# Patient Record
Sex: MALE | Race: WHITE | Employment: OTHER | URBAN - METROPOLITAN AREA
[De-identification: names, ages, dates, MRNs, and addresses within clinical notes are randomized per-mention and may not be internally consistent; named-entity substitution may affect disease eponyms.]

---

## 2017-01-03 ENCOUNTER — OFFICE VISIT (OUTPATIENT)
Dept: GASTROENTEROLOGY | Facility: CLINIC | Age: 62
End: 2017-01-03
Attending: INTERNAL MEDICINE
Payer: COMMERCIAL

## 2017-01-03 ENCOUNTER — TELEPHONE (OUTPATIENT)
Dept: GASTROENTEROLOGY | Facility: CLINIC | Age: 62
End: 2017-01-03

## 2017-01-03 VITALS
RESPIRATION RATE: 16 BRPM | WEIGHT: 185.3 LBS | BODY MASS INDEX: 25.94 KG/M2 | TEMPERATURE: 97.4 F | HEIGHT: 71 IN | SYSTOLIC BLOOD PRESSURE: 135 MMHG | OXYGEN SATURATION: 99 % | HEART RATE: 72 BPM | DIASTOLIC BLOOD PRESSURE: 71 MMHG

## 2017-01-03 DIAGNOSIS — K74.60 CIRRHOSIS OF LIVER WITHOUT ASCITES, UNSPECIFIED HEPATIC CIRRHOSIS TYPE (H): Primary | ICD-10-CM

## 2017-01-03 DIAGNOSIS — K76.82 HEPATIC ENCEPHALOPATHY (H): ICD-10-CM

## 2017-01-03 DIAGNOSIS — B18.2 CHRONIC HEPATITIS C WITHOUT HEPATIC COMA (H): ICD-10-CM

## 2017-01-03 PROCEDURE — 99213 OFFICE O/P EST LOW 20 MIN: CPT | Mod: ZF

## 2017-01-03 RX ORDER — RIBAVIRIN 200 MG/1
400 TABLET, FILM COATED ORAL 2 TIMES DAILY
Qty: 150 TABLET | Refills: 2 | Status: SHIPPED | OUTPATIENT
Start: 2017-01-03 | End: 2017-02-22 | Stop reason: DRUGHIGH

## 2017-01-03 RX ORDER — LEDIPASVIR AND SOFOSBUVIR 90; 400 MG/1; MG/1
1 TABLET, FILM COATED ORAL DAILY
Qty: 28 TABLET | Refills: 2 | Status: ON HOLD | OUTPATIENT
Start: 2017-01-03 | End: 2017-01-09

## 2017-01-03 ASSESSMENT — PAIN SCALES - GENERAL: PAINLEVEL: NO PAIN (0)

## 2017-01-03 NOTE — TELEPHONE ENCOUNTER
[facility] Prior Authorization Team   Phone: [phone]  Fax: [phone]    PA Initiation    Medication: Xifaxan 550 mg BID  Insurance Company: [employer]  Fax Number: [phone]    Phone Number:    Pharmacy Filling the Rx: [facility] [address] [place], [place] - [address]  Filling Pharmacy Phone: [phone]  Filling Pharmacy Fax: [phone]  Start Date: 1/3/2017

## 2017-01-03 NOTE — TELEPHONE ENCOUNTER
Prior Authorization Retail Medication Request  Medication/Dose: Xifaxan 550 mg BID  Diagnosis and ICD code: Hepatic encephalopathy  K72.90  New/Renewal/Insurance Change PA: Renewal PA  Previously Tried and Failed Therapies: Lactulose     Insurance ID (if provided):   Insurance Phone (if provided):     Any additional info from fax request: Not covered, change RX or PA?  [phone],  PT ID# [id number]    If you received a fax notification from an outside Pharmacy:  Pharmacy Name:  [facility]  Pharmacy #:   Pharmacy Fax: [phone]

## 2017-01-03 NOTE — PROGRESS NOTES
I had the pleasure of seeing Scott Casale for followup in the Liver Clinic at the [facility] on 01/03/2017.  Mr. 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  He is status post TIPS for diuretic refractory ascites.      The TIPS was working well in terms of his ascites management, as he has not required a large volume paracentesis for quite some time, and his most recent ultrasound showed no ascites to be present.  He does though complain of some right upper quadrant pain.  He denies any itching or skin rash but does complain of fatigue.  He is also complaining of a tremor and some decrease in mental status.  It does not sound as though he is taking his lactulose on a reliable basis, at best once a day according to his significant other.      He denies any fevers or chills, cough or shortness of breath.  He denies any nausea or vomiting.  He is having he says 2 bowel movements a day.  He denies any increased abdominal girth or lower extremity edema.  His appetite has been good, and he has put on a significant amount of muscle mass since I saw him last.     Current Outpatient Prescriptions   Medication     rifaximin (XIFAXAN) 550 MG TABS tablet     ledipasvir-sofosbuvir (LEDIPASVIR-SOFOSBUVIR)  MG per tablet     ribavirin 200 MG TABS     gabapentin (NEURONTIN) 100 MG capsule     traZODone (DESYREL) 100 MG tablet     sildenafil (REVATIO/VIAGRA) 50 MG cap/tab     lactulose 20 GM/30ML SOLN     vitamin D (ERGOCALCIFEROL) 92510 UNIT capsule     furosemide (LASIX) 40 MG tablet     spironolactone (ALDACTONE) 100 MG tablet     ondansetron (ZOFRAN) 4 MG tablet     [DISCONTINUED] gabapentin (NEURONTIN) 100 MG capsule     ledipasvir-sofosbuvir (LEDIPASVIR-SOFOSBUVIR)  MG per tablet     ribavirin 200 MG TABS     No current facility-administered medications for this visit.     B/P: 135/71, T: 97.4, P: 72, R: 16    HEENT exam shows no scleral icterus.  He has no  temporal muscle wasting.  His chest is clear.  His abdominal exam shows no obvious ascites.  No masses or tenderness to palpation are present.  His liver is 10-11 cm in span with a slightly prominent left lobe.  No spleen tip is palpable, and extremity exam shows no edema.  Skin exam shows some palmar erythema without spider angioma.  Neurologic exam does shows some mild asterixis.  He is able to converse and is oriented x3.     Recent Results (from the past 168 hour(s))   Routine UA with microscopic - No culture    Collection Time: 12/27/16  1:40 PM   Result Value Ref Range    Color Urine Yellow     Appearance Urine Clear     Glucose Urine Negative NEG mg/dL    Bilirubin Urine Negative NEG    Ketones Urine Negative NEG mg/dL    Specific Gravity Urine 1.017 1.003 - 1.035    Blood Urine Large (A) NEG    pH Urine 5.0 5.0 - 7.0 pH    Protein Albumin Urine Negative NEG mg/dL    Urobilinogen mg/dL 4.0 (H) 0.0 - 2.0 mg/dL    Nitrite Urine Negative NEG    Leukocyte Esterase Urine Negative NEG    Source Unspecified Urine     WBC Urine 1 0 - 2 /HPF    RBC Urine 32 (H) 0 - 2 /HPF    Squamous Epithelial /HPF Urine <1 0 - 1 /HPF    Mucous Urine Present (A) NEG /LPF   Urine Culture Aerobic Bacterial    Collection Time: 12/27/16  1:40 PM   Result Value Ref Range    Specimen Description Unspecified Urine     Special Requests Specimen received in preservative     Culture Micro No growth     Micro Report Status FINAL 12/28/2016       His most recent laboratory tests show his white count is 4.2, hemoglobin 12.0, platelets are 44,000, INR is 1.36, sodium is 142, potassium 3.9, BUN is 23, creatinine 1.15.  AST is 80, ALT is 39, alkaline phosphatase is 59.  His albumin is 2.2 with total protein of 6.4, total bilirubin is 2.6.        I did review his most recent ultrasound which shows good TIPS flow velocities and, as I mentioned, showed no ascites.        My impression is that Mr. Casale has cirrhosis caused by chronic hepatitis C and  is status post TIPS for diuretic refractory ascites.  The TIPS, as I mentioned above, is working well and his ascites is well managed, but unfortunately he does have some encephalopathy.  I have encouraged him to take his lactulose on a more reliable basis, targeting 3-5 bowel movements per day. I will also add Rifaximin 50 mg twice a day to his regimen.  He also is a candidate for Harvoni with ribavirin to treat his hepatitis C, and I will put in a prescription for that.  I also will, as I mentioned, tell our hepatitis C nurses that this is occurring.  Otherwise, my plan will be to see the patient back in the clinic in 3 months.      Thank you very much for allowing me to participate in the care of this patient.  If you have any questions regarding my recommendations, please do not hesitate to contact me.       Warren Higginbotham MD    Professor of Medicine  [employer]    Executive Medical Director, Solid Organ Transplant Program  [facility]

## 2017-01-03 NOTE — Clinical Note
1/3/2017       RE: Scott Casale  [address] [address]     Dear Colleague,    Thank you for referring your patient, Scott Casale, to the [facility] at [facility]. Please see a copy of my visit note below.    I had the pleasure of seeing Scott Casale for followup in the Liver Clinic at the [facility] on 01/03/2017.  Mr. 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  He is status post TIPS for diuretic refractory ascites.      The TIPS was working well in terms of his ascites management, as he has not required a large volume paracentesis for quite some time, and his most recent ultrasound showed no ascites to be present.  He does though complain of some right upper quadrant pain.  He denies any itching or skin rash but does complain of fatigue.  He is also complaining of a tremor and some decrease in mental status.  It does not sound as though he is taking his lactulose on a reliable basis, at best once a day according to his significant other.      He denies any fevers or chills, cough or shortness of breath.  He denies any nausea or vomiting.  He is having he says 2 bowel movements a day.  He denies any increased abdominal girth or lower extremity edema.  His appetite has been good, and he has put on a significant amount of muscle mass since I saw him last.     Current Outpatient Prescriptions   Medication     rifaximin (XIFAXAN) 550 MG TABS tablet     ledipasvir-sofosbuvir (LEDIPASVIR-SOFOSBUVIR)  MG per tablet     ribavirin 200 MG TABS     gabapentin (NEURONTIN) 100 MG capsule     traZODone (DESYREL) 100 MG tablet     sildenafil (REVATIO/VIAGRA) 50 MG cap/tab     lactulose 20 GM/30ML SOLN     vitamin D (ERGOCALCIFEROL) 36133 UNIT capsule     furosemide (LASIX) 40 MG tablet     spironolactone (ALDACTONE) 100 MG tablet     ondansetron (ZOFRAN) 4 MG tablet     [DISCONTINUED] gabapentin (NEURONTIN)  100 MG capsule     ledipasvir-sofosbuvir (LEDIPASVIR-SOFOSBUVIR)  MG per tablet     ribavirin 200 MG TABS     No current facility-administered medications for this visit.     B/P: 135/71, T: 97.4, P: 72, R: 16    HEENT exam shows no scleral icterus.  He has no temporal muscle wasting.  His chest is clear.  His abdominal exam shows no obvious ascites.  No masses or tenderness to palpation are present.  His liver is 10-11 cm in span with a slightly prominent left lobe.  No spleen tip is palpable, and extremity exam shows no edema.  Skin exam shows some palmar erythema without spider angioma.  Neurologic exam does shows some mild asterixis.  He is able to converse and is oriented x3.     Recent Results (from the past 168 hour(s))   Routine UA with microscopic - No culture    Collection Time: 12/27/16  1:40 PM   Result Value Ref Range    Color Urine Yellow     Appearance Urine Clear     Glucose Urine Negative NEG mg/dL    Bilirubin Urine Negative NEG    Ketones Urine Negative NEG mg/dL    Specific Gravity Urine 1.017 1.003 - 1.035    Blood Urine Large (A) NEG    pH Urine 5.0 5.0 - 7.0 pH    Protein Albumin Urine Negative NEG mg/dL    Urobilinogen mg/dL 4.0 (H) 0.0 - 2.0 mg/dL    Nitrite Urine Negative NEG    Leukocyte Esterase Urine Negative NEG    Source Unspecified Urine     WBC Urine 1 0 - 2 /HPF    RBC Urine 32 (H) 0 - 2 /HPF    Squamous Epithelial /HPF Urine <1 0 - 1 /HPF    Mucous Urine Present (A) NEG /LPF   Urine Culture Aerobic Bacterial    Collection Time: 12/27/16  1:40 PM   Result Value Ref Range    Specimen Description Unspecified Urine     Special Requests Specimen received in preservative     Culture Micro No growth     Micro Report Status FINAL 12/28/2016       His most recent laboratory tests show his white count is 4.2, hemoglobin 12.0, platelets are 44,000, INR is 1.36, sodium is 142, potassium 3.9, BUN is 23, creatinine 1.15.  AST is 80, ALT is 39, alkaline phosphatase is 59.  His albumin is  2.2 with total protein of 6.4, total bilirubin is 2.6.        I did review his most recent ultrasound which shows good TIPS flow velocities and, as I mentioned, showed no ascites.        My impression is that Mr. Casale has cirrhosis caused by chronic hepatitis C and is status post TIPS for diuretic refractory ascites.  The TIPS, as I mentioned above, is working well and his ascites is well managed, but unfortunately he does have some encephalopathy.  I have encouraged him to take his lactulose on a more reliable basis, targeting 3-5 bowel movements per day. I will also add Rifaximin 50 mg twice a day to his regimen.  He also is a candidate for Harvoni with ribavirin to treat his hepatitis C, and I will put in a prescription for that.  I also will, as I mentioned, tell our hepatitis C nurses that this is occurring.  Otherwise, my plan will be to see the patient back in the clinic in 3 months.      Thank you very much for allowing me to participate in the care of this patient.  If you have any questions regarding my recommendations, please do not hesitate to contact me.       Warren Higginbotham MD    Professor of Medicine  [employer]    Executive Medical Director, Solid Organ Transplant Program  [facility]

## 2017-01-03 NOTE — NURSING NOTE
"Chief Complaint   Patient presents with     RECHECK     Hep C cirrhosis, mass on liver       Initial /71 mmHg  Pulse 72  Temp(Src) 97.4  F (36.3  C) (Oral)  Resp 16  Ht 1.803 m (5' 10.98\")  Wt 84.052 kg (185 lb 4.8 oz)  BMI 25.86 kg/m2  SpO2 99% Estimated body mass index is 25.86 kg/(m^2) as calculated from the following:    Height as of this encounter: 1.803 m (5' 10.98\").    Weight as of this encounter: 84.052 kg (185 lb 4.8 oz).  BP completed using cuff size: regular    "

## 2017-01-03 NOTE — MR AVS SNAPSHOT
After Visit Summary   1/3/2017    Scott Casale    MRN: [id number]           Patient Information     Date Of Birth          1955        Visit Information        Provider Department      1/3/2017 11:00 AM Warren Higginbotham MD [facility] Hepatology        Today's Diagnoses     Cirrhosis of liver without ascites, unspecified hepatic cirrhosis type (H) [K74.60]    -  1     Hepatic encephalopathy (H)         Chronic hepatitis C without hepatic coma (H)            Follow-ups after your visit        Follow-up notes from your care team     Return in about 3 months (around 4/3/2017).      Your next 10 appointments already scheduled     Jan 17, 2017   Procedure with Guru Reina Palacios MD   [facility] [place], Endoscopy ([facility])    [address] [address] [phone]           [facility] located on the corner of [address] and [address] on the [facility]. It is easily accessible from virtually any point in the [facility] [place] area, via [address].            Jan 24, 2017  2:30 PM   (Arrive by 2:15 PM)   Cystoscopy with Cheo Estrada MD   [facility] Urology and Inst for Prostate and Urologic Cancers ([facility])    [address] [place] [address] [phone]            Mar 09, 2017  9:25 AM   (Arrive by 9:10 AM)   Return Visit with Greg Valdez MD   [facility] Primary Care Clinic ([facility])    [address] [place] [address]   [phone]            Mar 22, 2017  6:00 PM   (Arrive by 5:45 PM)   New Patient Visit with GILMAR Saleem CNP   Gastroenterology and IBD ([facility])    [address] [place] [address]   [phone]            Apr 11, 2017  9:00 AM   Lab with  LAB   [facility] Lab ([facility])    [address] [facility] [facility]  [address] [facility] [address]   [phone]            Apr 11, 2017 10:00 AM   US ABDOMEN COMPLETE with UCUS1   [facility] US ([facility])    [address] [place] [address]  [address] [facility] [address]   [phone]           Please bring a list of your medicines (including vitamins, minerals and over-the-counter drugs). Also, tell your doctor about any allergies you may have. Wear comfortable clothes and leave your valuables at home.  Adults: No eating or drinking for 8 hours before the exam. You may take medicine with a small sip of water.  Children: - Children 6+ years: No food or drink for 6 hours before exam. - Children 1-5 years: No food or drink for 4 hours before exam. - Infants, breast-fed: may have breast milk up to 2 hours before exam. - Infants, formula: may have bottle until 4 hours before exam.  Please call the Imaging Department at your exam site with any questions.            Apr 11, 2017 11:00 AM   (Arrive by 10:45 AM)   Return General Liver with Warren Higginbotham MD   [facility] Hepatology ([facility])    [address] [place] [address]  [address] [facility] [address]   [phone]            Apr 11, 2017 12:00 PM   (Arrive by 11:45 AM)   Return Visit with Micki Peter MD   [facility] ([facility])    [address] [place] [address]  [address] [facility] [address] [phone]              Future tests that were ordered for you today     Open Standing Orders        Priority Remaining Interval Expires Ordered    US abdomen complete [MCI427] Routine 2/2 Every 6 Months 2/2/2018 1/3/2017          Open Future Orders        Priority Expected Expires Ordered    US Abdomen/Pelvis Duplex Complete Routine 1/31/2017 1/3/2018 1/3/2017     Hepatic Panel [LAB20] Routine 7/2/2017 2/2/2018 1/3/2017    Basic metabolic panel [LAB15] Routine 7/2/2017 2/2/2018 1/3/2017  "   CBC with platelets [ZEW312] Routine 7/2/2017 2/2/2018 1/3/2017    INR [XOU4847] Routine 7/2/2017 2/2/2018 1/3/2017    AFP tumor marker [NST926] Routine 7/2/2017 2/2/2018 1/3/2017            Who to contact     If you have questions or need follow up information about today's clinic visit or your schedule please contact [facility] HEPATOLOGY directly at [phone].  Normal or non-critical lab and imaging results will be communicated to you by [employer], letter or phone within 4 business days after the clinic has received the results. If you do not hear from us within 7 days, please contact the clinic through [employer] or phone. If you have a critical or abnormal lab result, we will notify you by phone as soon as possible.  Submit refill requests through [employer] or call your pharmacy and they will forward the refill request to us. Please allow 3 business days for your refill to be completed.          Additional Information About Your Visit        [employer] Information     [employer] gives you secure access to your electronic health record. If you see a primary care provider, you can also send messages to your care team and make appointments. If you have questions, please call your primary care clinic.  If you do not have a primary care provider, please call [phone] and they will assist you.        Care EveryWhere ID     This is your Care EveryWhere ID. This could be used by other organizations to access your [facility] medical records  [id number]        Your Vitals Were     Pulse Temperature Respirations Height BMI (Body Mass Index) Pulse Oximetry    72 97.4  F (36.3  C) (Oral) 16 1.803 m (5' 10.98\") 25.86 kg/m2 99%       Blood Pressure from Last 3 Encounters:   01/03/17 135/71   12/27/16 134/71   12/09/16 137/73    Weight from Last 3 Encounters:   01/03/17 84.052 kg (185 lb 4.8 oz)   12/27/16 90.266 kg (199 lb)   12/09/16 86.002 kg (189 lb 9.6 oz)              We Performed the Following     Schedule follow up " appointments          Today's Medication Changes          These changes are accurate as of: 1/3/17 11:33 AM.  If you have any questions, ask your nurse or doctor.               Start taking these medicines.        Dose/Directions    rifaximin 550 MG Tabs tablet   Commonly known as:  XIFAXAN   Used for:  Hepatic encephalopathy (H)   Started by:  Warren Higginbotham MD        Dose:  550 mg   Take 1 tablet (550 mg) by mouth 2 times daily   Quantity:  60 tablet   Refills:  11         These medicines have changed or have updated prescriptions.        Dose/Directions    gabapentin 100 MG capsule   Commonly known as:  NEURONTIN   This may have changed:  Another medication with the same name was removed. Continue taking this medication, and follow the directions you see here.   Used for:  Chronic low back pain with sciatica, sciatica laterality unspecified, unspecified back pain laterality   Changed by:  Greg Valdez MD        Dose:  200 mg   Take 2 capsules (200 mg) by mouth At Bedtime   Quantity:  90 capsule   Refills:  1       * ledipasvir-sofosbuvir  MG per tablet   Commonly known as:  ledipasvir-sofosbuvir   This may have changed:  Another medication with the same name was added. Make sure you understand how and when to take each.   Used for:  Hepatitis C virus infection without hepatic coma, unspecified chronicity   Changed by:  Warren Higginbotham MD        Dose:  1 tablet   Take 1 tablet by mouth daily   Quantity:  28 tablet   Refills:  2       * ledipasvir-sofosbuvir  MG per tablet   Commonly known as:  ledipasvir-sofosbuvir   This may have changed:  You were already taking a medication with the same name, and this prescription was added. Make sure you understand how and when to take each.   Used for:  Chronic hepatitis C without hepatic coma (H)   Changed by:  Warren Higginbotham MD        Dose:  1 tablet   Take 1 tablet by mouth daily   Quantity:  28 tablet   Refills:  2       * ribavirin 200 MG Tabs   This  may have changed:  Another medication with the same name was added. Make sure you understand how and when to take each.   Used for:  Hepatitis C virus infection without hepatic coma, unspecified chronicity   Changed by:  Warren Higginbotham MD        600 mg PO BID   Quantity:  180 tablet   Refills:  2       * ribavirin 200 MG Tabs   This may have changed:  You were already taking a medication with the same name, and this prescription was added. Make sure you understand how and when to take each.   Used for:  Chronic hepatitis C without hepatic coma (H)   Changed by:  Warren Higginbotham MD        Dose:  400 mg   Take 2 tablets (400 mg) by mouth 2 times daily   Quantity:  150 tablet   Refills:  2       * Notice:  This list has 4 medication(s) that are the same as other medications prescribed for you. Read the directions carefully, and ask your doctor or other care provider to review them with you.      Stop taking these medicines if you haven't already. Please contact your care team if you have questions.     morphine 15 MG 12 hr tablet   Commonly known as:  MS CONTIN   Stopped by:  Warren Higginbotham MD           morphine 15 MG IR tablet   Commonly known as:  MSIR   Stopped by:  Warren Higginbotham MD                Where to get your medicines      These medications were sent to [facility] - [address]  [address] [facility] [address]    Hours:  Mon-Fri 8:30am-5:00pm Toll Free [phone] Phone:  [phone]    - ledipasvir-sofosbuvir  MG per tablet  - ribavirin 200 MG Tabs      These medications were sent to [facility] - [place], [place] - [address]   [address] [facility] [address]     Phone:  [phone]    - rifaximin 550 MG Tabs tablet             Primary Care Provider Office Phone # Fax #    Carlos Duenas MD, [phone]       [facility] [place] [address] [place] [address]        Thank you!      Thank you for choosing [facility] HEPATOLOGY  for your care. Our goal is always to provide you with excellent care. Hearing back from our patients is one way we can continue to improve our services. Please take a few minutes to complete the written survey that you may receive in the mail after your visit with us. Thank you!             Your Updated Medication List - Protect others around you: Learn how to safely use, store and throw away your medicines at www.disposemymeds.org.          This list is accurate as of: 1/3/17 11:33 AM.  Always use your most recent med list.                   Brand Name Dispense Instructions for use    furosemide 40 MG tablet    LASIX     Take 40 mg by mouth daily       gabapentin 100 MG capsule    NEURONTIN    90 capsule    Take 2 capsules (200 mg) by mouth At Bedtime       lactulose 20 GM/30ML Soln     1892 mL    Take 30 mLs by mouth 3 times daily       * ledipasvir-sofosbuvir  MG per tablet    ledipasvir-sofosbuvir    28 tablet    Take 1 tablet by mouth daily       * ledipasvir-sofosbuvir  MG per tablet    ledipasvir-sofosbuvir    28 tablet    Take 1 tablet by mouth daily       * ribavirin 200 MG Tabs     180 tablet    600 mg PO BID       * ribavirin 200 MG Tabs     150 tablet    Take 2 tablets (400 mg) by mouth 2 times daily       rifaximin 550 MG Tabs tablet    XIFAXAN    60 tablet    Take 1 tablet (550 mg) by mouth 2 times daily       sildenafil 50 MG cap/tab    REVATIO/VIAGRA    12 tablet    Take 0.5-1 tablets (25-50 mg) by mouth daily as needed for erectile dysfunction Take 30 min to 4 hours before intercourse.  Never use with nitroglycerin, terazosin or doxazosin.       spironolactone 100 MG tablet    ALDACTONE     Take 100 mg by mouth daily       traZODone 100 MG tablet    DESYREL    30 tablet    Take 1 tablet (100 mg) by mouth nightly as needed for sleep       vitamin D 03278 UNIT capsule    ERGOCALCIFEROL     Take 50,000 Units by mouth Twice weekly Mon and Thurs        ZOFRAN 4 MG tablet   Generic drug:  ondansetron      Take 1 tablet (4 mg) by mouth every 8 hours as needed for nausea       * Notice:  This list has 4 medication(s) that are the same as other medications prescribed for you. Read the directions carefully, and ask your doctor or other care provider to review them with you.

## 2017-01-04 ENCOUNTER — HOSPITAL ENCOUNTER (INPATIENT)
Facility: CLINIC | Age: 62
LOS: 5 days | Discharge: HOME | DRG: 433 | End: 2017-01-09
Attending: FAMILY MEDICINE | Admitting: FAMILY MEDICINE
Payer: COMMERCIAL

## 2017-01-04 DIAGNOSIS — K76.82 HEPATIC ENCEPHALOPATHY (H): ICD-10-CM

## 2017-01-04 LAB
ALBUMIN SERPL-MCNC: 2.3 G/DL (ref 3.4–5)
ALBUMIN UR-MCNC: 10 MG/DL
ALP SERPL-CCNC: 65 U/L (ref 40–150)
ALT SERPL W P-5'-P-CCNC: 45 U/L (ref 0–70)
AMMONIA PLAS-SCNC: 46 UMOL/L (ref 10–50)
ANION GAP SERPL CALCULATED.3IONS-SCNC: 7 MMOL/L (ref 3–14)
APPEARANCE UR: CLEAR
AST SERPL W P-5'-P-CCNC: 76 U/L (ref 0–45)
BASOPHILS # BLD AUTO: 0 10E9/L (ref 0–0.2)
BASOPHILS NFR BLD AUTO: 0.5 %
BILIRUB SERPL-MCNC: 3 MG/DL (ref 0.2–1.3)
BILIRUB UR QL STRIP: NEGATIVE
BUN SERPL-MCNC: 20 MG/DL (ref 7–30)
CALCIUM SERPL-MCNC: 8.2 MG/DL (ref 8.5–10.1)
CHLORIDE SERPL-SCNC: 114 MMOL/L (ref 94–109)
CO2 SERPL-SCNC: 24 MMOL/L (ref 20–32)
COLOR UR AUTO: YELLOW
CREAT SERPL-MCNC: 1.11 MG/DL (ref 0.66–1.25)
DIFFERENTIAL METHOD BLD: ABNORMAL
EOSINOPHIL # BLD AUTO: 0.1 10E9/L (ref 0–0.7)
EOSINOPHIL NFR BLD AUTO: 3.2 %
ERYTHROCYTE [DISTWIDTH] IN BLOOD BY AUTOMATED COUNT: 13.5 % (ref 10–15)
GFR SERPL CREATININE-BSD FRML MDRD: 67 ML/MIN/1.7M2
GLUCOSE SERPL-MCNC: 110 MG/DL (ref 70–99)
GLUCOSE UR STRIP-MCNC: NEGATIVE MG/DL
HCT VFR BLD AUTO: 37.9 % (ref 40–53)
HGB BLD-MCNC: 13.2 G/DL (ref 13.3–17.7)
HGB UR QL STRIP: ABNORMAL
IMM GRANULOCYTES # BLD: 0 10E9/L (ref 0–0.4)
IMM GRANULOCYTES NFR BLD: 0 %
KETONES UR STRIP-MCNC: NEGATIVE MG/DL
LEUKOCYTE ESTERASE UR QL STRIP: NEGATIVE
LYMPHOCYTES # BLD AUTO: 0.6 10E9/L (ref 0.8–5.3)
LYMPHOCYTES NFR BLD AUTO: 15.6 %
MCH RBC QN AUTO: 35.6 PG (ref 26.5–33)
MCHC RBC AUTO-ENTMCNC: 34.8 G/DL (ref 31.5–36.5)
MCV RBC AUTO: 102 FL (ref 78–100)
MONOCYTES # BLD AUTO: 0.4 10E9/L (ref 0–1.3)
MONOCYTES NFR BLD AUTO: 10.7 %
MUCOUS THREADS #/AREA URNS LPF: PRESENT /LPF
NEUTROPHILS # BLD AUTO: 2.9 10E9/L (ref 1.6–8.3)
NEUTROPHILS NFR BLD AUTO: 70 %
NITRATE UR QL: NEGATIVE
PH UR STRIP: 5.5 PH (ref 5–7)
PLATELET # BLD AUTO: 50 10E9/L (ref 150–450)
POTASSIUM SERPL-SCNC: 3.7 MMOL/L (ref 3.4–5.3)
PROT SERPL-MCNC: 6.9 G/DL (ref 6.8–8.8)
RBC # BLD AUTO: 3.71 10E12/L (ref 4.4–5.9)
RBC #/AREA URNS AUTO: 9 /HPF (ref 0–2)
SODIUM SERPL-SCNC: 145 MMOL/L (ref 133–144)
SP GR UR STRIP: 1.01 (ref 1–1.03)
SQUAMOUS #/AREA URNS AUTO: <1 /HPF (ref 0–1)
URN SPEC COLLECT METH UR: ABNORMAL
UROBILINOGEN UR STRIP-MCNC: 2 MG/DL (ref 0–2)
WBC # BLD AUTO: 4.1 10E9/L (ref 4–11)
WBC #/AREA URNS AUTO: 1 /HPF (ref 0–2)

## 2017-01-04 PROCEDURE — 99285 EMERGENCY DEPT VISIT HI MDM: CPT | Mod: 25

## 2017-01-04 PROCEDURE — 87086 URINE CULTURE/COLONY COUNT: CPT | Performed by: FAMILY MEDICINE

## 2017-01-04 PROCEDURE — 25000132 ZZH RX MED GY IP 250 OP 250 PS 637: Performed by: FAMILY MEDICINE

## 2017-01-04 PROCEDURE — 12000000 ZZH R&B MED SURG/OB

## 2017-01-04 PROCEDURE — 96374 THER/PROPH/DIAG INJ IV PUSH: CPT

## 2017-01-04 PROCEDURE — 82140 ASSAY OF AMMONIA: CPT | Performed by: FAMILY MEDICINE

## 2017-01-04 PROCEDURE — 96361 HYDRATE IV INFUSION ADD-ON: CPT

## 2017-01-04 PROCEDURE — 25000128 H RX IP 250 OP 636: Performed by: FAMILY MEDICINE

## 2017-01-04 PROCEDURE — 85025 COMPLETE CBC W/AUTO DIFF WBC: CPT | Performed by: FAMILY MEDICINE

## 2017-01-04 PROCEDURE — 25000125 ZZHC RX 250: Performed by: FAMILY MEDICINE

## 2017-01-04 PROCEDURE — 80053 COMPREHEN METABOLIC PANEL: CPT | Performed by: FAMILY MEDICINE

## 2017-01-04 PROCEDURE — 99285 EMERGENCY DEPT VISIT HI MDM: CPT | Performed by: FAMILY MEDICINE

## 2017-01-04 PROCEDURE — 81001 URINALYSIS AUTO W/SCOPE: CPT | Performed by: FAMILY MEDICINE

## 2017-01-04 RX ORDER — LACTULOSE 10 G/15ML
30 SOLUTION ORAL 3 TIMES DAILY
Status: DISCONTINUED | OUTPATIENT
Start: 2017-01-05 | End: 2017-01-08

## 2017-01-04 RX ORDER — SODIUM CHLORIDE 9 MG/ML
1000 INJECTION, SOLUTION INTRAVENOUS CONTINUOUS
Status: DISCONTINUED | OUTPATIENT
Start: 2017-01-04 | End: 2017-01-05

## 2017-01-04 RX ORDER — ONDANSETRON 2 MG/ML
4 INJECTION INTRAMUSCULAR; INTRAVENOUS
Status: DISCONTINUED | OUTPATIENT
Start: 2017-01-04 | End: 2017-01-05

## 2017-01-04 RX ADMIN — SODIUM CHLORIDE 1000 ML: 9 INJECTION, SOLUTION INTRAVENOUS at 20:34

## 2017-01-04 RX ADMIN — LACTULOSE 30 G: 10 SOLUTION ORAL at 23:30

## 2017-01-04 RX ADMIN — ONDANSETRON 4 MG: 2 INJECTION INTRAMUSCULAR; INTRAVENOUS at 18:56

## 2017-01-04 RX ADMIN — SODIUM CHLORIDE 500 ML: 9 INJECTION, SOLUTION INTRAVENOUS at 18:56

## 2017-01-04 NOTE — IP AVS SNAPSHOT
[facility]    [address] [facility] [address] [address]    Phone:  [phone]    Fax:  [phone]                                       After Visit Summary   1/4/2017    Scott Casale    MRN: [id number]           After Visit Summary Signature Page     I have received my discharge instructions, and my questions have been answered. I have discussed any challenges I see with this plan with the nurse or doctor.    ..........................................................................................................................................  Patient/Patient Representative Signature      ..........................................................................................................................................  Patient Representative Print Name and Relationship to Patient    ..................................................               ................................................  Date                                            Time    ..........................................................................................................................................  Reviewed by Signature/Title    ...................................................              ..............................................  Date                                                            Time

## 2017-01-04 NOTE — IP AVS SNAPSHOT
MRN:[id number]                      After Visit Summary   1/4/2017    Scott Casale    MRN: [id number]           Thank you!     Thank you for choosing [facility] for your care. Our goal is always to provide you with excellent care. Hearing back from our patients is one way we can continue to improve our services. Please take a few minutes to complete the written survey that you may receive in the mail after you visit with us. Thank you!        Patient Information     Date Of Birth          1955        About your hospital stay     You were admitted on:  January 4, 2017 You last received care in the:  [facility]    You were discharged on:  January 9, 2017       Who to Call     For medical emergencies, please call 911.  For non-urgent questions about your medical care, please call your primary care provider or clinic, [phone]          Attending Provider     Provider    Adis Ma MD NorthwoodWarren MD Eikens, John Patrick, MD       Primary Care Provider Office Phone # Fax #    Carlos Duenas MD, [phone]       [address]     [facility] [address]        Your next 10 appointments already scheduled     Jan 17, 2017   Procedure with Guru Reina Palacios MD   [facility] ([facility])    [address] [address] [phone]           The [facility] is located on the corner of [address] and [address] on the [facility]. It is easily accessible from virtually any point in the [facility] area, via [address] and [address].            Jan 24, 2017  2:30 PM   (Arrive by 2:15 PM)   Cystoscopy with Cheo Estrada MD   [facility] ([facility])    [address]  4th Floor  [facility] [address] [phone]             Mar 09, 2017  9:25 AM   (Arrive by 9:10 AM)   Return Visit with Greg Valdez MD   [facility] ([facility])    [address] [place] [address]  [address] [facility] [address]   [phone]            Mar 22, 2017  6:00 PM   (Arrive by 5:45 PM)   New Patient Visit with GILMAR Saleem CNP   Gastroenterology and IBD ([facility])    [address] [place] [address]  [address] [facility] [address]   [phone]            Apr 11, 2017  9:00 AM   Lab with  LAB   [facility] ([facility])    [address] [place] [id number] [phone]            Apr 11, 2017 10:00 AM   US ABDOMEN COMPLETE with UCUS1   [facility] ([facility])    [address] [place] [id number] [phone]           Please bring a list of your medicines (including vitamins, minerals and over-the-counter drugs). Also, tell your doctor about any allergies you may have. Wear comfortable clothes and leave your valuables at home.  Adults: No eating or drinking for 8 hours before the exam. You may take medicine with a small sip of water.  Children: - Children 6+ years: No food or drink for 6 hours before exam. - Children 1-5 years: No food or drink for 4 hours before exam. - Infants, breast-fed: may have breast milk up to 2 hours before exam. - Infants, formula: may have bottle until 4 hours before exam.  Please call the Imaging Department at your exam site with any questions.            Apr 11, 2017 11:00 AM   (Arrive by 10:45 AM)   Return General Liver with Warren Higginbotham MD   [facility] ([facility])    [address] [place] [address]  [address] [facility] [address]   [phone]            Apr 11, 2017 12:00 PM   (Arrive by 11:45 AM)   Return Visit with Micki Peter MD   [facility] ([facility]  "and [facility] Center)    [address]  [address] [facility] [address] [phone]              Further instructions from your care team       Take lactulose 20 grams (30 ml) three times daily.  See your regular doctor by late this week and have your ammonia rechecked.  Continue your other meds.  Stay on a low protein diet.    Pending Results     No orders found from 1/3/2017 to 1/5/2017.            Statement of Approval     Ordered          01/09/17 1011  I have reviewed and agree with all the recommendations and orders detailed in this document.   EFFECTIVE NOW     Approved and electronically signed by:  Warren Larson MD             Admission Information        Provider Department Dept Phone    1/4/2017 Warren Larson MD, MD [facility] [phone]      Your Vitals Were     Blood Pressure Pulse Temperature    110/53 mmHg 73 97.7  F (36.5  C) (Oral)    Respirations Height Weight    18 1.803 m (5' 11\") 85.2 kg (187 lb 13.3 oz)    BMI (Body Mass Index) Pulse Oximetry       26.21 kg/m2 99%       MyChart Information     [employer] gives you secure access to your electronic health record. If you see a primary care provider, you can also send messages to your care team and make appointments. If you have questions, please call your primary care clinic.  If you do not have a primary care provider, please call [phone] and they will assist you.        Care EveryWhere ID     This is your Care EveryWhere ID. This could be used by other organizations to access your [facility] medical records  [id number]           Review of your medicines      CONTINUE these medicines which may have CHANGED, or have new prescriptions. If we are uncertain of the size of tablets/capsules you have at home, strength may be listed as something that might have changed.        Dose / Directions    ledipasvir-sofosbuvir  MG per tablet   Commonly known as:  ledipasvir-sofosbuvir   This may have changed:  " Another medication with the same name was removed. Continue taking this medication, and follow the directions you see here.   Used for:  Hepatitis C virus infection without hepatic coma, unspecified chronicity        Dose:  1 tablet   Take 1 tablet by mouth daily   Quantity:  28 tablet   Refills:  2       ribavirin 200 MG Tabs   This may have changed:  Another medication with the same name was removed. Continue taking this medication, and follow the directions you see here.   Used for:  Chronic hepatitis C without hepatic coma (H)        Dose:  400 mg   Take 2 tablets (400 mg) by mouth 2 times daily   Quantity:  150 tablet   Refills:  2         CONTINUE these medicines which have NOT CHANGED        Dose / Directions    furosemide 40 MG tablet   Commonly known as:  LASIX        Dose:  40 mg   Take 40 mg by mouth daily   Refills:  0       gabapentin 100 MG capsule   Commonly known as:  NEURONTIN   Used for:  Chronic low back pain with sciatica, sciatica laterality unspecified, unspecified back pain laterality        Dose:  200 mg   Take 2 capsules (200 mg) by mouth At Bedtime   Quantity:  90 capsule   Refills:  1       lactulose 20 GM/30ML Soln   Used for:  Alcoholic cirrhosis of liver with ascites (H)        Dose:  30 mL   Take 30 mLs by mouth 3 times daily   Quantity:  1892 mL   Refills:  3       rifaximin 550 MG Tabs tablet   Commonly known as:  XIFAXAN   Used for:  Hepatic encephalopathy (H)        Dose:  550 mg   Take 1 tablet (550 mg) by mouth 2 times daily   Quantity:  60 tablet   Refills:  11       sildenafil 50 MG cap/tab   Commonly known as:  REVATIO/VIAGRA   Used for:  Erectile dysfunction, unspecified erectile dysfunction type        Dose:  25-50 mg   Take 0.5-1 tablets (25-50 mg) by mouth daily as needed for erectile dysfunction Take 30 min to 4 hours before intercourse.  Never use with nitroglycerin, terazosin or doxazosin.   Quantity:  12 tablet   Refills:  11       spironolactone 100 MG tablet    Commonly known as:  ALDACTONE        Dose:  100 mg   Take 100 mg by mouth daily   Refills:  0       traZODone 100 MG tablet   Commonly known as:  DESYREL   Used for:  Primary insomnia        Dose:  100 mg   Take 1 tablet (100 mg) by mouth nightly as needed for sleep   Quantity:  30 tablet   Refills:  1       vitamin D 34742 UNIT capsule   Commonly known as:  ERGOCALCIFEROL        Dose:  99521 Units   Take 50,000 Units by mouth Twice weekly Mon and Thurs   Refills:  0       ZOFRAN 4 MG tablet   Generic drug:  ondansetron        Dose:  4 mg   Take 1 tablet (4 mg) by mouth every 8 hours as needed for nausea   Refills:  0                Protect others around you: Learn how to safely use, store and throw away your medicines at www.disposemymeds.org.             Medication List: This is a list of all your medications and when to take them. Check marks below indicate your daily home schedule. Keep this list as a reference.      Medications           Morning Afternoon Evening Bedtime As Needed    furosemide 40 MG tablet   Commonly known as:  LASIX   Take 40 mg by mouth daily   Last time this was given:  20 mg on 1/9/2017  8:28 AM                                gabapentin 100 MG capsule   Commonly known as:  NEURONTIN   Take 2 capsules (200 mg) by mouth At Bedtime   Last time this was given:  200 mg on 1/8/2017  9:53 PM                                lactulose 20 GM/30ML Soln   Take 30 mLs by mouth 3 times daily   Last time this was given:  20 g on 1/9/2017  8:28 AM                                ledipasvir-sofosbuvir  MG per tablet   Commonly known as:  ledipasvir-sofosbuvir   Take 1 tablet by mouth daily                                ribavirin 200 MG Tabs   Take 2 tablets (400 mg) by mouth 2 times daily                                rifaximin 550 MG Tabs tablet   Commonly known as:  XIFAXAN   Take 1 tablet (550 mg) by mouth 2 times daily   Last time this was given:  550 mg on 1/9/2017  8:28 AM                                 sildenafil 50 MG cap/tab   Commonly known as:  REVATIO/VIAGRA   Take 0.5-1 tablets (25-50 mg) by mouth daily as needed for erectile dysfunction Take 30 min to 4 hours before intercourse.  Never use with nitroglycerin, terazosin or doxazosin.                                spironolactone 100 MG tablet   Commonly known as:  ALDACTONE   Take 100 mg by mouth daily   Last time this was given:  25 mg on 1/9/2017  8:28 AM                                traZODone 100 MG tablet   Commonly known as:  DESYREL   Take 1 tablet (100 mg) by mouth nightly as needed for sleep   Last time this was given:  100 mg on 1/8/2017  9:52 PM                                vitamin D 92457 UNIT capsule   Commonly known as:  ERGOCALCIFEROL   Take 50,000 Units by mouth Twice weekly Mon and Thurs                                ZOFRAN 4 MG tablet   Take 1 tablet (4 mg) by mouth every 8 hours as needed for nausea   Generic drug:  ondansetron

## 2017-01-05 ENCOUNTER — APPOINTMENT (OUTPATIENT)
Dept: OCCUPATIONAL THERAPY | Facility: CLINIC | Age: 62
DRG: 433 | End: 2017-01-05
Payer: COMMERCIAL

## 2017-01-05 ENCOUNTER — APPOINTMENT (OUTPATIENT)
Dept: PHYSICAL THERAPY | Facility: CLINIC | Age: 62
DRG: 433 | End: 2017-01-05
Payer: COMMERCIAL

## 2017-01-05 PROBLEM — K76.82 HEPATIC ENCEPHALOPATHY (H): Status: ACTIVE | Noted: 2017-01-05

## 2017-01-05 LAB
ALBUMIN SERPL-MCNC: 2 G/DL (ref 3.4–5)
ALP SERPL-CCNC: 64 U/L (ref 40–150)
ALT SERPL W P-5'-P-CCNC: 39 U/L (ref 0–70)
AMMONIA PLAS-SCNC: 121 UMOL/L (ref 10–50)
ANION GAP SERPL CALCULATED.3IONS-SCNC: 7 MMOL/L (ref 3–14)
AST SERPL W P-5'-P-CCNC: 70 U/L (ref 0–45)
BASOPHILS # BLD AUTO: 0 10E9/L (ref 0–0.2)
BASOPHILS NFR BLD AUTO: 0.4 %
BILIRUB SERPL-MCNC: 2.7 MG/DL (ref 0.2–1.3)
BUN SERPL-MCNC: 19 MG/DL (ref 7–30)
CALCIUM SERPL-MCNC: 7.6 MG/DL (ref 8.5–10.1)
CHLORIDE SERPL-SCNC: 118 MMOL/L (ref 94–109)
CO2 SERPL-SCNC: 20 MMOL/L (ref 20–32)
CREAT SERPL-MCNC: 0.98 MG/DL (ref 0.66–1.25)
DIFFERENTIAL METHOD BLD: ABNORMAL
EOSINOPHIL # BLD AUTO: 0.1 10E9/L (ref 0–0.7)
EOSINOPHIL NFR BLD AUTO: 2.9 %
ERYTHROCYTE [DISTWIDTH] IN BLOOD BY AUTOMATED COUNT: 13.4 % (ref 10–15)
FOLATE SERPL-MCNC: 10.9 NG/ML
GFR SERPL CREATININE-BSD FRML MDRD: 78 ML/MIN/1.7M2
GLUCOSE SERPL-MCNC: 115 MG/DL (ref 70–99)
HCT VFR BLD AUTO: 34.6 % (ref 40–53)
HGB BLD-MCNC: 11.8 G/DL (ref 13.3–17.7)
IMM GRANULOCYTES # BLD: 0 10E9/L (ref 0–0.4)
IMM GRANULOCYTES NFR BLD: 0 %
LYMPHOCYTES # BLD AUTO: 0.8 10E9/L (ref 0.8–5.3)
LYMPHOCYTES NFR BLD AUTO: 16.9 %
MCH RBC QN AUTO: 35.2 PG (ref 26.5–33)
MCHC RBC AUTO-ENTMCNC: 34.1 G/DL (ref 31.5–36.5)
MCV RBC AUTO: 103 FL (ref 78–100)
MONOCYTES # BLD AUTO: 0.4 10E9/L (ref 0–1.3)
MONOCYTES NFR BLD AUTO: 9.8 %
NEUTROPHILS # BLD AUTO: 3.2 10E9/L (ref 1.6–8.3)
NEUTROPHILS NFR BLD AUTO: 70 %
PLATELET # BLD AUTO: 52 10E9/L (ref 150–450)
POTASSIUM SERPL-SCNC: 4.1 MMOL/L (ref 3.4–5.3)
PROT SERPL-MCNC: 6 G/DL (ref 6.8–8.8)
RBC # BLD AUTO: 3.35 10E12/L (ref 4.4–5.9)
SODIUM SERPL-SCNC: 145 MMOL/L (ref 133–144)
WBC # BLD AUTO: 4.5 10E9/L (ref 4–11)

## 2017-01-05 PROCEDURE — 12000007 ZZH R&B INTERMEDIATE

## 2017-01-05 PROCEDURE — 40000193 ZZH STATISTIC PT WARD VISIT: Performed by: PHYSICAL THERAPIST

## 2017-01-05 PROCEDURE — 97161 PT EVAL LOW COMPLEX 20 MIN: CPT | Mod: GP | Performed by: PHYSICAL THERAPIST

## 2017-01-05 PROCEDURE — 12000000 ZZH R&B MED SURG/OB

## 2017-01-05 PROCEDURE — 40000133 ZZH STATISTIC OT WARD VISIT

## 2017-01-05 PROCEDURE — 25000128 H RX IP 250 OP 636: Performed by: FAMILY MEDICINE

## 2017-01-05 PROCEDURE — 82746 ASSAY OF FOLIC ACID SERUM: CPT | Performed by: PHYSICIAN ASSISTANT

## 2017-01-05 PROCEDURE — 25800025 ZZH RX 258: Performed by: PHYSICIAN ASSISTANT

## 2017-01-05 PROCEDURE — 82140 ASSAY OF AMMONIA: CPT | Performed by: PHYSICIAN ASSISTANT

## 2017-01-05 PROCEDURE — 80053 COMPREHEN METABOLIC PANEL: CPT | Performed by: PHYSICIAN ASSISTANT

## 2017-01-05 PROCEDURE — 97165 OT EVAL LOW COMPLEX 30 MIN: CPT | Mod: GO

## 2017-01-05 PROCEDURE — 97535 SELF CARE MNGMENT TRAINING: CPT | Mod: GO

## 2017-01-05 PROCEDURE — 97530 THERAPEUTIC ACTIVITIES: CPT | Mod: GP | Performed by: PHYSICAL THERAPIST

## 2017-01-05 PROCEDURE — 85025 COMPLETE CBC W/AUTO DIFF WBC: CPT | Performed by: PHYSICIAN ASSISTANT

## 2017-01-05 PROCEDURE — 36415 COLL VENOUS BLD VENIPUNCTURE: CPT | Performed by: PHYSICIAN ASSISTANT

## 2017-01-05 PROCEDURE — 25000132 ZZH RX MED GY IP 250 OP 250 PS 637: Performed by: PHYSICIAN ASSISTANT

## 2017-01-05 PROCEDURE — 99223 1ST HOSP IP/OBS HIGH 75: CPT | Mod: AI | Performed by: PHYSICIAN ASSISTANT

## 2017-01-05 PROCEDURE — 25000132 ZZH RX MED GY IP 250 OP 250 PS 637: Performed by: FAMILY MEDICINE

## 2017-01-05 PROCEDURE — 82607 VITAMIN B-12: CPT | Performed by: PHYSICIAN ASSISTANT

## 2017-01-05 RX ORDER — IBUPROFEN 600 MG/1
600 TABLET, FILM COATED ORAL EVERY 6 HOURS PRN
Status: DISCONTINUED | OUTPATIENT
Start: 2017-01-05 | End: 2017-01-05

## 2017-01-05 RX ORDER — PROCHLORPERAZINE MALEATE 5 MG
5-10 TABLET ORAL EVERY 6 HOURS PRN
Status: DISCONTINUED | OUTPATIENT
Start: 2017-01-05 | End: 2017-01-09 | Stop reason: HOSPADM

## 2017-01-05 RX ORDER — FUROSEMIDE 40 MG
40 TABLET ORAL DAILY
Status: DISCONTINUED | OUTPATIENT
Start: 2017-01-05 | End: 2017-01-07

## 2017-01-05 RX ORDER — NALOXONE HYDROCHLORIDE 0.4 MG/ML
.1-.4 INJECTION, SOLUTION INTRAMUSCULAR; INTRAVENOUS; SUBCUTANEOUS
Status: DISCONTINUED | OUTPATIENT
Start: 2017-01-05 | End: 2017-01-09 | Stop reason: HOSPADM

## 2017-01-05 RX ORDER — ONDANSETRON 4 MG/1
4 TABLET, FILM COATED ORAL EVERY 8 HOURS PRN
Status: DISCONTINUED | OUTPATIENT
Start: 2017-01-05 | End: 2017-01-09 | Stop reason: HOSPADM

## 2017-01-05 RX ORDER — PROCHLORPERAZINE 25 MG
25 SUPPOSITORY, RECTAL RECTAL EVERY 12 HOURS PRN
Status: DISCONTINUED | OUTPATIENT
Start: 2017-01-05 | End: 2017-01-09 | Stop reason: HOSPADM

## 2017-01-05 RX ORDER — TRAZODONE HYDROCHLORIDE 100 MG/1
100 TABLET ORAL
Status: DISCONTINUED | OUTPATIENT
Start: 2017-01-05 | End: 2017-01-09 | Stop reason: HOSPADM

## 2017-01-05 RX ORDER — SPIRONOLACTONE 25 MG/1
100 TABLET ORAL DAILY
Status: DISCONTINUED | OUTPATIENT
Start: 2017-01-05 | End: 2017-01-07

## 2017-01-05 RX ORDER — RIBAVIRIN 200 MG/1
600 TABLET, FILM COATED ORAL 2 TIMES DAILY
Status: DISCONTINUED | OUTPATIENT
Start: 2017-01-05 | End: 2017-01-09 | Stop reason: HOSPADM

## 2017-01-05 RX ORDER — GABAPENTIN 100 MG/1
200 CAPSULE ORAL AT BEDTIME
Status: DISCONTINUED | OUTPATIENT
Start: 2017-01-05 | End: 2017-01-09 | Stop reason: HOSPADM

## 2017-01-05 RX ORDER — NALOXONE HYDROCHLORIDE 0.4 MG/ML
.1-.4 INJECTION, SOLUTION INTRAMUSCULAR; INTRAVENOUS; SUBCUTANEOUS
Status: DISCONTINUED | OUTPATIENT
Start: 2017-01-05 | End: 2017-01-05

## 2017-01-05 RX ADMIN — SODIUM CHLORIDE 1000 ML: 9 INJECTION, SOLUTION INTRAVENOUS at 00:32

## 2017-01-05 RX ADMIN — LACTULOSE 30 G: 10 SOLUTION ORAL at 18:07

## 2017-01-05 RX ADMIN — DEXTROSE AND SODIUM CHLORIDE: 5; 450 INJECTION, SOLUTION INTRAVENOUS at 22:40

## 2017-01-05 RX ADMIN — DEXTROSE AND SODIUM CHLORIDE: 5; 450 INJECTION, SOLUTION INTRAVENOUS at 09:46

## 2017-01-05 RX ADMIN — LACTULOSE 30 G: 10 SOLUTION ORAL at 12:18

## 2017-01-05 RX ADMIN — DEXTROSE AND SODIUM CHLORIDE: 5; 450 INJECTION, SOLUTION INTRAVENOUS at 16:08

## 2017-01-05 RX ADMIN — RIFAXIMIN 550 MG: 550 TABLET ORAL at 08:56

## 2017-01-05 RX ADMIN — TRAZODONE HYDROCHLORIDE 100 MG: 100 TABLET ORAL at 22:08

## 2017-01-05 RX ADMIN — RIFAXIMIN 550 MG: 550 TABLET ORAL at 19:53

## 2017-01-05 RX ADMIN — GABAPENTIN 200 MG: 100 CAPSULE ORAL at 22:04

## 2017-01-05 RX ADMIN — FUROSEMIDE 40 MG: 40 TABLET ORAL at 08:55

## 2017-01-05 RX ADMIN — SPIRONOLACTONE 100 MG: 25 TABLET ORAL at 08:55

## 2017-01-05 RX ADMIN — SODIUM CHLORIDE 1000 ML: 9 INJECTION, SOLUTION INTRAVENOUS at 09:00

## 2017-01-05 RX ADMIN — SODIUM CHLORIDE 1000 ML: 9 INJECTION, SOLUTION INTRAVENOUS at 00:08

## 2017-01-05 RX ADMIN — LACTULOSE 30 G: 10 SOLUTION ORAL at 08:50

## 2017-01-05 NOTE — PLAN OF CARE
Problem: Goal Outcome Summary  Goal: Goal Outcome Summary  Outcome: Improving  Gait remains unsteady but improving, and he still has jerky movements with activity at times.  Requires assist of two for bedside commode.  Has had 4 bowel movements since admission and he received lactulose prior to admission.  Is alert and orientated once awake, but has troubles with the date.  Slept well after admission, arouses easily.  Uses call light appropriately.  Denies pain other than what he calls his baseline abdominal pain.  Neuros intact, vitals stable.  No family present at time of admission.

## 2017-01-05 NOTE — PROGRESS NOTES
"[facility] [place]MISSION [place]    Patient admitted to room 2205 at approximately 0037 via ambulation from emergency room. Patient was accompanied by transport tech.     Verbal SBAR report received from Raji prior to patient arrival.     Patient trasferred to bed via assist of 2-very jerky and shaky and assisted onto commode. Patient alert and oriented X 2. Pain is controlled without any medications.  . Admission vital signs: Blood pressure 134/70, pulse 73, temperature 98  F (36.7  C), temperature source Oral, resp. rate 22, height 1.803 m (5' 11\"), weight 83.8 kg (184 lb 11.9 oz), SpO2 99 %. Patient was oriented to plan of care, call light, bed controls, tv, telephone, bathroom and visiting hours.     The following safety risks were identified during admission: fall. Yellow risk band applied: YES.     Charity Marx      "

## 2017-01-05 NOTE — PROGRESS NOTES
"   01/05/17 1500   Quick Adds   Type of Visit Initial Occupational Therapy Evaluation   Living Environment   Lives With child(sara), adult;spouse   Living Arrangements house   Number of Stairs to Enter Home 2   Number of Stairs Within Home 14   Stair Railings at Home inside, present on left side;outside, present on right side   Functional Level Prior   Ambulation 2-->assistive person   Transferring 2-->assistive person   Toileting 0-->independent   Bathing 0-->independent   Dressing 0-->independent  (wife assists with socks. )   Eating 0-->independent   Communication 0-->understands/communicates without difficulty   Swallowing 0-->swallows foods/liquids without difficulty   Cognition 0 - no cognition issues reported   Fall history within last six months yes   Number of times patient has fallen within last six months 1   Which of the above functional risks had a recent onset or change? ambulation;transferring;toileting;bathing;dressing   General Information   Onset of Illness/Injury or Date of Surgery - Date 01/04/17   Referring Physician Warren Rodriguez MD   Patient/Family Goals Statement Eventual to return home.    Additional Occupational Profile Info/Pertinent History of Current Problem Scott Casale is a 61 year old male with PMH significant for chronic hepatitis C (no yet received harvoni), alcoholic cirrhosis (now sober), previous ascites s/p TIPS procedure, thrombocytopenia, edema of LE, hx depression, GERD who now presents with increasing confusion.   Precautions/Limitations fall precautions   Cognitive Status Examination   Orientation person;place   Level of Consciousness alert   Able to Follow Commands WNL/WFL   Cognitive Comment states \"2001\" for year   Transfer Skills   Transfer Comments SBA for supine to sit.    Transfer Skill: Bed to Chair/Chair to Bed   Level of [place]: Bed to Chair moderate assist (50% patients effort)   Physical Assist/Nonphysical Assist: Bed to Chair 2 persons   Weight-Bearing " "Restrictions full weight-bearing   Assistive Device - Transfer Skill Bed to Chair Chair to Bed Rehab Eval rolling walker   Transfer Skill: Sit to Stand   Level of [place]: Sit/Stand contact guard   Physical Assist/Nonphysical Assist: Sit/Stand 2 persons   Transfer Skill: Sit to Stand full weight-bearing   Assistive Device for Transfer: Sit/Stand rolling walker   Instrumental Activities of Daily Living (IADL)   IADL Comments Wife can assist with IADL tasks upon discharge.    Activities of Daily Living Analysis   Impairments Contributing to Impaired Activities of Daily Living balance impaired;cognition impaired;strength decreased   General Therapy Interventions   Planned Therapy Interventions ADL retraining;progressive activity/exercise   Clinical Impression   Criteria for Skilled Therapeutic Interventions Met yes, treatment indicated   OT Diagnosis decreased independence with ADLs and functional mobility   Influenced by the following impairments weakness, impaired balance, AMS   Assessment of Occupational Performance 3-5 Performance Deficits   Identified Performance Deficits dressing, bathing, toileting, home management.    Clinical Decision Making (Complexity) Moderate complexity   Therapy Frequency daily   Predicted Duration of Therapy Intervention (days/wks) 2-3 days   Anticipated Discharge Disposition Transitional Care Facility   Risks and Benefits of Treatment have been explained. Yes   Patient, Family & other staff in agreement with plan of care Yes   [facility] AM-PAC  \"6 Clicks\" Daily Activity Inpatient Short Form   1. Putting on and taking off regular lower body clothing? 2 - A Lot   2. Bathing (including washing, rinsing, drying)? 2 - A Lot   3. Toileting, which includes using toilet, bedpan or urinal? 2 - A Lot   4. Putting on and taking off regular upper body clothing? 4 - None   5. Taking care of personal grooming such as brushing teeth? 4 - None   6. Eating meals? 4 - None   Daily Activity " Raw Score (Score out of 24.Lower scores equate to lower levels of function) 18   Total Evaluation Time   Total Evaluation Time (Minutes) 10     Maria Luisa Riojas OTR/L

## 2017-01-05 NOTE — ED PROVIDER NOTES
"  HPI  Patient is a 61-year-old male presenting with confusion.  He comes in by ambulance.  He is known to have alcoholic cirrhosis and hepatitis C.  He's had encephalopathy previously.  He has had ascites and a TIPS procedure.  He was seen by his primary hepatologist yesterday and thought to have some encephalopathy.  He had been using lactulose intermittently, as much as twice a day.  He was told to increase the frequency of his use.  He was also told to consider using some antiviral which was prescribed.    The patient reports being confused \"and all over the place of my house.\"  He apparently slept all day and when he awoke this evening he had difficulty getting up or moving around.  He was stumbling and falling against the walls.  He was obviously confused, not remembering the name of his wife or where he was.  Currently, he knows he is in the hospital.  He knows it's January.  He knows his wife's name.  He thinks it's 2001 though.  He has difficulty remembering some of the details of his recent medical problems.  He denies any new abdominal pain.  He denies any change in urine frequency or pain with urination.  No reported hematochezia or melena.  No fever.  He denies falls or trauma.  No chest pain.  No coughing or rhinorrhea.  He denies shortness of breath.    ROS: All other review of systems are negative other than that noted above.   PMH: Reviewed.  SH: Reviewed.  FH: Reviewed.      PHYSICAL  /63 mmHg  Pulse 77  Temp(Src) 97.6  F (36.4  C) (Oral)  Resp 13  Ht 1.803 m (5' 11\")  SpO2 100%  General: Patient is alert and in moderate distress.  Mild jaundice.  Neurological: Alert.  Moving upper and lower extremities equally, bilaterally.  Head / Neck: Atraumatic.  Ears: Not done.  Eyes: Pupils are equal, round, and reactive.  Scleral icterus.  Nose: Midline.  No epistaxis.  Mouth / Throat: No ulcerations or lesions.  Upper pharynx is not erythematous.  Moist.  Respiratory: No respiratory distress. " CTA B.  Cardiovascular: Regular rhythm.  Peripheral extremities are warm.  No edema.  No calf tenderness.  Abdomen / Pelvis: He does have some mild tenderness in the right lower quadrant but he tells me this is not new.  No distention.  Soft throughout.  Genitalia: Not done.  Musculoskeletal: No tenderness over major muscles and joints.  Skin: No evidence of rash or trauma.        PHYSICIAN  1853.  Patient has confusion and apparently had been stumbling and weak at home.  No other infectious kinds of symptoms described.  Urinalysis will be ordered, culture added.  CBC and comprehensive panel.  Ammonia level.  Fluid bolus with normal saline 500 mL and then 125 mL per hour.  Normal vitals here in the ED except for some mild hypertension.    Labs Ordered and Resulted from Time of ED Arrival Up to the Time of Departure from the ED   CBC WITH PLATELETS DIFFERENTIAL - Abnormal; Notable for the following:     RBC Count 3.71 (*)     Hemoglobin 13.2 (*)     Hematocrit 37.9 (*)      (*)     MCH 35.6 (*)     Platelet Count 50 (*)     Absolute Lymphocytes 0.6 (*)     All other components within normal limits   COMPREHENSIVE METABOLIC PANEL - Abnormal; Notable for the following:     Sodium 145 (*)     Chloride 114 (*)     Glucose 110 (*)     Calcium 8.2 (*)     Bilirubin Total 3.0 (*)     Albumin 2.3 (*)     AST 76 (*)     All other components within normal limits   ROUTINE UA WITH MICROSCOPIC - Abnormal; Notable for the following:     Blood Urine Moderate (*)     Protein Albumin Urine 10 (*)     RBC Urine 9 (*)     Mucous Urine Present (*)     All other components within normal limits   AMMONIA   MAY SALINE LOCK IV   URINE CULTURE AEROBIC BACTERIAL       1950.  The patient is answering questions appropriately for the most part.  He is asking for fluid by mouth.  1 L normal saline ordered after seeing his lab values.  They suggest dehydration.  This correlates clinically.  Ammonia value was within normal limits.  He  likely still has encephalopathy as suggested by his hepatologist.  Unable to provide a urine sample for analysis as of yet.    2304.  Urine analysis is unremarkable.  Clinically, the patient has hepatic encephalopathy.  Lactulose will be given.  Patient will be admitted to the hospital for further cares as he is unable to care for himself currently given his medical problems.  He needs treatment provided here in the hospital.  Dr. Larson accepting.      IMPRESSION    ICD-10-CM    1. Hepatic encephalopathy (H) K72.90            Critical Care Time:  none   Trauma:      CMS Coding:  None        Adis Ma MD  01/04/17 2064

## 2017-01-05 NOTE — PROGRESS NOTES
01/05/17 1200   Quick Adds   Type of Visit Initial PT Evaluation   Living Environment   Lives With child(sara), adult;spouse;grandchild(sara)   Living Arrangements house   Number of Stairs to Enter Home 2   Number of Stairs Within Home 14   Stair Railings at Home inside, present on left side;outside, present on right side   Functional Level Prior   Ambulation 2-->assistive person   Transferring 2-->assistive person   Fall history within last six months yes   Number of times patient has fallen within last six months 1   Which of the above functional risks had a recent onset or change? ambulation;transferring   General Information   Onset of Illness/Injury or Date of Surgery - Date 01/04/17   Referring Physician Rhiannon   Patient/Family Goals Statement Go Home   Pertinent History of Current Problem (include personal factors and/or comorbidities that impact the POC) Pt states may need a liver transplant, difficulty walking around home yesterday so came to [facility]. Pt states extremely shaky when he walks. Pt notes blood in his urine. Pt states 1 fall in the last 6 months while walking in the woods, notes he is otherwise very active. Pt states he does aquatic therapy over the last couple months.   Precautions/Limitations no known precautions/limitations   Cognitive Status Examination   Orientation place;person   Level of Consciousness lethargic/somnolent   Follows Commands and Answers Questions 100% of the time   Personal Safety and Judgment impaired  (attempted to walk with PT cueing not to)   Memory intact  (Pt able to later identify correct date in session today)   Pain Assessment   Patient Currently in Pain No   Strength   Strength Comments Pt demonstrates changing status during functional mobility. For instance, pt sit to stand I, however during standing with tremors and hemibalism of trunk. Pt required 2 assist to ensure walking did not more and pt stays standing.   Bed Mobility   Bed Mobility Comments Pt  "independent with bed mobility   Gait   Gait Comments Pt amb x4 steps w/ CGA to mod assist x2 due to pt tremor and intermittent spastic trunk movements.   Clinical Impression   Criteria for Skilled Therapeutic Intervention yes, treatment indicated   PT Diagnosis Altered gait secondary to weakness   Influenced by the following impairments decreased strength, independent and safety awareness   Functional limitations due to impairments decreased ambulation tolerance, safety with transfers   Clinical Presentation Stable/Uncomplicated   Clinical Presentation Rationale Pt demonstrates changing condition with good strength. However, pt impaired cogntivity with ambulation and transfers.   Clinical Decision Making (Complexity) Low complexity   Therapy Frequency` daily   Predicted Duration of Therapy Intervention (days/wks) 5 days   Anticipated Discharge Disposition Transitional Care Facility   Risk & Benefits of therapy have been explained Yes   Patient, Family & other staff in agreement with plan of care Yes   [facility] [employer] TM \"6 Clicks\"   2016, Trustees of [facility], under license to [employer].  All rights reserved.   6 Clicks Short Forms Basic Mobility Inpatient Short Form   [facility] AM-PAC  \"6 Clicks\" V.2 Basic Mobility Inpatient Short Form   1. Turning from your back to your side while in a flat bed without using bedrails? 3 - A Little   2. Moving from lying on your back to sitting on the side of a flat bed without using bedrails? 3 - A Little   3. Moving to and from a bed to a chair (including a wheelchair)? 2 - A Lot   4. Standing up from a chair using your arms (e.g., wheelchair, or bedside chair)? 2 - A Lot   5. To walk in hospital room? 2 - A Lot   6. Climbing 3-5 steps with a railing? 1 - Total   Basic Mobility Raw Score (Score out of 24.Lower scores equate to lower levels of function) 13   Total Evaluation Time   Total Evaluation Time (Minutes) 10     "

## 2017-01-05 NOTE — PLAN OF CARE
Problem: Goal Outcome Summary  Goal: Goal Outcome Summary  Pt Progress Note: Pt evaluated today w/ PT and OT present. Pt demonstrates tremor in UE and trunk with mobility, requiring assist of 2 to maintain upright position. Pt intermittent assistance, sit to stand with CGA x2, however once standing required assist of 2 mod. Pt then took 4 steps w/ assist of 2 to bedside chair. Pt noted dizziness and SOB afterwards.    Recommendation: TCU

## 2017-01-05 NOTE — ED NOTES
Pt presents to ED via ambulance. Pt reports that he has had generalized weakness since this morning. Also reports that he has had shaking/tremors since this morning. Pt reports that he feels very disoriented and that he has never felt like this before. Pt has hx cirrhosis of the liver and hep C. Reports that he has had aptmts recently and has not been able to take his lactulose as prescribed. Pt appears slightly jaundice. abd upper right quardrant is firm. Denies pain

## 2017-01-05 NOTE — H&P
[facility]    History and Physical  Hospital Medicine       Date of Admission:  1/4/2017  Date of Service: 1/5/2017     Assessment and Plan  Scott Casale is a 61 year old male with PMH significant for chronic hepatitis C (no yet received harvoni), alcoholic cirrhosis (now sober), previous ascites s/p TIPS procedure, thrombocytopenia, edema of LE, hx depression, GERD who now presents with increasing confusion.  VSS.  CMP within normal limits.  Ammonia 46.  CBC reveals platelets 50k (this is about patient baseline).  UA negative.      Hepatic Encephalopathy, grade I-II  DDx: known alcoholic cirrhosis, poor compliance with lactulose  Ammonia 46 on admission  - recheck ammonia today, with tomorrow AM labs  - initiate lactulose 30mg TID - goal of 3-5BMs daily  - carefully monitor potassium - LOW threshold for potassium protocol  - continue PTA rifaximin (started 01/03/2017)    Weakness, shaking  DDx: secondary to hepatic encephalopathy  - IP PT consult placed  - IP SW consult placed, discharge planning    Ascites of liver s/p TIPS  - continue PTA lasix, spironolactone    Hepatic cirrhosis, Chronic hepatitis C virus infection (H)  - continue PTA ribavirin  - planning to start harvoni treatment as outpatient, will withhold at this point in time    Thrombocytopenia (H)  Chronic, 50k on admission which appears to be baseline.    - continue to monitor, AM CBC with platelets    Insomnia  - continue PTA gabapentin, trazodone       F: 125mL/hr 0.9 NS  E: BMP in AM  N: regular diet  DVT Prophylaxis: mechanical    Code Status: Full Code    Disposition: Anticipate discharge in 2-3 days. Appropriate for inpatient care.    Case discussed with Dr. Warren Rodriguez.  Assessment and plan as written above.    Rhiannon Krueger PA-C  [facility] Hospitalist Program    History is obtained from the patient and review of the EMR.    Past Medical History   Past Medical History   Diagnosis Date     Ankylosing  "spondylitis (H)      Splenomegaly      Shoulder dislocation      Ulcerative colitis (H)      H/O testicular cancer age 28     Hepatitis C      genotype 1A      Ascites      diuretic refractory       Past Surgical History  Past Surgical History   Procedure Laterality Date     C knee scope,clean/drain       bilateral     C appendectomy       Strabismus surgery       bilateral     Orchiectomy scrotal       prosthesis placed     Ir transven intrahepatic portosyst rev         Family History   Family History   Problem Relation Age of Onset     CANCER Mother 70     brain tumor     Prostate Cancer No family hx of      Cancer - colorectal No family hx of      DIABETES Maternal Grandmother      CEREBROVASCULAR DISEASE Sister      HEART DISEASE Sister      Respiratory Son      CANCER Father      Uterine Cancer Sister        History of Present Illness  Scott Casale is a 61 year old male who now presents with altered mental status, weakness/shaking.  Patient reports saw hepatologist 01/03. [place] somewhat weak at clinic visit, but noted normal mentation.  Reports was assured by hepatologist that \"shaking/weakness\" was \"all a part of\" it, in reference to his cirrhosis.  Awoke morning of 01/04 with acute disorientation.  Reports he was intermittently unaware of whom his wife, children and grandchildren were.  Wife urged patient to present to ED in the morning, but patient refused.  Spent the majority of the day in his bed; reports that he got up \"only\" four times to use the restroom.  Reports he \"fell\" numerous times when ambulating to bathroom given uncontrollable shaking and weakness.  Patient denies excessive itchiness.  Denies changes to chronic LE swelling and size of abdomen.  Denies acute changes to skin color.  Denies melena, hematochezia.  Patient has chronic nausea which is unchanged from baseline. Reports poor lactulose adherence, utilizing \"maybe\" once per day.  Noted some lightheadedness/dizziness with ambulation " "yesterday.  Patient endorses chronic RUQ, RMQ pain which is unchanged from baseline.  Estimates he has 1 BM daily.  Patient does not recent weight drop; he estimates that he's lost \"about 30 pounds\" as his lower extremity swelling has lessened.  Estimates that this change has been over the last month or so.  Upon chart review, appear to be 15 pound weight loss in last month.    In the past several weeks denies fever, chills, vomiting, myalgias, headaches, chest pain, palpitations/flutters, SOB, cough, wheezes, diarrhea/constipation, dysuria, hematuria, joint swelling, joint pain, leg swelling, and rashes.      Prior to Admission Medications  Prior to Admission Medications   Prescriptions Last Dose Informant Patient Reported? Taking?   furosemide (LASIX) 40 MG tablet 2017 at am  Yes Yes   Sig: Take 40 mg by mouth daily    gabapentin (NEURONTIN) 100 MG capsule 1/3/2017 at pm  No Yes   Sig: Take 2 capsules (200 mg) by mouth At Bedtime   lactulose 20 GM/30ML SOLN 2017  No Yes   Sig: Take 30 mLs by mouth 3 times daily   ledipasvir-sofosbuvir (LEDIPASVIR-SOFOSBUVIR)  MG per tablet hasn't started  No No   Sig: Take 1 tablet by mouth daily   ledipasvir-sofosbuvir (LEDIPASVIR-SOFOSBUVIR)  MG per tablet hasn't started  No No   Sig: Take 1 tablet by mouth daily   ondansetron (ZOFRAN) 4 MG tablet   Yes No   Sig: Take 1 tablet (4 mg) by mouth every 8 hours as needed for nausea   ribavirin 200 MG TABS   No No   Si mg PO BID   ribavirin 200 MG TABS 2017 at am  No Yes   Sig: Take 2 tablets (400 mg) by mouth 2 times daily   rifaximin (XIFAXAN) 550 MG TABS tablet hasn't started  No No   Sig: Take 1 tablet (550 mg) by mouth 2 times daily   sildenafil (REVATIO/VIAGRA) 50 MG cap/tab   No No   Sig: Take 0.5-1 tablets (25-50 mg) by mouth daily as needed for erectile dysfunction Take 30 min to 4 hours before intercourse.  Never use with nitroglycerin, terazosin or doxazosin.   spironolactone (ALDACTONE) 100 MG " "tablet 1/4/2017 at am  Yes Yes   Sig: Take 100 mg by mouth daily    traZODone (DESYREL) 100 MG tablet Past Week at Unknown time  No Yes   Sig: Take 1 tablet (100 mg) by mouth nightly as needed for sleep   vitamin D (ERGOCALCIFEROL) 45625 UNIT capsule 1/2/2017  Yes Yes   Sig: Take 50,000 Units by mouth Twice weekly Mon and Thurs      Facility-Administered Medications: None       Allergies  Allergies   Allergen Reactions     Blood Transfusion Related (Informational Only) Other (See Comments)     Patient has a history of a clinically significant antibody against RBC antigens.  A delay in compatible RBCs may occur.     Percocet [Oxycodone-Acetaminophen] Nausea and Vomiting     Acetaminophen Nausea     Iodine-131 Hives     Motrin [Ibuprofen Micronized] Nausea     Tylenol Nausea       Social History  Social History     Social History     Marital Status:      Spouse Name: N/A     Number of Children: N/A     Years of Education: N/A     Occupational History     Not on file.     Social History Main Topics     Smoking status: Former Smoker     Quit date: 01/01/1978     Smokeless tobacco: Never Used     Alcohol Use: No      Comment: quit in 2010     Drug Use: No     Sexual Activity:     Partners: Female     Other Topics Concern     Parent/Sibling W/ Cabg, Mi Or Angioplasty Before 65f 55m? No     Social History Narrative    Admit 9/28:    Lives with new wife of 2 mos.    Tobacco:  Former smoker, quit 1978    Alcohol:  Sober since 2010, recovering alcoholic.    Drugs:  Denies    Lives with wife Carrie    ROS: 10 point ROS neg other than the symptoms noted above in the HPI.    Physical Exam    /68 mmHg  Pulse 73  Temp(Src) 98.1  F (36.7  C) (Oral)  Resp 18  Ht 1.803 m (5' 11\")  Wt 83.8 kg (184 lb 11.9 oz)  BMI 25.78 kg/m2  SpO2 98%     Weight: 184 lbs 11.93 oz Body mass index is 25.78 kg/(m^2).     Constitutional: Alert and oriented x2 (self, location).  Believes it to be February 6th, 2006.  Cooperative.  " Appears older than stated age.  Appears in no acute distress.  Skin tan, but not overtly yellow.  Intentional tremor.  HEENT: Oropharynx is clear and moist. No evidence of cranial trauma.  No yellow hue to underside of tongue.  No scleral icterus.  Lymph/Hematologic: No occipital, submental, submandibular, anterior or posterior cervical, or supraclavicular lymphadenopathy is appreciated.  Cardiovascular: Regular rate/ rhythm.  S1 and S2 grossly normal.  No appreciable murmur, rub, gallop. Trace lower extremity edema, reportedly unchanged drom baseline.  Respiratory: Clear to auscultation bilaterally. Equal chest expansion.  GI: Soft, normal bowel sounds. Somewhat obese appearing abdomen.  Palpable liver edge secondary to known cirrhosis.  Mild tenderness to palpation of RUQ, RMQ.  Genitourinary: Deferred  Musculoskeletal: Normal muscle bulk and tone.  Skin: Warm and dry, no rashes.   Neurologic: Neck supple. Cranial nerves 3-12 are grossly intact.  is symmetric.     Data  Data reviewed today:     Recent Labs  Lab 01/05/17  0830 01/04/17  1845   WBC 4.5 4.1   HGB 11.8* 13.2*   * 102*   PLT 52* 50*   NA  --  145*   POTASSIUM  --  3.7   CHLORIDE  --  114*   CO2  --  24   BUN  --  20   CR  --  1.11   ANIONGAP  --  7   ARACELI  --  8.2*   GLC  --  110*   ALBUMIN  --  2.3*   PROTTOTAL  --  6.9   BILITOTAL  --  3.0*   ALKPHOS  --  65   ALT  --  45   AST  --  76*       No results found for this or any previous visit (from the past 24 hour(s)).    I personally reviewed no images or EKG's today.    Rhiannon Krueger PA-C  [facility] Medicine        Physician Attestation  I, Warren Rodriguez, saw and evaluated Scott Casale as part of a shared visit.  I have reviewed and discussed with the advanced practice provider their history, physical and plan.    I personally reviewed the vital signs, medications, labs and imaging.    My key history or physical exam findings: confused, weak, shaky ammonia level 110 today    Key management  decisions made by me: lactulose warren Kelley Oley  Date of Service (when I saw the patient): 01/05/2017

## 2017-01-05 NOTE — PLAN OF CARE
Problem: Goal Outcome Summary  Goal: Goal Outcome Summary  Pt has ataxia and very shaky with jerking movements when up oob.  Very unsteady, up with 2 assist, compliant with all cares, alert and oriented.  Ammonia 121, receiving Lactulose TID.  No significant changes noted with orthostatic blood pressures.

## 2017-01-05 NOTE — PLAN OF CARE
Problem: Goal Outcome Summary  Goal: Goal Outcome Summary  OT: Eval complete, see PT note for mobility status. Oriented to place and person. States February, 2001. Re-oriented and able to recall date 2 minutes later.     REC: TCU as pt is needing assist with all ADLs and functional mobility

## 2017-01-05 NOTE — TELEPHONE ENCOUNTER
Prior Authorization Approval    Authorization Effective Date: 1/4/2017  Authorization Expiration Date: 1/4/2018  Medication: Xifaxan 550 mg BID  Approved Dose/Quantity:   Reference #: [id number]   Insurance Company: [employer]  Expected CoPay: $0.00     CoPay Card Available:      Foundation Assistance Needed:    Which Pharmacy is filling the prescription (Not needed for infusion/clinic administered): [facility] - [place], [place] - [address]     Pharmacy notified. Called patient and Spouse wanted us to know that patient is in hospital due to pneumonia.

## 2017-01-06 ENCOUNTER — APPOINTMENT (OUTPATIENT)
Dept: OCCUPATIONAL THERAPY | Facility: CLINIC | Age: 62
DRG: 433 | End: 2017-01-06
Payer: COMMERCIAL

## 2017-01-06 ENCOUNTER — APPOINTMENT (OUTPATIENT)
Dept: PHYSICAL THERAPY | Facility: CLINIC | Age: 62
DRG: 433 | End: 2017-01-06
Payer: COMMERCIAL

## 2017-01-06 LAB
AMMONIA PLAS-SCNC: 115 UMOL/L (ref 10–50)
ANION GAP SERPL CALCULATED.3IONS-SCNC: 8 MMOL/L (ref 3–14)
BACTERIA SPEC CULT: NORMAL
BASOPHILS # BLD AUTO: 0 10E9/L (ref 0–0.2)
BASOPHILS NFR BLD AUTO: 0.6 %
BUN SERPL-MCNC: 15 MG/DL (ref 7–30)
CALCIUM SERPL-MCNC: 7.5 MG/DL (ref 8.5–10.1)
CHLORIDE SERPL-SCNC: 113 MMOL/L (ref 94–109)
CO2 SERPL-SCNC: 21 MMOL/L (ref 20–32)
CREAT SERPL-MCNC: 1.12 MG/DL (ref 0.66–1.25)
DIFFERENTIAL METHOD BLD: ABNORMAL
EOSINOPHIL # BLD AUTO: 0.1 10E9/L (ref 0–0.7)
EOSINOPHIL NFR BLD AUTO: 3.7 %
ERYTHROCYTE [DISTWIDTH] IN BLOOD BY AUTOMATED COUNT: 12.9 % (ref 10–15)
GFR SERPL CREATININE-BSD FRML MDRD: 67 ML/MIN/1.7M2
GLUCOSE SERPL-MCNC: 115 MG/DL (ref 70–99)
HCT VFR BLD AUTO: 31.7 % (ref 40–53)
HGB BLD-MCNC: 10.8 G/DL (ref 13.3–17.7)
IMM GRANULOCYTES # BLD: 0 10E9/L (ref 0–0.4)
IMM GRANULOCYTES NFR BLD: 0 %
LYMPHOCYTES # BLD AUTO: 0.6 10E9/L (ref 0.8–5.3)
LYMPHOCYTES NFR BLD AUTO: 17 %
MCH RBC QN AUTO: 35 PG (ref 26.5–33)
MCHC RBC AUTO-ENTMCNC: 34.1 G/DL (ref 31.5–36.5)
MCV RBC AUTO: 103 FL (ref 78–100)
MICRO REPORT STATUS: NORMAL
MONOCYTES # BLD AUTO: 0.6 10E9/L (ref 0–1.3)
MONOCYTES NFR BLD AUTO: 17 %
NEUTROPHILS # BLD AUTO: 2.1 10E9/L (ref 1.6–8.3)
NEUTROPHILS NFR BLD AUTO: 61.7 %
PLATELET # BLD AUTO: 44 10E9/L (ref 150–450)
POTASSIUM SERPL-SCNC: 4.1 MMOL/L (ref 3.4–5.3)
RBC # BLD AUTO: 3.09 10E12/L (ref 4.4–5.9)
SODIUM SERPL-SCNC: 142 MMOL/L (ref 133–144)
SPECIMEN SOURCE: NORMAL
VIT B12 SERPL-MCNC: 710 PG/ML (ref 193–986)
WBC # BLD AUTO: 3.5 10E9/L (ref 4–11)

## 2017-01-06 PROCEDURE — 25000132 ZZH RX MED GY IP 250 OP 250 PS 637: Performed by: FAMILY MEDICINE

## 2017-01-06 PROCEDURE — 40000193 ZZH STATISTIC PT WARD VISIT: Performed by: PHYSICAL THERAPIST

## 2017-01-06 PROCEDURE — 85025 COMPLETE CBC W/AUTO DIFF WBC: CPT | Performed by: PHYSICIAN ASSISTANT

## 2017-01-06 PROCEDURE — 82140 ASSAY OF AMMONIA: CPT | Performed by: PHYSICIAN ASSISTANT

## 2017-01-06 PROCEDURE — 25000132 ZZH RX MED GY IP 250 OP 250 PS 637: Performed by: PHYSICIAN ASSISTANT

## 2017-01-06 PROCEDURE — 80048 BASIC METABOLIC PNL TOTAL CA: CPT | Performed by: PHYSICIAN ASSISTANT

## 2017-01-06 PROCEDURE — 97116 GAIT TRAINING THERAPY: CPT | Mod: GP | Performed by: PHYSICAL THERAPIST

## 2017-01-06 PROCEDURE — 97535 SELF CARE MNGMENT TRAINING: CPT | Mod: GO

## 2017-01-06 PROCEDURE — 99233 SBSQ HOSP IP/OBS HIGH 50: CPT | Performed by: FAMILY MEDICINE

## 2017-01-06 PROCEDURE — 36415 COLL VENOUS BLD VENIPUNCTURE: CPT | Performed by: PHYSICIAN ASSISTANT

## 2017-01-06 PROCEDURE — 25800025 ZZH RX 258: Performed by: PHYSICIAN ASSISTANT

## 2017-01-06 PROCEDURE — 40000133 ZZH STATISTIC OT WARD VISIT

## 2017-01-06 PROCEDURE — 12000000 ZZH R&B MED SURG/OB

## 2017-01-06 RX ADMIN — FUROSEMIDE 40 MG: 40 TABLET ORAL at 08:11

## 2017-01-06 RX ADMIN — LACTULOSE 30 G: 10 SOLUTION ORAL at 12:01

## 2017-01-06 RX ADMIN — GABAPENTIN 200 MG: 100 CAPSULE ORAL at 21:41

## 2017-01-06 RX ADMIN — DEXTROSE AND SODIUM CHLORIDE: 5; 450 INJECTION, SOLUTION INTRAVENOUS at 07:08

## 2017-01-06 RX ADMIN — LACTULOSE 30 G: 10 SOLUTION ORAL at 08:11

## 2017-01-06 RX ADMIN — SPIRONOLACTONE 100 MG: 25 TABLET ORAL at 08:11

## 2017-01-06 RX ADMIN — LACTULOSE 30 G: 10 SOLUTION ORAL at 17:31

## 2017-01-06 RX ADMIN — DEXTROSE AND SODIUM CHLORIDE: 5; 450 INJECTION, SOLUTION INTRAVENOUS at 23:04

## 2017-01-06 RX ADMIN — RIFAXIMIN 550 MG: 550 TABLET ORAL at 19:53

## 2017-01-06 RX ADMIN — DEXTROSE AND SODIUM CHLORIDE: 5; 450 INJECTION, SOLUTION INTRAVENOUS at 15:02

## 2017-01-06 NOTE — PROGRESS NOTES
Pt up in the chair for breakfast, transfers small distances with assist of one. Has been up voiding but no bowel movement thus far, given scheduled lactulose this AM. Pt wanting to walk in the sifuentes today but pt is still pretty unsteady, will try ambulating later today with a 2nd person to assist.  Pt aware and agreeable to this plan. Call light within reach. Alarms activated and audible.

## 2017-01-06 NOTE — PROGRESS NOTES
Pt has had 8 soft/tan stools since 0700 this am.  Appears more clear cognitively, less tremmors and jerking movements noted, up with one to two assist to BSC with steady gait.

## 2017-01-06 NOTE — PLAN OF CARE
Problem: Goal Outcome Summary  Goal: Goal Outcome Summary  Outcome: Therapy, progress toward functional goals as expected  OT: Pt with increased independence today. See PT for mobility status. Pt with observed difficulty with lower body dressing. Educated pt/wife on use of sock aid to increase independence and ease. Pt's wife reports she will be purchasing one prior to discharge. Completes all aspects of toileting independently.     REC: Home with support of family as needed.

## 2017-01-06 NOTE — PLAN OF CARE
Problem: Confusion, Acute (Adult)  Goal: Cognitive/Functional Impairments Minimized  Patient will demonstrate the desired outcomes by discharge/transition of care.   Outcome: Improving  Pt awake on and off over night to use the bathroom.   Oriented to place, time and situation able to explain why he is taking Lactulose and expected results and goals.   Denies pain overnight.   Moving freely in bed and up to the commode overnight with A-1 for safetly, continues to be unsteady.

## 2017-01-06 NOTE — PROGRESS NOTES
Ammonia 115 (HH) 10 - 50 umol/L Final     Platelet Count 44 (LL) 150 - 450 10e9/L Final      Comment:      Critical labs web paged to Dr Rodriguez. Primary nurse Lucretia ibarra.

## 2017-01-06 NOTE — PLAN OF CARE
Problem: Goal Outcome Summary  Goal: Goal Outcome Summary  PT Progress Note: Pt amb x150ft in hallway with PT and OT, CGA x1. Pt demonstrates no shakiness today as he was able to ambulate, barely holding onto FWW. Pt normally walks without an AD. Plan is to progress tomorrow with attempting stairs and ambulation with an AD w/ PT.    Recommendation: Home with spouse support.

## 2017-01-06 NOTE — PROGRESS NOTES
[facility]    Assessment and Plan:    Principal Problem:    Hepatic encephalopathy (H)   improved  Pt clearer today, but still confused.  amonia down but still quit high.  Had several bowel movements yesterday.  Plan to keep up with lactulose and recheck amonia sandy  Active Problems:    Ascites of liver  Stable will recheck labs in am.       Chronic hepatitis C virus infection (H)    - continue PTA ribavirin  - planning to start harvoni treatment when avalible. Okay to use home meds when it is avalible for him    Hepatic cirrhosis (H)   will treat hep c    Thrombocytopenia (H)   stable recheck sandy       Disposition: Tentative plan is to discharge patient Home in 1-2 days    Subjective:  General:  feels better and more alert   Vitals: vitals signs have been stable   Intake/Output: tolerating po well   Mental status: more alert  , still confused   Respiratory: denies shortness of breath or cough   Cardiovascular no chest pain and no palpitations   Renal: good urine output     Review of Systems:  C: NEGATIVE for fever, chills, change in weight  E/M: NEGATIVE for ear, mouth and throat problems  R: NEGATIVE for significant cough or SOB  CV: NEGATIVE for chest pain, palpitations or peripheral edema    Physical Exam:  Vitals Were Reviewed    Patient Vitals for the past 16 hrs:   BP Temp Temp src Pulse Heart Rate Resp SpO2   01/06/17 1156 123/69 mmHg 97.7  F (36.5  C) Oral - 76 18 100 %   01/06/17 0755 117/70 mmHg 98.2  F (36.8  C) Oral - 75 18 95 %   01/06/17 0450 117/62 mmHg 98.2  F (36.8  C) Oral 72 - 18 97 %   01/06/17 0036 133/71 mmHg 98  F (36.7  C) Oral 78 - 18 97 %    Patient Vitals for the past 16 hrs:   SpO2 O2 Device   01/06/17 1156 100 % None (Room air)   01/06/17 0755 95 % None (Room air)   01/06/17 0450 97 % None (Room air)   01/06/17 0036 97 % None (Room air)         Intake/Output Summary (Last 24 hours) at 01/06/17 1322  Last data filed at 01/06/17 1227   Gross per 24 hour   Intake  5003.5 ml   Output   1950 ml   Net 3053.5 ml       Patient Vitals for the past 24 hrs:   Urine Occurrence Stool Occurrence Stool Color   01/05/17 1343 1 1 Yellow;Tan   01/05/17 1439 - 1 Yellow;Tan   01/05/17 1606 1 1 Tan   01/05/17 1700 1 1 Tan   01/05/17 1730 1 1 Tan   01/05/17 1757 - 1 Tan   01/05/17 1920 1 1 Tan   01/05/17 2100 - 1 -   01/05/17 2121 - 1 -   01/05/17 2145 1 1 -   01/05/17 2239 1 1 Timmons   01/06/17 0024 1 - -   01/06/17 0313 1 - -   01/06/17 1204 - 1 Tan       GENERAL APPEARANCE: healthy, alert and no distress  EYES: conjunctiva clear, eyes grossly normal  RESP: lungs clear to auscultation - no rales, rhonchi or wheezes  CV: regular rate and rhythm, normal S1 S2, no S3 or S4 and no murmur, click or rub   ABDOMEN: soft, nontender, no HSM or masses and bowel sounds normal  MS: no clubbing, cyanosis; trace edema  SKIN: clear without significant rashes or lesions    Lab:  CMP  Recent Labs  Lab 01/06/17  0702 01/05/17  0830 01/04/17  1845    145* 145*   POTASSIUM 4.1 4.1 3.7   CHLORIDE 113* 118* 114*   CO2 21 20 24   ANIONGAP 8 7 7   * 115* 110*   BUN 15 19 20   CR 1.12 0.98 1.11   GFRESTIMATED 67 78 67   GFRESTBLACK 80 >90African American GFR Calc 81   ARACELI 7.5* 7.6* 8.2*   PROTTOTAL  --  6.0* 6.9   ALBUMIN  --  2.0* 2.3*   BILITOTAL  --  2.7* 3.0*   ALKPHOS  --  64 65   AST  --  70* 76*   ALT  --  39 45     CBC  Recent Labs  Lab 01/06/17  0702 01/05/17  0830 01/04/17  1845   WBC 3.5* 4.5 4.1   RBC 3.09* 3.35* 3.71*   HGB 10.8* 11.8* 13.2*   HCT 31.7* 34.6* 37.9*   * 103* 102*   MCH 35.0* 35.2* 35.6*   MCHC 34.1 34.1 34.8   RDW 12.9 13.4 13.5   PLT 44* 52* 50*     INRNo lab results found in last 7 days.  Arterial Blood GasNo lab results found in last 7 days.     Intake/Output Summary (Last 24 hours) at 01/06/17 1322  Last data filed at 01/06/17 1227   Gross per 24 hour   Intake 5003.5 ml   Output   1950 ml   Net 3053.5 ml

## 2017-01-07 ENCOUNTER — APPOINTMENT (OUTPATIENT)
Dept: PHYSICAL THERAPY | Facility: CLINIC | Age: 62
DRG: 433 | End: 2017-01-07
Payer: COMMERCIAL

## 2017-01-07 ENCOUNTER — APPOINTMENT (OUTPATIENT)
Dept: OCCUPATIONAL THERAPY | Facility: CLINIC | Age: 62
DRG: 433 | End: 2017-01-07
Payer: COMMERCIAL

## 2017-01-07 LAB
ALBUMIN SERPL-MCNC: 1.9 G/DL (ref 3.4–5)
ALP SERPL-CCNC: 75 U/L (ref 40–150)
ALT SERPL W P-5'-P-CCNC: 36 U/L (ref 0–70)
AMMONIA PLAS-SCNC: 150 UMOL/L (ref 10–50)
ANION GAP SERPL CALCULATED.3IONS-SCNC: 6 MMOL/L (ref 3–14)
AST SERPL W P-5'-P-CCNC: 63 U/L (ref 0–45)
BILIRUB SERPL-MCNC: 2.2 MG/DL (ref 0.2–1.3)
BUN SERPL-MCNC: 17 MG/DL (ref 7–30)
CALCIUM SERPL-MCNC: 7.7 MG/DL (ref 8.5–10.1)
CHLORIDE SERPL-SCNC: 110 MMOL/L (ref 94–109)
CO2 SERPL-SCNC: 23 MMOL/L (ref 20–32)
CREAT SERPL-MCNC: 1.07 MG/DL (ref 0.66–1.25)
ERYTHROCYTE [DISTWIDTH] IN BLOOD BY AUTOMATED COUNT: 12.6 % (ref 10–15)
GFR SERPL CREATININE-BSD FRML MDRD: 70 ML/MIN/1.7M2
GLUCOSE SERPL-MCNC: 111 MG/DL (ref 70–99)
HCT VFR BLD AUTO: 31.1 % (ref 40–53)
HGB BLD-MCNC: 10.7 G/DL (ref 13.3–17.7)
INR PPP: 1.5 (ref 0.86–1.14)
MCH RBC QN AUTO: 35.1 PG (ref 26.5–33)
MCHC RBC AUTO-ENTMCNC: 34.4 G/DL (ref 31.5–36.5)
MCV RBC AUTO: 102 FL (ref 78–100)
PLATELET # BLD AUTO: 44 10E9/L (ref 150–450)
POTASSIUM SERPL-SCNC: 4.4 MMOL/L (ref 3.4–5.3)
PROT SERPL-MCNC: 5.7 G/DL (ref 6.8–8.8)
RBC # BLD AUTO: 3.05 10E12/L (ref 4.4–5.9)
SODIUM SERPL-SCNC: 139 MMOL/L (ref 133–144)
WBC # BLD AUTO: 4.4 10E9/L (ref 4–11)

## 2017-01-07 PROCEDURE — 85610 PROTHROMBIN TIME: CPT | Performed by: FAMILY MEDICINE

## 2017-01-07 PROCEDURE — 85027 COMPLETE CBC AUTOMATED: CPT | Performed by: FAMILY MEDICINE

## 2017-01-07 PROCEDURE — 82140 ASSAY OF AMMONIA: CPT | Performed by: FAMILY MEDICINE

## 2017-01-07 PROCEDURE — 25000132 ZZH RX MED GY IP 250 OP 250 PS 637: Performed by: FAMILY MEDICINE

## 2017-01-07 PROCEDURE — 97535 SELF CARE MNGMENT TRAINING: CPT | Mod: GO

## 2017-01-07 PROCEDURE — 25000132 ZZH RX MED GY IP 250 OP 250 PS 637: Performed by: PHYSICIAN ASSISTANT

## 2017-01-07 PROCEDURE — 40000193 ZZH STATISTIC PT WARD VISIT: Performed by: PHYSICAL THERAPIST

## 2017-01-07 PROCEDURE — 97110 THERAPEUTIC EXERCISES: CPT | Mod: GO

## 2017-01-07 PROCEDURE — 12000000 ZZH R&B MED SURG/OB

## 2017-01-07 PROCEDURE — 80053 COMPREHEN METABOLIC PANEL: CPT | Performed by: FAMILY MEDICINE

## 2017-01-07 PROCEDURE — 25800025 ZZH RX 258: Performed by: PHYSICIAN ASSISTANT

## 2017-01-07 PROCEDURE — 36415 COLL VENOUS BLD VENIPUNCTURE: CPT | Performed by: FAMILY MEDICINE

## 2017-01-07 PROCEDURE — 97116 GAIT TRAINING THERAPY: CPT | Mod: GP | Performed by: PHYSICAL THERAPIST

## 2017-01-07 PROCEDURE — 40000133 ZZH STATISTIC OT WARD VISIT

## 2017-01-07 PROCEDURE — 99232 SBSQ HOSP IP/OBS MODERATE 35: CPT | Performed by: FAMILY MEDICINE

## 2017-01-07 RX ORDER — FUROSEMIDE 20 MG
20 TABLET ORAL DAILY
Status: DISCONTINUED | OUTPATIENT
Start: 2017-01-07 | End: 2017-01-09 | Stop reason: HOSPADM

## 2017-01-07 RX ORDER — LIDOCAINE HYDROCHLORIDE 10 MG/ML
INJECTION, SOLUTION EPIDURAL; INFILTRATION; INTRACAUDAL; PERINEURAL
Status: DISPENSED
Start: 2017-01-07 | End: 2017-01-08

## 2017-01-07 RX ORDER — SPIRONOLACTONE 25 MG/1
25 TABLET ORAL DAILY
Status: DISCONTINUED | OUTPATIENT
Start: 2017-01-07 | End: 2017-01-09 | Stop reason: HOSPADM

## 2017-01-07 RX ADMIN — SPIRONOLACTONE 25 MG: 25 TABLET ORAL at 09:09

## 2017-01-07 RX ADMIN — FUROSEMIDE 20 MG: 20 TABLET ORAL at 09:09

## 2017-01-07 RX ADMIN — LACTULOSE 30 G: 10 SOLUTION ORAL at 13:44

## 2017-01-07 RX ADMIN — LACTULOSE 30 G: 10 SOLUTION ORAL at 17:22

## 2017-01-07 RX ADMIN — OMEPRAZOLE 20 MG: 20 CAPSULE, DELAYED RELEASE ORAL at 15:12

## 2017-01-07 RX ADMIN — DEXTROSE AND SODIUM CHLORIDE: 5; 450 INJECTION, SOLUTION INTRAVENOUS at 07:06

## 2017-01-07 RX ADMIN — RIFAXIMIN 550 MG: 550 TABLET ORAL at 20:32

## 2017-01-07 RX ADMIN — RIFAXIMIN 550 MG: 550 TABLET ORAL at 09:14

## 2017-01-07 RX ADMIN — TRAZODONE HYDROCHLORIDE 100 MG: 100 TABLET ORAL at 21:54

## 2017-01-07 RX ADMIN — LACTULOSE 30 G: 10 SOLUTION ORAL at 09:09

## 2017-01-07 RX ADMIN — GABAPENTIN 200 MG: 100 CAPSULE ORAL at 21:51

## 2017-01-07 NOTE — PLAN OF CARE
Problem: Confusion, Acute (Adult)  Goal: Identify Related Risk Factors and Signs and Symptoms  Related risk factors and signs and symptoms are identified upon initiation of Human Response Clinical Practice Guideline (CPG)   Outcome: Improving  Speech continues to be slow and garbled at times. Calls appropriately for assistance. Ambulates to bathroom with standby assist. Continues to have multiple stools after doses of Lactulose. States that he is feeling better.

## 2017-01-07 NOTE — CONSULTS
Care Transitions:  Per chart review and the discussion in Interdisciplinary Rounds there are no discharge needs.  Care Management will continue to monitor through daily chart reviews and Interdisciplinary Rounds.  If immediate needs arise, please contact Care Management at [phone].   Caitlin Navas RN, Care Coordinator

## 2017-01-07 NOTE — PLAN OF CARE
Problem: Goal Outcome Summary  Goal: Goal Outcome Summary  PT:  Pt amb in sifuentes with RW and supervision, 100ft x2. Ascend/descend 15 steps with B rail with SBA. Needing seated rest due to light headedness. Steady on feet. Amb in room pushing IV pole.    REC: Home with spouse once medical stable

## 2017-01-07 NOTE — PLAN OF CARE
Problem: Discharge Planning  Goal: Discharge Planning (Adult, OB, Behavioral, Peds)  Outcome: No Change  Plans to return home with help from his wife at time of discharge.    Problem: Confusion, Acute (Adult)  Goal: Identify Related Risk Factors and Signs and Symptoms  Related risk factors and signs and symptoms are identified upon initiation of Human Response Clinical Practice Guideline (CPG)   Outcome: No Change  Patient clear today, answering questions appropriately.

## 2017-01-07 NOTE — PLAN OF CARE
Problem: Confusion, Acute (Adult)  Goal: Identify Related Risk Factors and Signs and Symptoms  Related risk factors and signs and symptoms are identified upon initiation of Human Response Clinical Practice Guideline (CPG)   Outcome: Improving  Patient is not confused at this time  Using call lite appropriately  Up to BATHROOM with STAND BY ASSIST X1

## 2017-01-07 NOTE — PLAN OF CARE
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 Wife is planning to purchase AE recommended for ease and comfort of task for pt. Pt amb in room to stretch painful back with SBA no c/o dizziness. In afternoon pt seen due to unable to complete BUE exercises in am due to staff meeting and pt napping. He completed 6 ex for 10-15 reps with c/o increased dizziness seated EOB needing to lay back in bed. Wife present for tx.    Recommendation: Home with HH and assist from wife

## 2017-01-07 NOTE — PROGRESS NOTES
[facility]    Assessment and Plan:    Principal Problem:    Hepatic encephalopathy (H)   improved  Pt clearer today, but still confused.  amonia down but still quit high.  Had several bowel movements yesterday.  Plan to keep up with lactulose and recheck amonia sandy  1/7/17 unchanged.  Has frequent b owel movements   But amopnia is up,  Will decrese his spironolactone and his lasix.  I think he has not alex taking them at home, like maurice not been taking his lacutulose and he is now  Getting a bit over diuresis  He has also been eatting all the protein on his tray s o i will decrease him to a low protein diet.   Active Problems:    Ascites of liver  Stable will recheck labs in am.       Chronic hepatitis C virus infection (H)    - continue PTA ribavirin  - planning to start harvoni treatment when avalible. Okay to use home meds when it is avalible for him    Hepatic cirrhosis (H)   will treat hep c    Thrombocytopenia (H)   stable recheck sandy       Disposition: Tentative plan is to discharge patient Home in 1-2 days    Subjective:  General:  feels uncjhanged to a bit confused    Vitals: vitals signs have been stable   Intake/Output: tolerating po well   Mental status: more alert  , still confused   Respiratory: denies shortness of breath or cough   Cardiovascular no chest pain and no palpitations   Renal: good urine output     Review of Systems:  C: NEGATIVE for fever, chills, change in weight  E/M: NEGATIVE for ear, mouth and throat problems  R: NEGATIVE for significant cough or SOB  CV: NEGATIVE for chest pain, palpitations or peripheral edema    Physical Exam:  Vitals Were Reviewed    Patient Vitals for the past 16 hrs:   BP Temp Temp src Pulse Resp SpO2 Weight   01/07/17 1125 116/64 mmHg 97.9  F (36.6  C) Oral 79 18 100 % -   01/07/17 0826 115/59 mmHg 98.2  F (36.8  C) Oral 84 18 99 % -   01/07/17 0330 130/73 mmHg 98.3  F (36.8  C) Oral 94 18 98 % 187 lb 13.3 oz (85.2 kg)   01/06/17 6923  127/66 mmHg 98.2  F (36.8  C) Oral 83 20 99 % -      Patient Vitals for the past 16 hrs:   SpO2 O2 Device   01/07/17 1125 100 % None (Room air)   01/07/17 0826 99 % None (Room air)   01/07/17 0330 98 % None (Room air)   01/06/17 2248 99 % None (Room air)         Intake/Output Summary (Last 24 hours) at 01/06/17 1322  Last data filed at 01/06/17 1227   Gross per 24 hour   Intake 5003.5 ml   Output   1950 ml   Net 3053.5 ml       Patient Vitals for the past 24 hrs:   Stool Color   01/06/17 1415 Tan   01/06/17 1510 Brown;Yellow   01/06/17 1820 Green;Brown   01/06/17 1829 Green;Brown   01/06/17 1945 Yellow;Brown   01/06/17 2344 Yellow   01/07/17 0941 Yellow;Tan       GENERAL APPEARANCE: slghtly jaundice, alert and no distress  EYES: conjunctiva clear, eyes grossly normal  RESP: lungs clear to auscultation - no rales, rhonchi or wheezes  CV: regular rate and rhythm, normal S1 S2, no S3 or S4 and no murmur, click or rub   ABDOMEN: soft,  distendeed no ascitiesnontender, no HSM or masses and bowel sounds normal  MS: no clubbing, cyanosis; trace edema  SKIN: clear without significant rashes or lesions    Lab:  CMP    Recent Labs  Lab 01/07/17  0715 01/06/17  0702 01/05/17  0830 01/04/17  1845    142 145* 145*   POTASSIUM 4.4 4.1 4.1 3.7   CHLORIDE 110* 113* 118* 114*   CO2 23 21 20 24   ANIONGAP 6 8 7 7   * 115* 115* 110*   BUN 17 15 19 20   CR 1.07 1.12 0.98 1.11   GFRESTIMATED 70 67 78 67   GFRESTBLACK 85 80 >90African American GFR Calc 81   ARACELI 7.7* 7.5* 7.6* 8.2*   PROTTOTAL 5.7*  --  6.0* 6.9   ALBUMIN 1.9*  --  2.0* 2.3*   BILITOTAL 2.2*  --  2.7* 3.0*   ALKPHOS 75  --  64 65   AST 63*  --  70* 76*   ALT 36  --  39 45     CBC    Recent Labs  Lab 01/07/17  0715 01/06/17  0702 01/05/17  0830 01/04/17  1845   WBC 4.4 3.5* 4.5 4.1   RBC 3.05* 3.09* 3.35* 3.71*   HGB 10.7* 10.8* 11.8* 13.2*   HCT 31.1* 31.7* 34.6* 37.9*   * 103* 103* 102*   MCH 35.1* 35.0* 35.2* 35.6*   MCHC 34.4 34.1 34.1 34.8   RDW  12.6 12.9 13.4 13.5   PLT 44* 44* 52* 50*     INR    Recent Labs  Lab 01/07/17  0715   INR 1.50*     Arterial Blood GasNo lab results found in last 7 days.     Intake/Output Summary (Last 24 hours) at 01/06/17 1322  Last data filed at 01/06/17 1227   Gross per 24 hour   Intake 5003.5 ml   Output   1950 ml   Net 3053.5 ml

## 2017-01-08 ENCOUNTER — APPOINTMENT (OUTPATIENT)
Dept: PHYSICAL THERAPY | Facility: CLINIC | Age: 62
DRG: 433 | End: 2017-01-08
Payer: COMMERCIAL

## 2017-01-08 ENCOUNTER — APPOINTMENT (OUTPATIENT)
Dept: OCCUPATIONAL THERAPY | Facility: CLINIC | Age: 62
DRG: 433 | End: 2017-01-08
Payer: COMMERCIAL

## 2017-01-08 LAB
ALBUMIN SERPL-MCNC: 1.9 G/DL (ref 3.4–5)
ALP SERPL-CCNC: 52 U/L (ref 40–150)
ALT SERPL W P-5'-P-CCNC: 36 U/L (ref 0–70)
AMMONIA PLAS-SCNC: 132 UMOL/L (ref 10–50)
ANION GAP SERPL CALCULATED.3IONS-SCNC: 7 MMOL/L (ref 3–14)
AST SERPL W P-5'-P-CCNC: 68 U/L (ref 0–45)
BILIRUB SERPL-MCNC: 2.9 MG/DL (ref 0.2–1.3)
BUN SERPL-MCNC: 16 MG/DL (ref 7–30)
CALCIUM SERPL-MCNC: 7.8 MG/DL (ref 8.5–10.1)
CHLORIDE SERPL-SCNC: 110 MMOL/L (ref 94–109)
CO2 SERPL-SCNC: 23 MMOL/L (ref 20–32)
CREAT SERPL-MCNC: 1.02 MG/DL (ref 0.66–1.25)
ERYTHROCYTE [DISTWIDTH] IN BLOOD BY AUTOMATED COUNT: 12.5 % (ref 10–15)
GFR SERPL CREATININE-BSD FRML MDRD: 74 ML/MIN/1.7M2
GLUCOSE SERPL-MCNC: 111 MG/DL (ref 70–99)
HCT VFR BLD AUTO: 30.9 % (ref 40–53)
HGB BLD-MCNC: 10.7 G/DL (ref 13.3–17.7)
MCH RBC QN AUTO: 35.1 PG (ref 26.5–33)
MCHC RBC AUTO-ENTMCNC: 34.6 G/DL (ref 31.5–36.5)
MCV RBC AUTO: 101 FL (ref 78–100)
PLATELET # BLD AUTO: 46 10E9/L (ref 150–450)
POTASSIUM SERPL-SCNC: 4.5 MMOL/L (ref 3.4–5.3)
PROT SERPL-MCNC: 5.5 G/DL (ref 6.8–8.8)
RBC # BLD AUTO: 3.05 10E12/L (ref 4.4–5.9)
SODIUM SERPL-SCNC: 140 MMOL/L (ref 133–144)
WBC # BLD AUTO: 3.7 10E9/L (ref 4–11)

## 2017-01-08 PROCEDURE — 82140 ASSAY OF AMMONIA: CPT | Performed by: FAMILY MEDICINE

## 2017-01-08 PROCEDURE — 97116 GAIT TRAINING THERAPY: CPT | Mod: GP | Performed by: PHYSICAL THERAPIST

## 2017-01-08 PROCEDURE — 40000193 ZZH STATISTIC PT WARD VISIT: Performed by: PHYSICAL THERAPIST

## 2017-01-08 PROCEDURE — 12000000 ZZH R&B MED SURG/OB

## 2017-01-08 PROCEDURE — 80053 COMPREHEN METABOLIC PANEL: CPT | Performed by: FAMILY MEDICINE

## 2017-01-08 PROCEDURE — 99232 SBSQ HOSP IP/OBS MODERATE 35: CPT | Performed by: FAMILY MEDICINE

## 2017-01-08 PROCEDURE — 25000132 ZZH RX MED GY IP 250 OP 250 PS 637: Performed by: FAMILY MEDICINE

## 2017-01-08 PROCEDURE — 25000132 ZZH RX MED GY IP 250 OP 250 PS 637: Performed by: PHYSICIAN ASSISTANT

## 2017-01-08 PROCEDURE — 97110 THERAPEUTIC EXERCISES: CPT | Mod: GO

## 2017-01-08 PROCEDURE — 85027 COMPLETE CBC AUTOMATED: CPT | Performed by: FAMILY MEDICINE

## 2017-01-08 PROCEDURE — 97535 SELF CARE MNGMENT TRAINING: CPT | Mod: GO

## 2017-01-08 PROCEDURE — 40000133 ZZH STATISTIC OT WARD VISIT

## 2017-01-08 PROCEDURE — 36415 COLL VENOUS BLD VENIPUNCTURE: CPT | Performed by: FAMILY MEDICINE

## 2017-01-08 PROCEDURE — 97110 THERAPEUTIC EXERCISES: CPT | Mod: GP | Performed by: PHYSICAL THERAPIST

## 2017-01-08 RX ORDER — LACTULOSE 10 G/15ML
20 SOLUTION ORAL 3 TIMES DAILY
Status: DISCONTINUED | OUTPATIENT
Start: 2017-01-08 | End: 2017-01-09 | Stop reason: HOSPADM

## 2017-01-08 RX ADMIN — LACTULOSE 30 G: 10 SOLUTION ORAL at 08:17

## 2017-01-08 RX ADMIN — FUROSEMIDE 20 MG: 20 TABLET ORAL at 08:19

## 2017-01-08 RX ADMIN — LACTULOSE 20 G: 10 SOLUTION ORAL at 12:18

## 2017-01-08 RX ADMIN — GABAPENTIN 200 MG: 100 CAPSULE ORAL at 21:53

## 2017-01-08 RX ADMIN — LACTULOSE 20 G: 10 SOLUTION ORAL at 17:16

## 2017-01-08 RX ADMIN — SPIRONOLACTONE 25 MG: 25 TABLET ORAL at 08:19

## 2017-01-08 RX ADMIN — TRAZODONE HYDROCHLORIDE 100 MG: 100 TABLET ORAL at 21:52

## 2017-01-08 RX ADMIN — RIFAXIMIN 550 MG: 550 TABLET ORAL at 08:19

## 2017-01-08 RX ADMIN — RIFAXIMIN 550 MG: 550 TABLET ORAL at 19:44

## 2017-01-08 RX ADMIN — OMEPRAZOLE 20 MG: 20 CAPSULE, DELAYED RELEASE ORAL at 06:34

## 2017-01-08 NOTE — PLAN OF CARE
Problem: Confusion, Acute (Adult)  Goal: Identify Related Risk Factors and Signs and Symptoms  Related risk factors and signs and symptoms are identified upon initiation of Human Response Clinical Practice Guideline (CPG)   Outcome: Improving  Awake, alert, steady on his feet, denies felling lightheaded or dizzy. Continues to have multiple stools after Lactulose doses. Using barrier cream on rectum for comfort. Low protein diet continues, patient states understanding of need to restrict diet. Hopes to go home tomorrow.

## 2017-01-08 NOTE — PROGRESS NOTES
Ammonia 132 (HH) 10 - 50 umol/L Final      Comment:      Critical lab result provided to Dr Rodriguez. Primary nurse Jodi SOLORZANO updated.

## 2017-01-08 NOTE — PLAN OF CARE
Problem: Goal Outcome Summary  Goal: Goal Outcome Summary  PT:  Patient has met all initial PT goals with plans to D/C to TCU as previous plan and assessment.  If patient does not d/c on Monday they will require new goals and POC as acute care and inpatient     Comments:   Recommendation:  PT goals as inpt met.  If does not d/c to TCU on Monday will require new POC and goals to continue with inpt skilled services.  Continues to demonstrate appropriateness for TCU for graded increased activity tolerance and progression.

## 2017-01-08 NOTE — PROGRESS NOTES
[facility]ist Service    Assessment and Plan:        Hepatic encephalopathy (H)   improved  Pt clearer today, but still confused.  amonia down but still quit high.  Had several bowel movements yesterday.  Plan to keep up with lactulose and recheck amonia sandy  1/7/17 unchanged.  Has frequent b owel movements   But amopnia is up,  Will decrese his spironolactone and his lasix.  I think he has not alex taking them at home, like maurice not been taking his lacutulose and he is now  Getting a bit over diuresis  He has also been eatting all the protein on his tray s o i will decrease him to a low protein diet.   1/8/17 ammonia down today 132, was 150.  Having 8+ stools a day anus sore.     Ascites of liver  Stable will recheck labs in am.       Chronic hepatitis C virus infection (H)    - continue PTA ribavirin  - planning to start harvoni treatment when avalible. Okay to use home meds when it is avalible for him    Hepatic cirrhosis (H)   will treat hep c    Thrombocytopenia (H)   stable recheck sandy       Disposition: Tentative plan is to discharge patient Home in 1-2 days    Subjective:  General:  feels uncjhanged to a bit confused    Vitals: vitals signs have been stable   Intake/Output: tolerating po well   Mental status: more alert  , still confused   Respiratory: denies shortness of breath or cough   Cardiovascular no chest pain and no palpitations   Renal: good urine output     Review of Systems:  C: NEGATIVE for fever, chills, change in weight  E/M: NEGATIVE for ear, mouth and throat problems  R: NEGATIVE for significant cough or SOB  CV: NEGATIVE for chest pain, palpitations or peripheral edema    Physical Exam:  Vitals Were Reviewed    Patient Vitals for the past 16 hrs:   BP Temp Temp src Pulse Heart Rate Resp SpO2   01/08/17 0927 126/66 mmHg - - 77 - - 98 %   01/08/17 0722 115/61 mmHg 97.7  F (36.5  C) Oral - 74 18 96 %   01/08/17 0458 109/56 mmHg 97.7  F (36.5  C) Oral 73 - 18 97 %   01/07/17 2316  113/59 mmHg 97.8  F (36.6  C) Oral 82 - 16 98 %   01/07/17 2012 114/62 mmHg 97.7  F (36.5  C) Oral 78 - 18 100 %      Patient Vitals for the past 16 hrs:   SpO2 O2 Device   01/08/17 0927 98 % -   01/08/17 0722 96 % None (Room air)   01/08/17 0458 97 % None (Room air)   01/07/17 2316 98 % None (Room air)   01/07/17 2012 100 % None (Room air)         Intake/Output Summary (Last 24 hours) at 01/06/17 1322  Last data filed at 01/06/17 1227   Gross per 24 hour   Intake 5003.5 ml   Output   1950 ml   Net 3053.5 ml       Patient Vitals for the past 24 hrs:   Stool Color   01/07/17 1600 Yellow;Tan   01/07/17 1722 Brown   01/07/17 1800 Brown   01/07/17 1900 Brown       GENERAL APPEARANCE: slghtly jaundice, alert and no distress  EYES: conjunctiva clear, eyes grossly normal  RESP: lungs clear to auscultation - no rales, rhonchi or wheezes  CV: regular rate and rhythm, normal S1 S2, no S3 or S4 and no murmur, click or rub   ABDOMEN: soft,  distendeed no ascitiesnontender, no HSM or masses and bowel sounds normal  MS: no clubbing, cyanosis; trace edema  SKIN: clear without significant rashes or lesions    Lab:  CMP    Recent Labs  Lab 01/08/17  0707 01/07/17  0715 01/06/17  0702 01/05/17  0830 01/04/17  1845    139 142 145* 145*   POTASSIUM 4.5 4.4 4.1 4.1 3.7   CHLORIDE 110* 110* 113* 118* 114*   CO2 23 23 21 20 24   ANIONGAP 7 6 8 7 7   * 111* 115* 115* 110*   BUN 16 17 15 19 20   CR 1.02 1.07 1.12 0.98 1.11   GFRESTIMATED 74 70 67 78 67   GFRESTBLACK 90 85 80 >90African American GFR Calc 81   ARACELI 7.8* 7.7* 7.5* 7.6* 8.2*   PROTTOTAL 5.5* 5.7*  --  6.0* 6.9   ALBUMIN 1.9* 1.9*  --  2.0* 2.3*   BILITOTAL 2.9* 2.2*  --  2.7* 3.0*   ALKPHOS 52 75  --  64 65   AST 68* 63*  --  70* 76*   ALT 36 36  --  39 45     CBC    Recent Labs  Lab 01/08/17  0707 01/07/17  0715 01/06/17  0702 01/05/17  0830   WBC 3.7* 4.4 3.5* 4.5   RBC 3.05* 3.05* 3.09* 3.35*   HGB 10.7* 10.7* 10.8* 11.8*   HCT 30.9* 31.1* 31.7* 34.6*   *  102* 103* 103*   MCH 35.1* 35.1* 35.0* 35.2*   MCHC 34.6 34.4 34.1 34.1   RDW 12.5 12.6 12.9 13.4   PLT 46* 44* 44* 52*     INR    Recent Labs  Lab 01/07/17  0715   INR 1.50*     Arterial Blood GasNo lab results found in last 7 days.     Intake/Output Summary (Last 24 hours) at 01/06/17 1322  Last data filed at 01/06/17 1227   Gross per 24 hour   Intake 5003.5 ml   Output   1950 ml   Net 3053.5 ml

## 2017-01-08 NOTE — PLAN OF CARE
Patient is up to bathroom independently; steady gait  sleeping without difficulty, abdomen distended,  Sclera with increase color of yellow than 24 hrs previously  Patient has not c/o pain or discomfort to writer

## 2017-01-08 NOTE — PLAN OF CARE
Problem: Goal Outcome Summary  Goal: Goal Outcome Summary  Outcome: Improving  OT Note: Pt progressing well toward all goals having met goal of 10 min stand at sink with S with initial c/o dizziness resolving quickly and S toilet transfer with FWW. Pt completed 6 ex with 12 reps of yellow t band limiting L shoulder ex due to increased irritation from yesterday exercises with soreness from recent fall pt states.    REcommendation: Home with assist and continued HEP for increased act sivan

## 2017-01-08 NOTE — PROGRESS NOTES
"   01/08/17 1400   Signing Clinician's Name / Credentials   Signing clinician's name / credentials Taylor Bejarano PT, DPT <CEEAA, STAR?T   Quick Adds   Rehab Discipline PT   Gait Training   Minutes of Treatment (Gait Training) 20   Symptoms Noted During/After Treatment (Gait Training) fatigue;increased pain   Treatment Detail Patietn demonstrates sit to stand with set up and CGA.  Use of RW.  Stnding rests periods and reviewed steps with x 10 bilateral rails.  Increased fatgiue and required standing rest.  RW to room   Theresa Level (Gait Training) contact guard   Physical Assistance Level (Gait Training) set-up required   Weight Bearing (Gait Training) full weight-bearing   Assistive Device (Gait Training) rolling walker   Gait Distance 175 ft   Gait Analysis Deviations decreased devika;increased time in double stance   Therapeutic Exercise   Minutes of Treatment 15   Symptoms Noted During/After Treatment fatigue   Treatment Detail HEP:  Written instructions:  Sitting marching, LAQ, Heel toe raises.  Standing mini squats, hip abduction and standing and march.  Use of RW to balance and give additional support   Additional Documentation   Rehab Comments Progressing toward POC    PT Plan POC continues to be TCU at d/c.  Minimal progressin.  Standing rest vs sitting today.  Added and tolerated exercises.   [facility] AM-PAC  \"6 Clicks\" V.2 Basic Mobility Inpatient Short Form   1. Turning from your back to your side while in a flat bed without using bedrails? 4 - None   2. Moving from lying on your back to sitting on the side of a flat bed without using bedrails? 4 - None   3. Moving to and from a bed to a chair (including a wheelchair)? 4 - None   4. Standing up from a chair using your arms (e.g., wheelchair, or bedside chair)? 4 - None   5. To walk in hospital room? 4 - None   6. Climbing 3-5 steps with a railing? 3 - A Little   Basic Mobility Raw Score (Score out of 24.Lower scores equate to lower levels " of function) 23   Total Session Time   Total Session Time (minutes) 35 minutes

## 2017-01-08 NOTE — PLAN OF CARE
"Problem: Goal Outcome Summary  Goal: Goal Outcome Summary  Outcome: Improving  No complaints of pain. Ammonia trending down. Voiding fine. Alert and steady. Ambulated in hallway this afternoon, stated \" I feel so much better.\"        "

## 2017-01-09 ENCOUNTER — TELEPHONE (OUTPATIENT)
Dept: GASTROENTEROLOGY | Facility: CLINIC | Age: 62
End: 2017-01-09

## 2017-01-09 VITALS
WEIGHT: 187.83 LBS | OXYGEN SATURATION: 99 % | TEMPERATURE: 97.7 F | SYSTOLIC BLOOD PRESSURE: 110 MMHG | HEIGHT: 71 IN | BODY MASS INDEX: 26.3 KG/M2 | HEART RATE: 73 BPM | RESPIRATION RATE: 18 BRPM | DIASTOLIC BLOOD PRESSURE: 53 MMHG

## 2017-01-09 LAB
ALBUMIN SERPL-MCNC: 2.1 G/DL (ref 3.4–5)
ALP SERPL-CCNC: 68 U/L (ref 40–150)
ALT SERPL W P-5'-P-CCNC: 43 U/L (ref 0–70)
AMMONIA PLAS-SCNC: 127 UMOL/L (ref 10–50)
ANION GAP SERPL CALCULATED.3IONS-SCNC: 6 MMOL/L (ref 3–14)
AST SERPL W P-5'-P-CCNC: 81 U/L (ref 0–45)
BILIRUB SERPL-MCNC: 2.5 MG/DL (ref 0.2–1.3)
BUN SERPL-MCNC: 19 MG/DL (ref 7–30)
CALCIUM SERPL-MCNC: 8.2 MG/DL (ref 8.5–10.1)
CHLORIDE SERPL-SCNC: 108 MMOL/L (ref 94–109)
CO2 SERPL-SCNC: 24 MMOL/L (ref 20–32)
CREAT SERPL-MCNC: 1.02 MG/DL (ref 0.66–1.25)
ERYTHROCYTE [DISTWIDTH] IN BLOOD BY AUTOMATED COUNT: 12.7 % (ref 10–15)
GFR SERPL CREATININE-BSD FRML MDRD: 74 ML/MIN/1.7M2
GLUCOSE SERPL-MCNC: 103 MG/DL (ref 70–99)
HCT VFR BLD AUTO: 32.7 % (ref 40–53)
HGB BLD-MCNC: 11.4 G/DL (ref 13.3–17.7)
MCH RBC QN AUTO: 35.2 PG (ref 26.5–33)
MCHC RBC AUTO-ENTMCNC: 34.9 G/DL (ref 31.5–36.5)
MCV RBC AUTO: 101 FL (ref 78–100)
PLATELET # BLD AUTO: 48 10E9/L (ref 150–450)
POTASSIUM SERPL-SCNC: 4.5 MMOL/L (ref 3.4–5.3)
PROT SERPL-MCNC: 6.1 G/DL (ref 6.8–8.8)
RBC # BLD AUTO: 3.24 10E12/L (ref 4.4–5.9)
SODIUM SERPL-SCNC: 138 MMOL/L (ref 133–144)
WBC # BLD AUTO: 4.3 10E9/L (ref 4–11)

## 2017-01-09 PROCEDURE — 85027 COMPLETE CBC AUTOMATED: CPT | Performed by: FAMILY MEDICINE

## 2017-01-09 PROCEDURE — 82140 ASSAY OF AMMONIA: CPT | Performed by: FAMILY MEDICINE

## 2017-01-09 PROCEDURE — 25000132 ZZH RX MED GY IP 250 OP 250 PS 637: Performed by: PHYSICIAN ASSISTANT

## 2017-01-09 PROCEDURE — 25000132 ZZH RX MED GY IP 250 OP 250 PS 637: Performed by: FAMILY MEDICINE

## 2017-01-09 PROCEDURE — 99239 HOSP IP/OBS DSCHRG MGMT >30: CPT | Performed by: FAMILY MEDICINE

## 2017-01-09 PROCEDURE — 36415 COLL VENOUS BLD VENIPUNCTURE: CPT | Performed by: FAMILY MEDICINE

## 2017-01-09 PROCEDURE — 80053 COMPREHEN METABOLIC PANEL: CPT | Performed by: FAMILY MEDICINE

## 2017-01-09 RX ADMIN — FUROSEMIDE 20 MG: 20 TABLET ORAL at 08:28

## 2017-01-09 RX ADMIN — OMEPRAZOLE 20 MG: 20 CAPSULE, DELAYED RELEASE ORAL at 06:35

## 2017-01-09 RX ADMIN — RIFAXIMIN 550 MG: 550 TABLET ORAL at 08:28

## 2017-01-09 RX ADMIN — SPIRONOLACTONE 25 MG: 25 TABLET ORAL at 08:28

## 2017-01-09 RX ADMIN — LACTULOSE 20 G: 10 SOLUTION ORAL at 08:28

## 2017-01-09 NOTE — PLAN OF CARE
Problem: Confusion, Acute (Adult)  Goal: Identify Related Risk Factors and Signs and Symptoms  Related risk factors and signs and symptoms are identified upon initiation of Human Response Clinical Practice Guideline (CPG)   Outcome: Improving  Awake, alert, steady on his feet. Up in room and sifuentes with standby assist. Good appetite, stooling has slowed down this evening. Patient concerned about increased edema of his feet. Encouraged to elevate them for awhile.

## 2017-01-09 NOTE — DISCHARGE INSTRUCTIONS
Take lactulose 20 grams (30 ml) three times daily.  See your regular doctor by late this week and have your ammonia rechecked.  Continue your other meds.  Stay on a low protein diet.

## 2017-01-09 NOTE — PLAN OF CARE
Problem: Goal Outcome Summary  Goal: Goal Outcome Summary  Outcome: Completed Date Met:  01/09/17  Occupational Therapy Discharge Summary    Reason for therapy discharge:    All goals and outcomes met, no further needs identified.    Progress towards therapy goal(s). See goals on Care Plan in [facility] electronic health record for goal details.  Goals met    Therapy recommendation(s):    Continue home exercise program.

## 2017-01-09 NOTE — PLAN OF CARE
Problem: Goal Outcome Summary  Goal: Goal Outcome Summary  Physical Therapy Discharge Summary    Reason for therapy discharge:    Discharged to home.    Progress towards therapy goal(s). See goals on Care Plan in [facility] electronic health record for goal details.  Goals met    Therapy recommendation(s):    Continue home exercise program.     Sangita Jean Baptiste, PT

## 2017-01-09 NOTE — TELEPHONE ENCOUNTER
PA Initiation    Medication: Harvoni and Ribavirin  Insurance Company: [employer]  Fax Number: [phone]    Phone Number: [phone]  [facility] [place], [place] - [facility] [address]  Filling Pharmacy Phone: [phone]  Filling Pharmacy Fax: [phone]  Start Date: 1/9/2017

## 2017-01-09 NOTE — PROGRESS NOTES
[place] [facility] DISCHARGE NOTE    Patient discharged to home at 11:09 AM via wheel chair. Accompanied by spouse and staff. Discharge instructions reviewed with patient, opportunity offered to ask questions. Prescriptions - None ordered for discharge. All belongings sent with patient.    Izabela Stinson

## 2017-01-09 NOTE — DISCHARGE SUMMARY
HISTORY OF PRESENT ILLNESS:  Scott Casale is a 61-year-old male with history of chronic hepatitis C that has not been treated yet, alcoholic cirrhosis, ascites with previous TIPS procedure, thrombocytopenia, lower extremity edema, depression and GERD who presented with increasing confusion.      The patient has had known hepatic encephalopathy, grade I-II, he has been noncompliant with lactulose.  His ammonia was initially 46 upon admission.  He did rise up to 150 before starting back down, was 127 on the day of discharge.  His lactulose was up titrated and his mental status consistently improved through his hospitalization.  He has also been maintained on rifaximin.      The patient does have chronic hepatitis C infection and he is going to start Harvoni, this has been ordered but he has not received it yet.      He has had chronic thrombocytopenia.      I saw him just on the day of discharge, he was afebrile.  He had a normal white count, he was quite conversant and oriented.  He gave me very clear history.  He was stooling frequently.  He had some rectal and anal irritation from that.  We talked about staying on a low protein diet.      ASSESSMENT:   1.  Chronic hepatic encephalopathy with acute exacerbation.   2.  Known cirrhosis, portal hypertension, ascites, splenomegaly and previous TIPS procedure.   3.  Chronic hepatitis C infection with Harvoni treatment to be initiated soon.   4.  Chronic thrombocytopenia.      PLAN:  The patient is going to discharge on his regular medications, he at the time of discharge was taking Lactulose 20 grams, which is 30 mL 3 times daily.  We will continue that and continue his other regular medications.  He is to see his primary care doctor by the end of the week and have his ammonia level checked and have status rechecked.     Discharge Medication List as of 1/9/2017 10:32 AM      CONTINUE these medications which have NOT CHANGED    Details   ribavirin 200 MG TABS Take 2  tablets (400 mg) by mouth 2 times daily, Disp-150 tablet, R-2, E-PrescribeTake 3 tablets and 2 PO in PM      gabapentin (NEURONTIN) 100 MG capsule Take 2 capsules (200 mg) by mouth At Bedtime, Disp-90 capsule, R-1, E-Prescribe      traZODone (DESYREL) 100 MG tablet Take 1 tablet (100 mg) by mouth nightly as needed for sleep, Disp-30 tablet, R-1, E-Prescribe      lactulose 20 GM/30ML SOLN Take 30 mLs by mouth 3 times daily, Disp-1892 mL, R-3, E-Prescribe      vitamin D (ERGOCALCIFEROL) 72396 UNIT capsule Take 50,000 Units by mouth Twice weekly Mon and Thurs, Historical      furosemide (LASIX) 40 MG tablet Take 40 mg by mouth daily , Historical      spironolactone (ALDACTONE) 100 MG tablet Take 100 mg by mouth daily , Historical      rifaximin (XIFAXAN) 550 MG TABS tablet Take 1 tablet (550 mg) by mouth 2 times daily, Disp-60 tablet, R-11, E-Prescribe      sildenafil (REVATIO/VIAGRA) 50 MG cap/tab Take 0.5-1 tablets (25-50 mg) by mouth daily as needed for erectile dysfunction Take 30 min to 4 hours before intercourse.  Never use with nitroglycerin, terazosin or doxazosin., Disp-12 tablet, R-11, Fax      ledipasvir-sofosbuvir (LEDIPASVIR-SOFOSBUVIR)  MG per tablet Take 1 tablet by mouth daily, Disp-28 tablet, R-2, E-Prescribe      ondansetron (ZOFRAN) 4 MG tablet Take 1 tablet (4 mg) by mouth every 8 hours as needed for nausea, Historical           Unresulted Labs Ordered in the Past 30 Days of this Admission     No orders found from 2016 to 2017.                TOTAL TIME SPENT:  Greater than 30 minutes spent on this.         ADY SINGER MD             D: 2017 10:16   T: 2017 12:55   MT: [id number]      Name:     CASALE, SCOTT   MRN:      [id number]        Account:        [id number]   :      1955           Admit Date:                                       Discharge Date: 2017      Document: [id number]

## 2017-01-12 NOTE — TELEPHONE ENCOUNTER
Prior Authorization Approval    Authorization Effective Date: 1/11/2017  Authorization Expiration Date: 4/5/2017  Medication: Harvoni and Ribavirin  Approved Dose/Quantity: 12 weeks  Reference #:     Insurance Company: [employer]  Expected CoPay: $0     CoPay Card Available: No   Foundation Assistance Needed: No  Which Pharmacy is filling the prescription (Not needed for infusion/clinic administered): [facility] - [place], [place] - [facility] [address]

## 2017-01-12 NOTE — TELEPHONE ENCOUNTER
Received notice of approval for Harvoni and ribavirin - 01/11/17 to 04/05/17.     Ciara Lopez LPN  [facility] - Hepatitis C Program

## 2017-01-13 ENCOUNTER — OFFICE VISIT (OUTPATIENT)
Dept: INTERNAL MEDICINE | Facility: CLINIC | Age: 62
End: 2017-01-13

## 2017-01-13 VITALS
OXYGEN SATURATION: 99 % | HEART RATE: 79 BPM | WEIGHT: 186 LBS | RESPIRATION RATE: 20 BRPM | SYSTOLIC BLOOD PRESSURE: 136 MMHG | DIASTOLIC BLOOD PRESSURE: 72 MMHG | BODY MASS INDEX: 25.95 KG/M2

## 2017-01-13 DIAGNOSIS — K72.10 END-STAGE LIVER DISEASE (H): Primary | ICD-10-CM

## 2017-01-13 DIAGNOSIS — K72.10 END-STAGE LIVER DISEASE (H): ICD-10-CM

## 2017-01-13 LAB
ALBUMIN SERPL-MCNC: 2.3 G/DL (ref 3.4–5)
ALP SERPL-CCNC: 75 U/L (ref 40–150)
ALT SERPL W P-5'-P-CCNC: 50 U/L (ref 0–70)
AMMONIA PLAS-SCNC: 69 UMOL/L (ref 10–50)
ANION GAP SERPL CALCULATED.3IONS-SCNC: 5 MMOL/L (ref 3–14)
AST SERPL W P-5'-P-CCNC: 98 U/L (ref 0–45)
BASOPHILS # BLD AUTO: 0 10E9/L (ref 0–0.2)
BASOPHILS NFR BLD AUTO: 0.4 %
BILIRUB SERPL-MCNC: 2.4 MG/DL (ref 0.2–1.3)
BUN SERPL-MCNC: 24 MG/DL (ref 7–30)
CALCIUM SERPL-MCNC: 8.2 MG/DL (ref 8.5–10.1)
CHLORIDE SERPL-SCNC: 109 MMOL/L (ref 94–109)
CO2 SERPL-SCNC: 27 MMOL/L (ref 20–32)
CREAT SERPL-MCNC: 1.21 MG/DL (ref 0.66–1.25)
DIFFERENTIAL METHOD BLD: ABNORMAL
EOSINOPHIL # BLD AUTO: 0.1 10E9/L (ref 0–0.7)
EOSINOPHIL NFR BLD AUTO: 1.5 %
ERYTHROCYTE [DISTWIDTH] IN BLOOD BY AUTOMATED COUNT: 13.7 % (ref 10–15)
GFR SERPL CREATININE-BSD FRML MDRD: 61 ML/MIN/1.7M2
GLUCOSE SERPL-MCNC: 75 MG/DL (ref 70–99)
HCT VFR BLD AUTO: 33.7 % (ref 40–53)
HGB BLD-MCNC: 11.5 G/DL (ref 13.3–17.7)
IMM GRANULOCYTES # BLD: 0 10E9/L (ref 0–0.4)
IMM GRANULOCYTES NFR BLD: 0.2 %
INR PPP: 1.31 (ref 0.86–1.14)
LYMPHOCYTES # BLD AUTO: 0.6 10E9/L (ref 0.8–5.3)
LYMPHOCYTES NFR BLD AUTO: 13.7 %
MCH RBC QN AUTO: 35.3 PG (ref 26.5–33)
MCHC RBC AUTO-ENTMCNC: 34.1 G/DL (ref 31.5–36.5)
MCV RBC AUTO: 103 FL (ref 78–100)
MONOCYTES # BLD AUTO: 0.7 10E9/L (ref 0–1.3)
MONOCYTES NFR BLD AUTO: 14.3 %
NEUTROPHILS # BLD AUTO: 3.2 10E9/L (ref 1.6–8.3)
NEUTROPHILS NFR BLD AUTO: 69.9 %
NRBC # BLD AUTO: 0 10*3/UL
NRBC BLD AUTO-RTO: 0 /100
PLATELET # BLD AUTO: 54 10E9/L (ref 150–450)
POTASSIUM SERPL-SCNC: 4.3 MMOL/L (ref 3.4–5.3)
PROT SERPL-MCNC: 6.6 G/DL (ref 6.8–8.8)
RBC # BLD AUTO: 3.26 10E12/L (ref 4.4–5.9)
SODIUM SERPL-SCNC: 141 MMOL/L (ref 133–144)
WBC # BLD AUTO: 4.6 10E9/L (ref 4–11)

## 2017-01-13 ASSESSMENT — PAIN SCALES - GENERAL: PAINLEVEL: NO PAIN (0)

## 2017-01-13 NOTE — NURSING NOTE
Chief Complaint   Patient presents with     Pre-Op Exam     preop fo upper endoscopy 1/17/17     [facility] F/U     1/4/17-1/9/17 for hepatic encephalopthy   Ruth Ann Yuan LPN 1:46 PM on 1/13/2017

## 2017-01-13 NOTE — PATIENT INSTRUCTIONS
[facility] Medication Refill Request Information:  * Please contact your pharmacy regarding ANY request for medication refills.  ** [facility] Prescription Fax = [phone]  * Please allow 3 business days for routine medication refills.  * Please allow 5 business days for controlled substance medication refills.     [facility] Test Result notification information:  *You will be notified with in 7-10 days of your appointment day regarding the results of your test.  If you are on MyChart you will be notified as soon as the provider has reviewed the results and signed off on them.

## 2017-01-13 NOTE — MR AVS SNAPSHOT
After Visit Summary   1/13/2017    Scott Casale    MRN: [id number]           Patient Information     Date Of Birth          1955        Visit Information        Provider Department      1/13/2017 1:40 PM Caroline Coker MD [facility]        Today's Diagnoses     End-stage liver disease (H)    -  1       Care Instructions    Primary Care Center Medication Refill Request Information:  * Please contact your pharmacy regarding ANY request for medication refills.  ** [facility] Prescription Fax = [phone]  * Please allow 3 business days for routine medication refills.  * Please allow 5 business days for controlled substance medication refills.     Primary Care Center Test Result notification information:  *You will be notified with in 7-10 days of your appointment day regarding the results of your test.  If you are on MyChart you will be notified as soon as the provider has reviewed the results and signed off on them.        Follow-ups after your visit        Your next 10 appointments already scheduled     Jan 13, 2017  2:15 PM   LAB with  LAB   [facility] ([facility])    [address] [place] [address] [phone]           Patient must bring picture ID.  Patient should be prepared to give a urine specimen  Please do not eat 10-12 hours before your appointment if you are coming in fasting for labs on lipids, cholesterol, or glucose (sugar).  Pregnant women should follow their Care Team instructions. Water with medications is okay. Do not drink coffee or other fluids.   If you have concerns about taking  your medications, please ask at office or if scheduling via [facility], send a message by clicking on Secure Messaging, Message Your Care Team.            Jan 17, 2017   Procedure with Guru Reina Palacios MD   [facility] [place] Endoscopy ([facility])    [address]  [facility] [address]   [phone]           The [place] campus is located on the corner of [facility] [address] [facility] on the [facility]. It is easily accessible from virtually any point in the [place] [place] area, via [address] and [address].            Jan 24, 2017  2:30 PM   (Arrive by 2:15 PM)   Cystoscopy with Cheo Estrada MD   [facility] ([facility])    [address] [place] [address]  4th [facility] [address] [phone]            Jan 26, 2017 10:30 AM   US ABDOMEN/PELVIS DUPLEX COMPLETE with [facility] [place] [address] Imaging Center US ([facility])    [address] [place] [id number] [phone]           Please bring a list of your medicines (including vitamins, minerals and over-the-counter drugs). Also, tell your doctor about any allergies you may have. Wear comfortable clothes and leave your valuables at home.  Adults: No eating or drinking for 8 hours before the exam. You may take medicine with a small sip of water.  Children: - Children 6+ years: No food or drink for 6 hours before exam. - Children 1-5 years: No food or drink for 4 hours before exam. - Infants, breast-fed: may have breast milk up to 2 hours before exam. - Infants, formula: may have bottle until 4 hours before exam.  Please call the Imaging Department at your exam site with any questions.            Jan 26, 2017 11:15 AM   US ABDOMEN COMPLETE with [address] [place] [address] Imaging Center US ([facility])    [address] [place] [id number] [phone]           Please bring a list of your medicines (including vitamins, minerals and over-the-counter drugs). Also, tell your doctor about any allergies you may have. Wear comfortable clothes and leave your valuables at home.  Adults: No eating or drinking for 8 hours before the  exam. You may take medicine with a small sip of water.  Children: - Children 6+ years: No food or drink for 6 hours before exam. - Children 1-5 years: No food or drink for 4 hours before exam. - Infants, breast-fed: may have breast milk up to 2 hours before exam. - Infants, formula: may have bottle until 4 hours before exam.  Please call the Imaging Department at your exam site with any questions.            Mar 09, 2017  9:25 AM   (Arrive by 9:10 AM)   Return Visit with Greg Valdez MD   [facility] ([facility])    [address] [place] [address]   [phone]            Mar 22, 2017  6:00 PM   (Arrive by 5:45 PM)   New Patient Visit with GILMAR Saleem CNP   Gastroenterology and IBD ([facility])    [address] [place] [address]   [phone]            Apr 11, 2017  9:00 AM   Lab with  LAB   [facility] ([facility])    [address] [place] [address]   [phone]            Apr 11, 2017 10:00 AM   US ABDOMEN COMPLETE with UCUS1   [facility] Center US ([facility])    [address] [place] [address]   [phone]           Please bring a list of your medicines (including vitamins, minerals and over-the-counter drugs). Also, tell your doctor about any allergies you may have. Wear comfortable clothes and leave your valuables at home.  Adults: No eating or drinking for 8 hours before the exam. You may take medicine with a small sip of water.  Children: - Children 6+ years: No food or drink for 6 hours before exam. - Children 1-5 years: No food or drink for 4 hours before exam. - Infants, breast-fed: may have breast milk up to 2 hours before exam. - Infants, formula: may have bottle until 4 hours before exam.  Please call the Imaging Department at your exam site  with any questions.            Apr 11, 2017 11:00 AM   (Arrive by 10:45 AM)   Return General Liver with Warren Higginbotham MD   [facility]atology ([facility])    [address] [address] [phone]              Future tests that were ordered for you today     Open Future Orders        Priority Expected Expires Ordered    Comprehensive metabolic panel Routine  1/13/2018 1/13/2017    Ammonia Routine  1/14/2018 1/13/2017    INR Routine  1/13/2018 1/13/2017    CBC with platelets differential Routine  1/13/2018 1/13/2017            Who to contact     Please call your clinic at [phone] to:    Ask questions about your health    Make or cancel appointments    Discuss your medicines    Learn about your test results    Speak to your doctor   If you have compliments or concerns about an experience at your clinic, or if you wish to file a complaint, please contact [facility] Patient Relations at [phone] or email us at [email or web address]         Additional Information About Your Visit        [employer] Information     [employer] gives you secure access to your electronic health record. If you see a primary care provider, you can also send messages to your care team and make appointments. If you have questions, please call your primary care clinic.  If you do not have a primary care provider, please call [phone] and they will assist you.      eduClipper is an electronic gateway that provides easy, online access to your medical records. With eduClipper, you can request a clinic appointment, read your test results, renew a prescription or communicate with your care team.     To access your existing account, please contact your [facility] or call [phone] for assistance.        Care EveryWhere ID     This is your Care EveryWhere ID. This could be used by other organizations to access your [place]  medical records  [id number]        Your Vitals Were     Pulse Respirations Pulse Oximetry             79 20 99%          Blood Pressure from Last 3 Encounters:   01/13/17 136/72   01/09/17 110/53   01/03/17 135/71    Weight from Last 3 Encounters:   01/13/17 84.369 kg (186 lb)   01/07/17 85.2 kg (187 lb 13.3 oz)   01/03/17 84.052 kg (185 lb 4.8 oz)               Primary Care Provider Office Phone # Fax #    Carlos Duenas MD, [phone]       [facility] [address] [place] [address]        Thank you!     Thank you for choosing [facility]  for your care. Our goal is always to provide you with excellent care. Hearing back from our patients is one way we can continue to improve our services. Please take a few minutes to complete the written survey that you may receive in the mail after your visit with us. Thank you!             Your Updated Medication List - Protect others around you: Learn how to safely use, store and throw away your medicines at www.disposemymeds.org.          This list is accurate as of: 1/13/17  2:06 PM.  Always use your most recent med list.                   Brand Name Dispense Instructions for use    furosemide 40 MG tablet    LASIX     Take 40 mg by mouth daily       gabapentin 100 MG capsule    NEURONTIN    90 capsule    Take 2 capsules (200 mg) by mouth At Bedtime       lactulose 20 GM/30ML Soln     1892 mL    Take 30 mLs by mouth 3 times daily       ledipasvir-sofosbuvir  MG per tablet    ledipasvir-sofosbuvir    28 tablet    Take 1 tablet by mouth daily       ribavirin 200 MG Tabs     150 tablet    Take 2 tablets (400 mg) by mouth 2 times daily       rifaximin 550 MG Tabs tablet    XIFAXAN    60 tablet    Take 1 tablet (550 mg) by mouth 2 times daily       sildenafil 50 MG cap/tab    REVATIO/VIAGRA    12 tablet    Take 0.5-1 tablets (25-50 mg) by mouth daily as needed for erectile dysfunction Take 30 min to 4 hours  before intercourse.  Never use with nitroglycerin, terazosin or doxazosin.       spironolactone 100 MG tablet    ALDACTONE     Take 100 mg by mouth daily       traZODone 100 MG tablet    DESYREL    30 tablet    Take 1 tablet (100 mg) by mouth nightly as needed for sleep       vitamin D 46790 UNIT capsule    ERGOCALCIFEROL     Take 50,000 Units by mouth Twice weekly Mon and Thurs       ZOFRAN 4 MG tablet   Generic drug:  ondansetron      Take 1 tablet (4 mg) by mouth every 8 hours as needed for nausea

## 2017-01-17 ENCOUNTER — PRE VISIT (OUTPATIENT)
Dept: UROLOGY | Facility: CLINIC | Age: 62
End: 2017-01-17

## 2017-01-17 NOTE — PROGRESS NOTES
"Leonel is a 61 year old male with a past medical history of ESLD, Ankylosing spondylitis (H); Splenomegaly; Shoulder dislocation; Ulcerative colitis (H); H/O testicular cancer (age 28); Hepatitis C; and Ascites. This is my first visit with him for both a hospital discharge follow up and apparently a preop, for endoscopy under general anesthesia next week.    He is accompanied by his wife.  They both agree he is much improved compared to last week, when he was hospitalized with hepatic encephalopathy, and was \"completely out of it.\"  He is back on a schedule of lactulose and rifaximin.   He does report some insomnia but no confusion, disorientation, and no tremors or flap.      As far as the preop, he denies any chest pain or cardiac symptoms.  No fevers or chills.  No bleeding episodes.  Has had surgery and general anesthesia previously, with no complication.    Past, family, social history unchanged per Epic.   ROS: 10 point ROS neg other than the symptoms noted above in the HPI.    PHYSICAL EXAM:  B/P: 136/72, P: 79, R: 20  Constitutional: no distress, comfortable, pleasant   Eyes: anicteric, normal extra-ocular movements   Ears, Nose and Throat: tympanic membranes clear, nose clear and free of lesions, throat clear, neck supple with full range of motion, no thyromegaly.   Cardiovascular: regular rate and rhythm, normal S1 and S2, no murmurs, rubs or gallops, peripheral pulses full and symmetric   Respiratory: clear to auscultation, no wheezes or crackles, normal breath sounds   Gastrointestinal: positive bowel sounds, nontender, no hepatosplenomegaly, no masses   Musculoskeletal: full range of motion, no active joints  Skin: no concerning lesions, no jaundice   Neurological: A&O times three; normal gait, no tremor, no asterixis, no flap  Psychological: appropriate mood   Lymphatic: no cervical lymphadenopathy    A/P    1.  Hepatic encephalopathy:  Clinically improved.  Ammonia level still elevated, but much lower " than during hospitalization.  Continue current regimen of lactulose and rifaximin.    2.  From a preop perspective, low risk for perioperative events other than what might be related to his primary liver disease (potential for bleeding, infection).  Proceed with endoscopy under GA as planned.    Maurilio Lynch MD

## 2017-01-17 NOTE — TELEPHONE ENCOUNTER
Patient coming in for cystoscopy for hematuria workup. Patient saw Caitlin Fuentes for hematuria workup on 12/27/16. Patient has complete CT Scan and labs. My chart message to patient

## 2017-01-24 ENCOUNTER — OFFICE VISIT (OUTPATIENT)
Dept: UROLOGY | Facility: CLINIC | Age: 62
End: 2017-01-24

## 2017-01-24 VITALS — DIASTOLIC BLOOD PRESSURE: 68 MMHG | HEART RATE: 83 BPM | SYSTOLIC BLOOD PRESSURE: 127 MMHG

## 2017-01-24 DIAGNOSIS — N30.01 ACUTE CYSTITIS WITH HEMATURIA: Primary | ICD-10-CM

## 2017-01-24 ASSESSMENT — PAIN SCALES - GENERAL
PAINLEVEL: NO PAIN (0)
PAINLEVEL: MILD PAIN (3)

## 2017-01-24 NOTE — PATIENT INSTRUCTIONS
Follow up as needed with the urology clinic    It was a pleasure meeting with you today.  Thank you for allowing me and my team the privilege of caring for you today.  YOU are the reason we are here, and I truly hope we provided you with the excellent service you deserve.  Please let us know if there is anything else we can do for you so that we can be sure you are leaving completely satisfied with your care experience.

## 2017-01-24 NOTE — PROGRESS NOTES
"      Chief Complaint:   ***         Consult or Referral:   Mr. Scott Casale is a 61 year old male seen in consultation from [employer]:[id number]::\"No ref. provider found\",\"No ref. provider found \",\"Carlos Duenas MD.         History of Present Illness:    Scott Casale is a very pleasant 61 year old male who presents with a history of ***         Past Medical History:     Past Medical History   Diagnosis Date     Ankylosing spondylitis (H)      Splenomegaly      Shoulder dislocation      Ulcerative colitis (H)      H/O testicular cancer age 28     Hepatitis C      genotype 1A      Ascites      diuretic refractory            Past Surgical History:     Past Surgical History   Procedure Laterality Date     C knee scope,clean/drain       bilateral     C appendectomy       Strabismus surgery       bilateral     Orchiectomy scrotal       prosthesis placed     Ir transven intrahepatic portosyst rev       Eye surgery       Esophagoscopy, gastroscopy, duodenoscopy (egd), combined N/A 1/17/2017     Procedure: COMBINED ESOPHAGOSCOPY, GASTROSCOPY, DUODENOSCOPY (EGD);  Surgeon: Guru Reina Palacios MD;  Location: [facility]     Colonoscopy N/A 1/17/2017     Procedure: COLONOSCOPY;  Surgeon: Guru Reina Palacios MD;  Location: [facility]            Medications     Current Outpatient Prescriptions   Medication     rifaximin (XIFAXAN) 550 MG TABS tablet     ribavirin 200 MG TABS     gabapentin (NEURONTIN) 100 MG capsule     traZODone (DESYREL) 100 MG tablet     sildenafil (REVATIO/VIAGRA) 50 MG cap/tab     lactulose 20 GM/30ML SOLN     ledipasvir-sofosbuvir (LEDIPASVIR-SOFOSBUVIR)  MG per tablet     vitamin D (ERGOCALCIFEROL) 50464 UNIT capsule     furosemide (LASIX) 40 MG tablet     spironolactone (ALDACTONE) 100 MG tablet     ondansetron (ZOFRAN) 4 MG tablet     No current facility-administered medications for this visit.            Family History:     Family History   Problem Relation Age of Onset     " CANCER Mother 70     brain tumor     Prostate Cancer No family hx of      Cancer - colorectal No family hx of      DIABETES Maternal Grandmother      CEREBROVASCULAR DISEASE Sister      HEART DISEASE Sister      Respiratory Son      CANCER Father      Uterine Cancer Sister             Social History:     Social History     Social History     Marital Status:      Spouse Name: N/A     Number of Children: N/A     Years of Education: N/A     Occupational History     Not on file.     Social History Main Topics     Smoking status: Former Smoker     Quit date: 01/01/1978     Smokeless tobacco: Never Used     Alcohol Use: No      Comment: quit in 2010     Drug Use: No     Sexual Activity:     Partners: Female     Other Topics Concern     Parent/Sibling W/ Cabg, Mi Or Angioplasty Before 65f 55m? No     Social History Narrative    Admit 9/28:    Lives with new wife of 2 mos.    Tobacco:  Former smoker, quit 1978    Alcohol:  Sober since 2010, recovering alcoholic.    Drugs:  Denies            Allergies:   Blood transfusion related (informational only); Percocet; Acetaminophen; Iodine-131; Motrin; and Tylenol         Review of Systems:  From intake questionnaire   Negative 14 system review except as noted on HPI, nurse's note.         Physical Exam:   Patient is a 61 year old  male   Vitals: Blood pressure 127/68, pulse 83.  General Appearance Adult: Alert, no acute distress, oriented  HENT: throat/mouth:normal, good dentition  Neck: No adenopathy,masses or thyromegaly  Lungs: no respiratory distress, or pursed lip breathing  Heart: No obvious jugular venous distension present  Abdomen: soft, nontender, no organomegaly or masses, There is no weight on file to calculate BMI.  Lymphatics: No cervical or supraclavicular adenopathy  Musculoskeltal: extremities normal, no peripheral edema  Skin: no suspicious lesions or rashes  Neuro: Alert, oriented, speech and mentation normal  Psych: affect and mood normal  Gait:  Normal  : {NANY EXAM MALE GENITAL:869579}     Uroflow and Post-Void Residual by Bladder Scan   Voided Volume: ***  QMax: ***  QAvg: ***  PVR: ***        Labs and Pathology:    I personally reviewed all applicable laboratory data and went over findings with patient  Significant for:    CBC RESULTS:  Recent Labs   Lab Test  01/13/17   1435  01/09/17   0650  01/08/17   0707  01/07/17   0715   WBC  4.6  4.3  3.7*  4.4   HGB  11.5*  11.4*  10.7*  10.7*   PLT  54*  48*  46*  44*        BMP RESULTS:  Recent Labs   Lab Test  01/13/17   1435  01/09/17   0650  01/08/17   0707  01/07/17   0715   NA  141  138  140  139   POTASSIUM  4.3  4.5  4.5  4.4   CHLORIDE  109  108  110*  110*   CO2  27  24  23  23   ANIONGAP  5  6  7  6   GLC  75  103*  111*  111*   BUN  24  19  16  17   CR  1.21  1.02  1.02  1.07   GFRESTIMATED  61  74  74  70   GFRESTBLACK  74  90  90  85   ARACELI  8.2*  8.2*  7.8*  7.7*       UA RESULTS:   Recent Labs   Lab Test  01/04/17   2100  12/27/16   1340  12/19/16   1333   SG  1.015  1.017  1.024   URINEPH  5.5  5.0  5.5   NITRITE  Negative  Negative  Negative   RBCU  9*  32*  6*   WBCU  1  1  1       CALCIUM RESULTS  ARACELI      8.2   1/13/2017  ARACELI      8.2   1/9/2017  ARACELI      7.8   1/8/2017    PSA RESULTS  No results found for: PSA    INR  Recent Labs   Lab Test  01/13/17   1435  01/07/17   0715  12/19/16   1335  12/05/16   1616   INR  1.31*  1.50*  1.36*  1.25*           Imaging:    I personally reviewed all applicable imaging and went over findings with patient.  Significant for ***      Outside and Past Medical records:    I spent *** minutes reviewing outside and past medical records.       Standardized Questionnaire:      [employer] SYMPTOM SCORE  SUM ***/35  QOL ***/6    [place] INCONTINENCE INDEX  -Stress ***/12   -Urge ***/12   -Pads ***/8   -Total Severity ***/32   -Total bother ***/8               Assessment and Plan:     Assessment:    {Diagnoses:033741}    Plan:  ***    Orders  No orders of the defined types were placed in this encounter.         Cheo Estrada MD    CC:  No ref. provider found.  Carlos Duenas MD

## 2017-01-24 NOTE — Clinical Note
1/24/2017       RE: Scott Casale  [address] [address]  [address]  [place] [address]     Dear Colleague,    Thank you for referring your patient, Scott Casale, to the [facility] AND INST FOR PROSTATE AND UROLOGIC CANCERS at [facility]. Please see a copy of my visit note below.    CYSTOSCOPY NOTE    Mr. Casale is a 62 yo M referred for cystoscopy as part of evaluation for microscopic hematuria.  He has never had gross hematuria.  He has advanced liver disease and is undergoing treatment for Hep C prior to considering possible eligibility for trasnplant.  Workup to date has included:    Urine Cytology - 12/16/16  CYTOLOGIC INTERPRETATION:     A.  Urine, voided:   Negative for High-Grade Urothelial Carcinoma   Specimen Adequacy: Satisfactory for evaluation.     B.  Urine, voided, FISH UroVysion:   Negative UroVysion test   Specimen Adequacy: Satisfactory for evaluation.     MRI Abdomen 12/16 - negative for renal abnormalities, tumors  CT 9/16 - no evidence of renal abnormalities or stones    CYSTOSCOPY  After obtaining informed consent, the patient was prepped and draped in the standard sterile fashion.  The 15 [place] flexible cystoscope was inserted through the urethral meatus.  The anterior urethra was normal without stricture.  The external sphincter was appropriately coapted.  The prostatic urethra demonstrated mild hypertrophy.  The bladder neck was open.  The bladder was unremarkable for tumors, erythema or stones.  The ureteral orifices were identified on each side in orthotopic position with efflux of clear urine. There were minimal trabeculations.  On retroflexion there was the usual bladder neck hyperemia.  There was no intravesical protrusion of the prostate.      The patient tolerated the procedure well without complication.      62 yo M with ESLD and microscopic hematuria now s/p complete workup without identifiable  cause for bleeding  -Would continue to check U/A  yearly, if neg x 2 years can come off screening.  If continues to stay positive or develops asymptomatic gross hematuria should be considered for repeat evaluation    Cheo Estrada

## 2017-01-24 NOTE — NURSING NOTE
Invasive Procedure Safety Checklist:    Procedure: cysto    Action: Complete sections and checkboxes as appropriate.    Pre-procedure:  1. Patient ID Verified with 2 identifiers ([place] and  or MRN) : YES    2. Procedure and site verified with patient/designee (when able) : YES    3. Accurate consent documentation in medical record : YES    4. H&P (or appropriate assessment) documented in medical record : NO  H&P must be up to 30 days prior to procedure an updated within 24 hours of                 Procedure as applicable.     5. Relevant diagnostic and radiology test results appropriately labeled and displayed as applicable : NO    6. Blood products, implants, devices, and/or special equipment available for the procedure as applicable : NO    7. Procedure site(s) marked with provider initials [Exclusions: none] : NO    8. Marking not required. Reason : Yes  Procedure does not require site marking    Time Out:     Time-Out performed immediately prior to starting procedure, including verbal and active participation of all team members addressing: YES    1. Correct patient identity.  2. Confirmed that the correct side and site are marked.  3. An accurate procedure to be done.  4. Agreement on the procedure to be done.  5. Correct patient position.  6. Relevant images and results are properly labeled and appropriately displayed.  7. The need to administer antibiotics or fluids for irrigation purposes during the procedure as applicable.  8. Safety precautions based on patient history or medication use.    During Procedure: Verification of correct person, site, and procedure occurs any time the responsibility for care of the patient is transferred to another member of the care team.    Mil GRANDE

## 2017-01-25 NOTE — PROGRESS NOTES
CYSTOSCOPY NOTE    Mr. Casale is a 60 yo M referred for cystoscopy as part of evaluation for microscopic hematuria.  He has never had gross hematuria.  He has advanced liver disease and is undergoing treatment for Hep C prior to considering possible eligibility for trasnplant.  Workup to date has included:    Urine Cytology - 12/16/16  CYTOLOGIC INTERPRETATION:     A.  Urine, voided:   Negative for High-Grade Urothelial Carcinoma   Specimen Adequacy: Satisfactory for evaluation.     B.  Urine, voided, FISH UroVysion:   Negative UroVysion test   Specimen Adequacy: Satisfactory for evaluation.     MRI Abdomen 12/16 - negative for renal abnormalities, tumors  CT 9/16 - no evidence of renal abnormalities or stones    CYSTOSCOPY  After obtaining informed consent, the patient was prepped and draped in the standard sterile fashion.  The 15 Swedish flexible cystoscope was inserted through the urethral meatus.  The anterior urethra was normal without stricture.  The external sphincter was appropriately coapted.  The prostatic urethra demonstrated mild hypertrophy.  The bladder neck was open.  The bladder was unremarkable for tumors, erythema or stones.  The ureteral orifices were identified on each side in orthotopic position with efflux of clear urine. There were minimal trabeculations.  On retroflexion there was the usual bladder neck hyperemia.  There was no intravesical protrusion of the prostate.      The patient tolerated the procedure well without complication.      60 yo M with ESLD and microscopic hematuria now s/p complete workup without identifiable  cause for bleeding  -Would continue to check U/A yearly, if neg x 2 years can come off screening.  If continues to stay positive or develops asymptomatic gross hematuria should be considered for repeat evaluation    Cheo Estrada

## 2017-02-08 ENCOUNTER — HOSPITAL ENCOUNTER (INPATIENT)
Facility: CLINIC | Age: 62
LOS: 6 days | Discharge: ANOTHER HOSPITAL | DRG: 441 | End: 2017-02-15
Attending: EMERGENCY MEDICINE | Admitting: INTERNAL MEDICINE
Payer: MEDICARE

## 2017-02-08 DIAGNOSIS — B18.2 CHRONIC HEPATITIS C WITHOUT HEPATIC COMA (H): Chronic | ICD-10-CM

## 2017-02-08 DIAGNOSIS — K76.82 HEPATIC ENCEPHALOPATHY (H): ICD-10-CM

## 2017-02-08 PROBLEM — G93.40 ENCEPHALOPATHY ACUTE: Status: ACTIVE | Noted: 2017-02-08

## 2017-02-08 LAB
ALBUMIN SERPL-MCNC: 2.3 G/DL (ref 3.4–5)
ALP SERPL-CCNC: 91 U/L (ref 40–150)
ALT SERPL W P-5'-P-CCNC: 34 U/L (ref 0–70)
AMMONIA PLAS-SCNC: 135 UMOL/L (ref 10–50)
ANION GAP SERPL CALCULATED.3IONS-SCNC: 7 MMOL/L (ref 3–14)
APTT PPP: 32 SEC (ref 22–37)
AST SERPL W P-5'-P-CCNC: 65 U/L (ref 0–45)
BASOPHILS # BLD AUTO: 0 10E9/L (ref 0–0.2)
BASOPHILS NFR BLD AUTO: 0.4 %
BILIRUB SERPL-MCNC: 3.2 MG/DL (ref 0.2–1.3)
BUN SERPL-MCNC: 24 MG/DL (ref 7–30)
CALCIUM SERPL-MCNC: 7.9 MG/DL (ref 8.5–10.1)
CHLORIDE SERPL-SCNC: 108 MMOL/L (ref 94–109)
CO2 SERPL-SCNC: 25 MMOL/L (ref 20–32)
CREAT SERPL-MCNC: 1.46 MG/DL (ref 0.66–1.25)
DIFFERENTIAL METHOD BLD: ABNORMAL
EOSINOPHIL # BLD AUTO: 0.1 10E9/L (ref 0–0.7)
EOSINOPHIL NFR BLD AUTO: 2 %
ERYTHROCYTE [DISTWIDTH] IN BLOOD BY AUTOMATED COUNT: 14.1 % (ref 10–15)
GFR SERPL CREATININE-BSD FRML MDRD: 49 ML/MIN/1.7M2
GLUCOSE SERPL-MCNC: 88 MG/DL (ref 70–99)
HCT VFR BLD AUTO: 36.7 % (ref 40–53)
HGB BLD-MCNC: 12.6 G/DL (ref 13.3–17.7)
IMM GRANULOCYTES # BLD: 0 10E9/L (ref 0–0.4)
IMM GRANULOCYTES NFR BLD: 0.2 %
INR PPP: 1.37 (ref 0.86–1.14)
LYMPHOCYTES # BLD AUTO: 0.8 10E9/L (ref 0.8–5.3)
LYMPHOCYTES NFR BLD AUTO: 15.1 %
MCH RBC QN AUTO: 35.4 PG (ref 26.5–33)
MCHC RBC AUTO-ENTMCNC: 34.3 G/DL (ref 31.5–36.5)
MCV RBC AUTO: 103 FL (ref 78–100)
MONOCYTES # BLD AUTO: 0.8 10E9/L (ref 0–1.3)
MONOCYTES NFR BLD AUTO: 14.6 %
NEUTROPHILS # BLD AUTO: 3.7 10E9/L (ref 1.6–8.3)
NEUTROPHILS NFR BLD AUTO: 67.7 %
PLATELET # BLD AUTO: 54 10E9/L (ref 150–450)
POTASSIUM SERPL-SCNC: 3.7 MMOL/L (ref 3.4–5.3)
PROT SERPL-MCNC: 6.8 G/DL (ref 6.8–8.8)
RBC # BLD AUTO: 3.56 10E12/L (ref 4.4–5.9)
SODIUM SERPL-SCNC: 140 MMOL/L (ref 133–144)
WBC # BLD AUTO: 5.5 10E9/L (ref 4–11)

## 2017-02-08 PROCEDURE — 82140 ASSAY OF AMMONIA: CPT | Performed by: EMERGENCY MEDICINE

## 2017-02-08 PROCEDURE — 80053 COMPREHEN METABOLIC PANEL: CPT | Performed by: EMERGENCY MEDICINE

## 2017-02-08 PROCEDURE — 25000132 ZZH RX MED GY IP 250 OP 250 PS 637: Performed by: EMERGENCY MEDICINE

## 2017-02-08 PROCEDURE — 99285 EMERGENCY DEPT VISIT HI MDM: CPT | Mod: 25

## 2017-02-08 PROCEDURE — 85610 PROTHROMBIN TIME: CPT | Performed by: EMERGENCY MEDICINE

## 2017-02-08 PROCEDURE — 99220 ZZC INITIAL OBSERVATION CARE,LEVL III: CPT | Performed by: INTERNAL MEDICINE

## 2017-02-08 PROCEDURE — 85025 COMPLETE CBC W/AUTO DIFF WBC: CPT | Performed by: EMERGENCY MEDICINE

## 2017-02-08 PROCEDURE — 99285 EMERGENCY DEPT VISIT HI MDM: CPT | Performed by: EMERGENCY MEDICINE

## 2017-02-08 PROCEDURE — 85730 THROMBOPLASTIN TIME PARTIAL: CPT | Performed by: EMERGENCY MEDICINE

## 2017-02-08 RX ORDER — SPIRONOLACTONE 25 MG/1
100 TABLET ORAL DAILY
Status: DISCONTINUED | OUTPATIENT
Start: 2017-02-09 | End: 2017-02-13

## 2017-02-08 RX ORDER — NALOXONE HYDROCHLORIDE 0.4 MG/ML
.1-.4 INJECTION, SOLUTION INTRAMUSCULAR; INTRAVENOUS; SUBCUTANEOUS
Status: DISCONTINUED | OUTPATIENT
Start: 2017-02-08 | End: 2017-02-08

## 2017-02-08 RX ORDER — LIDOCAINE 40 MG/G
CREAM TOPICAL
Status: DISCONTINUED | OUTPATIENT
Start: 2017-02-08 | End: 2017-02-15 | Stop reason: HOSPADM

## 2017-02-08 RX ORDER — FUROSEMIDE 40 MG
40 TABLET ORAL DAILY
Status: DISCONTINUED | OUTPATIENT
Start: 2017-02-09 | End: 2017-02-13

## 2017-02-08 RX ORDER — ONDANSETRON 2 MG/ML
4 INJECTION INTRAMUSCULAR; INTRAVENOUS EVERY 6 HOURS PRN
Status: DISCONTINUED | OUTPATIENT
Start: 2017-02-08 | End: 2017-02-15 | Stop reason: HOSPADM

## 2017-02-08 RX ORDER — LACTULOSE 10 G/15ML
10 SOLUTION ORAL ONCE
Status: COMPLETED | OUTPATIENT
Start: 2017-02-08 | End: 2017-02-08

## 2017-02-08 RX ORDER — ONDANSETRON 4 MG/1
4 TABLET, ORALLY DISINTEGRATING ORAL EVERY 6 HOURS PRN
Status: DISCONTINUED | OUTPATIENT
Start: 2017-02-08 | End: 2017-02-15 | Stop reason: HOSPADM

## 2017-02-08 RX ORDER — LACTULOSE 10 G/15ML
20 SOLUTION ORAL 3 TIMES DAILY
Status: DISCONTINUED | OUTPATIENT
Start: 2017-02-09 | End: 2017-02-10

## 2017-02-08 RX ORDER — RIBAVIRIN 200 MG/1
600 TABLET, FILM COATED ORAL EVERY MORNING
Status: DISCONTINUED | OUTPATIENT
Start: 2017-02-09 | End: 2017-02-15

## 2017-02-08 RX ORDER — LEDIPASVIR AND SOFOSBUVIR 90; 400 MG/1; MG/1
1 TABLET, FILM COATED ORAL DAILY
Status: DISCONTINUED | OUTPATIENT
Start: 2017-02-09 | End: 2017-02-15 | Stop reason: HOSPADM

## 2017-02-08 RX ORDER — GABAPENTIN 100 MG/1
200 CAPSULE ORAL AT BEDTIME
Status: DISCONTINUED | OUTPATIENT
Start: 2017-02-08 | End: 2017-02-15 | Stop reason: HOSPADM

## 2017-02-08 RX ORDER — DEXTROSE MONOHYDRATE, SODIUM CHLORIDE, AND POTASSIUM CHLORIDE 50; 1.49; 4.5 G/1000ML; G/1000ML; G/1000ML
INJECTION, SOLUTION INTRAVENOUS CONTINUOUS
Status: CANCELLED | OUTPATIENT
Start: 2017-02-08 | End: 2017-02-09

## 2017-02-08 RX ORDER — NALOXONE HYDROCHLORIDE 0.4 MG/ML
.1-.4 INJECTION, SOLUTION INTRAMUSCULAR; INTRAVENOUS; SUBCUTANEOUS
Status: DISCONTINUED | OUTPATIENT
Start: 2017-02-08 | End: 2017-02-15 | Stop reason: HOSPADM

## 2017-02-08 RX ORDER — POLYETHYLENE GLYCOL 3350 17 G/17G
17 POWDER, FOR SOLUTION ORAL ONCE
Status: COMPLETED | OUTPATIENT
Start: 2017-02-08 | End: 2017-02-09

## 2017-02-08 RX ORDER — RIBAVIRIN 200 MG/1
400 TABLET, FILM COATED ORAL EVERY EVENING
Status: DISCONTINUED | OUTPATIENT
Start: 2017-02-09 | End: 2017-02-15

## 2017-02-08 RX ORDER — LACTULOSE 10 G/15ML
20 SOLUTION ORAL ONCE
Status: COMPLETED | OUTPATIENT
Start: 2017-02-08 | End: 2017-02-08

## 2017-02-08 RX ADMIN — LACTULOSE 10 G: 20 SOLUTION ORAL at 21:28

## 2017-02-08 RX ADMIN — LACTULOSE 20 G: 20 SOLUTION ORAL at 20:28

## 2017-02-08 NOTE — IP AVS SNAPSHOT
` `     [facility] SURGICAL: [phone]            Medication Administration Report for Casale, Scott as of 02/15/17 1438   Legend:    Given Hold Not Given Due Canceled Entry Other Actions    Time Time (Time) Time  Time-Action       Inactive    Active    Linked        Medications 02/09/17 02/10/17 02/11/17 02/12/17 02/13/17 02/14/17 02/15/17    0.9% sodium chloride infusion  Rate: 50 mL/hr Freq: CONTINUOUS Route: IV  Start: 02/13/17 1245        1318-Stopped       1447 ( )-New Bag       1500 ( )-Rate/Dose Verify       1600 ( )-Rate/Dose Verify       1700 ( )-Rate/Dose Verify       1800 ( )-Rate/Dose Verify       1900 ( )-Rate/Dose Verify        0112 ( )-New Bag       0700 ( )-Rate/Dose Verify       1120 (1,000 mL)-New Bag       1145 ( )-Rate/Dose Verify       1418 (1,000 mL)-Rate/Dose Change       2124 ( )-New Bag        0700 ( )-Rate/Dose Verify       0800 ( )-Rate/Dose Verify       0900 ( )-Rate/Dose Verify       1112 ( )-Rate/Dose Change       1119 ( )-New Bag           cetirizine (zyrTEC) tablet 10 mg  Dose: 10 mg Freq: DAILY Route: PO  Start: 02/09/17 1100    1358 (10 mg)-Given        0836 (10 mg)-Given        0859 (10 mg)-Given        0947 (10 mg)-Given        (0942)-Not Given        0742 (10 mg)-Given        0808 (10 mg)-Given           gabapentin (NEURONTIN) capsule 200 mg  Dose: 200 mg Freq: AT BEDTIME Route: PO  Start: 02/08/17 2345    0025 (200 mg)-Given       2210 (200 mg)-Given        2156 (200 mg)-Given        2111 (200 mg)-Given        2106 (200 mg)-Given        2126 (200 mg)-Given        2008 (200 mg)-Given        [ ] 2200           lactulose (CHRONULAC) solution 45 g  Dose: 45 g Freq: 3 TIMES DAILY Route: PO  Start: 02/15/17 1400          1334 (45 g)-Given       [ ] 2000           ledipasvir-sofosbuvir (HARVONI)  MG per tablet TABS 1 tablet  Dose: 1 tablet Freq: DAILY Route: PO  Start: 02/09/17 0800   Admin Instructions: Patient may use own supply            1032 (1  "tablet)-Given        1039 (1 tablet)-Given        1033 (1 tablet)-Given        1042 (1 tablet)-Given        (1113)-Not Given [C]        1018 (1 tablet)-Given        1041-Hold           lidocaine (LMX4) kit  Freq: EVERY 1 HOUR PRN Route: Top  PRN Reason: pain  PRN Comment: with VAD insertion or accessing implanted port.  Start: 02/08/17 2339   Admin Instructions: Do NOT give if patient has a history of allergy to any local anesthetic or any \"deb\" product.   Apply 30 minutes prior to VAD insertion or port access.  MAX Dose:  2.5 g (  of 5 g tube)               lidocaine 1 % 1 mL  Dose: 1 mL Freq: EVERY 1 HOUR PRN Route: OTHER  PRN Comment: mild pain with VAD insertion or accessing implanted port  Start: 02/08/17 2339   Admin Instructions: Do NOT give if patient has a history of allergy to any local anesthetic or any \"deb\" product. MAX dose 1 mL subcutaneous OR intradermal in divided doses.               naloxone (NARCAN) injection 0.1-0.4 mg  Dose: 0.1-0.4 mg Freq: EVERY 2 MIN PRN Route: IV  PRN Reason: opioid reversal  Start: 02/08/17 2339   Admin Instructions: For respiratory rate LESS than or EQUAL to 8.  Partial reversal dose:  0.1 mg titrated q 2 minutes for Analgesia Side Effects Monitoring Sedation Level of 3 (frequently drowsy, arousable, drifts to sleep during conversation).Full reversal dose:  0.4 mg bolus for Analgesia Side Effects Monitoring Sedation Level of 4 (somnolent, minimal or no response to stimulation).               ondansetron (ZOFRAN-ODT) ODT tab 4 mg  Dose: 4 mg Freq: EVERY 6 HOURS PRN Route: PO  PRN Reason: nausea  Start: 02/08/17 2339   Admin Instructions: This is Step 1 of nausea and vomiting management.  If nausea not resolved in 15 minutes, go to Step 2 prochlorperazine (COMPAZINE). Do not push through foil backing. Peel back foil and gently remove. Place on tongue immediately. Administration with liquid unnecessary     0021 (4 mg)-Given        2156 (4 mg)-Given        1507 (4 " mg)-Given                            Or  ondansetron (ZOFRAN) injection 4 mg  Dose: 4 mg Freq: EVERY 6 HOURS PRN Route: IV  PRN Reasons: nausea,vomiting  Start: 02/08/17 2339   Admin Instructions: This is Step 1 of nausea and vomiting management.  If nausea not resolved in 15 minutes, go to Step 2 prochlorperazine (COMPAZINE).                             2210 (4 mg)-Given          1123 (4 mg)-Given           pantoprazole (PROTONIX) 40 mg IV push injection  Dose: 40 mg Freq: 2 TIMES DAILY Route: IV  Start: 02/09/17 0000   Admin Instructions: Reconstitute vial with 10mLs Saline and administer IV Push     0022 (40 mg)-Given       0821 (40 mg)-Given       2018 (40 mg)-Given        0836 (40 mg)-Given       2008 (40 mg)-Given        0859 (40 mg)-Given       2111 (40 mg)-Given        0943 (40 mg)-Given       2106 (40 mg)-Given        0938 (40 mg)-Given       2126 (40 mg)-Given        0742 (40 mg)-Given       2006 (40 mg)-Given        0808 (40 mg)-Given       [ ] 2000           rifaximin (XIFAXAN) tablet 550 mg  Dose: 550 mg Freq: 2 TIMES DAILY Route: PO  Indications Comment: cirrhosis  Start: 02/09/17 0000    0025 (550 mg)-Given       0821 (550 mg)-Given       2018 (550 mg)-Given        0836 (550 mg)-Given       2007 (550 mg)-Given        0859 (550 mg)-Given       2111 (550 mg)-Given        0943 (550 mg)-Given       2105 (550 mg)-Given        (1113)-Not Given       2126 (550 mg)-Given        0742 (550 mg)-Given       2007 (550 mg)-Given        0808 (550 mg)-Given       [ ] 2000           sodium chloride (PF) 0.9% PF flush 3 mL  Dose: 3 mL Freq: EVERY 8 HOURS Route: IK  Start: 02/08/17 2345   Admin Instructions: And Q1H PRN, to lock peripheral IV dormant line.     0022 (3 mL)-Given       0826 (3 mL)-Given       1739 (3 mL)-Given       2020 (3 mL)-Given        0458 (3 mL)-Given       1414 (3 mL)-Given       2008 (3 mL)-Given        0535 (3 mL)-Given       1422 (3 mL)-Given       2111 (3 mL)-Given               0945 (3  mL)-Given              2106 (3 mL)-Given        0549 (3 mL)-Given       (1319)-Not Given       (2127)-Not Given        (0628)-Not Given       (1320)-Not Given       (2006)-Not Given        (0400)-Not Given       1200-Hold       [ ] 2000           sodium chloride (PF) 0.9% PF flush 3 mL  Dose: 3 mL Freq: EVERY 1 HOUR PRN Route: IK  PRN Reason: line flush  PRN Comment: for peripheral IV flush post IV meds  Start: 02/08/17 2339        0941 (3 mL)-Given            Future Medications  Medications 02/09/17 02/10/17 02/11/17 02/12/17 02/13/17 02/14/17 02/15/17       ribavirin TABS 200 mg  Dose: 200 mg Freq: EVERY EVENING Route: PO  Start: 02/15/17 1730   Admin Instructions: Patient may use own supply           [ ] 1730           ribavirin TABS 400 mg  Dose: 400 mg Freq: EVERY MORNING Route: PO  Indications of Use: HEPATITIS  Indications Comment: Hep C  Start: 02/16/17 1030   Admin Instructions: Patient takes at 10:30 am, may use own supply              Completed Medications  Medications 02/09/17 02/10/17 02/11/17 02/12/17 02/13/17 02/14/17 02/15/17         Dose: 1,000 mL Freq: ONCE Route: IV  Last Dose: Stopped (02/13/17 1446)  Start: 02/13/17 1245   End: 02/13/17 1446        1256 (1,000 mL)-New Bag       1446-Stopped               Dose: 15 g Freq: ONCE Route: PO  Start: 02/15/17 1100   End: 02/15/17 1118          1118 (15 g)-Given             Dose: 550 mg Freq: ONCE Route: PO  Indications of Use: INTRA-ABDOMINAL INFECTION  Start: 02/13/17 1645   End: 02/13/17 1650        1650 (550 mg)-Given            Discontinued Medications  Medications 02/09/17 02/10/17 02/11/17 02/12/17 02/13/17 02/14/17 02/15/17         Dose: 40 mg Freq: DAILY Route: PO  Start: 02/09/17 0800   End: 02/13/17 1244    0821 (40 mg)-Given        0835 (40 mg)-Given        0859 (40 mg)-Given        0942 (40 mg)-Given        0937 (40 mg)-Given       1244-Med Discontinued           Dose: 30 g Freq: 3 TIMES DAILY Route: PO  Start: 02/13/17 2000   End: 02/15/17  1027        2125 (30 g)-Given        0742 (40 g)-Given       1422 (30 g)-Given       2005 (30 g)-Given        0808 (30 g)-Given       1027-Med Discontinued         Dose: 30 g Freq: 3 TIMES DAILY Route: PO  Start: 02/10/17 2000   End: 02/13/17 1639     2007 (30 g)-Given        0859 (30 g)-Given       1422 (30 g)-Given       2111 (30 g)-Given        0941 (30 g)-Given       1428 (30 g)-Given       2105 (30 g)-Given        (1112)-Not Given       1504 (20 g)-Given       1639-Med Discontinued           Dose: 400 mg Freq: EVERY EVENING Route: PO  Start: 02/09/17 1730   End: 02/15/17 1052   Admin Instructions: Patient may use own supply     1731 (400 mg)-Given        1726 (400 mg)-Given        1739 (400 mg)-Given        1714 (400 mg)-Given        1720 (400 mg)-Given        1659 (400 mg)-Given        1052-Med Discontinued         Dose: 600 mg Freq: EVERY MORNING Route: PO  Indications of Use: HEPATITIS  Indications Comment: Hep C  Start: 02/09/17 1030   End: 02/15/17 1052   Admin Instructions: Patient takes at 10:30 am, may use own supply     1033 (600 mg)-Given        1039 (600 mg)-Given        1034 (600 mg)-Given        1043 (600 mg)-Given        (1114)-Not Given [C]        1018 (600 mg)-Given        1040 (600 mg)-Given       1052-Med Discontinued         Dose: 100 mg Freq: DAILY Route: PO  Start: 02/09/17 0800   End: 02/13/17 1244    0821 (100 mg)-Given        0836 (100 mg)-Given        0859 (100 mg)-Given        0942 (100 mg)-Given        (1113)-Not Given [C]       1244-Med Discontinued      Medications 02/09/17 02/10/17 02/11/17 02/12/17 02/13/17 02/14/17 02/15/17

## 2017-02-08 NOTE — IP AVS SNAPSHOT
"          [facility]: [phone]                                              INTERAGENCY TRANSFER FORM - LAB / IMAGING / EKG / EMG RESULTS   2017                    Hospital Admission Date: 2017  SCOTT CASALE   : 1955  Sex: Male        Attending Provider: Marshal Rivera MD     Allergies:  Blood Transfusion Related (Informational Only), Percocet [Oxycodone-acetaminophen], Acetaminophen, Iodine-131, Motrin [Ibuprofen Micronized], Tylenol    Infection:  None   Service:  HOSPITALIST    Ht:  1.803 m (5' 11\")   Wt:  82.1 kg (181 lb)   Admission Wt:  90.7 kg (200 lb)    BMI:  25.24 kg/m 2   BSA:  2.03 m 2            Patient PCP Information     Provider PCP Type    Carlos Duenas MD, MD General         Lab Results - 3 Days      Ammonia [[id number]] (Abnormal)  Resulted: 02/15/17 0644, Result status: Final result    Ordering provider: Marshal Rivera MD  02/15/17 0001 Resulting lab: [facility]    Specimen Information    Type Source Collected On   Blood  02/15/17 0607          Components       Value Reference Range Flag Lab   Ammonia 183 10 - 50 umol/L HH 59   Comment:         Critical Value called to and read back by  SIRENA JACKSON ICU RN 2/15/17 AT 0641. RAR              Comprehensive metabolic panel [[id number]] (Abnormal)  Resulted: 17 0643, Result status: Final result    Ordering provider: Marshal Rivera MD  17 0001 Resulting lab: [facility]    Specimen Information    Type Source Collected On   Blood  17 0610          Components       Value Reference Range Flag Lab   Sodium 142 133 - 144 mmol/L  59   Potassium 4.3 3.4 - 5.3 mmol/L  59   Chloride 111 94 - 109 mmol/L H 59   Carbon Dioxide 23 20 - 32 mmol/L  59   Anion Gap 8 3 - 14 mmol/L  59   Glucose 113 70 - 99 mg/dL H 59   Urea Nitrogen 30 7 - 30 mg/dL  59   Creatinine 1.27 0.66 - 1.25 mg/dL H 59   GFR Estimate 58 >60 mL/min/1.7m2 L 59   Comment:  Non  GFR Calc   GFR " Estimate If Black 70 >60 mL/min/1.7m2  59   Comment:  African American GFR Calc   Calcium 8.3 8.5 - 10.1 mg/dL L 59   Bilirubin Total 4.5 0.2 - 1.3 mg/dL H 59   Albumin 2.1 3.4 - 5.0 g/dL L 59   Protein Total 6.3 6.8 - 8.8 g/dL L 59   Alkaline Phosphatase 47 40 - 150 U/L  59   ALT 31 0 - 70 U/L  59   AST 42 0 - 45 U/L  59            CBC with platelets [[id number]] (Abnormal)  Resulted: 02/14/17 0627, Result status: Final result    Ordering provider: Marshal Rivera MD  02/14/17 0001 Resulting lab: [facility]    Specimen Information    Type Source Collected On   Blood  02/14/17 0610          Components       Value Reference Range Flag Lab   WBC 5.7 4.0 - 11.0 10e9/L  59   RBC Count 3.15 4.4 - 5.9 10e12/L L 59   Hemoglobin 11.0 13.3 - 17.7 g/dL L 59   Hematocrit 32.3 40.0 - 53.0 % L 59    78 - 100 fl H 59   MCH 34.9 26.5 - 33.0 pg H 59   MCHC 34.1 31.5 - 36.5 g/dL  59   RDW 13.6 10.0 - 15.0 %  59   Platelet Count 57 150 - 450 10e9/L L 59            Ammonia [[id number]] (Abnormal)  Resulted: 02/13/17 1203, Result status: Final result    Ordering provider: Marshal Rivera MD  02/13/17 1102 Resulting lab: [facility]    Specimen Information    Type Source Collected On   Blood  02/13/17 1117          Components       Value Reference Range Flag Lab   Ammonia 138 10 - 50 umol/L HH 59   Comment:         Critical Value called to and read back by  ELIZABETH ANDRESROBBY AT 1201 2/13/17 RD              Comprehensive metabolic panel [[id number]] (Abnormal)  Resulted: 02/13/17 1146, Result status: Final result    Ordering provider: Marshal Rivera MD  02/13/17 1102 Resulting lab: [facility]    Specimen Information    Type Source Collected On   Blood  02/13/17 1117          Components       Value Reference Range Flag Lab   Sodium 141 133 - 144 mmol/L  59   Potassium 4.6 3.4 - 5.3 mmol/L  59   Chloride 110 94 - 109 mmol/L H 59   Carbon Dioxide 23 20 - 32 mmol/L  59   Anion Gap 8 3 - 14  mmol/L  59   Glucose 106 70 - 99 mg/dL H 59   Urea Nitrogen 26 7 - 30 mg/dL  59   Creatinine 1.31 0.66 - 1.25 mg/dL H 59   GFR Estimate 55 >60 mL/min/1.7m2 L 59   Comment:  Non  GFR Calc   GFR Estimate If Black 67 >60 mL/min/1.7m2  59   Comment:  African American GFR Calc   Calcium 8.9 8.5 - 10.1 mg/dL  59   Bilirubin Total 5.3 0.2 - 1.3 mg/dL H 59   Albumin 2.4 3.4 - 5.0 g/dL L 59   Protein Total 7.0 6.8 - 8.8 g/dL  59   Alkaline Phosphatase 55 40 - 150 U/L  59   ALT 32 0 - 70 U/L  59   AST 48 0 - 45 U/L H 59            CBC with platelets [[id number]] (Abnormal)  Resulted: 02/13/17 1126, Result status: Final result    Ordering provider: Marshal Rivera MD  02/13/17 1102 Resulting lab: [facility]    Specimen Information    Type Source Collected On   Blood  02/13/17 1117          Components       Value Reference Range Flag Lab   WBC 6.0 4.0 - 11.0 10e9/L  59   RBC Count 3.58 4.4 - 5.9 10e12/L L 59   Hemoglobin 12.5 13.3 - 17.7 g/dL L 59   Hematocrit 36.3 40.0 - 53.0 % L 59    78 - 100 fl H 59   MCH 34.9 26.5 - 33.0 pg H 59   MCHC 34.4 31.5 - 36.5 g/dL  59   RDW 13.4 10.0 - 15.0 %  59   Platelet Count 63 150 - 450 10e9/L L 59            Basic metabolic panel [[id number]] (Abnormal)  Resulted: 02/12/17 0637, Result status: Final result    Ordering provider: Gayle Chung MD  02/11/17 4923 Resulting lab: [facility]    Specimen Information    Type Source Collected On   Blood  02/12/17 0610          Components       Value Reference Range Flag Lab   Sodium 140 133 - 144 mmol/L  59   Potassium 4.2 3.4 - 5.3 mmol/L  59   Chloride 108 94 - 109 mmol/L  59   Carbon Dioxide 25 20 - 32 mmol/L  59   Anion Gap 7 3 - 14 mmol/L  59   Glucose 135 70 - 99 mg/dL H 59   Urea Nitrogen 23 7 - 30 mg/dL  59   Creatinine 1.41 0.66 - 1.25 mg/dL H 59   GFR Estimate 51 >60 mL/min/1.7m2 L 59   Comment:  Non  GFR Calc   GFR Estimate If Black 62 >60 mL/min/1.7m2  59    Comment:  African American GFR Calc   Calcium 8.5 8.5 - 10.1 mg/dL  59            Ammonia [[id number]] (Abnormal)  Resulted: 02/12/17 0635, Result status: Final result    Ordering provider: Gayle Chung MD  02/11/17 2151 Resulting lab: [facility]    Specimen Information    Type Source Collected On   Blood  02/12/17 0610          Components       Value Reference Range Flag Lab   Ammonia 92 10 - 50 umol/L H 59            CBC with platelets [[id number]] (Abnormal)  Resulted: 02/12/17 0624, Result status: Final result    Ordering provider: Gayle Chung MD  02/11/17 2151 Resulting lab: [facility]    Specimen Information    Type Source Collected On   Blood  02/12/17 0610          Components       Value Reference Range Flag Lab   WBC 4.4 4.0 - 11.0 10e9/L  59   RBC Count 3.46 4.4 - 5.9 10e12/L L 59   Hemoglobin 12.1 13.3 - 17.7 g/dL L 59   Hematocrit 35.6 40.0 - 53.0 % L 59    78 - 100 fl H 59   MCH 35.0 26.5 - 33.0 pg H 59   MCHC 34.0 31.5 - 36.5 g/dL  59   RDW 13.7 10.0 - 15.0 %  59   Platelet Count 60 150 - 450 10e9/L L 59            Testing Performed By     Lab - Abbreviation Name Director Address Valid Date Range    59 - Unknown [facility] Unknown [address] [facility] [address] 12/31/14 1006 - Present            Unresulted Labs     None         Imaging Results - 3 Days      XR Abdomen Port 1 View [[id number]]  Resulted: 02/13/17 2159, Result status: Final result    Ordering provider: Marshal Rivera MD  02/13/17 1513 Resulted by: Cheo Farmer MD    Performed: 02/13/17 1623 - 02/13/17 1640 Resulting lab: RADIOLOGY RESULTS    Narrative:       ABDOMEN ONE VIEW   2/13/2017 4:40 PM     HISTORY: NG tube placement.    COMPARISON: None.      Impression:       IMPRESSION: Enteric tube is in place, with tip likely in the gastric  antrum. Visualized bowel gas pattern is otherwise within normal  limits. Numerous surgical clips are  noted in the epigastric region.  TIPS shunt is in place. There is elevation of the right hemidiaphragm.    MICHAEL BILLINGSLEY MD      US Abdomen Complete-1 [[id number]]  Resulted: 02/13/17 1402, Result status: Final result    Ordering provider: Marshal Rivera MD  02/13/17 1102 Resulted by: Alfonzo Villanueva MD    Performed: 02/13/17 1311 - 02/13/17 1353 Resulting lab: RADIOLOGY RESULTS    Narrative:       ABDOMINAL ULTRASOUND 2/13/2017 1:53 PM    HISTORY:  Abdominal pain. Cirrhosis. Previous TIPS.    COMPARISON:  None.    FINDINGS:  Gallbladder is normal in size with multiple gallstones  within the dependent portion of the gallbladder. Gallbladder wall is  normal in thickness. Patient is nontender over the gallbladder. No  evidence for dilated intra or extrahepatic biliary tree. Cirrhotic  appearance to the liver. TIPS shunt identified with flow identified  through the shunt. Portions of the tail of the pancreas are obscured.  Visualized pancreas is normal. Spleen is enlarged measuring 16 cm in  length. Spleen is otherwise normal. The inferior aspect of the right  kidney is not well seen. Visualized right kidney and left kidney are  normal. Aorta and IVC are normal. Remainder of the scan is  unremarkable.      Impression:       IMPRESSION:    1. Cholelithiasis.  2. Cirrhotic appearance to the liver.  3. Patent TIPS shunt.  4. Splenomegaly.    ALFONZO VILLANUEVA MD      Testing Performed By     Lab - Abbreviation Name Director Address Valid Date Range    104 - Rad R[facility]ts RADIOLOGY RESULTS Unknown Unknown 02/16/05 1553 - Present            Encounter-Level Documents:     There are no encounter-level documents.      Order-Level Documents:     There are no order-level documents.

## 2017-02-08 NOTE — IP AVS SNAPSHOT
` `     [facility] SURGICAL: [phone]                 INTERAGENCY TRANSFER FORM - NOTES (H&P, Discharge Summary, Consults, Procedures, Therapies)   2017                    Hospital Admission Date: 2017  SCOTT CASALE   : 1955  Sex: Male        Patient PCP Information     Provider PCP Type    Carlos Duenas MD, MD General         History & Physicals      H&P by James Julien MD at 2017 11:26 PM     Author:  James Julien MD Service:  Internal Medicine Author Type:  Physician    Filed:  2017  2:39 AM Date of Service:  2017 11:26 PM Note Created:  2017 11:26 PM    Status:  Signed :  James Julien MD (Physician)         [facility]    History and Physical  Hospital Medicine       Date of Admission:  2017  Date of Service: 2017     Assessment and Plan  Scott Casale is a 61 year old male who presents with increased tremulousness, decreased stool output and incomplete compliance with lactulose.      Acute hepatic encephalopathy, mild   Patient has been not completely compliant with lactulose and is developing some symptoms of hepatic encephalopathy.  Symptoms are currently mild however there are enough that the patient cannot manage by himself and his wife is being hospitalized so won't be there to help him.  His mentation is slowed but he is not disoriented.   - Continue lactulose as prescribed 20 g 3 times a day   - Add one dose of MiraLAX      Cirrhosis of liver, s/p TIPS procedure    Hepatitis C   Due to alcohol abuse in the past and active hepatitis C infection.  Started Harvoni 2 weeks ago, and this is generally going well.    - Continue Harvoni and ribavirin, may use own supply    DVT Prophylaxis: Low Risk/Ambulatory with no VTE prophylaxis indicated  Code Status: Full Code    Disposition: Anticipate discharge tomorrow if he responds to resumption of lactulose and the dose of MiraLAX.  Patient's symptoms  are not as severe as in the past. Appropriate for observation care    James Julien MD  Hospital Medicine        Primary Care Physician  Carlos Duenas -[phone]    History is obtained from the patient who is a good historian and review of old records via the EMR.    Past Medical History   Past Medical History   Diagnosis Date     Ankylosing spondylitis (H)      Splenomegaly      Shoulder dislocation      Ulcerative colitis (H)      H/O testicular cancer age 28       Hepatic encephalopathy (H) 01/05/2017     Priority: Medium     S/P TIPS (transjugular intrahepatic portosystemic shunt) 09/28/2016     Priority: Medium     Ascites of liver 09/27/2016     Priority: Medium     Overview:   Low MELD score   S/p TIPS procedure at [facility] (09/28/2016)       Edema of lower extremity 06/15/2016     Priority: Medium     Overview:   Resistant to diuretic therapy        Chronic hepatitis C virus infection (H) 06/15/2016     Priority: Medium     Overview:   Dr Higginbotham [facility]   Harvoni Ribavirin started January 2017       Hepatic cirrhosis (H) 06/15/2016     Priority: Medium     Overview:   Hepatitis C and history of alcohol use ( no use currently)  in the past        Liver mass 06/15/2016     Priority: Medium     Overview:   0.8 cm right lobe hyperlucency per CT 04/2016 suspicious for HCC   Liver biopsy due - seeing Liver clinic at [facility]       Portal hypertension (H) 06/15/2016     Priority: Medium     Overview:   Furosemide Spironolactone Lactulose   Intolerance to Propranolol with hypotension, dizziness       Primary malignant neoplasm of testis (H) 06/15/2016     Priority: Medium     Overview:   At age 28 s/p orchiectomy with RPLND        Thrombocytopenia (H) 06/15/2016     Priority: Medium     Overview:   6/12/2016  PLT: 48       Chronic low back pain 12/06/2012     Priority: Medium     Narcotic Contract signed 12/6/12 and sent to be scanned    Uses soma at  night to help him sleep due to back pain. Has had back problems since 1986, since fell in big hole, collapsed lower discs. Also has ankylosing spondylitis. Has had much Physical Therapy and chiropractic work over years, was told surgery was only option.  Was accompanied by [place] records.    Narcotic contract -- soma #30 R2, at 3 mo can call for 3 more refills, but be seen q 6 mo for refills.  Uses for sleep.  Flexeril for daytime if needed, unrestricted.           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 12/06/2012     Priority: Medium     Since summer; ENT referral 12/6/12       GERD (gastroesophageal reflux disease) 06/26/2012     Priority: Medium     Mild major depression (H) 08/27/2008     Priority: Medium       Past Surgical History  Past Surgical History   Procedure Laterality Date     C knee scope,clean/drain       bilateral     C appendectomy       Strabismus surgery       bilateral     Orchiectomy scrotal       prosthesis placed     Ir transven intrahepatic portosyst rev       Eye surgery       Esophagoscopy, gastroscopy, duodenoscopy (egd), combined N/A 1/17/2017     Procedure: COMBINED ESOPHAGOSCOPY, GASTROSCOPY, DUODENOSCOPY (EGD);  Surgeon: Guru Reina Palacios MD;  Location:  GI     Colonoscopy N/A 1/17/2017     Procedure: COLONOSCOPY;  Surgeon: Guru Reina Palacios MD;  Location:  GI        History of Present Illness  Scott Casale is a 61 year old male who presents with complaint of feeling more shaky as he does when his ammonia is elevated.  Patient has known history of hepatic encephalopathy and is on lactulose and rifaximin, but he has not been taking his lactulose as he should and is bowel movements have slowed to 1 or 2 a day.  He's had some nausea but no emesis for the last couple days.  The patient's wife is in the emergency room due to angina and the patient came to see her.  His wife told the ER physicians that her  needed to be seen because he is  becoming more confused and will be unable to manage this without her since she is being hospitalized.  Patient was found to have increased tremors and ammonia up to 135.     Prior to Admission Medications  Prior to Admission Medications   Prescriptions Last Dose Informant Patient Reported? Taking?   furosemide (LASIX) 40 MG tablet 2/8/2017 at am Self Yes Yes   Sig: Take 40 mg by mouth daily    gabapentin (NEURONTIN) 100 MG capsule 2/7/2017 at hs Self No Yes   Sig: Take 2 capsules (200 mg) by mouth At Bedtime   lactulose 20 GM/30ML SOLN 2/8/2017 at 1100 Self No Yes   Sig: Take 30 mLs by mouth 3 times daily   ledipasvir-sofosbuvir (LEDIPASVIR-SOFOSBUVIR)  MG per tablet 2/8/2017 at am Self No Yes   Sig: Take 1 tablet by mouth daily   ondansetron (ZOFRAN) 4 MG tablet 2/7/2017 at hs Self Yes Yes   Sig: Take 1 tablet (4 mg) by mouth every 8 hours as needed for nausea   ribavirin 200 MG TABS 2/8/2017 at am Self No Yes   Sig: Take 2 tablets (400 mg) by mouth 2 times daily   rifaximin (XIFAXAN) 550 MG TABS tablet 2/8/2017 at am Self No Yes   Sig: Take 1 tablet (550 mg) by mouth 2 times daily   sildenafil (REVATIO/VIAGRA) 50 MG cap/tab not started Self No No   Sig: Take 0.5-1 tablets (25-50 mg) by mouth daily as needed for erectile dysfunction Take 30 min to 4 hours before intercourse.  Never use with nitroglycerin, terazosin or doxazosin.   spironolactone (ALDACTONE) 100 MG tablet 2/7/2017 at am Self Yes Yes   Sig: Take 100 mg by mouth daily    traZODone (DESYREL) 100 MG tablet Past Month at Unknown time Self No Yes   Sig: Take 1 tablet (100 mg) by mouth nightly as needed for sleep   vitamin D (ERGOCALCIFEROL) 01549 UNIT capsule 2/6/2017 Self Yes No   Sig: Take 50,000 Units by mouth Twice weekly Mon and Thurs      Facility-Administered Medications: None     Allergies  Allergies   Allergen Reactions     Blood Transfusion Related (Informational Only) Other (See Comments)     Patient has a history of a clinically  "significant antibody against RBC antigens.  A delay in compatible RBCs may occur.     Percocet [Oxycodone-Acetaminophen] Nausea and Vomiting     Acetaminophen Nausea     Iodine-131 Hives     Motrin [Ibuprofen Micronized] Nausea     Tylenol Nausea       Family History   Family History   Problem Relation Age of Onset     CANCER Mother 70     brain tumor     Prostate Cancer No family hx of      Cancer - colorectal No family hx of      DIABETES Maternal Grandmother      CEREBROVASCULAR DISEASE Sister      HEART DISEASE Sister      Respiratory Son      CANCER Father      Uterine Cancer Sister          Social History  Social History     Social History     Marital Status:      Spouse Name: N/A     Number of Children: N/A     Years of Education: N/A     Occupational History     Not on file.     Social History Main Topics     Smoking status: Former Smoker     Quit date: 01/01/1978     Smokeless tobacco: Never Used     Alcohol Use: No      Comment: quit in 2010     Drug Use: No     Sexual Activity:     Partners: Female     Other Topics Concern     Parent/Sibling W/ Cabg, Mi Or Angioplasty Before 65f 55m? No     Social History Narrative    Admit 9/28:    Lives with new wife of 2 mos.    Tobacco:  Former smoker, quit 1978    Alcohol:  Sober since 2010, recovering alcoholic.    Drugs:  Denies        Review of Systems  The 10 point Review of Systems is negative other than noted in the HPI or here.  Patient's ascites has been better since his TIPS procedure, his edema in lower steroids is chronic with left worse than right but has been a little better recently, started Harvoni 2 weeks ago and this is going well.     Physical Exam  /66 mmHg  Pulse 87  Temp(Src) 97.2  F (36.2  C) (Oral)  Resp 16  Ht 1.803 m (5' 11\")  Wt 90.719 kg (200 lb)  BMI 27.91 kg/m2  SpO2 99%     Weight: 200 lbs 0 oz Body mass index is 27.91 kg/(m^2).     Constitutional: Awake, mentation a little slowed, oriented to place, date the week, " month, year and situation, cooperative, mild tremulousness otherwise no apparent distress, appears nontoxic.  Eyes: Eyes are clear, pupils are reactive.  HEENT: Oropharynx is clear and moist. No evidence of cranial trauma.  Lymph/Hematologic: No epitrochlear, axillary, anterior or posterior cervical, or supraclavicular lymphadenopathy is appreciated.  Cardiovascular: Regular rate and rhythm, normal S1 and S2, and no murmur noted. JVP is normal. Good peripheral pulses in wrists bilaterally. Lower extremity edema on the left to the knee and on the right two thirds up lower leg.  Respiratory: Clear to auscultation bilaterally.   GI: Soft, mild to moderate tenderness of the right abdomen to moderate palpation, left side is soft and nontender, normal bowel sounds, hepatomegaly present.  Genitourinary: Deferred  Musculoskeletal: Decreased muscle bulk and normal tone.  Skin: Warm and dry, no rashes.   Neurologic: Neck supple. Cranial nerves are grossly intact.  is symmetric.  There is mild irregular tremulousness to the hands but no profound asterixis.     Data  Data reviewed today:     Recent Labs  Lab 02/08/17 2010   WBC 5.5   HGB 12.6*   *   PLT 54*   INR 1.37*      POTASSIUM 3.7   CHLORIDE 108   CO2 25   BUN 24   CR 1.46*   ANIONGAP 7   ARACELI 7.9*   GLC 88   ALBUMIN 2.3*   PROTTOTAL 6.8   BILITOTAL 3.2*   ALKPHOS 91   ALT 34   AST 65*       No results found for this or any previous visit (from the past 24 hour(s)).    I personally reviewed no images or EKG's today.    Chart documentation with keystrokes and/or Dragon voice recognition software. Although reviewed after completion, some word and grammatical error may remain.  James Julien MD  Hospital Medicine            Revision History        User Key Date/Time User Provider Type Action    > [N/A] 2/9/2017  2:39 AM James Julien MD Physician Sign                  Discharge Summaries     No notes of this type exist for this encounter.          Consult Notes      Consults by Blanquita Longo LICSW at 2/13/2017 11:23 AM     Author:  Blanquita Longo LICSW Service:  (none) Author Type:      Filed:  2/13/2017 11:23 AM Date of Service:  2/13/2017 11:23 AM Note Created:  2/13/2017 11:22 AM    Status:  Signed :  Blanquita Longo LICSW ()     Consult Orders:    1. Care Transition RN/SW IP Consult [[id number]] ordered by Jett Candelario MD at 02/10/17 0921     2. Social Work IP Consult [[id number]] ordered by Rhiannon Krueger PA-C at 01/05/17 1311                Reason for Follow up: DC planning    Anticipated discharge needs: This writer received phone call from TCU and they reported that they will not be able to accept pt at this time d/t cost of his medications.     Next steps: CTS will cont to follow    Blanquita Longo [facility], [facility] [phone][AK1.1]           Revision History        User Key Date/Time User Provider Type Action    > AK1.1 2/13/2017 11:23 AM Blanquita Longo LICSW  Sign            Consults by Edna Ye RN at 2/12/2017  2:53 PM     Author:  Edna Ye RN Service:  (none) Author Type:      Filed:  2/12/2017  3:02 PM Date of Service:  2/12/2017  2:53 PM Note Created:  2/12/2017  2:41 PM    Status:  Addendum :  Edna Ye RN ()     Consult Orders:    1. Care Coordinator IP Consult [[id number]] ordered by Gayle Chung MD at 02/12/17 1407                  Reason for Referral:  Discharge planning    Diagnosis:[JD1.1]   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     S/P TIPS (transjugular intrahepatic portosystemic shunt)     Hepatic encephalopathy (H)     Ascites of liver     Edema of lower extremity     Chronic hepatitis C virus  infection (H)     Hepatic cirrhosis (H)     Hyponatremia     Liver mass     Portal hypertension (H)     Primary malignant neoplasm of testis (H)     Thrombocytopenia (H)     Encephalopathy acute[JD1.2]       Cognitive Behavioral Status:  awake, alert and oriented    Support Systems:  family    Current Living Situation:   Home Setting:  stairs   Living Status:  Spouse, adult children   Function Status / Assistive Devices:  no assistive devices    Employment / Financial / Insurance Concerns:  not employed ,disabled        No Insurance issues identified    Housing Concerns:  No    Health Care Directives:  Patient considering completing    Current Services: No    Assessment:  Chart reviewed, discussed at [facility] Rounds. Referral for discharge planning, TCU being recommended as patient is unsteady and continues to need assistance. Patient agrees for referrals for TCU to be sent (1) [facility] Phone: ([phone]) Fax: ([phone])          (2) [facility] (Phone: [phone]) Fax: ([phone])          (3)[facility] (Phone: [phone]) Fax: ([phone])  Patient states he will talk to his wife who is not currently here.     Plan: May need TCU[JD1.1],[JD1.3] referrals pending.         Edna Ye -[phone][JD1.1]       Revision History        User Key Date/Time User Provider Type Action    > JD1.3 2/12/2017  3:02 PM Edna Ye RN Case Manager Addend     JD1.2 2/12/2017  2:53 PM Edna Ye RN Case Manager Sign     JD1.1 2/12/2017  2:41 PM Edna Ye RN Case Manager                      Progress Notes - Physician (Notes from 02/12/17 through 02/15/17)      Progress Notes by Rosmery Oneal RN at 2/15/2017 10:13 AM     Author:  Rosmery Oneal RN Service:  (none) Author Type:  Registered Nurse    Filed:  2/15/2017 10:15 AM Date of Service:  2/15/2017 10:13 AM Note Created:  2/15/2017 10:13 AM    Status:  Signed :  Rosmery Oneal RN (Registered Nurse)          [facility] TRANSPORT NOTE  Data: Reason for Transport:  Transfer to med/surg  Scott Casale was transported from 1005 via wheel chair at 1013.  Patient was accompanied by Nursing Assistant. Equipment used for transport: IV pump. Family was aware of reason for transport: yes  Action:Report: given to RASHAD Stout  Response:Patient's condition when transferred off unit was stable.  Bridger Oneal RN,BC, CCRN[MO1.1]     Revision History        User Key Date/Time User Provider Type Action    > MO1.1 2/15/2017 10:15 AM Bridger Oneal RN Registered Nurse Sign            Progress Notes by Pilar Mercado RN at 2/15/2017  6:14 AM     Author:  Pilar Mercado RN Service:  (none) Author Type:  Registered Nurse    Filed:  2/15/2017  6:14 AM Date of Service:  2/15/2017  6:14 AM Note Created:  2/15/2017  6:14 AM    Status:  Signed :  Pilar Mercado RN (Registered Nurse)         AM labs drawn, await results.[SC1.1]     Revision History        User Key Date/Time User Provider Type Action    > SC1.1 2/15/2017  6:14 AM Pilar Mercado RN Registered Nurse Sign            Progress Notes by Pilar Mercado RN at 2/15/2017  3:27 AM     Author:  Pilar Mercado RN Service:  (none) Author Type:  Registered Nurse    Filed:  2/15/2017  3:30 AM Date of Service:  2/15/2017  3:27 AM Note Created:  2/15/2017  3:27 AM    Status:  Signed :  Pilar Mercado RN (Registered Nurse)         Pt appears to be sleeping/resting comfortably since 2300. IV patent at 75 cc/hr. Pt has not stooled since last evening after receiving bedtime lactulose dose. Will continue to monitor.[SC1.1]     Revision History        User Key Date/Time User Provider Type Action    > SC1.1 2/15/2017  3:30 AM Pilar Mercado RN Registered Nurse Sign            Progress Notes by Pilar Mercado RN at 2/14/2017  9:03 PM     Author:  Pilar Mercado RN Service:  (none) Author Type:  Registered Nurse    Filed:  2/14/2017  9:05 PM Date of Service:  2/14/2017  9:03 PM Note  Created:  2/14/2017  9:03 PM    Status:  Signed :  Pilar Mercado, RN (Registered Nurse)         Pt took all his bedtime meds without difficulty. VSS. Pt appears appropriate with answers to questions.[SC1.1]     Revision History        User Key Date/Time User Provider Type Action    > SC1.1 2/14/2017  9:05 PM Pilar Mercado, RN Registered Nurse Sign            Progress Notes by David Rivera MD at 2/14/2017 11:03 AM     Author:  David Rivera MD Service:  Hospitalist Author Type:  Physician    Filed:  2/14/2017 11:15 AM Date of Service:  2/14/2017 11:03 AM Note Created:  2/14/2017 11:03 AM    Status:  Signed :  David Rivera MD (Physician)         [facility] Internal Medicine Progress Note     Date of Service (when I saw the patient): 02/14/2017      REASON FOR ADMISSION / INTERVAL HISTORY:  Admitted for hepatic encephalopathy. Doing worse today after initial improvement. See details below.    ASSESSMENT/PLAN:   ACUTE HEPATIC ENCEPHALOPATHY STAGE 2  Presented with shakiness, tremors and ammonia of 180. [place] to be due to non compliance with meds. S/p TIPS redo a week ago 1/5. Was admitted and started on po lactulose/rifaximin. Apparently was doing better and ammonia down to 92.  Pt  Decompensated 2/12 night. Had large emesis after PM meds and another one in middle of night. On 2/13, he was in coma-difficult to arouse state. Pt appeared dehydrated with elevated cr, poor po intake, emesis and was on lasix/spironolactone. D/licha diuretics, put in NG for po meds on  2/13 and started fluids after a bolus. Pt woke up in a couple hrs after iv fluids and has been awake/alert since. NG d/licha 2/13.  -continue fluids, po meds.    LIVER CIRRHOSIS DUE TO HEP C and ETOH in past  S/p TIPS--redone 1/5.Started Harvoni 2 weeks ago.  -resume harvoni     DISPO  Pt has stabelized. Will move out of ICU. PT/OT/SW eval. Home vs TCU in 1-2 days    DAVID RIVERA MD   Pg [phone]    DVT Prhylaxis: Pneumatic Compression  "Devices  Code Status: Full Code    ROS:  As described in A/P and Exam.  Otherwise ALL are  negative.    PHYSICAL EXAM:  All vitals have been reviewed    Blood pressure 110/65, pulse 85, temperature 98.1  F (36.7  C), temperature source Oral, resp. rate 22, height 1.803 m (5' 11\"), weight 82.1 kg (181 lb), SpO2 97 %.    I/O this shift:  In: 25 [I.V.:25]  Out: 300 [Urine:300]    GENERAL APPEARANCE:awake, eating and no distress  EYES: conjunctiva clear, eyes grossly normal  HENT: external ears and nose normal   NECK: supple, no masses or adenopathy  RESP: lungs clear to auscultation - no rales, rhonchi or wheezes  CV: regular rate and rhythm, normal S1 S2, no S3 or S4 and no murmur, click or rub   ABDOMEN: soft, nontender, no HSM or masses and bowel sounds normal  MS: no clubbing, cyanosis; 1+ edema B LE  SKIN: clear without significant rashes or lesions  NEURO: -non-focal, flapping tremor.     ROUTINE  LABS (Last four results)  CMP    Recent Labs  Lab 02/14/17  0610 02/13/17  1117 02/12/17  0610 02/11/17  0715 02/10/17  0625 02/09/17  0725    141 140 142 142 140   POTASSIUM 4.3 4.6 4.2 4.6 4.4 3.5   CHLORIDE 111* 110* 108 110* 110* 109   CO2 23 23 25 23 24 25   ANIONGAP 8 8 7 9 8 6   * 106* 135* 107* 98 98   BUN 30 26 23 21 24 25   CR 1.27* 1.31* 1.41* 1.38* 1.34* 1.34*   GFRESTIMATED 58* 55* 51* 52* 54* 54*   GFRESTBLACK 70 67 62 63 65 65   ARACELI 8.3* 8.9 8.5 8.8 8.1* 7.8*   PROTTOTAL 6.3* 7.0  --   --  6.0* 6.0*   ALBUMIN 2.1* 2.4*  --   --  2.1* 2.1*   BILITOTAL 4.5* 5.3*  --   --  3.4* 3.0*   ALKPHOS 47 55  --   --  50 60   AST 42 48*  --   --  47* 50*   ALT 31 32  --   --  29 27     CBC    Recent Labs  Lab 02/14/17  0610 02/13/17  1117 02/12/17  0610 02/11/17  0715   WBC 5.7 6.0 4.4 4.5   RBC 3.15* 3.58* 3.46* 3.48*   HGB 11.0* 12.5* 12.1* 12.3*   HCT 32.3* 36.3* 35.6* 35.6*   * 101* 103* 102*   MCH 34.9* 34.9* 35.0* 35.3*   MCHC 34.1 34.4 34.0 34.6   RDW 13.6 13.4 13.7 13.7   PLT 57* 63* 60* " 56*     INR    Recent Labs  Lab 02/08/17 2010   INR 1.37*     Arterial Blood GasNo lab results found in last 7 days.    Recent Results (from the past 24 hour(s))   US Abdomen Complete-1    Narrative    ABDOMINAL ULTRASOUND 2/13/2017 1:53 PM    HISTORY:  Abdominal pain. Cirrhosis. Previous TIPS.    COMPARISON:  None.    FINDINGS:  Gallbladder is normal in size with multiple gallstones  within the dependent portion of the gallbladder. Gallbladder wall is  normal in thickness. Patient is nontender over the gallbladder. No  evidence for dilated intra or extrahepatic biliary tree. Cirrhotic  appearance to the liver. TIPS shunt identified with flow identified  through the shunt. Portions of the tail of the pancreas are obscured.  Visualized pancreas is normal. Spleen is enlarged measuring 16 cm in  length. Spleen is otherwise normal. The inferior aspect of the right  kidney is not well seen. Visualized right kidney and left kidney are  normal. Aorta and IVC are normal. Remainder of the scan is  unremarkable.      Impression    IMPRESSION:    1. Cholelithiasis.  2. Cirrhotic appearance to the liver.  3. Patent TIPS shunt.  4. Splenomegaly.    ALFONZO VILLANUEVA MD   XR Abdomen Port 1 View    Narrative    ABDOMEN ONE VIEW   2/13/2017 4:40 PM     HISTORY: NG tube placement.    COMPARISON: None.      Impression    IMPRESSION: Enteric tube is in place, with tip likely in the gastric  antrum. Visualized bowel gas pattern is otherwise within normal  limits. Numerous surgical clips are noted in the epigastric region.  TIPS shunt is in place. There is elevation of the right hemidiaphragm.    MICHAEL BILLINGSLEY MD[SK1.1]                   Revision History        User Key Date/Time User Provider Type Action    > SK1.1 2/14/2017 11:15 AM Marshal Rivera MD Physician Sign            Progress Notes by Blanquita Longo LICSW at 2/14/2017 10:53 AM     Author:  Blanquita Longo LICSW Service:  (none) Author Type:      Filed:   2/14/2017 10:54 AM Date of Service:  2/14/2017 10:53 AM Note Created:  2/14/2017 10:53 AM    Status:  Signed :  Blanquita Longo LICSW ()         Reason for Follow up: DC planning    Anticipated discharge needs: Discussed pt in rounds, doing better. Would most likely be able to dc home with home care.     Next steps: CTS to follow, home care on dc?    Blanquita Longo MSW, LESSW, [facility] [phone][AK1.1]           Revision History        User Key Date/Time User Provider Type Action    > AK1.1 2/14/2017 10:54 AM Blanquita Longo LICSW  Sign            Progress Notes by Audrey Blandon RN at 2/13/2017  5:32 PM     Author:  Audrey Blandon RN Service:  ICU Author Type:  Registered Nurse    Filed:  2/13/2017  5:34 PM Date of Service:  2/13/2017  5:32 PM Note Created:  2/13/2017  5:32 PM    Status:  Signed :  Audrey Blandon RN (Registered Nurse)         Pt put call light on and asked to go to the bathroom. Urinated and had a large BM. VSS. Pt transferred from commode and back to bed with assist of one.[JH1.1]     Revision History        User Key Date/Time User Provider Type Action    > JH1.1 2/13/2017  5:34 PM Audrey Blandon RN Registered Nurse Sign            Progress Notes by Marshal Rivera MD at 2/13/2017  3:20 PM     Author:  Marshal Rivera MD Service:  Hospitalist Author Type:  Physician    Filed:  2/13/2017  3:21 PM Date of Service:  2/13/2017  3:20 PM Note Created:  2/13/2017  3:20 PM    Status:  Signed :  Marshal Rivera MD (Physician)          abd-unremarkable.  Continue rx as current[SK1.1]     Revision History        User Key Date/Time User Provider Type Action    > SK1.1 2/13/2017  3:21 PM Marshal Rivera MD Physician Sign            Progress Notes by Audrey Blandon RN at 2/13/2017  2:26 PM     Author:  Audrey Blandon RN Service:  ICU Author Type:  Registered Nurse    Filed:  2/13/2017  2:29 PM Date of Service:  2/13/2017  2:26 PM Note Created:  2/13/2017  2:26 PM    Status:  Signed  :  Audrey Blandon, RN (Registered Nurse)         Pt arrives to ICU awake, asking to urinate. Oriented to person, place, year, situation. SHELLY 3 mm and brisk. Moves all extremities with equal strength. Pt is asking to talk to his wife. NGT placed, tolerated procedure well. VSS, NSR. 100% saturation on room air.[JH1.1]     Revision History        User Key Date/Time User Provider Type Action    > JH1.1 2/13/2017  2:29 PM Audrey Blandon RN Registered Nurse Sign            Progress Notes by Devorah Mayers RN at 2/13/2017  1:48 PM     Author:  Devorah Mayers RN Service:  (none) Author Type:  Registered Nurse    Filed:  2/13/2017  1:54 PM Date of Service:  2/13/2017  1:48 PM Note Created:  2/13/2017  1:48 PM    Status:  Addendum :  Devorah Mayers RN (Registered Nurse)         [facility] TRANSPORT NOTE  Data:   Reason for Transport:  ICU status    Scott Casale was transported to ICU via bed at 1349.  Patient was accompanied by Registered Nurse. Equipment used for transport: IV pump. Family was aware of reason for transport:[JB1.1] Yes.    Notified wife Carrie [phone][JB1.2]    Action:  Report: given to Audrey    Response:  Patient's condition when transferred off unit was stable.    Devorah Mayers[JB1.1]     Revision History        User Key Date/Time User Provider Type Action    > JB1.2 2/13/2017  1:54 PM Devorah Mayers RN Registered Nurse Addend     JB1.1 2/13/2017  1:51 PM Devorah Mayers RN Registered Nurse Sign            Progress Notes by Marshal Rivera MD at 2/13/2017  1:23 PM     Author:  Marshal Rivera MD Service:  Hospitalist Author Type:  Physician    Filed:  2/13/2017  1:40 PM Date of Service:  2/13/2017  1:23 PM Note Created:  2/13/2017  1:23 PM    Status:  Signed :  Marshal Rivera MD (Physician)         [facility] Progress Note     Date of Service (when I saw the patient): 02/13/2017      REASON FOR ADMISSION / INTERVAL HISTORY:  Admitted for hepatic encephalopathy. Doing worse today after initial  "improvement. See details below.    ASSESSMENT/PLAN:   ACUTE HEPATIC ENCEPHALOPATHY STAGE 3-4  Presented with shakiness, tremors and ammonia of 180. [place] to be due to non compliance with meds. S/p TIPS redo a week ago 1/5. Was admitted and started on po lactulose/rifaximin. Apparently was doing better and ammonia down to 92.  Pt  Decompensated last night. Had large emesis after PM meds and another one in middle of night. This AM, he is in coma-difficult to arouse.   Acute decompensation could be due to inability to keep lactulose/rifaximen, dehydration. ?GI bleed  -will move to ICU, NG in for po meds, stat abd US and f/u. Start iv fluids after a bolus and hold off diuretics.    LIVER CIRRHOSIS DUE TO HEP C and ETOH in past  S/p TIPS--redone 1/5.Started Harvoni 2 weeks ago.  -hold harvoni for now    DISPO  Pt is critical--move to ICU. Trying to contacy spouse with no success.    DAVID ZEPEDA MD   Pg [phone]    DVT Prhylaxis: Pneumatic Compression Devices  Code Status: Full Code    ROS:  As described in A/P and Exam.  Otherwise ALL are  negative.    PHYSICAL EXAM:  All vitals have been reviewed    Blood pressure 133/67, pulse 78, temperature 98.2  F (36.8  C), temperature source Oral, resp. rate 20, height 1.803 m (5' 11\"), weight 92 kg (202 lb 13.2 oz), SpO2 100 %.    I/O this shift:  In: 50 [P.O.:50]  Out: 1000 [Urine:1000]    GENERAL APPEARANCE:ill appearing, somnolant and no distress  EYES: conjunctiva clear, eyes grossly normal  HENT: external ears and nose normal   NECK: supple, no masses or adenopathy  RESP: lungs clear to auscultation - no rales, rhonchi or wheezes  CV: regular rate and rhythm, normal S1 S2, no S3 or S4 and no murmur, click or rub   ABDOMEN: soft, nontender, no HSM or masses and bowel sounds normal  MS: no clubbing, cyanosis; 1+ edema B LE  SKIN: clear without significant rashes or lesions  NEURO: -somnolant, difficult to arouse, does not follow commands.     ROUTINE  LABS (Last four " results)  CMP  Recent Labs  Lab 02/13/17  1117 02/12/17  0610 02/11/17 0715 02/10/17  0625 02/09/17 0725 02/08/17 2010    140 142 142 140 140   POTASSIUM 4.6 4.2 4.6 4.4 3.5 3.7   CHLORIDE 110* 108 110* 110* 109 108   CO2 23 25 23 24 25 25   ANIONGAP 8 7 9 8 6 7   * 135* 107* 98 98 88   BUN 26 23 21 24 25 24   CR 1.31* 1.41* 1.38* 1.34* 1.34* 1.46*   GFRESTIMATED 55* 51* 52* 54* 54* 49*   GFRESTBLACK 67 62 63 65 65 59*   ARACELI 8.9 8.5 8.8 8.1* 7.8* 7.9*   PROTTOTAL 7.0  --   --  6.0* 6.0* 6.8   ALBUMIN 2.4*  --   --  2.1* 2.1* 2.3*   BILITOTAL 5.3*  --   --  3.4* 3.0* 3.2*   ALKPHOS 55  --   --  50 60 91   AST 48*  --   --  47* 50* 65*   ALT 32  --   --  29 27 34     CBC  Recent Labs  Lab 02/13/17 1117 02/12/17  0610 02/11/17  0715 02/10/17  0625   WBC 6.0 4.4 4.5 3.6*   RBC 3.58* 3.46* 3.48* 3.15*   HGB 12.5* 12.1* 12.3* 10.9*   HCT 36.3* 35.6* 35.6* 32.1*   * 103* 102* 102*   MCH 34.9* 35.0* 35.3* 34.6*   MCHC 34.4 34.0 34.6 34.0   RDW 13.4 13.7 13.7 13.5   PLT 63* 60* 56* 50*     INR  Recent Labs  Lab 02/08/17 2010   INR 1.37*     Arterial Blood GasNo lab results found in last 7 days.    No results found for this or any previous visit (from the past 24 hour(s)).[SK1.1]                Revision History        User Key Date/Time User Provider Type Action    > SK1.1 2/13/2017  1:40 PM Marshal Rivera MD Physician Sign            ED Provider Notes by Kendall Ness MD at 2/8/2017  7:53 PM     Author:  Kendall Ness MD Service:  Hospitalist Author Type:  Physician    Filed:  2/13/2017  9:32 AM Date of Service:  2/8/2017  7:53 PM Note Created:  2/8/2017  7:53 PM    Status:  Signed :  Kendall Ness MD (Physician)           History     Chief Complaint   Patient presents with     Generalized Weakness     hx hep C and cirrhosis - states has been feeling shaky and feels like his ammonia is high     HPI  Scott Casale is a 61 year old male with a history of hepatitis C, hepatic cirrhosis,  "ascites, hepatic encephalopathy, hyponatremia, and thrombocytopenia who presents to the ED today with complaints of feeling shaky as if his ammonia is high. He had came to see his wife who is in the ED for evaluation of chest pain, and she wouldn't let him leave until he was seen for possible elevated ammonia/hepatic encephalopathy. The shaking and unsteady gait began within the past several days.  He is supposed to urinate 4-5 times a day, but he is only urinating 1-2 times daily. This started a few days ago. At that time he started to feel dizzy, weak and shaky. His legs have felt wobbly as well.  He does not take his lactulose if he is out or away from home. He is supposed to take 20g (30ml) tid. He states that he misses \"once in a while\". Today he has taken 2 of his doses.   Wife thinks he is poorly compliant with lactulose, states \"I think he only takes it when he wants to\".    I have reviewed the Medications, Allergies, Past Medical and Surgical History, and Social History in the Epic system.   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     S/P TIPS (transjugular intrahepatic portosystemic shunt)     Hepatic encephalopathy (H)     Ascites of liver     Edema of lower extremity     Chronic hepatitis C virus infection (H)     Hepatic cirrhosis (H)     Hyponatremia     Liver mass     Portal hypertension (H)     Primary malignant neoplasm of testis (H)     Thrombocytopenia (H)       Current Outpatient Prescriptions   Medication Sig Dispense Refill     rifaximin (XIFAXAN) 550 MG TABS tablet Take 1 tablet (550 mg) by mouth 2 times daily 60 tablet 11     ribavirin 200 MG TABS Take 2 tablets (400 mg) by mouth 2 times daily 150 tablet 2     gabapentin (NEURONTIN) 100 MG capsule Take 2 capsules (200 mg) by " "mouth At Bedtime 90 capsule 1     traZODone (DESYREL) 100 MG tablet Take 1 tablet (100 mg) by mouth nightly as needed for sleep 30 tablet 1     lactulose 20 GM/30ML SOLN Take 30 mLs by mouth 3 times daily 1892 mL 3     ledipasvir-sofosbuvir (LEDIPASVIR-SOFOSBUVIR)  MG per tablet Take 1 tablet by mouth daily 28 tablet 2     furosemide (LASIX) 40 MG tablet Take 40 mg by mouth daily        spironolactone (ALDACTONE) 100 MG tablet Take 100 mg by mouth daily        ondansetron (ZOFRAN) 4 MG tablet Take 1 tablet (4 mg) by mouth every 8 hours as needed for nausea       sildenafil (REVATIO/VIAGRA) 50 MG cap/tab Take 0.5-1 tablets (25-50 mg) by mouth daily as needed for erectile dysfunction Take 30 min to 4 hours before intercourse.  Never use with nitroglycerin, terazosin or doxazosin. 12 tablet 11     vitamin D (ERGOCALCIFEROL) 00201 UNIT capsule Take 50,000 Units by mouth Twice weekly Mon and Thurs         Allergies   Allergen Reactions     Blood Transfusion Related (Informational Only) Other (See Comments)     Patient has a history of a clinically significant antibody against RBC antigens.  A delay in compatible RBCs may occur.     Percocet [Oxycodone-Acetaminophen] Nausea and Vomiting     Acetaminophen Nausea     Iodine-131 Hives     Motrin [Ibuprofen Micronized] Nausea     Tylenol Nausea       Social History   Substance Use Topics     Smoking status: Former Smoker     Quit date: 01/01/1978     Smokeless tobacco: Never Used     Alcohol Use: No      Comment: quit in 2010     Review of Systems  As mentioned above in the history present illness. All other systems were reviewed and are negative.    Physical Exam   BP: 152/81 mmHg  Pulse: 87  Resp: 16  Height: 180.3 cm (5' 11\")  Weight: 90.719 kg (200 lb)  SpO2: 98 %    Physical Exam   Constitutional: He is oriented to person, place, and time. He appears well-developed and well-nourished. No distress.   HENT:   Head: Normocephalic and atraumatic.   Mouth/Throat: " Oropharynx is clear and moist.   Eyes: EOM are normal. Pupils are equal, round, and reactive to light. Scleral icterus (mild) is present.   Neck: Normal range of motion. Neck supple. No tracheal deviation present. No thyromegaly present.   Cardiovascular: Normal rate, regular rhythm and normal heart sounds.  Exam reveals no gallop and no friction rub.    No murmur heard.  Pulmonary/Chest: Effort normal and breath sounds normal. No respiratory distress. He has no wheezes. He has no rales.   Abdominal: Soft. Bowel sounds are normal. He exhibits no distension and no mass. There is no tenderness. There is no rebound and no guarding.   Musculoskeletal: Normal range of motion. He exhibits no edema or tenderness.   Neurological: He is alert and oriented to person, place, and time. He has normal strength. He displays tremor (asterixis). No cranial nerve deficit (2-12 intact) or sensory deficit. Coordination normal.   Skin: Skin is warm and dry. No rash noted. He is not diaphoretic. No erythema. No pallor.   Psychiatric: He has a normal mood and affect. His behavior is normal.   Nursing note and vitals reviewed.      ED Course   Procedures            Results for orders placed or performed during the hospital encounter of 02/08/17   CBC with platelets, differential   Result Value Ref Range    WBC 5.5 4.0 - 11.0 10e9/L    RBC Count 3.56 (L) 4.4 - 5.9 10e12/L    Hemoglobin 12.6 (L) 13.3 - 17.7 g/dL    Hematocrit 36.7 (L) 40.0 - 53.0 %     (H) 78 - 100 fl    MCH 35.4 (H) 26.5 - 33.0 pg    MCHC 34.3 31.5 - 36.5 g/dL    RDW 14.1 10.0 - 15.0 %    Platelet Count 54 (L) 150 - 450 10e9/L    Diff Method Automated Method     % Neutrophils 67.7 %    % Lymphocytes 15.1 %    % Monocytes 14.6 %    % Eosinophils 2.0 %    % Basophils 0.4 %    % Immature Granulocytes 0.2 %    Absolute Neutrophil 3.7 1.6 - 8.3 10e9/L    Absolute Lymphocytes 0.8 0.8 - 5.3 10e9/L    Absolute Monocytes 0.8 0.0 - 1.3 10e9/L    Absolute Eosinophils 0.1 0.0 -  0.7 10e9/L    Absolute Basophils 0.0 0.0 - 0.2 10e9/L    Abs Immature Granulocytes 0.0 0 - 0.4 10e9/L   Comprehensive metabolic panel   Result Value Ref Range    Sodium 140 133 - 144 mmol/L    Potassium 3.7 3.4 - 5.3 mmol/L    Chloride 108 94 - 109 mmol/L    Carbon Dioxide 25 20 - 32 mmol/L    Anion Gap 7 3 - 14 mmol/L    Glucose 88 70 - 99 mg/dL    Urea Nitrogen 24 7 - 30 mg/dL    Creatinine 1.46 (H) 0.66 - 1.25 mg/dL    GFR Estimate 49 (L) >60 mL/min/1.7m2    GFR Estimate If Black 59 (L) >60 mL/min/1.7m2    Calcium 7.9 (L) 8.5 - 10.1 mg/dL    Bilirubin Total 3.2 (H) 0.2 - 1.3 mg/dL    Albumin 2.3 (L) 3.4 - 5.0 g/dL    Protein Total 6.8 6.8 - 8.8 g/dL    Alkaline Phosphatase 91 40 - 150 U/L    ALT 34 0 - 70 U/L    AST 65 (H) 0 - 45 U/L   Ammonia (on ice)   Result Value Ref Range    Ammonia 135 (HH) 10 - 50 umol/L   PTT   Result Value Ref Range    PTT 32 22 - 37 sec   INR   Result Value Ref Range    INR 1.37 (H) 0.86 - 1.14       Medications   lactulose (CHRONULAC) solution 10 g (not administered)   lactulose (CHRONULAC) solution 20 g (20 g Oral Given 2/8/17 2028)       Assessments & Plan (with Medical Decision Making)   Hep C patient with acute hepatic encephalopathy. He was given Lactulose and will be admitted for further care. TIPS patent by US study performed in the past month. No acute infectious s/sx or sx of GI bleeding.[AG1.1]    I have reviewed the nursing notes.    I have reviewed the findings, diagnosis, plan and need for follow up with the patient.    New Prescriptions    No medications on file       Final diagnoses:   Hepatic encephalopathy (H)   Chronic hepatitis C without hepatic coma (H)     This document serves as a record of the services and decisions personally performed and made by Kendall Ness MD. It was created on HIS/HER behalf by Bianca Roslaes, a trained medical scribe. The creation of this document is based the provider's statements to the medical scribe.  Bianca Rosales 7:54 PM  2/8/2017    Provider:   The information in this document, created by the medical scribe for me, accurately reflects the services I personally performed and the decisions made by me. I have reviewed and approved this document for accuracy prior to leaving the patient care area.  Kendall Ness MD 7:54 PM 2/8/2017 2/8/2017   [facility] EMERGENCY DEPARTMENT       Kendall Ness MD  02/13/17 0932       Revision History        User Key Date/Time User Provider Type Action    > AG1.2 2/13/2017  9:32 AM Kendall Ness MD Physician Sign     AG1.1 2/13/2017  9:28 AM Kendall Ness MD Physician      [N/A] 2/10/2017  5:03 PM Jett Candelario MD Physician Share     [N/A] 2/8/2017  9:45 PM Kendall Ness MD Physician Share     [N/A] 2/8/2017  9:43 PM Kendall Ness MD Physician Share     [N/A] 2/8/2017  9:11 PM Kendall Ness MD Physician Share     [N/A] 2/8/2017  9:01 PM Kendall Ness MD Physician Share     [N/A] 2/8/2017  8:46 PM Kendall Ness MD Physician Share     [N/A] 2/8/2017  8:20 PM [place], Bianca Scribe Share     [N/A] 2/8/2017  8:17 PM [place], Bianca Scribe Share     [N/A] 2/8/2017  8:10 PM [place], Bianca Scribe Share     [N/A] 2/8/2017  7:59 PM [place], Bianca Scribe Share            Progress Notes by Jessica Jacome RN at 2/13/2017  5:42 AM     Author:  Jessica Jacome RN Service:  (none) Author Type:  Registered Nurse    Filed:  2/13/2017  5:44 AM Date of Service:  2/13/2017  5:42 AM Note Created:  2/13/2017  5:42 AM    Status:  Signed :  Jessica Jacome RN (Registered Nurse)         Pt up to BSC, unable to void, when back in bed had small (40 CC) emesis. Changed linen and gown. Oral care done as much as pt allowed.[LC1.1]     Revision History        User Key Date/Time User Provider Type Action    > LC1.1 2/13/2017  5:44 AM Jessica Jacome, RN Registered Nurse Sign            Progress Notes by Jessica Jacome RN at 2/13/2017  3:09 AM     Author:  Jessica Jaocme RN  Service:  (none) Author Type:  Registered Nurse    Filed:  2/13/2017  3:32 AM Date of Service:  2/13/2017  3:09 AM Note Created:  2/13/2017  3:09 AM    Status:  Addendum :  Jessica Jacome RN (Registered Nurse)         Pt up to C w/ confusion, unable to turn around to sit on commode, had to move commode to pt. Tremors/hepatic flapping also worse, noted had large emesis after HS meds. Discussed w/ charge nurse, will web page.[LC1.1]    Addendum: Return call per MD, no changes tonight, will address in AM.[LC1.2]     Revision History        User Key Date/Time User Provider Type Action    > LC1.2 2/13/2017  3:32 AM Jessica Jacome RN Registered Nurse Addend     LC1.1 2/13/2017  3:25 AM Jessica Jacome RN Registered Nurse Sign            Progress Notes by Gayle Chung MD at 2/12/2017  2:36 PM     Author:  Gayle Chung MD Service:  (none) Author Type:  Physician    Filed:  2/12/2017  2:43 PM Date of Service:  2/12/2017  2:36 PM Note Created:  2/12/2017  2:36 PM    Status:  Signed :  Gayle Chung MD (Physician)         [facility]            Assessment and Plan:    Acute hepatic encephalopathy  Non compliance with lactulose at home. The dose has been increased during the hospital stay. ammonia improving again today. Stronger and less confused today but still having difficulty with simple tasks. Unsteady while walking and needs assistance. Recommend continuing current dose of lactulose. Consult for tcu placement. (Pt wants to speak with wife about plan.)      Cirrhosis of liver, s/p TIPS procedure    Hepatitis C   2/8/17 -- Due to alcohol abuse in the past and active hepatitis C infection.  Started Harvoni 2 weeks ago, and this is generally going well.     - Continue Harvoni and ribavirin, may use own supply      DVT Prophylaxis:   2/9/2017 -- still ambulate today, consider other prophylaxis if unable to discharge tomorrow.   [SW1.1]  2017[SW1.2]: ambulating     Code Status: Full Code    Disposition  Recommend TCU. Will likely be ready tomorrow for discharge.           Interval History:   One stool today. Some bad pain which he alwatys has. No nausea/vomiting   No fever/shakes/chills    He isstill unable to dial his phone-- This feels abnormal to him. No cough or fevers  He will consider TCU- wants to speak with wife  Unsteady with walking            Review of Systems:    ROS: 10 point ROS neg other than the symptoms noted above in the HPI.             Medications:   Current active medications and PTA medications reviewed, see medication list for details.            Physical Exam:   Vitals were reviewed  Patient Vitals for the past 24 hrs:   BP Temp Temp src Pulse Resp SpO2   17 1132 115/69 98.3  F (36.8  C) Oral 74 20 100 %   17 0733 127/62 97.9  F (36.6  C) Oral 76 20 98 %   17 2323 129/62 98.1  F (36.7  C) Oral 79 18 99 %   17 2030 106/56 98.2  F (36.8  C) Oral 80 16 98 %   17 1545 135/66 98  F (36.7  C) Oral 78 16 99 %       Temperatures:  Current - Temp: 97.6  F (36.4  C); Max - Temp  Av  F (36.7  C)  Min: 97.6  F (36.4  C)  Max: 98.3  F (36.8  C)  Respiration range: Resp  Av.5  Min: 16  Max: 18  Pulse range: Pulse  Av.8  Min: 68  Max: 77  Blood pressure range: Systolic (24hrs), Av mmHg, Min:119 mmHg, Max:130 mmHg  ; Diastolic (24hrs), Av mmHg, Min:59 mmHg, Max:70 mmHg    Pulse oximetry range: SpO2  Av.5 %  Min: 99 %  Max: 100 %  I/O last 3 completed shifts:  In: 1360 [P.O.:1360]  Out: 2325 [Urine:2325]    Intake/Output Summary (Last 24 hours) at 02/10/17 1704  Last data filed at 02/10/17 1215   Gross per 24 hour   Intake    620 ml   Output      0 ml   Net    620 ml     EXAM:  General: awake and alert, NAD  Head: normocephalic  Neck: unremarkable, no lymphadenopathy   HEENT: oropharynx pink and moist    Heart: Regular rate and rhythm, no murmurs, rubs, or  gallops  Lungs: clear to auscultation bilaterally with good air movement throughout  Abdomen: soft, mild tenderness throughout, slightly distended, no masses or organomegaly  Extremities: no edema in lower extremities   More alert and now oriented x3  Appears mildly tremulous            Data:     Results for orders placed or performed during the hospital encounter of 02/08/17 (from the past 24 hour(s))   Basic metabolic panel   Result Value Ref Range    Sodium 140 133 - 144 mmol/L    Potassium 4.2 3.4 - 5.3 mmol/L    Chloride 108 94 - 109 mmol/L    Carbon Dioxide 25 20 - 32 mmol/L    Anion Gap 7 3 - 14 mmol/L    Glucose 135 (H) 70 - 99 mg/dL    Urea Nitrogen 23 7 - 30 mg/dL    Creatinine 1.41 (H) 0.66 - 1.25 mg/dL    GFR Estimate 51 (L) >60 mL/min/1.7m2    GFR Estimate If Black 62 >60 mL/min/1.7m2    Calcium 8.5 8.5 - 10.1 mg/dL   Ammonia   Result Value Ref Range    Ammonia 92 (H) 10 - 50 umol/L   CBC with platelets   Result Value Ref Range    WBC 4.4 4.0 - 11.0 10e9/L    RBC Count 3.46 (L) 4.4 - 5.9 10e12/L    Hemoglobin 12.1 (L) 13.3 - 17.7 g/dL    Hematocrit 35.6 (L) 40.0 - 53.0 %     (H) 78 - 100 fl    MCH 35.0 (H) 26.5 - 33.0 pg    MCHC 34.0 31.5 - 36.5 g/dL    RDW 13.7 10.0 - 15.0 %    Platelet Count 60 (L) 150 - 450 10e9/L           Attestation:  I have reviewed today's vital signs, notes, medications, labs and imaging.  Amount of time performed on this daily note: 25 minutes.     Gayle Chung MD[SW1.1]                     Revision History        User Key Date/Time User Provider Type Action    > SW1.2 2/12/2017  2:43 PM Gayle Chung MD Physician Sign     SW1.1 2/12/2017  2:36 PM Gayle Chung MD Physician             Progress Notes by Tarah Doherty OT at 2/12/2017 11:04 AM     Author:  Tarah Doherty OT Service:  Acute IP Rehab Author Type:  Occupational Therapist    Filed:  2/12/2017 11:04 AM Date of Service:  2/12/2017 11:04 AM Note Created:   "2/12/2017 11:04 AM    Status:  Signed :  Tarah Doherty OT (Occupational Therapist)            02/12/17 1000   Signing Clinician's Name / Credentials   Signing clinician's name / credentials BRUCE Nassar   Quick Adds   Rehab Discipline OT   Therapeutic Activity   Minutes of Treatment 8 minutes   Symptoms Noted During/After Treatment Dizziness;Fatigue;Pain increased   Treatment Detail Min A supine to sit EOB. Worked in conjunction with PT for safety as pt can be unsteady on his feet with twitching and knee buckling without loss of balance. After walking in s[place]es he reports increased head 'fuzziness' and headache. Positioned in recliner which he tolerated briefly. Nurse notified of symptoms   ADL Training   Minutes of Treatment 23   Symptoms Noted During/After Treatment none   Treatment ADL training within precautions   Treatment Detail Pt required set-up for self feeding sitting EOB with good balance. He is able to use utensils to feed self entire meal and uses 2 hands to manage coffee cup. Stood at sink about 6' to brush teeth with CGA for safety. Some knee buckling but no LOB   Patient Response Increased tolerance for ADL activity   Patient Response Comments Pt feels good about being able to help himself more today   Additional Documentation   Rehab Comments Did not administer MOCHA this weekend d/t poor level of alertness SAT. and just beginning to feel better Sun.   OT Plan See STGs.    [facility] AM-PAC  \"6 Clicks\" Daily Activity Inpatient Short Form   1. Putting on and taking off regular lower body clothing? 2 - A Lot   2. Bathing (including washing, rinsing, drying)? 2 - A Lot   3. Toileting, which includes using toilet, bedpan or urinal? 2 - A Lot   4. Putting on and taking off regular upper body clothing? 3 - A Little   5. Taking care of personal grooming such as brushing teeth? 4 - None   6. Eating meals? 4 - None   Daily Activity Raw Score (Score out of 24.Lower scores equate to " lower levels of function) 17   Total Session Time   Total Session Time (minutes) 31 minutes     BRUCE Nassar[SL1.1]     Revision History        User Key Date/Time User Provider Type Action    > SL1.1 2/12/2017 11:04 AM Tarah Doherty OT Occupational Therapist Sign                  Procedure Notes     No notes of this type exist for this encounter.         Progress Notes - Therapies (Notes from 02/12/17 through 02/15/17)      Progress Notes by Tarah Doherty OT at 2/12/2017 11:04 AM     Author:  Tarah Doherty OT Service:  Acute IP Rehab Author Type:  Occupational Therapist    Filed:  2/12/2017 11:04 AM Date of Service:  2/12/2017 11:04 AM Note Created:  2/12/2017 11:04 AM    Status:  Signed :  Tarah Doherty OT (Occupational Therapist)            02/12/17 1000   Signing Clinician's Name / Credentials   Signing clinician's name / credentials BRUCE Nassar   Quick Adds   Rehab Discipline OT   Therapeutic Activity   Minutes of Treatment 8 minutes   Symptoms Noted During/After Treatment Dizziness;Fatigue;Pain increased   Treatment Detail Min A supine to sit EOB. Worked in conjunction with PT for safety as pt can be unsteady on his feet with twitching and knee buckling without loss of balance. After walking in sifuentes he reports increased head 'fuzziness' and headache. Positioned in recliner which he tolerated briefly. Nurse notified of symptoms   ADL Training   Minutes of Treatment 23   Symptoms Noted During/After Treatment none   Treatment ADL training within precautions   Treatment Detail Pt required set-up for self feeding sitting EOB with good balance. He is able to use utensils to feed self entire meal and uses 2 hands to manage coffee cup. Stood at sink about 6' to brush teeth with CGA for safety. Some knee buckling but no LOB   Patient Response Increased tolerance for ADL activity   Patient Response Comments Pt feels good about being able to help himself more today  "  Additional Documentation   Rehab Comments Did not administer MOCHA this weekend d/t poor level of alertness SAT. and just beginning to feel better Sun.   OT Plan See STGs.    [facility] AM-PAC  \"6 Clicks\" Daily Activity Inpatient Short Form   1. Putting on and taking off regular lower body clothing? 2 - A Lot   2. Bathing (including washing, rinsing, drying)? 2 - A Lot   3. Toileting, which includes using toilet, bedpan or urinal? 2 - A Lot   4. Putting on and taking off regular upper body clothing? 3 - A Little   5. Taking care of personal grooming such as brushing teeth? 4 - None   6. Eating meals? 4 - None   Daily Activity Raw Score (Score out of 24.Lower scores equate to lower levels of function) 17   Total Session Time   Total Session Time (minutes) 31 minutes     Tarah Doherty OTR[SL1.1]     Revision History        User Key Date/Time User Provider Type Action    > SL1.1 2/12/2017 11:04 AM Tarah Doherty OT Occupational Therapist Sign            "

## 2017-02-08 NOTE — IP AVS SNAPSHOT
"    [facility] SURGICAL: [phone]                                              INTERAGENCY TRANSFER FORM - PHYSICIAN ORDERS   2017                    Hospital Admission Date: 2017  SCOTT CASALE   : 1955  Sex: Male        Attending Provider: Marshal Rivera MD     Allergies:  Blood Transfusion Related (Informational Only), Percocet [Oxycodone-acetaminophen], Acetaminophen, Iodine-131, Motrin [Ibuprofen Micronized], Tylenol    Infection:  None   Service:  HOSPITALIST    Ht:  1.803 m (5' 11\")   Wt:  82.1 kg (181 lb)   Admission Wt:  90.7 kg (200 lb)    BMI:  25.24 kg/m 2   BSA:  2.03 m 2            Patient PCP Information     Provider PCP Type    Carlos Duenas MD, MD General      ED Clinical Impression     Diagnosis Description Comment Added By Time Added    Hepatic encephalopathy (H) [K72.90] Hepatic encephalopathy (H) [K72.90]  Kendall Ness MD 2017  8:55 PM    Chronic hepatitis C without hepatic coma (H) [B18.2] Chronic hepatitis C without hepatic coma (H) [B18.2]  Kendall Ness MD 2017  9:08 PM      Hospital Problems as of 2/15/2017              Priority Class Noted POA    Hepatic encephalopathy (H) Medium  2017 Yes    * (Principal)Encephalopathy acute Medium  2017 Yes      Non-Hospital Problems as of 2/15/2017              Priority Class Noted    AC separation   2008    Mild major depression (H)   2008    CARDIOVASCULAR SCREENING; LDL GOAL LESS THAN 130   2012    GERD (gastroesophageal reflux disease)   2012    Chronic low back pain   2012    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   2012    Edema of lower extremity Medium  6/15/2016    Chronic hepatitis C virus infection (H) Medium  6/15/2016    Hepatic cirrhosis (H) Medium  6/15/2016    Liver mass Medium  6/15/2016    Portal hypertension (H) Medium  6/15/2016    Primary malignant neoplasm of testis (H) Medium  6/15/2016    Thrombocytopenia (H) Medium  6/15/2016    " Hyponatremia Medium  8/22/2016    Ascites of liver Medium  9/27/2016    S/P TIPS (transjugular intrahepatic portosystemic shunt) Medium  9/28/2016      Code Status History     Date Active Date Inactive Code Status Order ID Comments User Context    1/5/2017  7:33 AM 1/9/2017  1:06 PM Full Code [id number]  Rhiannon Krueger PA-C Inpatient    9/29/2016  1:29 PM 1/5/2017  7:33 AM Full Code [id number]  Mich Parr PA-C Outpatient    9/28/2016  5:49 PM 9/29/2016  1:29 PM Full Code [id number]  Katarina Rabago PA Inpatient         Medication Review      UNREVIEWED medications. Ask your doctor about these medications        Dose / Directions Comments    furosemide 40 MG tablet   Commonly known as:  LASIX        Dose:  40 mg   Take 40 mg by mouth daily   Refills:  0        gabapentin 100 MG capsule   Commonly known as:  NEURONTIN   Used for:  Chronic low back pain with sciatica, sciatica laterality unspecified, unspecified back pain laterality        Dose:  200 mg   Take 2 capsules (200 mg) by mouth At Bedtime   Quantity:  90 capsule   Refills:  1        lactulose 20 GM/30ML Soln   Used for:  Alcoholic cirrhosis of liver with ascites (H)        Dose:  30 mL   Take 30 mLs by mouth 3 times daily   Quantity:  1892 mL   Refills:  3        ledipasvir-sofosbuvir  MG per tablet   Commonly known as:  ledipasvir-sofosbuvir   Used for:  Hepatitis C virus infection without hepatic coma, unspecified chronicity        Dose:  1 tablet   Take 1 tablet by mouth daily   Quantity:  28 tablet   Refills:  2        ribavirin 200 MG Tabs   Used for:  Chronic hepatitis C without hepatic coma (H)        Dose:  400 mg   Take 2 tablets (400 mg) by mouth 2 times daily   Quantity:  150 tablet   Refills:  2    Take 3 tablets and 2 PO in PM       rifaximin 550 MG Tabs tablet   Commonly known as:  XIFAXAN   Used for:  Hepatic encephalopathy (H)        Dose:  550 mg   Take 1 tablet (550 mg) by mouth 2 times daily   Quantity:  60 tablet    Refills:  11        sildenafil 50 MG cap/tab   Commonly known as:  REVATIO/VIAGRA   Used for:  Erectile dysfunction, unspecified erectile dysfunction type        Dose:  25-50 mg   Take 0.5-1 tablets (25-50 mg) by mouth daily as needed for erectile dysfunction Take 30 min to 4 hours before intercourse.  Never use with nitroglycerin, terazosin or doxazosin.   Quantity:  12 tablet   Refills:  11        spironolactone 100 MG tablet   Commonly known as:  ALDACTONE        Dose:  100 mg   Take 100 mg by mouth daily   Refills:  0        traZODone 100 MG tablet   Commonly known as:  DESYREL   Used for:  Primary insomnia        Dose:  100 mg   Take 1 tablet (100 mg) by mouth nightly as needed for sleep   Quantity:  30 tablet   Refills:  1        vitamin D 70245 UNIT capsule   Commonly known as:  ERGOCALCIFEROL        Dose:  16817 Units   Take 50,000 Units by mouth Twice weekly Mon and Thurs   Refills:  0        ZOFRAN 4 MG tablet   Generic drug:  ondansetron        Dose:  4 mg   Take 1 tablet (4 mg) by mouth every 8 hours as needed for nausea   Refills:  0                Your next 10 appointments already scheduled     Mar 09, 2017  9:25 AM CST   (Arrive by 9:10 AM)   Return Visit with Greg Concepcion MD   [facility] ([facility])    [address] [place] [address]   [phone]            Mar 22, 2017  6:00 PM CDT   (Arrive by 5:45 PM)   New Patient Visit with GILMAR Saleem [facility] ([facility])    [address] [place] [address]  [address] [facility] [address]   [phone]            Apr 11, 2017  9:00 AM CDT   Lab with  LAB   [facility] ([facility])    [address] [place] [address]   [phone]            Apr 11, 2017 10:00 AM CDT   US ABDOMEN COMPLETE with [email or web address]   [facility] ([facility])    [facility]  [address] [facility] [id number] [phone]           Please bring a list of your medicines (including vitamins, minerals and over-the-counter drugs). Also, tell your doctor about any allergies you may have. Wear comfortable clothes and leave your valuables at home.  Adults: No eating or drinking for 8 hours before the exam. You may take medicine with a small sip of water.  Children: - Children 6+ years: No food or drink for 6 hours before exam. - Children 1-5 years: No food or drink for 4 hours before exam. - Infants, breast-fed: may have breast milk up to 2 hours before exam. - Infants, formula: may have bottle until 4 hours before exam.  Please call the Imaging Department at your exam site with any questions.            Apr 11, 2017 11:00 AM CDT   (Arrive by 10:45 AM)   Return General Liver with Warren Higginbotham MD   [facility] ([facility])    [address] [facility]  [address] [facility] [id number] [phone]            Apr 11, 2017 12:00 PM CDT   (Arrive by 11:45 AM)   Return Visit with Micki Peter MD   [facility] ([facility])    [address] [facility]  [address] [facility] [id number] [phone]

## 2017-02-08 NOTE — IP AVS SNAPSHOT
"` `           [facility] SURGICAL: [phone]                                              INTERAGENCY TRANSFER FORM - NURSING   2017                    Hospital Admission Date: 2017  SCOTT CASALE   : 1955  Sex: Male        Attending Provider: Marshal Rivera MD     Allergies:  Blood Transfusion Related (Informational Only), Percocet [Oxycodone-acetaminophen], Acetaminophen, Iodine-131, Motrin [Ibuprofen Micronized], Tylenol    Infection:  None   Service:  HOSPITALIST    Ht:  1.803 m (5' 11\")   Wt:  82.1 kg (181 lb)   Admission Wt:  90.7 kg (200 lb)    BMI:  25.24 kg/m 2   BSA:  2.03 m 2            Patient PCP Information     Provider PCP Type    Carlos Duenas MD, MD General      Current Code Status     Date Active Code Status Order ID Comments User Context       2017 11:39 PM Full Code [id number]  James Julien MD Inpatient       Code Status History     Date Active Date Inactive Code Status Order ID Comments User Context    2017  7:33 AM 2017  1:06 PM Full Code [id number]  Rhiannon Krueger PA-C Inpatient    2016  1:29 PM 2017  7:33 AM Full Code [id number]  Mich Parr PA-C Outpatient    2016  5:49 PM 2016  1:29 PM Full Code [id number]  Katarina Rabago PA Inpatient      Advance Directives        Does patient have a scanned Advance Directive/ACP document in EPIC?           No        Hospital Problems as of 2/15/2017              Priority Class Noted POA    Hepatic encephalopathy (H) Medium  2017 Yes    * (Principal)Encephalopathy acute Medium  2017 Yes      Non-Hospital Problems as of 2/15/2017              Priority Class Noted    AC separation   2008    Mild major depression (H)   2008    CARDIOVASCULAR SCREENING; LDL GOAL LESS THAN 130   2012    GERD (gastroesophageal reflux disease)   2012    Chronic low back pain   2012    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   2012    Edema of lower " extremity Medium  6/15/2016    Chronic hepatitis C virus infection (H) Medium  6/15/2016    Hepatic cirrhosis (H) Medium  6/15/2016    Liver mass Medium  6/15/2016    Portal hypertension (H) Medium  6/15/2016    Primary malignant neoplasm of testis (H) Medium  6/15/2016    Thrombocytopenia (H) Medium  6/15/2016    Hyponatremia Medium  8/22/2016    Ascites of liver Medium  9/27/2016    S/P TIPS (transjugular intrahepatic portosystemic shunt) Medium  9/28/2016      Immunizations     Name Date      Influenza (IIV3) 10/16/16     Influenza (IIV3) 12/06/12     Pneumococcal 23 valent 02/12/17     TDAP (ADACEL AGES 11-64) 12/06/12          END      ASSESSMENT     Discharge Profile Flowsheet     EXPECTED DISCHARGE     SKIN      Expected Discharge Date  02/13/17 02/12/17 1456   Inspection  Full 02/15/17 0940    DISCHARGE NEEDS ASSESSMENT     Skin areas NOT inspected  Buttock, left 02/14/17 1833    # of Referrals Placed by CTS  Post Acute Facilities 02/12/17 1456   Procedural focused assessment (identify areas inspected)   -- (abdomen) 02/08/17 2034    GASTROINTESTINAL (ADULT,PEDIATRIC,OB)     Skin WDL  WDL 02/15/17 0940    GI WDL  WDL 02/15/17 0940   Skin Color/Characteristics  bruised (ecchymotic) 02/15/17 0940    Abdominal Appearance  flat;nondistended 02/14/17 1201   Skin Temperature  warm 02/14/17 1833    All Quadrants Bowel Sounds  high pitched 02/14/17 1201   Skin Moisture  dry 02/14/17 1833    All Quadrants Palpation  soft/nontender 02/08/17 2035   Skin Integrity  scab(s) 02/15/17 0940    Last Bowel Movement  02/14/17 02/15/17 0613   SAFETY      Passing flatus  yes 02/15/17 0613   Safety WDL  WDL 02/15/17 0940    COMMUNICATION ASSESSMENT     Safety Factors  side rails raised x 4 02/14/17 1833    Patient's communication style  spoken language (English or Bilingual) 02/08/17 2343                      Assessment WDL (Within Defined Limits) Definitions           Safety WDL     Effective: 09/28/15    Row Information: <b>WDL  "Definition:</b> Bed in low position, wheels locked; call light in reach; upper side rails up x 2; ID band on<br> <font color=\"gray\"><i>Item=AS safety wdl>>List=AS safety wdl>>Version=F14</i></font>      Skin WDL     Effective: 09/28/15    Row Information: <b>WDL Definition:</b> Warm; dry; intact; elastic; without discoloration; pressure points without redness<br> <font color=\"gray\"><i>Item=AS skin wdl>>List=AS skin wdl>>Version=F14</i></font>      Vitals     Vital Signs Flowsheet     VITAL SIGNS     HEIGHT AND WEIGHT      Temp  97.7  F (36.5  C) 02/15/17 1128   Height  1.803 m (5' 11\") 02/09/17 0448    Temp src  Oral 02/15/17 1128   Height Method  Stated 02/08/17 1950    Resp  18 02/15/17 1128   Weight  82.1 kg (181 lb) 02/14/17 0726    Pulse  76 02/15/17 1128   BSA (Calculated - sq m)  2.15 02/09/17 0448    Heart Rate  80 02/15/17 0753   BMI (Calculated)  28.35 02/09/17 0448    Pulse/Heart Rate Source  Monitor 02/15/17 1128   DAILY CARE      BP  128/68 02/15/17 1128   Activity Type  up in chair 02/15/17 0934    BP Location  Left arm 02/15/17 1128   Activity Level of Assistance  assistance, 2 people 02/15/17 0934    OXYGEN THERAPY     EMANI COMA SCALE      SpO2  100 % 02/15/17 1128   Best Eye Response  4-->(E4) spontaneous 02/15/17 0934    O2 Device  None (Room air) 02/15/17 1128   Best Motor Response  6-->(M6) obeys commands 02/15/17 0934    PAIN/COMFORT     Best Verbal Response  5-->(V5) oriented 02/15/17 0934    Patient Currently in Pain  denies 02/14/17 1706   Emani Coma Scale Score  15 02/15/17 0934    Preferred Pain Scale  CAPA (Clinically Aligned Pain Assessment) ([facility] Adults Only) 02/14/17 1156   POSITIONING      Pain Location  Abdomen 02/14/17 1156   Body Position  independently positioning 02/15/17 0934    Pain Orientation  Lower;Mid 02/14/17 1156   Head of Bed (HOB)  HOB at 20 degrees 02/15/17 0607    Pain Descriptors  Aching 02/14/17 0753   Positioning/Transfer Devices  pillows " 02/14/17 1156    Pain Intervention(s)  Repositioned 02/14/17 1156   ECG      CLINICALLY ALIGNED PAIN ASSESSMENT (CAPA) ([facility], [facility] AND [facility] ADULTS ONLY)     ECG Rhythm  Sinus rhythm 02/14/17 1053    Comfort  negligible pain 02/15/17 0934   Ectopy  None 02/14/17 0607    Change in Pain  about the same 02/14/17 1156   OH Interval  .12 02/14/17 1053    Pain Control  partially effective 02/14/17 1156   QRS Interval  .07 02/14/17 1053    Functioning  can do most things, but pain gets in the way of some 02/14/17 1156   QT Interval  .38 02/14/17 1053    Sleep  normal sleep 02/14/17 1156   Lead Monitored  Lead II 02/14/17 1053            Patient Lines/Drains/Airways Status    Active LINES/DRAINS/AIRWAYS     Name: Placement date: Placement time: Site: Days: Last dressing change:    Peripheral IV 02/08/17 Right Lower forearm 02/08/17   2011   Lower forearm   6     Incision/Surgical Site 09/28/16 Right Neck 09/28/16       140     Incision/Surgical Site 09/28/16 Right Abdomen 09/28/16       140             Patient Lines/Drains/Airways Status    Active PICC/CVC     None            Intake/Output Detail Report     Date Intake     Output     Net    Shift P.O. I.V. IV Piggyback Total Urine Emesis/NG output Other Total       Noc 02/13/17 2300 - 02/14/17 0659 200 875 -- 1075 125 -- -- 125 950    Day 02/14/17 0700 - 02/14/17 1459 150 25 -- 175 300 -- -- 300 -125    Rachael 02/14/17 1500 - 02/14/17 2259 280 1307.5 -- 1587.5 -- -- 1 1 1586.5    Noc 02/14/17 2300 - 02/15/17 0659 0 600 -- 600 -- -- -- -- 600    Day 02/15/17 0700 - 02/15/17 1459 105 225 -- 330 400 -- -- 400 -70      Last Void/BM       Most Recent Value    Urine Occurrence 1 at 02/14/2017 0239    Stool Occurrence 1 at 02/14/2017 2124      Case Management/Discharge Planning     Case Management/Discharge Planning Flowsheet     REFERRAL INFORMATION     Major Change/Loss/Stressor  none 02/09/17 1403    Admission Type  inpatient 02/12/17 1455   EXPECTED DISCHARGE      Arrived  From  admitted as an inpatient 01/05/17 0057   Expected Discharge Date  02/13/17 02/12/17 1456    Referral Source  nursing 02/12/17 1455   ABUSE RISK SCREEN      # of Referrals Placed by CTS  Post Acute Facilities 02/12/17 1456   QUESTION TO PATIENT:  Has a member of your family or a partner(now or in the past) intimidated, hurt, manipulated, or controlled you in any way?  no 02/08/17 1950    Reason For Consult  discharge planning 02/12/17 1455   QUESTION TO PATIENT: Do you feel safe going back to the place where you are living?  yes 02/08/17 1950    Primary Care Clinic Name  -- ([facility]) 02/12/17 1456   OBSERVATION: Is there reason to believe there has been maltreatment of a vulnerable adult (ie. Physical/Sexual/Emotional abuse, self neglect, lack of adequate food, shelter, medical care, or financial exploitation)?  no 02/08/17 1950    Primary Care MD Name  -- (Carlos Duenas MD, MD) 02/12/17 1456   (R) MENTAL HEALTH SUICIDE RISK      LIVING ENVIRONMENT     Are you depressed or being treated for depression?  No 02/09/17 1403    Lives With  child(sara), adult;spouse 02/10/17 1542   HOMICIDE RISK      Living Arrangements  house 02/10/17 1530   Homicidal Ideation  no 02/08/17 2346    COPING/STRESS

## 2017-02-08 NOTE — IP AVS SNAPSHOT
` ` Patient Information     Patient Name Sex     Casale, Scott ([id number]) Male 1955       Room Bed    2306 9406-16      Patient Demographics     Address Phone E-mail Address    [address] [address] [phone] (Home)  [phone] (Mobile) *Preferred* [email or web address]      Patient Ethnicity & Race     Ethnic Group Patient Race    Not  or  White      Emergency Contact(s)     Name Relation Home Work Mobile    CASALE,CINDY Spouse [phone]    CHRISTINE GARSIA Sister [phone]      Documents on File        Status Date Received Description       Documents for the Patient    Privacy Notice - [place]  05     Other  07 [place] [facility] INTENT     Face Sheet  08     Waiver - Payment  08     Consent Form  08     Other  08 INS INFO FROM REMOTE    Other  08 INTENT    Insurance Card       External Medication Information Consent Accepted () 12     Patient ID Received 16 ID    Consent for Services - Hospital/Clinic Received () 12     Insurance Card Received 12     Insurance Card       Privacy Notice - [place] Received 12     Waiver - Payment Received 12     Waiver - Payment Received 12     Insurance Card Received 13 MA    HIM DAV Authorization - File Only  13 [facility]    Consent for EHR Access  13 Copied from existing Consent for services - C/HOD collected on 2012    [facility] Specified Other       Waiver - Payment Accepted 13     Consent for Services - Hospital/Clinic Received () 13     Consent for Services - Hospital/Clinic       External Medication Information Consent Accepted 13     Waiver - Payment Received 10/09/13     Consent for Services - [facility]       Consent for Services/Privacy Notice - Hospital/Clinic-[place] Received 16     Consent for Services/Privacy Notice - Hospital/Clinic        Insurance Card Received 02/08/17 medica    HIM DAV Authorization  11/16/16 [facility]    Business/Insurance/Care Coordination/Health Form - Patient  12/06/16 MEDICA HEPATITIS C TREATMENT PROVIDER/PATIENT ATTESTATION FORM 10/11/16       Documents for the Encounter    CMS IM for Patient Signature       Observation Notice Received 02/08/17       Admission Information     Attending Provider Admitting Provider Admission Type Admission Date/Time    Marshal Rivera MD [facility], Jett DO MD Emergency 02/08/17 1951    Discharge Date Hospital Service Auth/Cert Status Service Area     Hospitalist [facility]    Unit Room/Bed Admission Status       [facility] [facility] 2306/2306-01 Admission (Confirmed)       Admission     Complaint    Encephalopathy acute, Hepatic encephalopathy (H)      Hospital Account     Name Acct ID Class Status Primary Coverage    Casale, Scott [id number] Inpatient Open [place]            Guarantor Account (for Hospital Account #[id number])     Name Relation to Pt Service Area Active? Acct Type    Casale, Scott  FCS Yes Personal/Family    Address Phone          [address] [address] [phone](H)              Coverage Information (for Hospital Account #[id number])     F/O Payor/Plan Precert #    [place]     Subscriber Subscriber #    Casale, Scott [id number]    Address Phone    [address] [address]  [place] [address] [phone]

## 2017-02-09 LAB
ALBUMIN SERPL-MCNC: 2.1 G/DL (ref 3.4–5)
ALP SERPL-CCNC: 60 U/L (ref 40–150)
ALT SERPL W P-5'-P-CCNC: 27 U/L (ref 0–70)
AMMONIA PLAS-SCNC: 90 UMOL/L (ref 10–50)
ANION GAP SERPL CALCULATED.3IONS-SCNC: 6 MMOL/L (ref 3–14)
AST SERPL W P-5'-P-CCNC: 50 U/L (ref 0–45)
BILIRUB SERPL-MCNC: 3 MG/DL (ref 0.2–1.3)
BUN SERPL-MCNC: 25 MG/DL (ref 7–30)
CALCIUM SERPL-MCNC: 7.8 MG/DL (ref 8.5–10.1)
CHLORIDE SERPL-SCNC: 109 MMOL/L (ref 94–109)
CO2 SERPL-SCNC: 25 MMOL/L (ref 20–32)
CREAT SERPL-MCNC: 1.34 MG/DL (ref 0.66–1.25)
GFR SERPL CREATININE-BSD FRML MDRD: 54 ML/MIN/1.7M2
GLUCOSE SERPL-MCNC: 98 MG/DL (ref 70–99)
POTASSIUM SERPL-SCNC: 3.5 MMOL/L (ref 3.4–5.3)
PROT SERPL-MCNC: 6 G/DL (ref 6.8–8.8)
SODIUM SERPL-SCNC: 140 MMOL/L (ref 133–144)

## 2017-02-09 PROCEDURE — 96376 TX/PRO/DX INJ SAME DRUG ADON: CPT

## 2017-02-09 PROCEDURE — 99232 SBSQ HOSP IP/OBS MODERATE 35: CPT | Performed by: FAMILY MEDICINE

## 2017-02-09 PROCEDURE — 25000132 ZZH RX MED GY IP 250 OP 250 PS 637: Performed by: FAMILY MEDICINE

## 2017-02-09 PROCEDURE — 36415 COLL VENOUS BLD VENIPUNCTURE: CPT | Performed by: INTERNAL MEDICINE

## 2017-02-09 PROCEDURE — G0378 HOSPITAL OBSERVATION PER HR: HCPCS

## 2017-02-09 PROCEDURE — 25000132 ZZH RX MED GY IP 250 OP 250 PS 637: Performed by: EMERGENCY MEDICINE

## 2017-02-09 PROCEDURE — 25000125 ZZHC RX 250: Performed by: EMERGENCY MEDICINE

## 2017-02-09 PROCEDURE — 25000132 ZZH RX MED GY IP 250 OP 250 PS 637: Performed by: INTERNAL MEDICINE

## 2017-02-09 PROCEDURE — 36415 COLL VENOUS BLD VENIPUNCTURE: CPT | Performed by: FAMILY MEDICINE

## 2017-02-09 PROCEDURE — 80053 COMPREHEN METABOLIC PANEL: CPT | Performed by: INTERNAL MEDICINE

## 2017-02-09 PROCEDURE — 12000007 ZZH R&B INTERMEDIATE

## 2017-02-09 PROCEDURE — 96374 THER/PROPH/DIAG INJ IV PUSH: CPT

## 2017-02-09 PROCEDURE — 82140 ASSAY OF AMMONIA: CPT | Performed by: FAMILY MEDICINE

## 2017-02-09 RX ORDER — CETIRIZINE HYDROCHLORIDE 10 MG/1
10 TABLET ORAL DAILY
Status: DISCONTINUED | OUTPATIENT
Start: 2017-02-09 | End: 2017-02-15 | Stop reason: HOSPADM

## 2017-02-09 RX ADMIN — POLYETHYLENE GLYCOL 3350 17 G: 17 POWDER, FOR SOLUTION ORAL at 00:26

## 2017-02-09 RX ADMIN — CETIRIZINE HYDROCHLORIDE 10 MG: 10 TABLET, FILM COATED ORAL at 13:58

## 2017-02-09 RX ADMIN — PANTOPRAZOLE SODIUM 40 MG: 40 INJECTION, POWDER, FOR SOLUTION INTRAVENOUS at 00:22

## 2017-02-09 RX ADMIN — LACTULOSE 20 G: 20 SOLUTION ORAL at 20:18

## 2017-02-09 RX ADMIN — RIBAVIRIN 400 MG: 200 TABLET, FILM COATED ORAL at 17:31

## 2017-02-09 RX ADMIN — RIBAVIRIN 600 MG: 200 TABLET, FILM COATED ORAL at 10:33

## 2017-02-09 RX ADMIN — RIFAXIMIN 550 MG: 550 TABLET ORAL at 20:18

## 2017-02-09 RX ADMIN — RIFAXIMIN 550 MG: 550 TABLET ORAL at 08:21

## 2017-02-09 RX ADMIN — SPIRONOLACTONE 100 MG: 25 TABLET ORAL at 08:21

## 2017-02-09 RX ADMIN — LACTULOSE 20 G: 20 SOLUTION ORAL at 08:21

## 2017-02-09 RX ADMIN — GABAPENTIN 200 MG: 100 CAPSULE ORAL at 00:25

## 2017-02-09 RX ADMIN — ONDANSETRON 4 MG: 4 TABLET, ORALLY DISINTEGRATING ORAL at 00:21

## 2017-02-09 RX ADMIN — FUROSEMIDE 40 MG: 40 TABLET ORAL at 08:21

## 2017-02-09 RX ADMIN — GABAPENTIN 200 MG: 100 CAPSULE ORAL at 22:10

## 2017-02-09 RX ADMIN — LEDIPASVIR AND SOFOSBUVIR 1 TABLET: 90; 400 TABLET, FILM COATED ORAL at 10:32

## 2017-02-09 RX ADMIN — LACTULOSE 20 G: 20 SOLUTION ORAL at 13:58

## 2017-02-09 RX ADMIN — PANTOPRAZOLE SODIUM 40 MG: 40 INJECTION, POWDER, FOR SOLUTION INTRAVENOUS at 08:21

## 2017-02-09 RX ADMIN — RIFAXIMIN 550 MG: 550 TABLET ORAL at 00:25

## 2017-02-09 RX ADMIN — PANTOPRAZOLE SODIUM 40 MG: 40 INJECTION, POWDER, FOR SOLUTION INTRAVENOUS at 20:18

## 2017-02-09 NOTE — PROGRESS NOTES
"[place] [facility] [place]    Patient admitted to room 2309 at approximately 1145 via cart from emergency room. Patient was accompanied by transport tech.     Verbal SBAR report received from Lucy SOLORZANO prior to patient arrival.     Patient ambulated to bed with stand-by assist. Patient alert and oriented X 3. The patient is not having any pain.  . Admission vital signs: Blood pressure 123/64, pulse 84, temperature 98  F (36.7  C), temperature source Oral, resp. rate 18, height 1.803 m (5' 11\"), weight 90.719 kg (200 lb), SpO2 99 %. Patient was oriented to plan of care, call light, bed controls, tv, telephone, bathroom and visiting hours.     The following safety risks were identified during admission: fall. Yellow risk band applied: YES.     Cesia Campbell      "

## 2017-02-09 NOTE — ED NOTES
Patient has  [place] to Observation  order. Patient has been given Patient Bill of Rights, Observation brochure and  What does Observation mean to me  forms.  Patient has been given the opportunity to ask questions about observation status and their plan of care.  Patient will be oriented to the observation room, bathroom, and call light by floor nurse .    Suyapa Larson

## 2017-02-09 NOTE — ED NOTES
DATE:  2/8/2017   TIME OF RECEIPT FROM LAB:  2044  LAB TEST:  Ammonia  LAB VALUE:  135  RESULTS GIVEN WITH READ-BACK TO (PROVIDER):  Dr. Ness  TIME LAB VALUE REPORTED TO PROVIDER:   2045

## 2017-02-09 NOTE — H&P
[facility]    History and Physical  Hospital Medicine       Date of Admission:  2/8/2017  Date of Service: 2/8/2017     Assessment and Plan  Scott Casale is a 61 year old male who presents with increased tremulousness, decreased stool output and incomplete compliance with lactulose.      Acute hepatic encephalopathy, mild   Patient has been not completely compliant with lactulose and is developing some symptoms of hepatic encephalopathy.  Symptoms are currently mild however there are enough that the patient cannot manage by himself and his wife is being hospitalized so won't be there to help him.  His mentation is slowed but he is not disoriented.   - Continue lactulose as prescribed 20 g 3 times a day   - Add one dose of MiraLAX      Cirrhosis of liver, s/p TIPS procedure    Hepatitis C   Due to alcohol abuse in the past and active hepatitis C infection.  Started Harvoni 2 weeks ago, and this is generally going well.    - Continue Harvoni and ribavirin, may use own supply    DVT Prophylaxis: Low Risk/Ambulatory with no VTE prophylaxis indicated  Code Status: Full Code    Disposition: Anticipate discharge tomorrow if he responds to resumption of lactulose and the dose of MiraLAX.  Patient's symptoms are not as severe as in the past. Appropriate for observation care    James Julien MD  Hospital Medicine        Primary Care Physician  Carlos Duenas [phone]    History is obtained from the patient who is a good historian and review of old records via the EMR.    Past Medical History   Past Medical History   Diagnosis Date     Ankylosing spondylitis (H)      Splenomegaly      Shoulder dislocation      Ulcerative colitis (H)      H/O testicular cancer age 28       Hepatic encephalopathy (H) 01/05/2017     Priority: Medium     S/P TIPS (transjugular intrahepatic portosystemic shunt) 09/28/2016     Priority: Medium     Ascites of liver 09/27/2016     Priority: Medium     Overview:    Low MELD score   S/p TIPS procedure at [facility] (09/28/2016)       Edema of lower extremity 06/15/2016     Priority: Medium     Overview:   Resistant to diuretic therapy        Chronic hepatitis C virus infection (H) 06/15/2016     Priority: Medium     Overview:   Dr Higginbotham [facility]   Melva Ribavirin started January 2017       Hepatic cirrhosis (H) 06/15/2016     Priority: Medium     Overview:   Hepatitis C and history of alcohol use ( no use currently)  in the past        Liver mass 06/15/2016     Priority: Medium     Overview:   0.8 cm right lobe hyperlucency per CT 04/2016 suspicious for HCC   Liver biopsy due - seeing Liver clinic at [facility]       Portal hypertension (H) 06/15/2016     Priority: Medium     Overview:   Furosemide Spironolactone Lactulose   Intolerance to Propranolol with hypotension, dizziness       Primary malignant neoplasm of testis (H) 06/15/2016     Priority: Medium     Overview:   At age 28 s/p orchiectomy with RPLND        Thrombocytopenia (H) 06/15/2016     Priority: Medium     Overview:   6/12/2016  PLT: 48       Chronic low back pain 12/06/2012     Priority: Medium     Narcotic Contract signed 12/6/12 and sent to be scanned    Uses soma at night to help him sleep due to back pain. Has had back problems since 1986, since fell in big hole, collapsed lower discs. Also has ankylosing spondylitis. Has had much Physical Therapy and chiropractic work over years, was told surgery was only option.  Was accompanied by [place] records.    Narcotic contract -- soma #30 R2, at 3 mo can call for 3 more refills, but be seen q 6 mo for refills.  Uses for sleep.  Flexeril for daytime if needed, unrestricted.           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 12/06/2012     Priority: Medium     Since summer; ENT referral 12/6/12       GERD (gastroesophageal reflux disease) 06/26/2012     Priority: Medium     Mild major depression (H) 08/27/2008      Priority: Medium       Past Surgical History  Past Surgical History   Procedure Laterality Date     C knee scope,clean/drain       bilateral     C appendectomy       Strabismus surgery       bilateral     Orchiectomy scrotal       prosthesis placed     Ir transven intrahepatic portosyst rev       Eye surgery       Esophagoscopy, gastroscopy, duodenoscopy (egd), combined N/A 1/17/2017     Procedure: COMBINED ESOPHAGOSCOPY, GASTROSCOPY, DUODENOSCOPY (EGD);  Surgeon: Guru Reina Palacios MD;  Location: [facility]     Colonoscopy N/A 1/17/2017     Procedure: COLONOSCOPY;  Surgeon: Guru Reina Palacios MD;  Location: [facility]        History of Present Illness  Scott Casale is a 61 year old male who presents with complaint of feeling more shaky as he does when his ammonia is elevated.  Patient has known history of hepatic encephalopathy and is on lactulose and rifaximin, but he has not been taking his lactulose as he should and is bowel movements have slowed to 1 or 2 a day.  He's had some nausea but no emesis for the last couple days.  The patient's wife is in the emergency room due to angina and the patient came to see her.  His wife told the ER physicians that her  needed to be seen because he is becoming more confused and will be unable to manage this without her since she is being hospitalized.  Patient was found to have increased tremors and ammonia up to 135.     Prior to Admission Medications  Prior to Admission Medications   Prescriptions Last Dose Informant Patient Reported? Taking?   furosemide (LASIX) 40 MG tablet 2/8/2017 at am Self Yes Yes   Sig: Take 40 mg by mouth daily    gabapentin (NEURONTIN) 100 MG capsule 2/7/2017 at hs Self No Yes   Sig: Take 2 capsules (200 mg) by mouth At Bedtime   lactulose 20 GM/30ML SOLN 2/8/2017 at 1100 Self No Yes   Sig: Take 30 mLs by mouth 3 times daily   ledipasvir-sofosbuvir (LEDIPASVIR-SOFOSBUVIR)  MG per tablet 2/8/2017 at am Self No  Yes   Sig: Take 1 tablet by mouth daily   ondansetron (ZOFRAN) 4 MG tablet 2/7/2017 at hs Self Yes Yes   Sig: Take 1 tablet (4 mg) by mouth every 8 hours as needed for nausea   ribavirin 200 MG TABS 2/8/2017 at am Self No Yes   Sig: Take 2 tablets (400 mg) by mouth 2 times daily   rifaximin (XIFAXAN) 550 MG TABS tablet 2/8/2017 at am Self No Yes   Sig: Take 1 tablet (550 mg) by mouth 2 times daily   sildenafil (REVATIO/VIAGRA) 50 MG cap/tab not started Self No No   Sig: Take 0.5-1 tablets (25-50 mg) by mouth daily as needed for erectile dysfunction Take 30 min to 4 hours before intercourse.  Never use with nitroglycerin, terazosin or doxazosin.   spironolactone (ALDACTONE) 100 MG tablet 2/7/2017 at am Self Yes Yes   Sig: Take 100 mg by mouth daily    traZODone (DESYREL) 100 MG tablet Past Month at Unknown time Self No Yes   Sig: Take 1 tablet (100 mg) by mouth nightly as needed for sleep   vitamin D (ERGOCALCIFEROL) 49011 UNIT capsule 2/6/2017 Self Yes No   Sig: Take 50,000 Units by mouth Twice weekly Mon and Thurs      Facility-Administered Medications: None     Allergies  Allergies   Allergen Reactions     Blood Transfusion Related (Informational Only) Other (See Comments)     Patient has a history of a clinically significant antibody against RBC antigens.  A delay in compatible RBCs may occur.     Percocet [Oxycodone-Acetaminophen] Nausea and Vomiting     Acetaminophen Nausea     Iodine-131 Hives     Motrin [Ibuprofen Micronized] Nausea     Tylenol Nausea       Family History   Family History   Problem Relation Age of Onset     CANCER Mother 70     brain tumor     Prostate Cancer No family hx of      Cancer - colorectal No family hx of      DIABETES Maternal Grandmother      CEREBROVASCULAR DISEASE Sister      HEART DISEASE Sister      Respiratory Son      CANCER Father      Uterine Cancer Sister          Social History  Social History     Social History     Marital Status:      Spouse Name: N/A     Number  "of Children: N/A     Years of Education: N/A     Occupational History     Not on file.     Social History Main Topics     Smoking status: Former Smoker     Quit date: 01/01/1978     Smokeless tobacco: Never Used     Alcohol Use: No      Comment: quit in 2010     Drug Use: No     Sexual Activity:     Partners: Female     Other Topics Concern     Parent/Sibling W/ Cabg, Mi Or Angioplasty Before 65f 55m? No     Social History Narrative    Admit 9/28:    Lives with new wife of 2 mos.    Tobacco:  Former smoker, quit 1978    Alcohol:  Sober since 2010, recovering alcoholic.    Drugs:  Denies        Review of Systems  The 10 point Review of Systems is negative other than noted in the HPI or here.  Patient's ascites has been better since his TIPS procedure, his edema in lower steroids is chronic with left worse than right but has been a little better recently, started Harvoni 2 weeks ago and this is going well.     Physical Exam  /66 mmHg  Pulse 87  Temp(Src) 97.2  F (36.2  C) (Oral)  Resp 16  Ht 1.803 m (5' 11\")  Wt 90.719 kg (200 lb)  BMI 27.91 kg/m2  SpO2 99%     Weight: 200 lbs 0 oz Body mass index is 27.91 kg/(m^2).     Constitutional: Awake, mentation a little slowed, oriented to place, date the week, month, year and situation, cooperative, mild tremulousness otherwise no apparent distress, appears nontoxic.  Eyes: Eyes are clear, pupils are reactive.  HEENT: Oropharynx is clear and moist. No evidence of cranial trauma.  Lymph/Hematologic: No epitrochlear, axillary, anterior or posterior cervical, or supraclavicular lymphadenopathy is appreciated.  Cardiovascular: Regular rate and rhythm, normal S1 and S2, and no murmur noted. JVP is normal. Good peripheral pulses in wrists bilaterally. Lower extremity edema on the left to the knee and on the right two thirds up lower leg.  Respiratory: Clear to auscultation bilaterally.   GI: Soft, mild to moderate tenderness of the right abdomen to moderate palpation, " left side is soft and nontender, normal bowel sounds, hepatomegaly present.  Genitourinary: Deferred  Musculoskeletal: Decreased muscle bulk and normal tone.  Skin: Warm and dry, no rashes.   Neurologic: Neck supple. Cranial nerves are grossly intact.  is symmetric.  There is mild irregular tremulousness to the hands but no profound asterixis.     Data  Data reviewed today:     Recent Labs  Lab 02/08/17 2010   WBC 5.5   HGB 12.6*   *   PLT 54*   INR 1.37*      POTASSIUM 3.7   CHLORIDE 108   CO2 25   BUN 24   CR 1.46*   ANIONGAP 7   ARACELI 7.9*   GLC 88   ALBUMIN 2.3*   PROTTOTAL 6.8   BILITOTAL 3.2*   ALKPHOS 91   ALT 34   AST 65*       No results found for this or any previous visit (from the past 24 hour(s)).    I personally reviewed no images or EKG's today.    Chart documentation with keystrokes and/or Dragon voice recognition software. Although reviewed after completion, some word and grammatical error may remain.  James Julien MD  [facility] Medicine

## 2017-02-09 NOTE — ED PROVIDER NOTES
"  History     Chief Complaint   Patient presents with     Generalized Weakness     hx hep C and cirrhosis - states has been feeling shaky and feels like his ammonia is high     HPI  Scott Casale is a 61 year old male with a history of hepatitis C, hepatic cirrhosis, ascites, hepatic encephalopathy, hyponatremia, and thrombocytopenia who presents to the ED today with complaints of feeling shaky as if his ammonia is high. He had came to see his wife who is in the ED for evaluation of chest pain, and she wouldn't let him leave until he was seen for possible elevated ammonia/hepatic encephalopathy. The shaking and unsteady gait began within the past several days.  He is supposed to urinate 4-5 times a day, but he is only urinating 1-2 times daily. This started a few days ago. At that time he started to feel dizzy, weak and shaky. His legs have felt wobbly as well.  He does not take his lactulose if he is out or away from home. He is supposed to take 20g (30ml) tid. He states that he misses \"once in a while\". Today he has taken 2 of his doses.   Wife thinks he is poorly compliant with lactulose, states \"I think he only takes it when he wants to\".    I have reviewed the Medications, Allergies, Past Medical and Surgical History, and Social History in the Epic system.   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     S/P TIPS (transjugular intrahepatic portosystemic shunt)     Hepatic encephalopathy (H)     Ascites of liver     Edema of lower extremity     Chronic hepatitis C virus infection (H)     Hepatic cirrhosis (H)     Hyponatremia     Liver mass     Portal hypertension (H)     Primary malignant neoplasm of testis (H)     Thrombocytopenia (H)       Current Outpatient Prescriptions   Medication Sig Dispense " Refill     rifaximin (XIFAXAN) 550 MG TABS tablet Take 1 tablet (550 mg) by mouth 2 times daily 60 tablet 11     ribavirin 200 MG TABS Take 2 tablets (400 mg) by mouth 2 times daily 150 tablet 2     gabapentin (NEURONTIN) 100 MG capsule Take 2 capsules (200 mg) by mouth At Bedtime 90 capsule 1     traZODone (DESYREL) 100 MG tablet Take 1 tablet (100 mg) by mouth nightly as needed for sleep 30 tablet 1     lactulose 20 GM/30ML SOLN Take 30 mLs by mouth 3 times daily 1892 mL 3     ledipasvir-sofosbuvir (LEDIPASVIR-SOFOSBUVIR)  MG per tablet Take 1 tablet by mouth daily 28 tablet 2     furosemide (LASIX) 40 MG tablet Take 40 mg by mouth daily        spironolactone (ALDACTONE) 100 MG tablet Take 100 mg by mouth daily        ondansetron (ZOFRAN) 4 MG tablet Take 1 tablet (4 mg) by mouth every 8 hours as needed for nausea       sildenafil (REVATIO/VIAGRA) 50 MG cap/tab Take 0.5-1 tablets (25-50 mg) by mouth daily as needed for erectile dysfunction Take 30 min to 4 hours before intercourse.  Never use with nitroglycerin, terazosin or doxazosin. 12 tablet 11     vitamin D (ERGOCALCIFEROL) 49090 UNIT capsule Take 50,000 Units by mouth Twice weekly Mon and Thurs         Allergies   Allergen Reactions     Blood Transfusion Related (Informational Only) Other (See Comments)     Patient has a history of a clinically significant antibody against RBC antigens.  A delay in compatible RBCs may occur.     Percocet [Oxycodone-Acetaminophen] Nausea and Vomiting     Acetaminophen Nausea     Iodine-131 Hives     Motrin [Ibuprofen Micronized] Nausea     Tylenol Nausea       Social History   Substance Use Topics     Smoking status: Former Smoker     Quit date: 01/01/1978     Smokeless tobacco: Never Used     Alcohol Use: No      Comment: quit in 2010     Review of Systems  As mentioned above in the history present illness. All other systems were reviewed and are negative.    Physical Exam   BP: 152/81 mmHg  Pulse: 87  Resp:  "16  Height: 180.3 cm (5' 11\")  Weight: 90.719 kg (200 lb)  SpO2: 98 %    Physical Exam   Constitutional: He is oriented to person, place, and time. He appears well-developed and well-nourished. No distress.   HENT:   Head: Normocephalic and atraumatic.   Mouth/Throat: Oropharynx is clear and moist.   Eyes: EOM are normal. Pupils are equal, round, and reactive to light. Scleral icterus (mild) is present.   Neck: Normal range of motion. Neck supple. No tracheal deviation present. No thyromegaly present.   Cardiovascular: Normal rate, regular rhythm and normal heart sounds.  Exam reveals no gallop and no friction rub.    No murmur heard.  Pulmonary/Chest: Effort normal and breath sounds normal. No respiratory distress. He has no wheezes. He has no rales.   Abdominal: Soft. Bowel sounds are normal. He exhibits no distension and no mass. There is no tenderness. There is no rebound and no guarding.   Musculoskeletal: Normal range of motion. He exhibits no edema or tenderness.   Neurological: He is alert and oriented to person, place, and time. He has normal strength. He displays tremor (asterixis). No cranial nerve deficit (2-12 intact) or sensory deficit. Coordination normal.   Skin: Skin is warm and dry. No rash noted. He is not diaphoretic. No erythema. No pallor.   Psychiatric: He has a normal mood and affect. His behavior is normal.   Nursing note and vitals reviewed.      ED Course   Procedures            Results for orders placed or performed during the hospital encounter of 02/08/17   CBC with platelets, differential   Result Value Ref Range    WBC 5.5 4.0 - 11.0 10e9/L    RBC Count 3.56 (L) 4.4 - 5.9 10e12/L    Hemoglobin 12.6 (L) 13.3 - 17.7 g/dL    Hematocrit 36.7 (L) 40.0 - 53.0 %     (H) 78 - 100 fl    MCH 35.4 (H) 26.5 - 33.0 pg    MCHC 34.3 31.5 - 36.5 g/dL    RDW 14.1 10.0 - 15.0 %    Platelet Count 54 (L) 150 - 450 10e9/L    Diff Method Automated Method     % Neutrophils 67.7 %    % Lymphocytes " 15.1 %    % Monocytes 14.6 %    % Eosinophils 2.0 %    % Basophils 0.4 %    % Immature Granulocytes 0.2 %    Absolute Neutrophil 3.7 1.6 - 8.3 10e9/L    Absolute Lymphocytes 0.8 0.8 - 5.3 10e9/L    Absolute Monocytes 0.8 0.0 - 1.3 10e9/L    Absolute Eosinophils 0.1 0.0 - 0.7 10e9/L    Absolute Basophils 0.0 0.0 - 0.2 10e9/L    Abs Immature Granulocytes 0.0 0 - 0.4 10e9/L   Comprehensive metabolic panel   Result Value Ref Range    Sodium 140 133 - 144 mmol/L    Potassium 3.7 3.4 - 5.3 mmol/L    Chloride 108 94 - 109 mmol/L    Carbon Dioxide 25 20 - 32 mmol/L    Anion Gap 7 3 - 14 mmol/L    Glucose 88 70 - 99 mg/dL    Urea Nitrogen 24 7 - 30 mg/dL    Creatinine 1.46 (H) 0.66 - 1.25 mg/dL    GFR Estimate 49 (L) >60 mL/min/1.7m2    GFR Estimate If Black 59 (L) >60 mL/min/1.7m2    Calcium 7.9 (L) 8.5 - 10.1 mg/dL    Bilirubin Total 3.2 (H) 0.2 - 1.3 mg/dL    Albumin 2.3 (L) 3.4 - 5.0 g/dL    Protein Total 6.8 6.8 - 8.8 g/dL    Alkaline Phosphatase 91 40 - 150 U/L    ALT 34 0 - 70 U/L    AST 65 (H) 0 - 45 U/L   Ammonia (on ice)   Result Value Ref Range    Ammonia 135 (HH) 10 - 50 umol/L   PTT   Result Value Ref Range    PTT 32 22 - 37 sec   INR   Result Value Ref Range    INR 1.37 (H) 0.86 - 1.14       Medications   lactulose (CHRONULAC) solution 10 g (not administered)   lactulose (CHRONULAC) solution 20 g (20 g Oral Given 2/8/17 2028)       Assessments & Plan (with Medical Decision Making)   Hep C patient with acute hepatic encephalopathy. He was given Lactulose and will be admitted for further care. TIPS patent by US study performed in the past month. No acute infectious s/sx or sx of GI bleeding.    I have reviewed the nursing notes.    I have reviewed the findings, diagnosis, plan and need for follow up with the patient.    New Prescriptions    No medications on file       Final diagnoses:   Hepatic encephalopathy (H)   Chronic hepatitis C without hepatic coma (H)     This document serves as a record of the services  and decisions personally performed and made by Kendall Ness MD. It was created on HIS/HER behalf by Bianca Rosales, a trained medical scribe. The creation of this document is based the provider's statements to the medical scribe.  Bianca Rosales 7:54 PM 2/8/2017    Provider:   The information in this document, created by the medical scribe for me, accurately reflects the services I personally performed and the decisions made by me. I have reviewed and approved this document for accuracy prior to leaving the patient care area.  Kendall Ness MD 7:54 PM 2/8/2017 2/8/2017   [facility] EMERGENCY DEPARTMENT       Kendall Ness MD  02/13/17 0932

## 2017-02-09 NOTE — PROGRESS NOTES
[facility] Hospitalist Progress Note            Assessment and Plan:        Acute hepatic encephalopathy  2/8/17 --  Patient has been not completely compliant with lactulose and is developing some symptoms of hepatic encephalopathy.  Symptoms are currently mild however there are enough that the patient cannot manage by himself and his wife is being hospitalized so won't be there to help him.  His mentation is slowed but he is not disoriented.   - Continue lactulose as prescribed 20 g 3 times a day   - Add one dose of MiraLAX  2/9/2017 -- just starting to stool with first softer stool this AM and only 1 yesterday.  Ammonia markedly elevated on admission, recheck pending but has not stooled enough for this to have decreased.  Discussed importance of compliance with his lactulose in detail, with his confusion and his wife currently hospitalized it is hard to say how often he misses doses and whether or not his home dosing is adequate especially given minimal stooling here so far.  Will continue home dosage for now, follow ammonia, and hope for discharge home in the next 1-2 days if stooling well and clinically improving.    Physical therapy and occupational therapy consulted.        Cirrhosis of liver, s/p TIPS procedure    Hepatitis C   2/8/17 -- Due to alcohol abuse in the past and active hepatitis C infection.  Started Harvoni 2 weeks ago, and this is generally going well.     - Continue Harvoni and ribavirin, may use own supply      DVT Prophylaxis:   2/9/2017 -- still ambulate today, consider other prophylaxis if unable to discharge tomorrow.     Code Status: Full Code    Disposition  Not much improvement yet - has ongoing encephalopathy without adequate response to lactulose yet and is certainly not yet safe for discharge especially without spouse at home.  Last admission for this lasted 5 days.  Overall he has not had a rapid improvement as hoped for on admission and I think has failed observation at this  "point with at least 1 more night inpatient needed and a fairly high risk of needing longer than that.  Will transition to inpatient today.             Interval History:   Patient started to stool this AM - per report and patient had 1 solid stool yesterday and then just 1 soft one this AM although 2 charted.   Still feels shaky and somewhat confused although shakiness is at least improved from yesterday.  No pain fever or chills.    Does report that he misses his lactulose when he's out and about, but says he just takes it later- report of how often he actually misses doses seems very inconsistent which may be due to his confusion - he does seem clear at times, but then will make a statement that clearly shows confusion.             Review of Systems:    ROS: 10 point ROS neg other than the symptoms noted above in the HPI.             Medications:   Current active medications and PTA medications reviewed, see medication list for details.            Physical Exam:   Vitals were reviewed  Patient Vitals for the past 24 hrs:   BP Temp Temp src Pulse Resp SpO2 Height Weight   17 0735 113/50 mmHg 98  F (36.7  C) Oral 72 18 98 % - -   17 0012 123/64 mmHg 98  F (36.7  C) Oral 84 18 99 % 1.803 m (5' 11\") 92 kg (202 lb 13.2 oz)   17 2245 125/66 mmHg - - - - 99 % - -   17 2230 130/62 mmHg - - - - - - -   17 2215 128/66 mmHg - - - - 99 % - -   17 2200 129/61 mmHg - - - - - - -   175 131/58 mmHg - - - - 99 % - -   17 - - - - - 100 % - -   17 132/69 mmHg - - - - - - -   17 97.2  F (36.2  C) Oral - - - - -   17 152/81 mmHg - Oral 87 16 98 % 1.803 m (5' 11\") 90.719 kg (200 lb)       Temperatures:  Current - Temp: 98  F (36.7  C); Max - Temp  Av.7  F (36.5  C)  Min: 97.2  F (36.2  C)  Max: 98  F (36.7  C)  Respiration range: Resp  Av.3  Min: 16  Max: 18  Pulse range: Pulse  Av  Min: 72  Max: 87  Blood pressure range: Systolic " (24hrs), Av mmHg, Min:113 mmHg, Max:152 mmHg  ; Diastolic (24hrs), Av mmHg, Min:50 mmHg, Max:81 mmHg    Pulse oximetry range: SpO2  Av.9 %  Min: 98 %  Max: 100 %     No intake or output data in the 24 hours ending 17 1057  EXAM:  General: awake and alert, NAD, oriented x 3  Head: normocephalic  Neck: unremarkable, no lymphadenopathy   HEENT: oropharynx pink and moist    Heart: Regular rate and rhythm, no murmurs, rubs, or gallops  Lungs: clear to auscultation bilaterally with good air movement throughout  Abdomen: soft, minimal tenderness right side which he says is unchanged ever since his TIPS, no masses or organomegaly, normal bowel sounds   Extremities: no edema in lower extremities   Skin unremarkable.               Data:     Results for orders placed or performed during the hospital encounter of 17 (from the past 24 hour(s))   CBC with platelets, differential   Result Value Ref Range    WBC 5.5 4.0 - 11.0 10e9/L    RBC Count 3.56 (L) 4.4 - 5.9 10e12/L    Hemoglobin 12.6 (L) 13.3 - 17.7 g/dL    Hematocrit 36.7 (L) 40.0 - 53.0 %     (H) 78 - 100 fl    MCH 35.4 (H) 26.5 - 33.0 pg    MCHC 34.3 31.5 - 36.5 g/dL    RDW 14.1 10.0 - 15.0 %    Platelet Count 54 (L) 150 - 450 10e9/L    Diff Method Automated Method     % Neutrophils 67.7 %    % Lymphocytes 15.1 %    % Monocytes 14.6 %    % Eosinophils 2.0 %    % Basophils 0.4 %    % Immature Granulocytes 0.2 %    Absolute Neutrophil 3.7 1.6 - 8.3 10e9/L    Absolute Lymphocytes 0.8 0.8 - 5.3 10e9/L    Absolute Monocytes 0.8 0.0 - 1.3 10e9/L    Absolute Eosinophils 0.1 0.0 - 0.7 10e9/L    Absolute Basophils 0.0 0.0 - 0.2 10e9/L    Abs Immature Granulocytes 0.0 0 - 0.4 10e9/L   Comprehensive metabolic panel   Result Value Ref Range    Sodium 140 133 - 144 mmol/L    Potassium 3.7 3.4 - 5.3 mmol/L    Chloride 108 94 - 109 mmol/L    Carbon Dioxide 25 20 - 32 mmol/L    Anion Gap 7 3 - 14 mmol/L    Glucose 88 70 - 99 mg/dL    Urea Nitrogen 24 7 -  30 mg/dL    Creatinine 1.46 (H) 0.66 - 1.25 mg/dL    GFR Estimate 49 (L) >60 mL/min/1.7m2    GFR Estimate If Black 59 (L) >60 mL/min/1.7m2    Calcium 7.9 (L) 8.5 - 10.1 mg/dL    Bilirubin Total 3.2 (H) 0.2 - 1.3 mg/dL    Albumin 2.3 (L) 3.4 - 5.0 g/dL    Protein Total 6.8 6.8 - 8.8 g/dL    Alkaline Phosphatase 91 40 - 150 U/L    ALT 34 0 - 70 U/L    AST 65 (H) 0 - 45 U/L   Ammonia (on ice)   Result Value Ref Range    Ammonia 135 (HH) 10 - 50 umol/L   PTT   Result Value Ref Range    PTT 32 22 - 37 sec   INR   Result Value Ref Range    INR 1.37 (H) 0.86 - 1.14   Comprehensive metabolic panel   Result Value Ref Range    Sodium 140 133 - 144 mmol/L    Potassium 3.5 3.4 - 5.3 mmol/L    Chloride 109 94 - 109 mmol/L    Carbon Dioxide 25 20 - 32 mmol/L    Anion Gap 6 3 - 14 mmol/L    Glucose 98 70 - 99 mg/dL    Urea Nitrogen 25 7 - 30 mg/dL    Creatinine 1.34 (H) 0.66 - 1.25 mg/dL    GFR Estimate 54 (L) >60 mL/min/1.7m2    GFR Estimate If Black 65 >60 mL/min/1.7m2    Calcium 7.8 (L) 8.5 - 10.1 mg/dL    Bilirubin Total 3.0 (H) 0.2 - 1.3 mg/dL    Albumin 2.1 (L) 3.4 - 5.0 g/dL    Protein Total 6.0 (L) 6.8 - 8.8 g/dL    Alkaline Phosphatase 60 40 - 150 U/L    ALT 27 0 - 70 U/L    AST 50 (H) 0 - 45 U/L           Attestation:  I have reviewed today's vital signs, notes, medications, labs and imaging.  Amount of time performed on this daily note: 25 minutes.     Jett Candelario MD, MD

## 2017-02-10 ENCOUNTER — APPOINTMENT (OUTPATIENT)
Dept: PHYSICAL THERAPY | Facility: CLINIC | Age: 62
DRG: 441 | End: 2017-02-10
Payer: MEDICARE

## 2017-02-10 ENCOUNTER — APPOINTMENT (OUTPATIENT)
Dept: OCCUPATIONAL THERAPY | Facility: CLINIC | Age: 62
DRG: 441 | End: 2017-02-10
Payer: MEDICARE

## 2017-02-10 LAB
ALBUMIN SERPL-MCNC: 2.1 G/DL (ref 3.4–5)
ALP SERPL-CCNC: 50 U/L (ref 40–150)
ALT SERPL W P-5'-P-CCNC: 29 U/L (ref 0–70)
AMMONIA PLAS-SCNC: 184 UMOL/L (ref 10–50)
ANION GAP SERPL CALCULATED.3IONS-SCNC: 8 MMOL/L (ref 3–14)
AST SERPL W P-5'-P-CCNC: 47 U/L (ref 0–45)
BILIRUB SERPL-MCNC: 3.4 MG/DL (ref 0.2–1.3)
BUN SERPL-MCNC: 24 MG/DL (ref 7–30)
CALCIUM SERPL-MCNC: 8.1 MG/DL (ref 8.5–10.1)
CHLORIDE SERPL-SCNC: 110 MMOL/L (ref 94–109)
CO2 SERPL-SCNC: 24 MMOL/L (ref 20–32)
CREAT SERPL-MCNC: 1.34 MG/DL (ref 0.66–1.25)
ERYTHROCYTE [DISTWIDTH] IN BLOOD BY AUTOMATED COUNT: 13.5 % (ref 10–15)
GFR SERPL CREATININE-BSD FRML MDRD: 54 ML/MIN/1.7M2
GLUCOSE SERPL-MCNC: 98 MG/DL (ref 70–99)
HCT VFR BLD AUTO: 32.1 % (ref 40–53)
HGB BLD-MCNC: 10.9 G/DL (ref 13.3–17.7)
MCH RBC QN AUTO: 34.6 PG (ref 26.5–33)
MCHC RBC AUTO-ENTMCNC: 34 G/DL (ref 31.5–36.5)
MCV RBC AUTO: 102 FL (ref 78–100)
PLATELET # BLD AUTO: 50 10E9/L (ref 150–450)
POTASSIUM SERPL-SCNC: 4.4 MMOL/L (ref 3.4–5.3)
PROT SERPL-MCNC: 6 G/DL (ref 6.8–8.8)
RBC # BLD AUTO: 3.15 10E12/L (ref 4.4–5.9)
SODIUM SERPL-SCNC: 142 MMOL/L (ref 133–144)
WBC # BLD AUTO: 3.6 10E9/L (ref 4–11)

## 2017-02-10 PROCEDURE — 25000125 ZZHC RX 250: Performed by: EMERGENCY MEDICINE

## 2017-02-10 PROCEDURE — 25000132 ZZH RX MED GY IP 250 OP 250 PS 637: Performed by: EMERGENCY MEDICINE

## 2017-02-10 PROCEDURE — 25000132 ZZH RX MED GY IP 250 OP 250 PS 637: Performed by: FAMILY MEDICINE

## 2017-02-10 PROCEDURE — 40000133 ZZH STATISTIC OT WARD VISIT

## 2017-02-10 PROCEDURE — 25000132 ZZH RX MED GY IP 250 OP 250 PS 637: Performed by: INTERNAL MEDICINE

## 2017-02-10 PROCEDURE — 99232 SBSQ HOSP IP/OBS MODERATE 35: CPT | Performed by: FAMILY MEDICINE

## 2017-02-10 PROCEDURE — 97116 GAIT TRAINING THERAPY: CPT | Mod: GP | Performed by: PHYSICAL THERAPIST

## 2017-02-10 PROCEDURE — 40000447 ZZH STATISTIC PT NICU VISIT: Performed by: PHYSICAL THERAPIST

## 2017-02-10 PROCEDURE — 82140 ASSAY OF AMMONIA: CPT | Performed by: FAMILY MEDICINE

## 2017-02-10 PROCEDURE — 85027 COMPLETE CBC AUTOMATED: CPT | Performed by: FAMILY MEDICINE

## 2017-02-10 PROCEDURE — 12000000 ZZH R&B MED SURG/OB

## 2017-02-10 PROCEDURE — 80053 COMPREHEN METABOLIC PANEL: CPT | Performed by: FAMILY MEDICINE

## 2017-02-10 PROCEDURE — 97110 THERAPEUTIC EXERCISES: CPT | Mod: GP | Performed by: PHYSICAL THERAPIST

## 2017-02-10 PROCEDURE — 36415 COLL VENOUS BLD VENIPUNCTURE: CPT | Performed by: FAMILY MEDICINE

## 2017-02-10 PROCEDURE — 97161 PT EVAL LOW COMPLEX 20 MIN: CPT | Mod: GP | Performed by: PHYSICAL THERAPIST

## 2017-02-10 PROCEDURE — 97535 SELF CARE MNGMENT TRAINING: CPT | Mod: GO

## 2017-02-10 PROCEDURE — 97165 OT EVAL LOW COMPLEX 30 MIN: CPT | Mod: GO

## 2017-02-10 RX ORDER — LACTULOSE 10 G/15ML
30 SOLUTION ORAL 3 TIMES DAILY
Status: DISCONTINUED | OUTPATIENT
Start: 2017-02-10 | End: 2017-02-13

## 2017-02-10 RX ADMIN — LACTULOSE 20 G: 20 SOLUTION ORAL at 14:14

## 2017-02-10 RX ADMIN — RIBAVIRIN 400 MG: 200 TABLET, FILM COATED ORAL at 17:26

## 2017-02-10 RX ADMIN — PANTOPRAZOLE SODIUM 40 MG: 40 INJECTION, POWDER, FOR SOLUTION INTRAVENOUS at 08:36

## 2017-02-10 RX ADMIN — CETIRIZINE HYDROCHLORIDE 10 MG: 10 TABLET, FILM COATED ORAL at 08:36

## 2017-02-10 RX ADMIN — LACTULOSE 20 G: 20 SOLUTION ORAL at 08:36

## 2017-02-10 RX ADMIN — RIFAXIMIN 550 MG: 550 TABLET ORAL at 20:07

## 2017-02-10 RX ADMIN — ONDANSETRON 4 MG: 4 TABLET, ORALLY DISINTEGRATING ORAL at 21:56

## 2017-02-10 RX ADMIN — RIBAVIRIN 600 MG: 200 TABLET, FILM COATED ORAL at 10:39

## 2017-02-10 RX ADMIN — SPIRONOLACTONE 100 MG: 25 TABLET ORAL at 08:36

## 2017-02-10 RX ADMIN — GABAPENTIN 200 MG: 100 CAPSULE ORAL at 21:56

## 2017-02-10 RX ADMIN — LACTULOSE 30 G: 20 SOLUTION ORAL at 20:07

## 2017-02-10 RX ADMIN — LEDIPASVIR AND SOFOSBUVIR 1 TABLET: 90; 400 TABLET, FILM COATED ORAL at 10:39

## 2017-02-10 RX ADMIN — RIFAXIMIN 550 MG: 550 TABLET ORAL at 08:36

## 2017-02-10 RX ADMIN — PANTOPRAZOLE SODIUM 40 MG: 40 INJECTION, POWDER, FOR SOLUTION INTRAVENOUS at 20:08

## 2017-02-10 RX ADMIN — FUROSEMIDE 40 MG: 40 TABLET ORAL at 08:35

## 2017-02-10 NOTE — PROGRESS NOTES
[facility] Hospitalist Progress Note            Assessment and Plan:    Acute hepatic encephalopathy  2/8/17 --  Patient has been not completely compliant with lactulose and is developing some symptoms of hepatic encephalopathy.  Symptoms are currently mild however there are enough that the patient cannot manage by himself and his wife is being hospitalized so won't be there to help him.  His mentation is slowed but he is not disoriented.   - Continue lactulose as prescribed 20 g 3 times a day   - Add one dose of MiraLAX  2/9/2017 -- just starting to stool with first softer stool this AM and only 1 yesterday.  Ammonia markedly elevated on admission, recheck pending but has not stooled enough for this to have decreased.  Discussed importance of compliance with his lactulose in detail, with his confusion and his wife currently hospitalized it is hard to say how often he misses doses and whether or not his home dosing is adequate especially given minimal stooling here so far.  Will continue home dosage for now, follow ammonia, and hope for discharge home in the next 1-2 days if stooling well and clinically improving.    Physical therapy and occupational therapy consulted.    2/10/2017 -- ammonia up, not stooling adequately - mental status unchanged - increasing to 30 3 times daily from 20 of lactulose.  Hope for discharge tomorrow - do not expect ammonia to normalize, but want to see at least 4 stools/day and improving mental status - occupational therapy consult pending ([facility])      Cirrhosis of liver, s/p TIPS procedure    Hepatitis C   2/8/17 -- Due to alcohol abuse in the past and active hepatitis C infection.  Started Harvoni 2 weeks ago, and this is generally going well.     - Continue Harvoni and ribavirin, may use own supply      DVT Prophylaxis:   2/9/2017 -- still ambulate today, consider other prophylaxis if unable to discharge tomorrow.      Code Status: Full Code    Disposition  Hope for discharge  tomorrow if ammonia and stooling improving.             Interval History:   Up and down today - has seemed clear at times per nursing, but confused at times as well- during our evaluation he is mildly confused - about the same as yesterday.  No focal concerns, but says he's a bit more shaky today.  Only 2 stools in past 24 hours.              Review of Systems:    ROS: 10 point ROS neg other than the symptoms noted above in the HPI.             Medications:   Current active medications and PTA medications reviewed, see medication list for details.            Physical Exam:   Vitals were reviewed  Patient Vitals for the past 24 hrs:   BP Temp Temp src Pulse Resp SpO2   02/10/17 1601 130/70 mmHg 97.6  F (36.4  C) Oral 68 18 100 %   02/10/17 1110 119/59 mmHg 98  F (36.7  C) Oral 71 18 100 %   02/10/17 0737 127/60 mmHg 97.9  F (36.6  C) Oral 77 18 99 %   17 2330 120/70 mmHg 98.3  F (36.8  C) Oral 75 16 99 %       Temperatures:  Current - Temp: 97.6  F (36.4  C); Max - Temp  Av  F (36.7  C)  Min: 97.6  F (36.4  C)  Max: 98.3  F (36.8  C)  Respiration range: Resp  Av.5  Min: 16  Max: 18  Pulse range: Pulse  Av.8  Min: 68  Max: 77  Blood pressure range: Systolic (24hrs), Av mmHg, Min:119 mmHg, Max:130 mmHg  ; Diastolic (24hrs), Av mmHg, Min:59 mmHg, Max:70 mmHg    Pulse oximetry range: SpO2  Av.5 %  Min: 99 %  Max: 100 %  I/O last 3 completed shifts:  In: 620 [P.O.:620]  Out: -     Intake/Output Summary (Last 24 hours) at 02/10/17 1704  Last data filed at 02/10/17 1215   Gross per 24 hour   Intake    620 ml   Output      0 ml   Net    620 ml     EXAM:  General: awake and alert, NAD, oriented x 2  Head: normocephalic  Neck: unremarkable, no lymphadenopathy   HEENT: oropharynx pink and moist    Heart: Regular rate and rhythm, no murmurs, rubs, or gallops  Lungs: clear to auscultation bilaterally with good air movement throughout  Abdomen: soft, non-tender, no masses or  organomegaly  Extremities: no edema in lower extremities   Skin unremarkable.               Data:     Results for orders placed or performed during the hospital encounter of 02/08/17 (from the past 24 hour(s))   Ammonia   Result Value Ref Range    Ammonia 184 (HH) 10 - 50 umol/L   CBC with platelets   Result Value Ref Range    WBC 3.6 (L) 4.0 - 11.0 10e9/L    RBC Count 3.15 (L) 4.4 - 5.9 10e12/L    Hemoglobin 10.9 (L) 13.3 - 17.7 g/dL    Hematocrit 32.1 (L) 40.0 - 53.0 %     (H) 78 - 100 fl    MCH 34.6 (H) 26.5 - 33.0 pg    MCHC 34.0 31.5 - 36.5 g/dL    RDW 13.5 10.0 - 15.0 %    Platelet Count 50 (L) 150 - 450 10e9/L   Comprehensive metabolic panel   Result Value Ref Range    Sodium 142 133 - 144 mmol/L    Potassium 4.4 3.4 - 5.3 mmol/L    Chloride 110 (H) 94 - 109 mmol/L    Carbon Dioxide 24 20 - 32 mmol/L    Anion Gap 8 3 - 14 mmol/L    Glucose 98 70 - 99 mg/dL    Urea Nitrogen 24 7 - 30 mg/dL    Creatinine 1.34 (H) 0.66 - 1.25 mg/dL    GFR Estimate 54 (L) >60 mL/min/1.7m2    GFR Estimate If Black 65 >60 mL/min/1.7m2    Calcium 8.1 (L) 8.5 - 10.1 mg/dL    Bilirubin Total 3.4 (H) 0.2 - 1.3 mg/dL    Albumin 2.1 (L) 3.4 - 5.0 g/dL    Protein Total 6.0 (L) 6.8 - 8.8 g/dL    Alkaline Phosphatase 50 40 - 150 U/L    ALT 29 0 - 70 U/L    AST 47 (H) 0 - 45 U/L           Attestation:  I have reviewed today's vital signs, notes, medications, labs and imaging.  Amount of time performed on this daily note: 25 minutes.     Jett Candelario MD, MD

## 2017-02-10 NOTE — PLAN OF CARE
Problem: Goal Outcome Summary  Goal: Goal Outcome Summary  Outcome: No Change  Patient alert and oriented, but continues to feel a little unsteady and sleepy.  Abdomen rounded.  Denies pain.  Up to void several times with stand by assist.

## 2017-02-10 NOTE — PLAN OF CARE
Problem: Goal Outcome Summary  Goal: Goal Outcome Summary  PT-  Evaluation completed, RX initiated. Pt alert, conversing appropriately fatigued  and occass.  closing his eyes.  Pt mildly unsteady - ambulates w/ staggering gait  which is more pronounced w/ longer distances.     REC- anticipate pt will improve; return home w/ assistance from his wife as needed

## 2017-02-10 NOTE — PROGRESS NOTES
02/10/17 1200   Quick Adds   Type of Visit Initial Occupational Therapy Evaluation   Living Environment   Lives With child(sara), adult;spouse   Living Arrangements house   Number of Stairs to Enter Home 2   Number of Stairs Within Home 14   Stair Railings at Home inside, present on left side;outside, present on right side   Self-Care   Activity/Exercise/Self-Care Comment Pt has been doing pool therapy with OP PT.    Functional Level Prior   Ambulation 0-->independent   Transferring 0-->independent   Toileting 0-->independent   Bathing 0-->independent   Dressing 0-->independent  (wife assists with socks.)   Eating 0-->independent   Communication 0-->understands/communicates without difficulty   Swallowing 0-->swallows foods/liquids without difficulty   Cognition 0 - no cognition issues reported   Fall history within last six months yes   Number of times patient has fallen within last six months 1  (slipped on ice. )   General Information   Onset of Illness/Injury or Date of Surgery - Date 02/08/17   Referring Physician Jett Candelario MD   Patient/Family Goals Statement To return home   Additional Occupational Profile Info/Pertinent History of Current Problem Acute hepatic encephalopathy, MD ordered SLUMS.    Precautions/Limitations fall precautions   Cognitive Status Examination   Orientation orientation to person, place and time   Level of Consciousness alert   Able to Follow Commands WNL/WFL   Cognitive Comment SLUMS score=21/30, indicating mild cognitive impairment (21-26=mild). Difficulty with delayed recall, clock drawing and recall during short story.    Visual Perception   Visual Perception No deficits were identified   Pain Assessment   Patient Currently in Pain No   Range of Motion (ROM)   ROM Comment B UE ROM: WNL   Transfer Skill: Bed to Chair/Chair to Bed   Level of [place]: Bed to Chair stand-by assist   Physical Assist/Nonphysical Assist: Bed to Chair supervision   Weight-Bearing Restrictions  "full weight-bearing   Transfer Skill: Sit to Stand   Level of [place]: Sit/Stand stand-by assist   Physical Assist/Nonphysical Assist: Sit/Stand supervision   Transfer Skill: Sit to Stand full weight-bearing   Transfer Skill: Toilet Transfer   Level of [place]: Toilet stand-by assist   Physical Assist/Nonphysical Assist: Toilet supervision   Weight-Bearing Restrictions: Toilet full weight-bearing   Upper Body Dressing   Level of [place]: Dress Upper Body independent   Lower Body Dressing   Level of [place]: Dress Lower Body stand-by assist  (for pants- wife assists with socks at baseline. )   Grooming   Level of [place]: Grooming independent   Instrumental Activities of Daily Living (IADL)   IADL Comments Wife assists with medication management at home. Per pt wife is just getting discharged from another hospital. Unsure how much she can physically assist is needed.    Activities of Daily Living Analysis   Impairments Contributing to Impaired Activities of Daily Living balance impaired;cognition impaired   General Therapy Interventions   Planned Therapy Interventions cognition   Clinical Impression   Criteria for Skilled Therapeutic Interventions Met yes, treatment indicated   OT Diagnosis impaired cognition   Influenced by the following impairments d/t ammonia level   Assessment of Occupational Performance 1-3 Performance Deficits   Identified Performance Deficits safety with IADLs.    Clinical Decision Making (Complexity) Low complexity   Therapy Frequency daily   Predicted Duration of Therapy Intervention (days/wks) 2 days   Anticipated Discharge Disposition Home with Assist   Risks and Benefits of Treatment have been explained. Yes   Patient, Family & other staff in agreement with plan of care Yes   Clinical Impression Comments Pt would benefit from further cognitive assessment to ensure safety with IADLs.    [facility] AM-PAC  \"6 Clicks\" Daily Activity Inpatient Short Form   1. " Putting on and taking off regular lower body clothing? 4 - None   2. Bathing (including washing, rinsing, drying)? 3 - A Little   3. Toileting, which includes using toilet, bedpan or urinal? 3 - A Little   4. Putting on and taking off regular upper body clothing? 4 - None   5. Taking care of personal grooming such as brushing teeth? 4 - None   6. Eating meals? 4 - None   Daily Activity Raw Score (Score out of 24.Lower scores equate to lower levels of function) 22   Total Evaluation Time   Total Evaluation Time (Minutes) 15     Maria Luisa Riojas OTR/L

## 2017-02-10 NOTE — PLAN OF CARE
"Problem: Goal Outcome Summary  Goal: Goal Outcome Summary  Outcome: Improving  Pt is having good results from Lactulose. Reports \"still a little shaky\". Appetite is good. LS clear. VSS. Denies pain.  Alert and oriented x3. Pt uses call light appropriately.        "

## 2017-02-10 NOTE — PLAN OF CARE
"Problem: Goal Outcome Summary  Goal: Goal Outcome Summary  OT: Eval complete. Pt up with SBA to transfer to bedside chair. Reports still \"feeling a little shaky\". Completes SLUMS and scores 21/30 (27-30=normal), indicating mild-moderate cognitive impairment. Observed difficulty with delayed recall, short story recall and clock drawing. Will continue to monitor cognition for safe return home.     REC: Home with assist from wife for medication management.         "

## 2017-02-10 NOTE — PROGRESS NOTES
02/10/17 1500   Quick Adds   Type of Visit Initial PT Evaluation   Living Environment   Lives With child(sara), adult;spouse   Living Arrangements house   Home Accessibility stairs to enter home;stairs within home   Number of Stairs to Enter Home 2  (no railing)   Number of Stairs Within Home 14   Stair Railings at Home inside, present at both sides   Self-Care   Activity/Exercise/Self-Care Comment Pt has been doing pool therapy with OP PT   Functional Level Prior   Ambulation 0-->independent   Transferring 0-->independent   Toileting 0-->independent   Bathing 0-->independent   Dressing 0-->independent   Eating 0-->independent   Communication 0-->understands/communicates without difficulty   Swallowing 0-->swallows foods/liquids without difficulty   Cognition 0 - no cognition issues reported   Fall history within last six months yes   Number of times patient has fallen within last six months 1   Prior Functional Level Comment Independent  at baseline; ambulates w/o an AD.   Pt reporting one fall, slipped on the ice   General Information   Onset of Illness/Injury or Date of Surgery - Date 02/08/17   Referring Physician Esther   Patient/Family Goals Statement S Pt w/ goal of Dc to home   Pertinent History of Current Problem (include personal factors and/or comorbidities that impact the POC) 61 year old male with a history of hepatitis C, hepatic cirrhosis, ascites, hepatic encephalopathy, hyponatremia, and thrombocytopenia who presents to the ED today with complaints of feeling shaky as if his ammonia is high; admitted to the hospital hepatic encephalopathy    Precautions/Limitations fall precautions   General Observations Pt alert, conversing appriopriately, but appears fatigued, ocas closing his eyes   Cognitive Status Examination   Orientation person;place  (month)   Level of Consciousness lethargic/somnolent;alert   Follows Commands and Answers Questions 100% of the time   Personal Safety and Judgment impaired  "  Memory intact   Pain Assessment   Patient Currently in Pain No   Integumentary/Edema   Integumentary/Edema no deficits were identifed   Range of Motion (ROM)   ROM Comment AROM UE,  LE  WFL   Strength   Strength Comments LE strength 4/5 throughout   Bed Mobility   Bed Mobility Comments INdep   Transfer Skills   Transfer Comments SBA sit<.stand , decreased control standing  > sitting due to weakness   Gait   Gait Comments Pt ambulated 140 feet x1 with no device. SBA.  Staggering gait  W/ decreased step ht and  clearance. One episode of crossing his LEs    Fair/ good dynamic balance    Static balance- able to stand- reach, perform head turns, maintain balance vs perturbations,   EO, EC,  W/ SBA- no LOB    Sensory Examination   Sensory Perception no deficits were identified   Coordination   Coordination no deficits were identified   Muscle Tone   Muscle Tone no deficits were identified   General Therapy Interventions   Planned Therapy Interventions balance training;gait training;strengthening;home program guidelines   Clinical Impression   Criteria for Skilled Therapeutic Intervention yes, treatment indicated   PT Diagnosis Impaired gait   Influenced by the following impairments decreased balance, strength   Functional limitations due to impairments decreased ambulatory status. fall risk   Clinical Presentation Stable/Uncomplicated   Clinical Presentation Rationale Pt indep. at baseline, comorbidities present- appears motivated to reurn to [facility]   Clinical Decision Making (Complexity) Low complexity   Therapy Frequency` daily   Predicted Duration of Therapy Intervention (days/wks) 3 day   Anticipated Discharge Disposition Home with Assist   Risk & Benefits of therapy have been explained Yes   Patient, Family & other staff in agreement with plan of care Yes   [facility] AM-PAC  \"6 Clicks\" V.2 Basic Mobility Inpatient Short Form   1. Turning from your back to your side while in a flat bed without using bedrails? " 4 - None   2. Moving from lying on your back to sitting on the side of a flat bed without using bedrails? 4 - None   3. Moving to and from a bed to a chair (including a wheelchair)? 4 - None   4. Standing up from a chair using your arms (e.g., wheelchair, or bedside chair)? 4 - None   5. To walk in hospital room? 3 - A Little   6. Climbing 3-5 steps with a railing? 3 - A Little   Basic Mobility Raw Score (Score out of 24.Lower scores equate to lower levels of function) 22

## 2017-02-10 NOTE — PLAN OF CARE
Problem: Liver Failure, Acute/Chronic (Adult)  Goal: Signs and Symptoms of Listed Potential Problems Will be Absent or Manageable (Liver Failure, Acute/Chronic)  Signs and symptoms of listed potential problems will be absent or manageable by discharge/transition of care (reference Liver Failure, Acute/Chronic (Adult) CPG).   Outcome: Declining  Pt has been more drowsy & a little more off throughout course of night shift. This am  called to report ammonia level is elevated once more at 184. Page sent to Dr. Candelario.

## 2017-02-10 NOTE — PLAN OF CARE
"Problem: Liver Failure, Acute/Chronic (Adult)  Goal: Signs and Symptoms of Listed Potential Problems Will be Absent or Manageable (Liver Failure, Acute/Chronic)  Signs and symptoms of listed potential problems will be absent or manageable by discharge/transition of care (reference Liver Failure, Acute/Chronic (Adult) CPG).   Outcome: No Change  /70 mmHg  Pulse 75  Temp(Src) 98.3  F (36.8  C) (Oral)  Resp 16  Ht 1.803 m (5' 11\")  Wt 92 kg (202 lb 13.2 oz)  BMI 28.30 kg/m2  SpO2 99%    NEURO: PT with intermittent disorietation but is able to reorient self with some cueing. Pt sleepy with interactions. Pt knows president, month, name, and situation but reports wrong year and is slow to answer.     GI/: pt voiding without difficulty. No BM overnight. Abdomen semi-firm, distended with active bowel sounds. Pt denies nausea. Pt reports feeling hungry this AM but declined all offered snacks and wishes to return to sleepy.         "

## 2017-02-10 NOTE — PLAN OF CARE
"Problem: Liver Failure, Acute/Chronic (Adult)  Goal: Signs and Symptoms of Listed Potential Problems Will be Absent or Manageable (Liver Failure, Acute/Chronic)  Signs and symptoms of listed potential problems will be absent or manageable by discharge/transition of care (reference Liver Failure, Acute/Chronic (Adult) CPG).   Outcome: Improving  /57 mmHg  Pulse 72  Temp(Src) 97.8  F (36.6  C) (Oral)  Resp 18  Ht 1.803 m (5' 11\")  Wt 92 kg (202 lb 13.2 oz)  BMI 28.30 kg/m2  SpO2 100%  Pt improving overall. Stooling frequently, renal labs slightly improved since admit. Pt has been alert, using call light appropriately and cooperative with staff.        "

## 2017-02-11 LAB
AMMONIA PLAS-SCNC: 159 UMOL/L (ref 10–50)
ANION GAP SERPL CALCULATED.3IONS-SCNC: 9 MMOL/L (ref 3–14)
BASOPHILS # BLD AUTO: 0 10E9/L (ref 0–0.2)
BASOPHILS NFR BLD AUTO: 0.2 %
BUN SERPL-MCNC: 21 MG/DL (ref 7–30)
CALCIUM SERPL-MCNC: 8.8 MG/DL (ref 8.5–10.1)
CHLORIDE SERPL-SCNC: 110 MMOL/L (ref 94–109)
CO2 SERPL-SCNC: 23 MMOL/L (ref 20–32)
CREAT SERPL-MCNC: 1.38 MG/DL (ref 0.66–1.25)
DIFFERENTIAL METHOD BLD: ABNORMAL
EOSINOPHIL # BLD AUTO: 0 10E9/L (ref 0–0.7)
EOSINOPHIL NFR BLD AUTO: 0.7 %
ERYTHROCYTE [DISTWIDTH] IN BLOOD BY AUTOMATED COUNT: 13.7 % (ref 10–15)
GFR SERPL CREATININE-BSD FRML MDRD: 52 ML/MIN/1.7M2
GLUCOSE SERPL-MCNC: 107 MG/DL (ref 70–99)
HCT VFR BLD AUTO: 35.6 % (ref 40–53)
HGB BLD-MCNC: 12.3 G/DL (ref 13.3–17.7)
IMM GRANULOCYTES # BLD: 0 10E9/L (ref 0–0.4)
IMM GRANULOCYTES NFR BLD: 0.2 %
LYMPHOCYTES # BLD AUTO: 0.4 10E9/L (ref 0.8–5.3)
LYMPHOCYTES NFR BLD AUTO: 9.6 %
MCH RBC QN AUTO: 35.3 PG (ref 26.5–33)
MCHC RBC AUTO-ENTMCNC: 34.6 G/DL (ref 31.5–36.5)
MCV RBC AUTO: 102 FL (ref 78–100)
MONOCYTES # BLD AUTO: 0.5 10E9/L (ref 0–1.3)
MONOCYTES NFR BLD AUTO: 11.6 %
NEUTROPHILS # BLD AUTO: 3.5 10E9/L (ref 1.6–8.3)
NEUTROPHILS NFR BLD AUTO: 77.7 %
PLATELET # BLD AUTO: 56 10E9/L (ref 150–450)
POTASSIUM SERPL-SCNC: 4.6 MMOL/L (ref 3.4–5.3)
RBC # BLD AUTO: 3.48 10E12/L (ref 4.4–5.9)
SODIUM SERPL-SCNC: 142 MMOL/L (ref 133–144)
WBC # BLD AUTO: 4.5 10E9/L (ref 4–11)

## 2017-02-11 PROCEDURE — 12000000 ZZH R&B MED SURG/OB

## 2017-02-11 PROCEDURE — 82140 ASSAY OF AMMONIA: CPT | Performed by: FAMILY MEDICINE

## 2017-02-11 PROCEDURE — 85025 COMPLETE CBC W/AUTO DIFF WBC: CPT | Performed by: FAMILY MEDICINE

## 2017-02-11 PROCEDURE — 36415 COLL VENOUS BLD VENIPUNCTURE: CPT | Performed by: FAMILY MEDICINE

## 2017-02-11 PROCEDURE — 25000132 ZZH RX MED GY IP 250 OP 250 PS 637: Performed by: FAMILY MEDICINE

## 2017-02-11 PROCEDURE — 25000125 ZZHC RX 250: Performed by: EMERGENCY MEDICINE

## 2017-02-11 PROCEDURE — 25000132 ZZH RX MED GY IP 250 OP 250 PS 637: Performed by: INTERNAL MEDICINE

## 2017-02-11 PROCEDURE — 99232 SBSQ HOSP IP/OBS MODERATE 35: CPT | Performed by: FAMILY MEDICINE

## 2017-02-11 PROCEDURE — 80048 BASIC METABOLIC PNL TOTAL CA: CPT | Performed by: FAMILY MEDICINE

## 2017-02-11 RX ADMIN — PANTOPRAZOLE SODIUM 40 MG: 40 INJECTION, POWDER, FOR SOLUTION INTRAVENOUS at 08:59

## 2017-02-11 RX ADMIN — RIFAXIMIN 550 MG: 550 TABLET ORAL at 21:11

## 2017-02-11 RX ADMIN — LACTULOSE 30 G: 20 SOLUTION ORAL at 14:22

## 2017-02-11 RX ADMIN — LACTULOSE 30 G: 20 SOLUTION ORAL at 08:59

## 2017-02-11 RX ADMIN — SPIRONOLACTONE 100 MG: 25 TABLET ORAL at 08:59

## 2017-02-11 RX ADMIN — FUROSEMIDE 40 MG: 40 TABLET ORAL at 08:59

## 2017-02-11 RX ADMIN — RIBAVIRIN 400 MG: 200 TABLET, FILM COATED ORAL at 17:39

## 2017-02-11 RX ADMIN — LACTULOSE 30 G: 20 SOLUTION ORAL at 21:11

## 2017-02-11 RX ADMIN — RIBAVIRIN 600 MG: 200 TABLET, FILM COATED ORAL at 10:34

## 2017-02-11 RX ADMIN — CETIRIZINE HYDROCHLORIDE 10 MG: 10 TABLET, FILM COATED ORAL at 08:59

## 2017-02-11 RX ADMIN — PANTOPRAZOLE SODIUM 40 MG: 40 INJECTION, POWDER, FOR SOLUTION INTRAVENOUS at 21:11

## 2017-02-11 RX ADMIN — ONDANSETRON 4 MG: 4 TABLET, ORALLY DISINTEGRATING ORAL at 15:07

## 2017-02-11 RX ADMIN — RIFAXIMIN 550 MG: 550 TABLET ORAL at 08:59

## 2017-02-11 RX ADMIN — LEDIPASVIR AND SOFOSBUVIR 1 TABLET: 90; 400 TABLET, FILM COATED ORAL at 10:33

## 2017-02-11 RX ADMIN — GABAPENTIN 200 MG: 100 CAPSULE ORAL at 21:11

## 2017-02-11 NOTE — PROGRESS NOTES
Received critical lab value from lab.  Ammonia 159.  Down from yesterday's value of 184.  MD updated.

## 2017-02-11 NOTE — PROGRESS NOTES
Hold all therapy today per RN based on pt's status. Will check status and resume PT/OT on 2/12/17 if pt is able.

## 2017-02-11 NOTE — PLAN OF CARE
Problem: Liver Failure, Acute/Chronic (Adult)  Goal: Signs and Symptoms of Listed Potential Problems Will be Absent or Manageable (Liver Failure, Acute/Chronic)  Signs and symptoms of listed potential problems will be absent or manageable by discharge/transition of care (reference Liver Failure, Acute/Chronic (Adult) CPG).   Outcome: No Change  Patient alert and oriented this afternoon, sleepier and more forgetful as the evening progressed. Given increased dose of lactulose this evening, and no stooling on this shift at this time. Some nausea this evening, given zofran with some relief. Bed alarm on as patient more forgetful and shaky this evening. Will continue to monitor.

## 2017-02-11 NOTE — PLAN OF CARE
Patient setting off BA to use BEDSIDE COMMODE  Patient needs multiple directions & cues, confused.  Patient spitting on floor, nose running, re-orient frequently.  No stools for writer's shift.

## 2017-02-11 NOTE — PLAN OF CARE
Problem: Goal Outcome Summary  Goal: Goal Outcome Summary  Outcome: No Change  Patient very sleepy and confused throughout day.  Requiring assistance with eating and drinking.  Setting bed alarm off several times to void.  No stool this shift.  Bed alarm on for safety.  Patient more awake this afternoon.  Denies pain or nausea.

## 2017-02-11 NOTE — PROGRESS NOTES
[facility] Hospitalist Progress Note            Assessment and Plan:    Acute hepatic encephalopathy  2/8/17 --  Patient not completely compliant with lactulose and is developing some symptoms of hepatic encephalopathy.  Symptoms are currently mild however there are enough that the patient cannot manage by himself and his wife is being hospitalized so won't be there to help him.  His mentation is slowed but he is not disoriented.   - Continue lactulose as prescribed 20 g 3 times a day   - Add one dose of MiraLAX  2/9/2017 -- just starting to stool with first softer stool this AM and only 1 yesterday.  Ammonia markedly elevated on admission, recheck pending but has not stooled enough for this to have decreased.  Discussed importance of compliance with his lactulose in detail, with his confusion and his wife currently hospitalized it is hard to say how often he misses doses and whether or not his home dosing is adequate especially given minimal stooling here so far.  Will continue home dosage for now, follow ammonia, and hope for discharge home in the next 1-2 days if stooling well and clinically improving.    Physical therapy and occupational therapy consulted.    2/10/2017 -- ammonia up, not stooling adequately - mental status unchanged - increasing to 30 3 times daily from 20 of lactulose.  Hope for discharge tomorrow - do not expect ammonia to normalize, but want to see at least 4 stools/day and improving mental status - occupational therapy consult pending ([facility])  2/11/2017: still difficulty with basic activities. Had 2 more stools late afternoon. Still confused. Continue current (increased dose) and recheck tomorrow.      Cirrhosis of liver, s/p TIPS procedure    Hepatitis C   2/8/17 -- Due to alcohol abuse in the past and active hepatitis C infection.  Started Harvoni 2 weeks ago, and this is generally going well.     - Continue Harvoni and ribavirin, may use own supply      DVT Prophylaxis:   2/9/2017 --  still ambulate today, consider other prophylaxis if unable to discharge tomorrow.      Code Status: Full Code    Disposition  Hope for discharge tomorrow if ammonia and stooling improving.  Needs to have more of an improvement in his confusion.           Interval History:   No stools during the day but then had two this early evning. Still quite confused. Knows he is in the hospital but unable to coherently give date or month or year.  Some nasuea. No vomiting. Some abdominal pain but not worse than usual. He is unable to dial his phone because he lacks the dexterity. This feels abnormal to him. No cough or fevers           Review of Systems:    ROS: 10 point ROS neg other than the symptoms noted above in the HPI.             Medications:   Current active medications and PTA medications reviewed, see medication list for details.            Physical Exam:   Vitals were reviewed  Patient Vitals for the past 24 hrs:   BP Temp Temp src Pulse Resp SpO2   17 1545 135/66 mmHg 98  F (36.7  C) Oral 78 16 99 %   17 0744 140/69 mmHg 97.8  F (36.6  C) Oral 86 16 99 %   02/10/17 2257 103/50 mmHg 98.2  F (36.8  C) Oral 76 18 99 %   02/10/17 1944 120/60 mmHg 98.3  F (36.8  C) Oral 71 18 100 %       Temperatures:  Current - Temp: 97.6  F (36.4  C); Max - Temp  Av  F (36.7  C)  Min: 97.6  F (36.4  C)  Max: 98.3  F (36.8  C)  Respiration range: Resp  Av.5  Min: 16  Max: 18  Pulse range: Pulse  Av.8  Min: 68  Max: 77  Blood pressure range: Systolic (24hrs), Av mmHg, Min:119 mmHg, Max:130 mmHg  ; Diastolic (24hrs), Av mmHg, Min:59 mmHg, Max:70 mmHg    Pulse oximetry range: SpO2  Av.5 %  Min: 99 %  Max: 100 %  I/O last 3 completed shifts:  In: 360 [P.O.:360]  Out: 1650 [Urine:1650]    Intake/Output Summary (Last 24 hours) at 02/10/17 1704  Last data filed at 02/10/17 1215   Gross per 24 hour   Intake    620 ml   Output      0 ml   Net    620 ml     EXAM:  General: awake and alert, NAD, oriented x  2 (not to time or date)  Head: normocephalic  Neck: unremarkable, no lymphadenopathy   HEENT: oropharynx pink and moist    Heart: Regular rate and rhythm, no murmurs, rubs, or gallops  Lungs: clear to auscultation bilaterally with good air movement throughout  Abdomen: soft, non-tender, no masses or organomegaly  Extremities: no edema in lower extremities   Sin general is mildly confused but, sppech is coherent.            Data:     Results for orders placed or performed during the hospital encounter of 02/08/17 (from the past 24 hour(s))   CBC with platelets differential   Result Value Ref Range    WBC 4.5 4.0 - 11.0 10e9/L    RBC Count 3.48 (L) 4.4 - 5.9 10e12/L    Hemoglobin 12.3 (L) 13.3 - 17.7 g/dL    Hematocrit 35.6 (L) 40.0 - 53.0 %     (H) 78 - 100 fl    MCH 35.3 (H) 26.5 - 33.0 pg    MCHC 34.6 31.5 - 36.5 g/dL    RDW 13.7 10.0 - 15.0 %    Platelet Count 56 (L) 150 - 450 10e9/L    Diff Method Automated Method     % Neutrophils 77.7 %    % Lymphocytes 9.6 %    % Monocytes 11.6 %    % Eosinophils 0.7 %    % Basophils 0.2 %    % Immature Granulocytes 0.2 %    Absolute Neutrophil 3.5 1.6 - 8.3 10e9/L    Absolute Lymphocytes 0.4 (L) 0.8 - 5.3 10e9/L    Absolute Monocytes 0.5 0.0 - 1.3 10e9/L    Absolute Eosinophils 0.0 0.0 - 0.7 10e9/L    Absolute Basophils 0.0 0.0 - 0.2 10e9/L    Abs Immature Granulocytes 0.0 0 - 0.4 10e9/L   Basic metabolic panel   Result Value Ref Range    Sodium 142 133 - 144 mmol/L    Potassium 4.6 3.4 - 5.3 mmol/L    Chloride 110 (H) 94 - 109 mmol/L    Carbon Dioxide 23 20 - 32 mmol/L    Anion Gap 9 3 - 14 mmol/L    Glucose 107 (H) 70 - 99 mg/dL    Urea Nitrogen 21 7 - 30 mg/dL    Creatinine 1.38 (H) 0.66 - 1.25 mg/dL    GFR Estimate 52 (L) >60 mL/min/1.7m2    GFR Estimate If Black 63 >60 mL/min/1.7m2    Calcium 8.8 8.5 - 10.1 mg/dL   Ammonia   Result Value Ref Range    Ammonia 159 (HH) 10 - 50 umol/L           Attestation:  I have reviewed today's vital signs, notes, medications,  labs and imaging.  Amount of time performed on this daily note: 25 minutes.     Gayle Chung MD

## 2017-02-12 ENCOUNTER — APPOINTMENT (OUTPATIENT)
Dept: PHYSICAL THERAPY | Facility: CLINIC | Age: 62
DRG: 441 | End: 2017-02-12
Payer: MEDICARE

## 2017-02-12 ENCOUNTER — APPOINTMENT (OUTPATIENT)
Dept: OCCUPATIONAL THERAPY | Facility: CLINIC | Age: 62
DRG: 441 | End: 2017-02-12
Payer: MEDICARE

## 2017-02-12 LAB
AMMONIA PLAS-SCNC: 92 UMOL/L (ref 10–50)
ANION GAP SERPL CALCULATED.3IONS-SCNC: 7 MMOL/L (ref 3–14)
BUN SERPL-MCNC: 23 MG/DL (ref 7–30)
CALCIUM SERPL-MCNC: 8.5 MG/DL (ref 8.5–10.1)
CHLORIDE SERPL-SCNC: 108 MMOL/L (ref 94–109)
CO2 SERPL-SCNC: 25 MMOL/L (ref 20–32)
CREAT SERPL-MCNC: 1.41 MG/DL (ref 0.66–1.25)
ERYTHROCYTE [DISTWIDTH] IN BLOOD BY AUTOMATED COUNT: 13.7 % (ref 10–15)
GFR SERPL CREATININE-BSD FRML MDRD: 51 ML/MIN/1.7M2
GLUCOSE SERPL-MCNC: 135 MG/DL (ref 70–99)
HCT VFR BLD AUTO: 35.6 % (ref 40–53)
HGB BLD-MCNC: 12.1 G/DL (ref 13.3–17.7)
MCH RBC QN AUTO: 35 PG (ref 26.5–33)
MCHC RBC AUTO-ENTMCNC: 34 G/DL (ref 31.5–36.5)
MCV RBC AUTO: 103 FL (ref 78–100)
PLATELET # BLD AUTO: 60 10E9/L (ref 150–450)
POTASSIUM SERPL-SCNC: 4.2 MMOL/L (ref 3.4–5.3)
RBC # BLD AUTO: 3.46 10E12/L (ref 4.4–5.9)
SODIUM SERPL-SCNC: 140 MMOL/L (ref 133–144)
WBC # BLD AUTO: 4.4 10E9/L (ref 4–11)

## 2017-02-12 PROCEDURE — 97530 THERAPEUTIC ACTIVITIES: CPT | Mod: GO

## 2017-02-12 PROCEDURE — 12000000 ZZH R&B MED SURG/OB

## 2017-02-12 PROCEDURE — 25000128 H RX IP 250 OP 636: Performed by: FAMILY MEDICINE

## 2017-02-12 PROCEDURE — 36415 COLL VENOUS BLD VENIPUNCTURE: CPT | Performed by: FAMILY MEDICINE

## 2017-02-12 PROCEDURE — 25000132 ZZH RX MED GY IP 250 OP 250 PS 637: Performed by: INTERNAL MEDICINE

## 2017-02-12 PROCEDURE — 25000128 H RX IP 250 OP 636: Performed by: EMERGENCY MEDICINE

## 2017-02-12 PROCEDURE — 25000132 ZZH RX MED GY IP 250 OP 250 PS 637: Performed by: FAMILY MEDICINE

## 2017-02-12 PROCEDURE — 97116 GAIT TRAINING THERAPY: CPT | Mod: GP | Performed by: PHYSICAL THERAPIST

## 2017-02-12 PROCEDURE — 80048 BASIC METABOLIC PNL TOTAL CA: CPT | Performed by: FAMILY MEDICINE

## 2017-02-12 PROCEDURE — 99232 SBSQ HOSP IP/OBS MODERATE 35: CPT | Performed by: FAMILY MEDICINE

## 2017-02-12 PROCEDURE — 82140 ASSAY OF AMMONIA: CPT | Performed by: FAMILY MEDICINE

## 2017-02-12 PROCEDURE — 90732 PPSV23 VACC 2 YRS+ SUBQ/IM: CPT | Performed by: FAMILY MEDICINE

## 2017-02-12 PROCEDURE — 40000193 ZZH STATISTIC PT WARD VISIT: Performed by: PHYSICAL THERAPIST

## 2017-02-12 PROCEDURE — 25000125 ZZHC RX 250: Performed by: EMERGENCY MEDICINE

## 2017-02-12 PROCEDURE — 97535 SELF CARE MNGMENT TRAINING: CPT | Mod: GO

## 2017-02-12 PROCEDURE — 40000133 ZZH STATISTIC OT WARD VISIT

## 2017-02-12 PROCEDURE — 85027 COMPLETE CBC AUTOMATED: CPT | Performed by: FAMILY MEDICINE

## 2017-02-12 RX ADMIN — LEDIPASVIR AND SOFOSBUVIR 1 TABLET: 90; 400 TABLET, FILM COATED ORAL at 10:42

## 2017-02-12 RX ADMIN — LACTULOSE 30 G: 20 SOLUTION ORAL at 14:28

## 2017-02-12 RX ADMIN — LACTULOSE 30 G: 20 SOLUTION ORAL at 09:41

## 2017-02-12 RX ADMIN — GABAPENTIN 200 MG: 100 CAPSULE ORAL at 21:06

## 2017-02-12 RX ADMIN — RIBAVIRIN 600 MG: 200 TABLET, FILM COATED ORAL at 10:43

## 2017-02-12 RX ADMIN — PANTOPRAZOLE SODIUM 40 MG: 40 INJECTION, POWDER, FOR SOLUTION INTRAVENOUS at 21:06

## 2017-02-12 RX ADMIN — SPIRONOLACTONE 100 MG: 25 TABLET ORAL at 09:42

## 2017-02-12 RX ADMIN — FUROSEMIDE 40 MG: 40 TABLET ORAL at 09:42

## 2017-02-12 RX ADMIN — PANTOPRAZOLE SODIUM 40 MG: 40 INJECTION, POWDER, FOR SOLUTION INTRAVENOUS at 09:43

## 2017-02-12 RX ADMIN — RIFAXIMIN 550 MG: 550 TABLET ORAL at 21:05

## 2017-02-12 RX ADMIN — RIFAXIMIN 550 MG: 550 TABLET ORAL at 09:43

## 2017-02-12 RX ADMIN — RIBAVIRIN 400 MG: 200 TABLET, FILM COATED ORAL at 17:14

## 2017-02-12 RX ADMIN — PNEUMOCOCCAL VACCINE POLYVALENT 0.5 ML
25; 25; 25; 25; 25; 25; 25; 25; 25; 25; 25; 25; 25; 25; 25; 25; 25; 25; 25; 25; 25; 25; 25 INJECTION, SOLUTION INTRAMUSCULAR; SUBCUTANEOUS at 10:39

## 2017-02-12 RX ADMIN — CETIRIZINE HYDROCHLORIDE 10 MG: 10 TABLET, FILM COATED ORAL at 09:47

## 2017-02-12 RX ADMIN — LACTULOSE 30 G: 20 SOLUTION ORAL at 21:05

## 2017-02-12 RX ADMIN — ONDANSETRON 4 MG: 2 INJECTION INTRAMUSCULAR; INTRAVENOUS at 22:10

## 2017-02-12 NOTE — PLAN OF CARE
"Problem: Goal Outcome Summary  Goal: Goal Outcome Summary  Outcome: Improving  Ammonia level 92 today, improved. Pt had 1 BM today, Lactulose as ordered. Pt ambulates with assist 1, unsteady/shuffle gait. Hands jittery, better today per pt. Wife requested to speak with this writer, states she is unable to care for him at home if he remains tremulous/jittery and \"so unsteady\", stating \"I'm too weak to help him\" concern with risk of falls, stairs to bedroom. This writer asked if TCU has been discussed between her and pt, she states no. Await care transition team to address tomorrow. Dr Chung aware.      "

## 2017-02-12 NOTE — PLAN OF CARE
Problem: Individualization  Goal: Patient Preferences  Outcome: No Change  Passing stool, up to BSC w/ assist, denies pain, tremors minimal tonight. Will keep BA on for safety.

## 2017-02-12 NOTE — PROGRESS NOTES
[facility] Hospitalist Progress Note            Assessment and Plan:    Acute hepatic encephalopathy  Non compliance with lactulose at home. The dose has been increased during the hospital stay. ammonia improving again today. Stronger and less confused today but still having difficulty with simple tasks. Unsteady while walking and needs assistance. Recommend continuing current dose of lactulose. Consult for tcu placement. (Pt wants to speak with wife about plan.)      Cirrhosis of liver, s/p TIPS procedure    Hepatitis C   2/8/17 -- Due to alcohol abuse in the past and active hepatitis C infection.  Started Harvoni 2 weeks ago, and this is generally going well.     - Continue Harvoni and ribavirin, may use own supply      DVT Prophylaxis:   2/9/2017 -- still ambulate today, consider other prophylaxis if unable to discharge tomorrow.    2/12/2017: ambulating     Code Status: Full Code    Disposition  Recommend TCU. Will likely be ready tomorrow for discharge.           Interval History:   One stool today. Some bad pain which he alwatys has. No nausea/vomiting   No fever/shakes/chills    He isstill unable to dial his phone-- This feels abnormal to him. No cough or fevers  He will consider TCU- wants to speak with wife  Unsteady with walking            Review of Systems:    ROS: 10 point ROS neg other than the symptoms noted above in the HPI.             Medications:   Current active medications and PTA medications reviewed, see medication list for details.            Physical Exam:   Vitals were reviewed  Patient Vitals for the past 24 hrs:   BP Temp Temp src Pulse Resp SpO2   02/12/17 1132 115/69 98.3  F (36.8  C) Oral 74 20 100 %   02/12/17 0733 127/62 97.9  F (36.6  C) Oral 76 20 98 %   02/11/17 2323 129/62 98.1  F (36.7  C) Oral 79 18 99 %   02/11/17 2030 106/56 98.2  F (36.8  C) Oral 80 16 98 %   02/11/17 1545 135/66 98  F (36.7  C) Oral 78 16 99 %       Temperatures:  Current - Temp: 97.6  F (36.4  C); Max -  Temp  Av  F (36.7  C)  Min: 97.6  F (36.4  C)  Max: 98.3  F (36.8  C)  Respiration range: Resp  Av.5  Min: 16  Max: 18  Pulse range: Pulse  Av.8  Min: 68  Max: 77  Blood pressure range: Systolic (24hrs), Av mmHg, Min:119 mmHg, Max:130 mmHg  ; Diastolic (24hrs), Av mmHg, Min:59 mmHg, Max:70 mmHg    Pulse oximetry range: SpO2  Av.5 %  Min: 99 %  Max: 100 %  I/O last 3 completed shifts:  In: 1360 [P.O.:1360]  Out: 2325 [Urine:2325]    Intake/Output Summary (Last 24 hours) at 02/10/17 1704  Last data filed at 02/10/17 1215   Gross per 24 hour   Intake    620 ml   Output      0 ml   Net    620 ml     EXAM:  General: awake and alert, NAD  Head: normocephalic  Neck: unremarkable, no lymphadenopathy   HEENT: oropharynx pink and moist    Heart: Regular rate and rhythm, no murmurs, rubs, or gallops  Lungs: clear to auscultation bilaterally with good air movement throughout  Abdomen: soft, mild tenderness throughout, slightly distended, no masses or organomegaly  Extremities: no edema in lower extremities   More alert and now oriented x3  Appears mildly tremulous            Data:     Results for orders placed or performed during the hospital encounter of 17 (from the past 24 hour(s))   Basic metabolic panel   Result Value Ref Range    Sodium 140 133 - 144 mmol/L    Potassium 4.2 3.4 - 5.3 mmol/L    Chloride 108 94 - 109 mmol/L    Carbon Dioxide 25 20 - 32 mmol/L    Anion Gap 7 3 - 14 mmol/L    Glucose 135 (H) 70 - 99 mg/dL    Urea Nitrogen 23 7 - 30 mg/dL    Creatinine 1.41 (H) 0.66 - 1.25 mg/dL    GFR Estimate 51 (L) >60 mL/min/1.7m2    GFR Estimate If Black 62 >60 mL/min/1.7m2    Calcium 8.5 8.5 - 10.1 mg/dL   Ammonia   Result Value Ref Range    Ammonia 92 (H) 10 - 50 umol/L   CBC with platelets   Result Value Ref Range    WBC 4.4 4.0 - 11.0 10e9/L    RBC Count 3.46 (L) 4.4 - 5.9 10e12/L    Hemoglobin 12.1 (L) 13.3 - 17.7 g/dL    Hematocrit 35.6 (L) 40.0 - 53.0 %     (H) 78 - 100 fl     MCH 35.0 (H) 26.5 - 33.0 pg    MCHC 34.0 31.5 - 36.5 g/dL    RDW 13.7 10.0 - 15.0 %    Platelet Count 60 (L) 150 - 450 10e9/L           Attestation:  I have reviewed today's vital signs, notes, medications, labs and imaging.  Amount of time performed on this daily note: 25 minutes.     Gayle Chung MD

## 2017-02-12 NOTE — PROGRESS NOTES
"   02/12/17 1000   Signing Clinician's Name / Credentials   Signing clinician's name / credentials Tarah Doherty OTR   Quick Adds   Rehab Discipline OT   Therapeutic Activity   Minutes of Treatment 8 minutes   Symptoms Noted During/After Treatment Dizziness;Fatigue;Pain increased   Treatment Detail Min A supine to sit EOB. Worked in conjunction with PT for safety as pt can be unsteady on his feet with twitching and knee buckling without loss of balance. After walking in sifuentes he reports increased head 'fuzziness' and headache. Positioned in recliner which he tolerated briefly. Nurse notified of symptoms   ADL Training   Minutes of Treatment 23   Symptoms Noted During/After Treatment none   Treatment ADL training within precautions   Treatment Detail Pt required set-up for self feeding sitting EOB with good balance. He is able to use utensils to feed self entire meal and uses 2 hands to manage coffee cup. Stood at sink about 6' to brush teeth with CGA for safety. Some knee buckling but no LOB   Patient Response Increased tolerance for ADL activity   Patient Response Comments Pt feels good about being able to help himself more today   Additional Documentation   Rehab Comments Did not administer MOCHA this weekend d/t poor level of alertness SAT. and just beginning to feel better Sun.   OT Plan See STGs.    [facility] AM-PAC  \"6 Clicks\" Daily Activity Inpatient Short Form   1. Putting on and taking off regular lower body clothing? 2 - A Lot   2. Bathing (including washing, rinsing, drying)? 2 - A Lot   3. Toileting, which includes using toilet, bedpan or urinal? 2 - A Lot   4. Putting on and taking off regular upper body clothing? 3 - A Little   5. Taking care of personal grooming such as brushing teeth? 4 - None   6. Eating meals? 4 - None   Daily Activity Raw Score (Score out of 24.Lower scores equate to lower levels of function) 17   Total Session Time   Total Session Time (minutes) 31 minutes     Tarah " Chas, OTR

## 2017-02-12 NOTE — PLAN OF CARE
Problem: Liver Failure, Acute/Chronic (Adult)  Goal: Signs and Symptoms of Listed Potential Problems Will be Absent or Manageable (Liver Failure, Acute/Chronic)  Signs and symptoms of listed potential problems will be absent or manageable by discharge/transition of care (reference Liver Failure, Acute/Chronic (Adult) CPG).   Outcome: No Change  Patient slowly clearing this evening. Needed full assistance to eat dinner and was confused about the date and time this afternoon. More awake and appetite was increased for supper. Slightly clearer and steadier this evening, but still weak and needing a lot of assistance. Stooling this afternoon, but fewer stools this evening. Continuing with the increased dose of lactulose. Will continue to monitor.

## 2017-02-12 NOTE — CONSULTS
Reason for Referral:  Discharge planning    Diagnosis:   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     S/P TIPS (transjugular intrahepatic portosystemic shunt)     Hepatic encephalopathy (H)     Ascites of liver     Edema of lower extremity     Chronic hepatitis C virus infection (H)     Hepatic cirrhosis (H)     Hyponatremia     Liver mass     Portal hypertension (H)     Primary malignant neoplasm of testis (H)     Thrombocytopenia (H)     Encephalopathy acute       Cognitive Behavioral Status:  awake, alert and oriented    Support Systems:  family    Current Living Situation:   Home Setting:  stairs   Living Status:  Spouse, adult children   Function Status / Assistive Devices:  no assistive devices    Employment / Financial / Insurance Concerns:  not employed ,disabled        No Insurance issues identified    Housing Concerns:  No    Health Care Directives:  Patient considering completing    Current Services: No    Assessment:  Chart reviewed, discussed at [facility]. Referral for discharge planning, TCU being recommended as patient is unsteady and continues to need assistance. Patient agrees for referrals for TCU to be sent (1) [facility] Phone: ([phone]) Fax: ([phone])          (2) [facility] (Phone: [phone]) Fax: ([phone])          (3)[facility] (Phone: [phone]) Fax: ([phone])  Patient states he will talk to his wife who is not currently here.     Plan: May need TCU, referrals pending.         Edna Ye [phone]

## 2017-02-12 NOTE — PLAN OF CARE
Problem: Goal Outcome Summary  Goal: Goal Outcome Summary  PT:  Pt requesting to amb out of room today. Pt amb with CGA-min A of 2 without AD 200ft. Pt having unsteady/staggered gait, shuffled steps. C/o feeling fussy and a HA.     REC:  Home with spouse

## 2017-02-13 ENCOUNTER — APPOINTMENT (OUTPATIENT)
Dept: ULTRASOUND IMAGING | Facility: CLINIC | Age: 62
DRG: 441 | End: 2017-02-13
Attending: INTERNAL MEDICINE
Payer: MEDICARE

## 2017-02-13 ENCOUNTER — CARE COORDINATION (OUTPATIENT)
Dept: GASTROENTEROLOGY | Facility: CLINIC | Age: 62
End: 2017-02-13

## 2017-02-13 ENCOUNTER — APPOINTMENT (OUTPATIENT)
Dept: GENERAL RADIOLOGY | Facility: CLINIC | Age: 62
DRG: 441 | End: 2017-02-13
Attending: INTERNAL MEDICINE
Payer: MEDICARE

## 2017-02-13 DIAGNOSIS — B18.2 CHRONIC HEPATITIS C WITHOUT HEPATIC COMA (H): Primary | ICD-10-CM

## 2017-02-13 LAB
ALBUMIN SERPL-MCNC: 2.4 G/DL (ref 3.4–5)
ALP SERPL-CCNC: 55 U/L (ref 40–150)
ALT SERPL W P-5'-P-CCNC: 32 U/L (ref 0–70)
AMMONIA PLAS-SCNC: 138 UMOL/L (ref 10–50)
ANION GAP SERPL CALCULATED.3IONS-SCNC: 8 MMOL/L (ref 3–14)
AST SERPL W P-5'-P-CCNC: 48 U/L (ref 0–45)
BILIRUB SERPL-MCNC: 5.3 MG/DL (ref 0.2–1.3)
BUN SERPL-MCNC: 26 MG/DL (ref 7–30)
CALCIUM SERPL-MCNC: 8.9 MG/DL (ref 8.5–10.1)
CHLORIDE SERPL-SCNC: 110 MMOL/L (ref 94–109)
CO2 SERPL-SCNC: 23 MMOL/L (ref 20–32)
CREAT SERPL-MCNC: 1.31 MG/DL (ref 0.66–1.25)
ERYTHROCYTE [DISTWIDTH] IN BLOOD BY AUTOMATED COUNT: 13.4 % (ref 10–15)
GFR SERPL CREATININE-BSD FRML MDRD: 55 ML/MIN/1.7M2
GLUCOSE SERPL-MCNC: 106 MG/DL (ref 70–99)
HCT VFR BLD AUTO: 36.3 % (ref 40–53)
HGB BLD-MCNC: 12.5 G/DL (ref 13.3–17.7)
MCH RBC QN AUTO: 34.9 PG (ref 26.5–33)
MCHC RBC AUTO-ENTMCNC: 34.4 G/DL (ref 31.5–36.5)
MCV RBC AUTO: 101 FL (ref 78–100)
PLATELET # BLD AUTO: 63 10E9/L (ref 150–450)
POTASSIUM SERPL-SCNC: 4.6 MMOL/L (ref 3.4–5.3)
PROT SERPL-MCNC: 7 G/DL (ref 6.8–8.8)
RBC # BLD AUTO: 3.58 10E12/L (ref 4.4–5.9)
SODIUM SERPL-SCNC: 141 MMOL/L (ref 133–144)
WBC # BLD AUTO: 6 10E9/L (ref 4–11)

## 2017-02-13 PROCEDURE — 85027 COMPLETE CBC AUTOMATED: CPT | Performed by: INTERNAL MEDICINE

## 2017-02-13 PROCEDURE — 25000132 ZZH RX MED GY IP 250 OP 250 PS 637: Performed by: FAMILY MEDICINE

## 2017-02-13 PROCEDURE — 36415 COLL VENOUS BLD VENIPUNCTURE: CPT | Performed by: INTERNAL MEDICINE

## 2017-02-13 PROCEDURE — 82140 ASSAY OF AMMONIA: CPT | Performed by: INTERNAL MEDICINE

## 2017-02-13 PROCEDURE — 99233 SBSQ HOSP IP/OBS HIGH 50: CPT | Performed by: INTERNAL MEDICINE

## 2017-02-13 PROCEDURE — 76700 US EXAM ABDOM COMPLETE: CPT

## 2017-02-13 PROCEDURE — 25000132 ZZH RX MED GY IP 250 OP 250 PS 637: Performed by: INTERNAL MEDICINE

## 2017-02-13 PROCEDURE — 25000128 H RX IP 250 OP 636: Performed by: INTERNAL MEDICINE

## 2017-02-13 PROCEDURE — 80053 COMPREHEN METABOLIC PANEL: CPT | Performed by: INTERNAL MEDICINE

## 2017-02-13 PROCEDURE — 25000125 ZZHC RX 250: Performed by: EMERGENCY MEDICINE

## 2017-02-13 PROCEDURE — 20000003 ZZH R&B ICU

## 2017-02-13 PROCEDURE — 40000940 XR ABDOMEN PORT F1 VW

## 2017-02-13 RX ORDER — SODIUM CHLORIDE 9 MG/ML
INJECTION, SOLUTION INTRAVENOUS CONTINUOUS
Status: DISCONTINUED | OUTPATIENT
Start: 2017-02-13 | End: 2017-02-15 | Stop reason: HOSPADM

## 2017-02-13 RX ORDER — LACTULOSE 10 G/15ML
30 SOLUTION ORAL 3 TIMES DAILY
Status: DISCONTINUED | OUTPATIENT
Start: 2017-02-13 | End: 2017-02-15

## 2017-02-13 RX ADMIN — RIBAVIRIN 400 MG: 200 TABLET, FILM COATED ORAL at 17:20

## 2017-02-13 RX ADMIN — LACTULOSE 30 G: 10 SOLUTION ORAL at 21:25

## 2017-02-13 RX ADMIN — GABAPENTIN 200 MG: 100 CAPSULE ORAL at 21:26

## 2017-02-13 RX ADMIN — PANTOPRAZOLE SODIUM 40 MG: 40 INJECTION, POWDER, FOR SOLUTION INTRAVENOUS at 09:38

## 2017-02-13 RX ADMIN — PANTOPRAZOLE SODIUM 40 MG: 40 INJECTION, POWDER, FOR SOLUTION INTRAVENOUS at 21:26

## 2017-02-13 RX ADMIN — LACTULOSE 20 G: 20 SOLUTION ORAL at 15:04

## 2017-02-13 RX ADMIN — RIFAXIMIN 550 MG: 550 TABLET ORAL at 21:26

## 2017-02-13 RX ADMIN — SODIUM CHLORIDE 1000 ML: 9 INJECTION, SOLUTION INTRAVENOUS at 12:56

## 2017-02-13 RX ADMIN — SODIUM CHLORIDE: 9 INJECTION, SOLUTION INTRAVENOUS at 14:47

## 2017-02-13 RX ADMIN — RIFAXIMIN 550 MG: 550 TABLET ORAL at 16:50

## 2017-02-13 RX ADMIN — FUROSEMIDE 40 MG: 40 TABLET ORAL at 09:37

## 2017-02-13 ASSESSMENT — PAIN DESCRIPTION - DESCRIPTORS: DESCRIPTORS: ACHING;CONSTANT;DULL

## 2017-02-13 NOTE — PROGRESS NOTES
[facility] TRANSPORT NOTE  Data:   Reason for Transport:  ICU status    Scott Casale was transported to ICU via bed at 1349.  Patient was accompanied by Registered Nurse. Equipment used for transport: IV pump. Family was aware of reason for transport: Yes.    Notified wife Carrie [phone]    Action:  Report: given to Audrey    Response:  Patient's condition when transferred off unit was stable.    Devorah Mayers

## 2017-02-13 NOTE — PLAN OF CARE
Problem: Goal Outcome Summary  Goal: Goal Outcome Summary  OT: CANCEL- Upon attempt pt can only briefly open eyes with verbal and tactile cues. States last name correctly when asked however does not answer any questions following this. RN aware. Will attempt tomorrow as appropriate.

## 2017-02-13 NOTE — PROGRESS NOTES
Pt put call light on and asked to go to the bathroom. Urinated and had a large BM. VSS. Pt transferred from commode and back to bed with assist of one.

## 2017-02-13 NOTE — PLAN OF CARE
Problem: Goal Outcome Summary  Goal: Goal Outcome Summary  Received Critical lab value: ammonia 138. Primary RN and Dr Rivera notified.

## 2017-02-13 NOTE — PROGRESS NOTES
2/13/2017 10:29 AM        Hep C Care Coordination Call   Multiple attempts to connect with patient for f/u on Hep C treatment start. I was notified by [facility]SP manager Cisneros that patient had reported that he started his Hep C medication on 1/24/17. After reviewing patient chart, it was noted that patient is currently admitted at [facility] since 2/8/17 for hepatic encephalopathy. Patient has been taking his Harvoni and Ribavirin medication per nursing notes.. I will continue to monitor the status of patient via Epic and alert provider Dr. Higginbotham and the Hep C care team. I will also send a copy of the Hep C treatment POC in the mail to patient to the address on file;       Below is a summary of your treatment schedule. Please follow the schedule as closely as possible. Labs should be drawn as close to the date indicated at the [facility] or [facility].    Hepatitis C Treatment  Treatment: Harvoni and Ribavirin x 12 weeks  Genotype: 1a  Stage Fibrosis: F4  Treatment Naive       Start Date: 01/24/17    Week 2  Hgb Lab Due: 2/7/2017. Please have this lab drawn on or after 2/7/17. You do not need to fast for this lab. Please call [phone] to set up a lab appointment.      Week 4  Hgb, Hepatic panel, BMP Labs Due: 2/21/2017  Office Visit Date:  Please set up an appointment to see provider Dr. Higginbotham or provider Caitlyn Wyatt NP on or within one week after the above date of 2/21/17.  Please book an appointment to have labs drawn one hour prior to your provider appointment. You do not need to fast for these labs. To set up an appointment, please call [phone].     Week 6  Hgb Due 3/7/2017. Please have this lab drawn on or after 3/7/17. You do not need to fast for this lab. Please call [phone] to set up a lab appointment.     Week 8  Hgb Lab Due: 3/21/2017. Please have this lab drawn on or after 3/21/17. You do not need to fast for this lab. Please call [phone] to set up a lab appointment.       Week 12 - End of Treatment  HCV RNA Quant Lab Due: 4/18/2017. Please have this lab drawn on or after 4/18/17. You do not need to fast for this lab. Please call [phone] to set up a lab appointment. *NOTE* It is important to have this lab drawn to see if treatment has worked so far. You will then have to wait three more months to have a final lab draw. Please see the date below.          3 Months Post Treatment  HCV RNA Quant Lab Due: 7/17/2017. Please have this lab drawn on or after 7/17/17. You do not need to fast for this lab.Please call [phone] to set up a lab appointment. *NOTE* It is important to have this final lab drawn. Without his lab, we will be unable to determine if Hepatitis C treatment was successful.         Educational information to patient on Hep C treatment;     -Contact the [facility] Hepatology clinic and speak with RN Care Coordinator prior to starting any new prescribed or OTC medications.   -Take medications exactly as prescribed, do not change dose or stop taking without consulting your provider.   -Take Medication one time each day with or without food  -If you miss a dose of medication, then take it as soon as you remember on the same day. If not remembered on the same day, then skip the dose and take the next dose at the usual time. Do not take more than the recommended dose. Contact the clinic if you miss a dose.    Please contact the pharmacy 1-2 weeks prior to needing a medication refill.      Side Effects  The most common side effects of Hep C medication treatment can include:  -tiredness  -headache  Notify the clinic of any side effects that bother you or that do not go away.   Possible side effects have been discussed.   Patient has been instructed to clinic for rash, itching or unmanageable nausea.    How to store Hep C Treatment Medications  -Store Medication at room temperature below 86 degrees F  -Keep Medication in it's original container  -Do not use Medication if the  seal is broken or missing    General information  It is not known if treatment will prevent you from infecting another person or reinfecting yourself with the hepatitis C virus during treatment. It is best that as soon as you start treatment to buy a new toothbrush, disposable razors (if you use a rotating shaver you do not need to buy a new one) and nail clippers. If you check your blood sugar at home, please dispose of the fingerstick needle after each use and DO NOT REUSE the insulin needles. These items should not be shared with anyone.        If you have any questions, please contact the main clinic at [phone] or your RN Care Coordinator at [phone]. We appreciate you choosing the [facility] clinic for your treatment. Patient agrees to Hep C treatment POC and verbalizes understanding. Patient will receive a copy of treatment plan in the mail, address verified with patient. Patient has no further questions or concerns. Hep C care team updated on patient status.          Michelle Vanegas RN, BSN, PHN  [facility]  Care Coordinator for Hepatology Clinic/Specialty Program

## 2017-02-13 NOTE — PROGRESS NOTES
"[facility] Internal Medicine Progress Note     Date of Service (when I saw the patient): 02/13/2017      REASON FOR ADMISSION / INTERVAL HISTORY:  Admitted for hepatic encephalopathy. Doing worse today after initial improvement. See details below.    ASSESSMENT/PLAN:   ACUTE HEPATIC ENCEPHALOPATHY STAGE 3-4  Presented with shakiness, tremors and ammonia of 180. [place] to be due to non compliance with meds. S/p TIPS last year.   Was now admitted and started on po lactulose/rifaximin. Apparently was doing better and ammonia down to 92.  Pt  Decompensated last night. Had large emesis after PM meds and another one in middle of night. This AM, he is in coma-difficult to arouse.   Acute decompensation could be due to inability to keep lactulose/rifaximen, dehydration. ?GI bleed  -will move to ICU, NG in for po meds, stat abd US and f/u. Start iv fluids after a bolus and hold off diuretics.    LIVER CIRRHOSIS DUE TO HEP C and ETOH in past  S/p TIPS--last year.Started Harvoni 2 weeks ago.  -hold harvoni for now    DISPO  Pt is critical--move to ICU. Trying to contacy spouse with no success.    DAVID ZEPEDA MD   Pg [phone]    DVT Prhylaxis: Pneumatic Compression Devices  Code Status: Full Code    ROS:  As described in A/P and Exam.  Otherwise ALL are  negative.    PHYSICAL EXAM:  All vitals have been reviewed    Blood pressure 133/67, pulse 78, temperature 98.2  F (36.8  C), temperature source Oral, resp. rate 20, height 1.803 m (5' 11\"), weight 92 kg (202 lb 13.2 oz), SpO2 100 %.    I/O this shift:  In: 50 [P.O.:50]  Out: 1000 [Urine:1000]    GENERAL APPEARANCE:ill appearing, somnolant and no distress  EYES: conjunctiva clear, eyes grossly normal  HENT: external ears and nose normal   NECK: supple, no masses or adenopathy  RESP: lungs clear to auscultation - no rales, rhonchi or wheezes  CV: regular rate and rhythm, normal S1 S2, no S3 or S4 and no murmur, click or rub   ABDOMEN: soft, nontender, no HSM or masses " and bowel sounds normal  MS: no clubbing, cyanosis; 1+ edema B LE  SKIN: clear without significant rashes or lesions  NEURO: -somnolant, difficult to arouse, does not follow commands.     ROUTINE  LABS (Last four results)  CMP  Recent Labs  Lab 02/13/17  1117 02/12/17  0610 02/11/17  0715 02/10/17  0625 02/09/17  0725 02/08/17 2010    140 142 142 140 140   POTASSIUM 4.6 4.2 4.6 4.4 3.5 3.7   CHLORIDE 110* 108 110* 110* 109 108   CO2 23 25 23 24 25 25   ANIONGAP 8 7 9 8 6 7   * 135* 107* 98 98 88   BUN 26 23 21 24 25 24   CR 1.31* 1.41* 1.38* 1.34* 1.34* 1.46*   GFRESTIMATED 55* 51* 52* 54* 54* 49*   GFRESTBLACK 67 62 63 65 65 59*   ARACELI 8.9 8.5 8.8 8.1* 7.8* 7.9*   PROTTOTAL 7.0  --   --  6.0* 6.0* 6.8   ALBUMIN 2.4*  --   --  2.1* 2.1* 2.3*   BILITOTAL 5.3*  --   --  3.4* 3.0* 3.2*   ALKPHOS 55  --   --  50 60 91   AST 48*  --   --  47* 50* 65*   ALT 32  --   --  29 27 34     CBC  Recent Labs  Lab 02/13/17  1117 02/12/17  0610 02/11/17  0715 02/10/17  0625   WBC 6.0 4.4 4.5 3.6*   RBC 3.58* 3.46* 3.48* 3.15*   HGB 12.5* 12.1* 12.3* 10.9*   HCT 36.3* 35.6* 35.6* 32.1*   * 103* 102* 102*   MCH 34.9* 35.0* 35.3* 34.6*   MCHC 34.4 34.0 34.6 34.0   RDW 13.4 13.7 13.7 13.5   PLT 63* 60* 56* 50*     INR  Recent Labs  Lab 02/08/17 2010   INR 1.37*     Arterial Blood GasNo lab results found in last 7 days.    No results found for this or any previous visit (from the past 24 hour(s)).

## 2017-02-13 NOTE — PLAN OF CARE
Problem: Liver Failure, Acute/Chronic (Adult)  Goal: Signs and Symptoms of Listed Potential Problems Will be Absent or Manageable (Liver Failure, Acute/Chronic)  Signs and symptoms of listed potential problems will be absent or manageable by discharge/transition of care (reference Liver Failure, Acute/Chronic (Adult) CPG).   Outcome: No Change  Patient improving slightly from yesterday. Able to feed himself and steadier when up to the commode. Had large unmeasured bout of emesis this evening about an hour after receiving medications. Given zofran with some relief. Plan to DC to TCU. Will continue to monitor.

## 2017-02-13 NOTE — PLAN OF CARE
PT-    Cancel  Per RN, pt too lethargic to participate will hold on therapy today; check on pt's status 2/14/17

## 2017-02-13 NOTE — PROGRESS NOTES
Pt arrives to ICU awake, asking to urinate. Oriented to person, place, year, situation. SHELLY 3 mm and brisk. Moves all extremities with equal strength. Pt is asking to talk to his wife. NGT placed, tolerated procedure well. VSS, NSR. 100% saturation on room air.

## 2017-02-13 NOTE — PROGRESS NOTES
Pt up to BS, unable to void, when back in bed had small (40 CC) emesis. Changed linen and gown. Oral care done as much as pt allowed.

## 2017-02-13 NOTE — LETTER
February 13, 2017       TO: Scott Casale  [address]  [facility] [address]       Dear Mr. Casale,    Below is a summary of your treatment schedule. Please follow the schedule as closely as possible. Labs should be drawn as close to the date indicated at the [facility] or [facility].    Hepatitis C Treatment  Treatment: Harvoni and Ribavirin x 12 weeks      Start Date: 01/24/17    Week 2  Hgb Lab Due: 2/7/2017. Please have this lab drawn on or after 2/7/17. You do not need to fast for this lab. Please call [phone] to set up a lab appointment.      Week 4  Hgb, CMP Labs Due: 2/21/2017  Office Visit Date:  Please set up an appointment to see provider Dr. Higginbotham or provider Caitlyn Wyatt NP on or within one week after the above date of 2/21/17.  Please book an appointment to have labs drawn one hour prior to your provider appointment. You do not need to fast for these labs. To set up an appointment, please call [phone].     Week 6  Hgb Due 3/7/2017. Please have this lab drawn on or after 3/7/17. You do not need to fast for this lab. Please call [phone] to set up a lab appointment.     Week 8  Hgb Lab Due: 3/21/2017. Please have this lab drawn on or after 3/21/17. You do not need to fast for this lab. Please call [phone] to set up a lab appointment.        Week 12 - End of Treatment  HCV RNA Quant Lab Due: 4/18/2017. Please have this lab drawn on or after 4/18/17. You do not need to fast for this lab. Please call [phone] to set up a lab appointment. *NOTE* It is important to have this lab drawn to see if treatment has worked so far. You will then have to wait three more months to have a final lab draw. Please see the date below.          3 Months Post Treatment  HCV RNA Quant Lab Due: 7/17/2017. Please have this lab drawn on or after 7/17/17. You do not need to fast for this lab.Please call [phone] to set up a lab appointment. *NOTE* It is important to  have this final lab drawn. Without his lab, we will be unable to determine if Hepatitis C treatment was successful.       Educational information to patient on Hep C treatment;     -Contact the [facility] Hepatology clinic and speak with RN Care Coordinator prior to starting any new prescribed or OTC medications.   -Take medications exactly as prescribed, do not change dose or stop taking without consulting your provider.   -Take Medication one time each day with or without food  -If you miss a dose of medication, then take it as soon as you remember on the same day. If not remembered on the same day, then skip the dose and take the next dose at the usual time. Do not take more than the recommended dose. Contact the clinic if you miss a dose.    Please contact the pharmacy 1-2 weeks prior to needing a medication refill.      Side Effects  The most common side effects of Hep C medication treatment can include:  -tiredness  -headache  Notify the clinic of any side effects that bother you or that do not go away.   Possible side effects have been discussed.   Patient has been instructed to clinic for rash, itching or unmanageable nausea.    How to store Hep C Treatment Medications  -Store Medication at room temperature below 86 degrees F  -Keep Medication in it's original container  -Do not use Medication if the seal is broken or missing                          General information  It is not known if treatment will prevent you from infecting another person or reinfecting yourself with the hepatitis C virus during treatment. It is best that as soon as you start treatment to buy a new toothbrush, disposable razors (if you use a rotating shaver you do not need to buy a new one) and nail clippers. If you check your blood sugar at home, please dispose of the fingerstick needle after each use and DO NOT REUSE the insulin needles. These items should not be shared with anyone.        If you have any questions, please contact the main  clinic at [phone] or your RN Care Coordinator at [phone]. We appreciate you choosing the [facility] clinic for your treatment.         Michelle Vanegas RN, BSN, PHN  [facility]  Care Coordinator for Hepatology Clinic/Specialty Program

## 2017-02-13 NOTE — PLAN OF CARE
Problem: Goal Outcome Summary  Goal: Goal Outcome Summary  Outcome: No Change  Pt alert, slow speaking, some ataxia when up to BSC, follows commands, states he feels better than earlier but can't believe how bad he felt. BS remain hyper, denies nausea at present.

## 2017-02-13 NOTE — PROVIDER NOTIFICATION
Pt very lethargic this morning. Unable to follow commands or answer questions. Knows his name and that he is in the hospital; disoriented to time. Pt is able to move all extremities, but not on command. Pupils reactive to light; right > left. MD paged.

## 2017-02-13 NOTE — CONSULTS
Reason for Follow up: DC planning    Anticipated discharge needs: This writer received phone call from TCU and they reported that they will not be able to accept pt at this time d/t cost of his medications.     Next steps: CTS will cont to follow    Blanquita ARENAS, [facility], [facility] [phone]

## 2017-02-13 NOTE — PROGRESS NOTES
Pt up to BSC w/ confusion, unable to turn around to sit on commode, had to move commode to pt. Tremors/hepatic flapping also worse, noted had large emesis after HS meds. Discussed w/ charge nurse, will web page.    Addendum: Return call per MD, no changes tonight, will address in AM.

## 2017-02-14 ENCOUNTER — APPOINTMENT (OUTPATIENT)
Dept: PHYSICAL THERAPY | Facility: CLINIC | Age: 62
DRG: 441 | End: 2017-02-14
Payer: MEDICARE

## 2017-02-14 ENCOUNTER — APPOINTMENT (OUTPATIENT)
Dept: OCCUPATIONAL THERAPY | Facility: CLINIC | Age: 62
DRG: 441 | End: 2017-02-14
Payer: MEDICARE

## 2017-02-14 LAB
ALBUMIN SERPL-MCNC: 2.1 G/DL (ref 3.4–5)
ALP SERPL-CCNC: 47 U/L (ref 40–150)
ALT SERPL W P-5'-P-CCNC: 31 U/L (ref 0–70)
ANION GAP SERPL CALCULATED.3IONS-SCNC: 8 MMOL/L (ref 3–14)
AST SERPL W P-5'-P-CCNC: 42 U/L (ref 0–45)
BILIRUB SERPL-MCNC: 4.5 MG/DL (ref 0.2–1.3)
BUN SERPL-MCNC: 30 MG/DL (ref 7–30)
CALCIUM SERPL-MCNC: 8.3 MG/DL (ref 8.5–10.1)
CHLORIDE SERPL-SCNC: 111 MMOL/L (ref 94–109)
CO2 SERPL-SCNC: 23 MMOL/L (ref 20–32)
CREAT SERPL-MCNC: 1.27 MG/DL (ref 0.66–1.25)
ERYTHROCYTE [DISTWIDTH] IN BLOOD BY AUTOMATED COUNT: 13.6 % (ref 10–15)
GFR SERPL CREATININE-BSD FRML MDRD: 58 ML/MIN/1.7M2
GLUCOSE SERPL-MCNC: 113 MG/DL (ref 70–99)
HCT VFR BLD AUTO: 32.3 % (ref 40–53)
HGB BLD-MCNC: 11 G/DL (ref 13.3–17.7)
MCH RBC QN AUTO: 34.9 PG (ref 26.5–33)
MCHC RBC AUTO-ENTMCNC: 34.1 G/DL (ref 31.5–36.5)
MCV RBC AUTO: 103 FL (ref 78–100)
PLATELET # BLD AUTO: 57 10E9/L (ref 150–450)
POTASSIUM SERPL-SCNC: 4.3 MMOL/L (ref 3.4–5.3)
PROT SERPL-MCNC: 6.3 G/DL (ref 6.8–8.8)
RBC # BLD AUTO: 3.15 10E12/L (ref 4.4–5.9)
SODIUM SERPL-SCNC: 142 MMOL/L (ref 133–144)
WBC # BLD AUTO: 5.7 10E9/L (ref 4–11)

## 2017-02-14 PROCEDURE — 97116 GAIT TRAINING THERAPY: CPT | Mod: GP | Performed by: PHYSICAL THERAPIST

## 2017-02-14 PROCEDURE — 25000125 ZZHC RX 250: Performed by: EMERGENCY MEDICINE

## 2017-02-14 PROCEDURE — 25000128 H RX IP 250 OP 636: Performed by: INTERNAL MEDICINE

## 2017-02-14 PROCEDURE — 97110 THERAPEUTIC EXERCISES: CPT | Mod: GP | Performed by: PHYSICAL THERAPIST

## 2017-02-14 PROCEDURE — 12000010 ZZH R&B MS INTERMEDIATE OVERFLOW

## 2017-02-14 PROCEDURE — 80053 COMPREHEN METABOLIC PANEL: CPT | Performed by: INTERNAL MEDICINE

## 2017-02-14 PROCEDURE — 40000193 ZZH STATISTIC PT WARD VISIT: Performed by: PHYSICAL THERAPIST

## 2017-02-14 PROCEDURE — 20000003 ZZH R&B ICU

## 2017-02-14 PROCEDURE — 36415 COLL VENOUS BLD VENIPUNCTURE: CPT | Performed by: INTERNAL MEDICINE

## 2017-02-14 PROCEDURE — 85027 COMPLETE CBC AUTOMATED: CPT | Performed by: INTERNAL MEDICINE

## 2017-02-14 PROCEDURE — 40000133 ZZH STATISTIC OT WARD VISIT

## 2017-02-14 PROCEDURE — 25000132 ZZH RX MED GY IP 250 OP 250 PS 637: Performed by: INTERNAL MEDICINE

## 2017-02-14 PROCEDURE — 99233 SBSQ HOSP IP/OBS HIGH 50: CPT | Performed by: INTERNAL MEDICINE

## 2017-02-14 PROCEDURE — 97535 SELF CARE MNGMENT TRAINING: CPT | Mod: GO

## 2017-02-14 PROCEDURE — 25000132 ZZH RX MED GY IP 250 OP 250 PS 637: Performed by: FAMILY MEDICINE

## 2017-02-14 RX ADMIN — PANTOPRAZOLE SODIUM 40 MG: 40 INJECTION, POWDER, FOR SOLUTION INTRAVENOUS at 20:06

## 2017-02-14 RX ADMIN — GABAPENTIN 200 MG: 100 CAPSULE ORAL at 20:08

## 2017-02-14 RX ADMIN — LACTULOSE 30 G: 10 SOLUTION ORAL at 20:05

## 2017-02-14 RX ADMIN — RIBAVIRIN 600 MG: 200 TABLET, FILM COATED ORAL at 10:18

## 2017-02-14 RX ADMIN — RIFAXIMIN 550 MG: 550 TABLET ORAL at 20:07

## 2017-02-14 RX ADMIN — RIBAVIRIN 400 MG: 200 TABLET, FILM COATED ORAL at 16:59

## 2017-02-14 RX ADMIN — PANTOPRAZOLE SODIUM 40 MG: 40 INJECTION, POWDER, FOR SOLUTION INTRAVENOUS at 07:42

## 2017-02-14 RX ADMIN — CETIRIZINE HYDROCHLORIDE 10 MG: 10 TABLET, FILM COATED ORAL at 07:42

## 2017-02-14 RX ADMIN — SODIUM CHLORIDE 1000 ML: 9 INJECTION, SOLUTION INTRAVENOUS at 11:20

## 2017-02-14 RX ADMIN — SODIUM CHLORIDE: 9 INJECTION, SOLUTION INTRAVENOUS at 21:24

## 2017-02-14 RX ADMIN — LEDIPASVIR AND SOFOSBUVIR 1 TABLET: 90; 400 TABLET, FILM COATED ORAL at 10:18

## 2017-02-14 RX ADMIN — RIFAXIMIN 550 MG: 550 TABLET ORAL at 07:42

## 2017-02-14 RX ADMIN — LACTULOSE 40 G: 10 SOLUTION ORAL at 07:42

## 2017-02-14 RX ADMIN — SODIUM CHLORIDE: 9 INJECTION, SOLUTION INTRAVENOUS at 01:12

## 2017-02-14 RX ADMIN — LACTULOSE 30 G: 10 SOLUTION ORAL at 14:22

## 2017-02-14 ASSESSMENT — PAIN DESCRIPTION - DESCRIPTORS
DESCRIPTORS: ACHING
DESCRIPTORS: ACHING;CONSTANT;DULL

## 2017-02-14 NOTE — PROGRESS NOTES
"[facility] Internal Medicine Progress Note     Date of Service (when I saw the patient): 02/14/2017      REASON FOR ADMISSION / INTERVAL HISTORY:  Admitted for hepatic encephalopathy. Doing worse today after initial improvement. See details below.    ASSESSMENT/PLAN:   ACUTE HEPATIC ENCEPHALOPATHY STAGE 2  Presented with shakiness, tremors and ammonia of 180. [place] to be due to non compliance with meds. S/p TIPS last year.   Was now admitted and started on po lactulose/rifaximin. Apparently was doing better and ammonia down to 92.  Pt  Decompensated 2/12 night. Had large emesis after PM meds and another one in middle of night. On 2/13, he was in coma-difficult to arouse state. Pt appeared dehydrated with elevated cr, poor po intake, emesis and was on lasix/spironolactone. D/licha diuretics, put in NG for po meds on  2/13 and started fluids after a bolus. Pt woke up in a couple hrs after iv fluids and has been awake/alert since. NG d/licha 2/13.  -continue fluids, po meds.    LIVER CIRRHOSIS DUE TO HEP C and ETOH in past  S/p TIPS--last year.Started Harvoni 2 in 1/17.  -resume harvoni     DISPO  Pt has stabelized. Will move out of ICU. PT/OT/MARLO pang. Home vs TCU in 1-2 days    DAVID ZEPEDA MD   Pg [phone]    DVT Prhylaxis: Pneumatic Compression Devices  Code Status: Full Code    ROS:  As described in A/P and Exam.  Otherwise ALL are  negative.    PHYSICAL EXAM:  All vitals have been reviewed    Blood pressure 110/65, pulse 85, temperature 98.1  F (36.7  C), temperature source Oral, resp. rate 22, height 1.803 m (5' 11\"), weight 82.1 kg (181 lb), SpO2 97 %.    I/O this shift:  In: 25 [I.V.:25]  Out: 300 [Urine:300]    GENERAL APPEARANCE:awake, eating and no distress  EYES: conjunctiva clear, eyes grossly normal  HENT: external ears and nose normal   NECK: supple, no masses or adenopathy  RESP: lungs clear to auscultation - no rales, rhonchi or wheezes  CV: regular rate and rhythm, normal S1 S2, no S3 or S4 and " no murmur, click or rub   ABDOMEN: soft, nontender, no HSM or masses and bowel sounds normal  MS: no clubbing, cyanosis; 1+ edema B LE  SKIN: clear without significant rashes or lesions  NEURO: -non-focal, flapping tremor.     ROUTINE  LABS (Last four results)  CMP    Recent Labs  Lab 02/14/17  0610 02/13/17  1117 02/12/17  0610 02/11/17  0715 02/10/17  0625 02/09/17  0725    141 140 142 142 140   POTASSIUM 4.3 4.6 4.2 4.6 4.4 3.5   CHLORIDE 111* 110* 108 110* 110* 109   CO2 23 23 25 23 24 25   ANIONGAP 8 8 7 9 8 6   * 106* 135* 107* 98 98   BUN 30 26 23 21 24 25   CR 1.27* 1.31* 1.41* 1.38* 1.34* 1.34*   GFRESTIMATED 58* 55* 51* 52* 54* 54*   GFRESTBLACK 70 67 62 63 65 65   ARACELI 8.3* 8.9 8.5 8.8 8.1* 7.8*   PROTTOTAL 6.3* 7.0  --   --  6.0* 6.0*   ALBUMIN 2.1* 2.4*  --   --  2.1* 2.1*   BILITOTAL 4.5* 5.3*  --   --  3.4* 3.0*   ALKPHOS 47 55  --   --  50 60   AST 42 48*  --   --  47* 50*   ALT 31 32  --   --  29 27     CBC    Recent Labs  Lab 02/14/17 0610 02/13/17 1117 02/12/17 0610 02/11/17  0715   WBC 5.7 6.0 4.4 4.5   RBC 3.15* 3.58* 3.46* 3.48*   HGB 11.0* 12.5* 12.1* 12.3*   HCT 32.3* 36.3* 35.6* 35.6*   * 101* 103* 102*   MCH 34.9* 34.9* 35.0* 35.3*   MCHC 34.1 34.4 34.0 34.6   RDW 13.6 13.4 13.7 13.7   PLT 57* 63* 60* 56*     INR    Recent Labs  Lab 02/08/17 2010   INR 1.37*     Arterial Blood GasNo lab results found in last 7 days.    Recent Results (from the past 24 hour(s))   US Abdomen Complete-1    Narrative    ABDOMINAL ULTRASOUND 2/13/2017 1:53 PM    HISTORY:  Abdominal pain. Cirrhosis. Previous TIPS.    COMPARISON:  None.    FINDINGS:  Gallbladder is normal in size with multiple gallstones  within the dependent portion of the gallbladder. Gallbladder wall is  normal in thickness. Patient is nontender over the gallbladder. No  evidence for dilated intra or extrahepatic biliary tree. Cirrhotic  appearance to the liver. TIPS shunt identified with flow identified  through the  shunt. Portions of the tail of the pancreas are obscured.  Visualized pancreas is normal. Spleen is enlarged measuring 16 cm in  length. Spleen is otherwise normal. The inferior aspect of the right  kidney is not well seen. Visualized right kidney and left kidney are  normal. Aorta and IVC are normal. Remainder of the scan is  unremarkable.      Impression    IMPRESSION:    1. Cholelithiasis.  2. Cirrhotic appearance to the liver.  3. Patent TIPS shunt.  4. Splenomegaly.    ALFONZO VILLANUEVA MD   XR Abdomen Port 1 View    Narrative    ABDOMEN ONE VIEW   2/13/2017 4:40 PM     HISTORY: NG tube placement.    COMPARISON: None.      Impression    IMPRESSION: Enteric tube is in place, with tip likely in the gastric  antrum. Visualized bowel gas pattern is otherwise within normal  limits. Numerous surgical clips are noted in the epigastric region.  TIPS shunt is in place. There is elevation of the right hemidiaphragm.    MICHAEL BILLINGSLEY MD

## 2017-02-14 NOTE — PLAN OF CARE
Problem: Goal Outcome Summary  Goal: Goal Outcome Summary  PT-  Pt alert, conversing appropriately today.   Pt required  CGA sit to stand w/ walker; ambulated 75 feet x2 with RW and CGA to minimal assistance, wc to follow for safety.   Fair gait pattern, one LOB w/ turning, narrow RIGOBERTO and decreased step ht..    Pt continues w/ increased ammonia level, has intermittent shaking movements/ requires SBA even  In sitting      REC-  Home  Depending on medical status.  Currently would be high fall risk.    Pt and wife report they want home care services if DC to home

## 2017-02-14 NOTE — PROGRESS NOTES
Reason for Follow up: DC planning    Anticipated discharge needs: Discussed pt in rounds, doing better. Would most likely be able to dc home with home care.     Next steps: CTS to follow, home care on dc?    Blanquita ARENAS, [facility], [facility] [phone]

## 2017-02-14 NOTE — PLAN OF CARE
"Problem: Liver Failure, Acute/Chronic (Adult)  Goal: Signs and Symptoms of Listed Potential Problems Will be Absent or Manageable (Liver Failure, Acute/Chronic)  Signs and symptoms of listed potential problems will be absent or manageable by discharge/transition of care (reference Liver Failure, Acute/Chronic (Adult) CPG).   Outcome: Improving  Started to have stools after last Lactolose dose.  From 0130 until about 245 had about 5-6 stools started out firm formed brown tan and progressed to mucoid loose in good amounts.  Unable to measure mixed stool/urine so unsure of how much actually voiding.  Awake and alert enough at 2000 to take all medications down and past NG, per order was to have NG pulled if able to tolerate medications without nausea and vomiting.  Patient states wife has a stomach \"bug\" and that was why he was throwing up.  Is alert and able to stand and pivot to commode is very fearful of incontinence of stool and has been able to know when needing to get up for stools.  In between stools was also able to take ham sandwich , giselle crackers and 250 cc of juice.  When finally empty of stool was able to rest and rested well rest of night.      "

## 2017-02-14 NOTE — PLAN OF CARE
Problem: Goal Outcome Summary  Goal: Goal Outcome Summary  OT: Pt seen to increase activity tolerance for ADLs/IADLs. Pt needing increased time to wake up prior to participation in therapy. Increased lighting and encouraged engagement in conversation with friend that was visiting to promote therapy participation. Pt participates in in-room mobility. Min A to transfer from EOB to bedside chair, walker retrieved as pt tremulous. Ambulates within room with CGA-Min A using FWW.      REC: This writer as seen pt in past admission and anticipate once ammonia level is normal pt will be able to return home with home care and assist from family

## 2017-02-15 ENCOUNTER — HOSPITAL ENCOUNTER (INPATIENT)
Facility: CLINIC | Age: 62
LOS: 2 days | Discharge: HOME HEALTH | DRG: 442 | End: 2017-02-17
Attending: INTERNAL MEDICINE | Admitting: INTERNAL MEDICINE
Payer: MEDICARE

## 2017-02-15 ENCOUNTER — APPOINTMENT (OUTPATIENT)
Dept: OCCUPATIONAL THERAPY | Facility: CLINIC | Age: 62
DRG: 441 | End: 2017-02-15
Payer: MEDICARE

## 2017-02-15 VITALS
DIASTOLIC BLOOD PRESSURE: 68 MMHG | OXYGEN SATURATION: 100 % | TEMPERATURE: 97.7 F | SYSTOLIC BLOOD PRESSURE: 128 MMHG | RESPIRATION RATE: 18 BRPM | HEART RATE: 76 BPM | BODY MASS INDEX: 25.34 KG/M2 | HEIGHT: 71 IN | WEIGHT: 181 LBS

## 2017-02-15 DIAGNOSIS — D69.6 THROMBOCYTOPENIA (H): Chronic | ICD-10-CM

## 2017-02-15 DIAGNOSIS — K76.82 HEPATIC ENCEPHALOPATHY (H): ICD-10-CM

## 2017-02-15 DIAGNOSIS — B18.2 CHRONIC HEPATITIS C WITHOUT HEPATIC COMA (H): Primary | Chronic | ICD-10-CM

## 2017-02-15 DIAGNOSIS — Z95.828 S/P TIPS (TRANSJUGULAR INTRAHEPATIC PORTOSYSTEMIC SHUNT): Chronic | ICD-10-CM

## 2017-02-15 LAB
ALBUMIN SERPL-MCNC: 2.2 G/DL (ref 3.4–5)
ALP SERPL-CCNC: 55 U/L (ref 40–150)
ALT SERPL W P-5'-P-CCNC: 30 U/L (ref 0–70)
AMMONIA PLAS-SCNC: 183 UMOL/L (ref 10–50)
AMMONIA PLAS-SCNC: 90 UMOL/L (ref 10–50)
ANION GAP SERPL CALCULATED.3IONS-SCNC: 7 MMOL/L (ref 3–14)
AST SERPL W P-5'-P-CCNC: 46 U/L (ref 0–45)
BILIRUB SERPL-MCNC: 4.3 MG/DL (ref 0.2–1.3)
BUN SERPL-MCNC: 22 MG/DL (ref 7–30)
CALCIUM SERPL-MCNC: 8.3 MG/DL (ref 8.5–10.1)
CHLORIDE SERPL-SCNC: 112 MMOL/L (ref 94–109)
CO2 SERPL-SCNC: 23 MMOL/L (ref 20–32)
CREAT SERPL-MCNC: 1.09 MG/DL (ref 0.66–1.25)
ERYTHROCYTE [DISTWIDTH] IN BLOOD BY AUTOMATED COUNT: 14.5 % (ref 10–15)
GFR SERPL CREATININE-BSD FRML MDRD: 69 ML/MIN/1.7M2
GLUCOSE SERPL-MCNC: 105 MG/DL (ref 70–99)
HCT VFR BLD AUTO: 29.9 % (ref 40–53)
HGB BLD-MCNC: 10.3 G/DL (ref 13.3–17.7)
INR PPP: 1.49 (ref 0.86–1.14)
MCH RBC QN AUTO: 35.8 PG (ref 26.5–33)
MCHC RBC AUTO-ENTMCNC: 34.4 G/DL (ref 31.5–36.5)
MCV RBC AUTO: 104 FL (ref 78–100)
PLATELET # BLD AUTO: 47 10E9/L (ref 150–450)
POTASSIUM SERPL-SCNC: 4.2 MMOL/L (ref 3.4–5.3)
PROT SERPL-MCNC: 6.2 G/DL (ref 6.8–8.8)
RBC # BLD AUTO: 2.88 10E12/L (ref 4.4–5.9)
SODIUM SERPL-SCNC: 141 MMOL/L (ref 133–144)
WBC # BLD AUTO: 4.3 10E9/L (ref 4–11)

## 2017-02-15 PROCEDURE — 25000132 ZZH RX MED GY IP 250 OP 250 PS 637: Performed by: FAMILY MEDICINE

## 2017-02-15 PROCEDURE — 82140 ASSAY OF AMMONIA: CPT | Performed by: INTERNAL MEDICINE

## 2017-02-15 PROCEDURE — 40000133 ZZH STATISTIC OT WARD VISIT

## 2017-02-15 PROCEDURE — 25000132 ZZH RX MED GY IP 250 OP 250 PS 637: Performed by: STUDENT IN AN ORGANIZED HEALTH CARE EDUCATION/TRAINING PROGRAM

## 2017-02-15 PROCEDURE — 36415 COLL VENOUS BLD VENIPUNCTURE: CPT | Performed by: STUDENT IN AN ORGANIZED HEALTH CARE EDUCATION/TRAINING PROGRAM

## 2017-02-15 PROCEDURE — 99239 HOSP IP/OBS DSCHRG MGMT >30: CPT | Performed by: INTERNAL MEDICINE

## 2017-02-15 PROCEDURE — 85610 PROTHROMBIN TIME: CPT | Performed by: STUDENT IN AN ORGANIZED HEALTH CARE EDUCATION/TRAINING PROGRAM

## 2017-02-15 PROCEDURE — 12000001 ZZH R&B MED SURG/OB UMMC

## 2017-02-15 PROCEDURE — 25000125 ZZHC RX 250: Performed by: EMERGENCY MEDICINE

## 2017-02-15 PROCEDURE — 36415 COLL VENOUS BLD VENIPUNCTURE: CPT | Performed by: INTERNAL MEDICINE

## 2017-02-15 PROCEDURE — 25000128 H RX IP 250 OP 636: Performed by: INTERNAL MEDICINE

## 2017-02-15 PROCEDURE — 82140 ASSAY OF AMMONIA: CPT | Performed by: STUDENT IN AN ORGANIZED HEALTH CARE EDUCATION/TRAINING PROGRAM

## 2017-02-15 PROCEDURE — 80053 COMPREHEN METABOLIC PANEL: CPT | Performed by: STUDENT IN AN ORGANIZED HEALTH CARE EDUCATION/TRAINING PROGRAM

## 2017-02-15 PROCEDURE — 97110 THERAPEUTIC EXERCISES: CPT | Mod: GO

## 2017-02-15 PROCEDURE — 25000125 ZZHC RX 250: Performed by: STUDENT IN AN ORGANIZED HEALTH CARE EDUCATION/TRAINING PROGRAM

## 2017-02-15 PROCEDURE — 25000128 H RX IP 250 OP 636: Performed by: EMERGENCY MEDICINE

## 2017-02-15 PROCEDURE — 25000132 ZZH RX MED GY IP 250 OP 250 PS 637: Performed by: INTERNAL MEDICINE

## 2017-02-15 PROCEDURE — 97535 SELF CARE MNGMENT TRAINING: CPT | Mod: GO

## 2017-02-15 PROCEDURE — 85027 COMPLETE CBC AUTOMATED: CPT | Performed by: STUDENT IN AN ORGANIZED HEALTH CARE EDUCATION/TRAINING PROGRAM

## 2017-02-15 RX ORDER — LACTULOSE 10 G/15ML
30 SOLUTION ORAL 3 TIMES DAILY
Status: DISCONTINUED | OUTPATIENT
Start: 2017-02-15 | End: 2017-02-17 | Stop reason: HOSPADM

## 2017-02-15 RX ORDER — NALOXONE HYDROCHLORIDE 0.4 MG/ML
.1-.4 INJECTION, SOLUTION INTRAMUSCULAR; INTRAVENOUS; SUBCUTANEOUS
Status: DISCONTINUED | OUTPATIENT
Start: 2017-02-15 | End: 2017-02-17 | Stop reason: HOSPADM

## 2017-02-15 RX ORDER — LACTULOSE 10 G/15ML
15 SOLUTION ORAL ONCE
Status: COMPLETED | OUTPATIENT
Start: 2017-02-15 | End: 2017-02-15

## 2017-02-15 RX ORDER — RIBAVIRIN 200 MG/1
200 CAPSULE ORAL 2 TIMES DAILY
Status: DISCONTINUED | OUTPATIENT
Start: 2017-02-15 | End: 2017-02-15

## 2017-02-15 RX ORDER — RIBAVIRIN 200 MG/1
400 TABLET, FILM COATED ORAL 2 TIMES DAILY
Status: DISCONTINUED | OUTPATIENT
Start: 2017-02-15 | End: 2017-02-16

## 2017-02-15 RX ORDER — RIBAVIRIN 200 MG/1
400 TABLET, FILM COATED ORAL EVERY MORNING
Status: DISCONTINUED | OUTPATIENT
Start: 2017-02-16 | End: 2017-02-15 | Stop reason: HOSPADM

## 2017-02-15 RX ORDER — LEDIPASVIR AND SOFOSBUVIR 90; 400 MG/1; MG/1
1 TABLET, FILM COATED ORAL DAILY
Status: DISCONTINUED | OUTPATIENT
Start: 2017-02-15 | End: 2017-02-17 | Stop reason: HOSPADM

## 2017-02-15 RX ORDER — GABAPENTIN 100 MG/1
200 CAPSULE ORAL AT BEDTIME
Status: DISCONTINUED | OUTPATIENT
Start: 2017-02-15 | End: 2017-02-17 | Stop reason: HOSPADM

## 2017-02-15 RX ORDER — TRAZODONE HYDROCHLORIDE 50 MG/1
100 TABLET, FILM COATED ORAL
Status: DISCONTINUED | OUTPATIENT
Start: 2017-02-15 | End: 2017-02-17 | Stop reason: HOSPADM

## 2017-02-15 RX ORDER — LACTULOSE 10 G/15ML
45 SOLUTION ORAL 3 TIMES DAILY
Status: DISCONTINUED | OUTPATIENT
Start: 2017-02-15 | End: 2017-02-15 | Stop reason: HOSPADM

## 2017-02-15 RX ORDER — RIBAVIRIN 200 MG/1
200 TABLET, FILM COATED ORAL EVERY EVENING
Status: DISCONTINUED | OUTPATIENT
Start: 2017-02-15 | End: 2017-02-15 | Stop reason: HOSPADM

## 2017-02-15 RX ORDER — ONDANSETRON 4 MG/1
4 TABLET, FILM COATED ORAL EVERY 8 HOURS PRN
Status: DISCONTINUED | OUTPATIENT
Start: 2017-02-15 | End: 2017-02-17 | Stop reason: HOSPADM

## 2017-02-15 RX ORDER — RIBAVIRIN 200 MG/1
200 TABLET, FILM COATED ORAL 2 TIMES DAILY
Status: DISCONTINUED | OUTPATIENT
Start: 2017-02-15 | End: 2017-02-15

## 2017-02-15 RX ADMIN — LACTULOSE 30 G: 10 SOLUTION ORAL at 08:08

## 2017-02-15 RX ADMIN — RIBAVIRIN 600 MG: 200 TABLET, FILM COATED ORAL at 10:40

## 2017-02-15 RX ADMIN — GABAPENTIN 200 MG: 100 CAPSULE ORAL at 21:44

## 2017-02-15 RX ADMIN — SODIUM CHLORIDE: 9 INJECTION, SOLUTION INTRAVENOUS at 11:19

## 2017-02-15 RX ADMIN — LACTULOSE 30 ML: 10 SOLUTION ORAL at 21:43

## 2017-02-15 RX ADMIN — RIFAXIMIN 550 MG: 550 TABLET ORAL at 08:08

## 2017-02-15 RX ADMIN — LACTULOSE 15 G: 10 SOLUTION ORAL at 11:18

## 2017-02-15 RX ADMIN — CETIRIZINE HYDROCHLORIDE 10 MG: 10 TABLET, FILM COATED ORAL at 08:08

## 2017-02-15 RX ADMIN — PANTOPRAZOLE SODIUM 40 MG: 40 INJECTION, POWDER, FOR SOLUTION INTRAVENOUS at 08:08

## 2017-02-15 RX ADMIN — ONDANSETRON HYDROCHLORIDE 4 MG: 4 TABLET, FILM COATED ORAL at 19:36

## 2017-02-15 RX ADMIN — LACTULOSE 45 G: 10 SOLUTION ORAL at 13:34

## 2017-02-15 RX ADMIN — ONDANSETRON 4 MG: 2 INJECTION INTRAMUSCULAR; INTRAVENOUS at 11:23

## 2017-02-15 RX ADMIN — RIBAVIRIN 400 MG: 200 TABLET, FILM COATED ORAL at 21:44

## 2017-02-15 NOTE — PROGRESS NOTES
[facility] [facility] [place]  Data:   Reason for Transport:  Transfer to [facility]    Scott Casale was transported to [address] at [facility] via cart at 1542.  Patient was accompanied by [facility]. Equipment used for transport: None. Family was aware of reason for transport: yes. All belongings sent with patient and significant other. Patient's medications with EMS in rig to be brought to [facility].     Action:  Report: given to RASHAD Edmonds by RASHAD Stout    Response:  Patient's condition when transferred off unit was stable.    Bianca Ahn RN

## 2017-02-15 NOTE — PLAN OF CARE
Problem: Goal Outcome Summary  Goal: Goal Outcome Summary  OT: ADL training-functional ambulation, toileting and standing grooming and hygiene tasks. SBA-CGA during engagement of ADLs and transfers regarding ADLS. Functionally ambulated SBA-CGA in hallway x6 min with W/C following for safety precaution. Min-Moderate tremors observe, 2-3x during OT session, increasing when sitting at EOB. Engaged in BUE with yellow Tband at EOB x2 exercises, [id number], requiring min A of v/c to breathe and motion of exercise, ultimately aiding in increasing strength, endurance and independence in ADLs.     OT REC: Anticipate return to home once ammonia levels are normal

## 2017-02-15 NOTE — LETTER
Transition Communication Hand-off for Care Transitions to Next Level of Care Provider    Name: Scott Casale  MRN #: [id number]  Primary Care Provider: Carlos Duenas MD     Primary Clinic: [facility] [place] [address] [place] [address]     Reason for Hospitalization:  hepatic encephalopathy with revision of TIPS   Hepatic encephalopathy (H)  Hepatic encephalopathy (H)  Admit Date/Time: 2/15/2017  4:57 PM  Discharge Date: 2/17/17  Payor Source: Payor: MEDICA / Plan: MEDICA [place] / Product Type: HMO /            Reason for Communication Hand-off Referral: Multiple providers/specialties    Discharge Plan: Home with [facility]       Concern for non-adherence with plan of care:   Y/N n  Discharge Needs Assessment:  Needs       Most Recent Value    Equipment Currently Used at Home none    Transportation Available family or friend will provide, car    Home Care [facility] [phone], Fax: [phone]          Already enrolled in Tele-monitoring program and name of program:    Follow-up specialty is recommended: Yes    Follow-up plan:  Future Appointments  Date Time Provider Department Center   3/9/2017 9:25 AM Greg Concepcion MD [facility]   3/20/2017 3:00 PM  LAB [facility]   3/20/2017 4:00 PM Warren Higginbotham MD [facility]   3/22/2017 6:00 PM Charlene North APRN CNP [facility]   4/11/2017 9:00 AM  LAB [facility]   4/11/2017 10:00 AM UCUS1 UCCUS [facility]   4/11/2017 11:00 AM Warren Higginbotham MD [facility]   4/11/2017 12:00 PM Micki Peter MD [facility]       Any outstanding tests or procedures:        Referrals     Future Labs/Procedures    Home care nursing referral     Comments:    [facility]  Ph: [phone]  Fax: [phone]    RN skilled nursing visit. RN to assess vital signs and weight, respiratory and cardiac status, pain level and activity tolerance, hydration, nutrition and bowel status and home safety.  RN to  teach medication management.    Your provider has ordered home care nursing services. If you have not been contacted within 2 days of your discharge please call the inpatient department phone number at [phone] .    Home Care OT Referral for Hospital Discharge     Comments:    [facility]: [phone]  Fax: [phone]    OT to eval and treat    Your provider has ordered home care - occupational therapy. If you have not been contacted within 2 days of your discharge please call the department phone number listed on the top of this document.    Home Care PT Referral for Hospital Discharge     Comments:    [facility]: [phone]  Fax: [phone]    PT to eval and treat    Your provider has ordered home care - physical therapy. If you have not been contacted within 2 days of your discharge please call the department phone number listed on the top of this document.            Key Recommendations:      Kenisha Wray    AVS/Discharge Summary is the source of truth; this is a helpful guide for improved communication of patient story

## 2017-02-15 NOTE — PLAN OF CARE
Report given to Kendal on 7c at [facility] patient will be transferred as soon as ambulance is available

## 2017-02-15 NOTE — PROGRESS NOTES
Pt appears to be sleeping/resting comfortably since 2300. IV patent at 75 cc/hr. Pt has not stooled since last evening after receiving bedtime lactulose dose. Will continue to monitor.

## 2017-02-15 NOTE — PLAN OF CARE
[facility] TRANSPORT NOTE  Data:   Reason for Transport:  Change in status    Scott Casale was transported from icu via wheel chair at 11.  Patient was accompanied by Registered Nurse. Equipment used for transport: None. Family was aware of reason for transport: yes    Action:  Report: received from ICU    Response:  Patient's condition upon return was stable.    Mario Lamb

## 2017-02-15 NOTE — IP AVS SNAPSHOT
MRN:[id number]                      After Visit Summary   2/15/2017    Scott Casale    MRN: [id number]           Thank you!     Thank you for choosing [facility] for your care. Our goal is always to provide you with excellent care. Hearing back from our patients is one way we can continue to improve our services. Please take a few minutes to complete the written survey that you may receive in the mail after you visit with us. Thank you!        Patient Information     Date Of Birth          1955        About your hospital stay     You were admitted on:  February 15, 2017 You last received care in the:  [address] [facility]    You were discharged on:  February 17, 2017        Reason for your hospital stay       You were admitted with hepatic encephalopathy; likely related to dehydration and nausea and vomiting  This has resolved with treatment but make sure you follow with Dr Higginbotham in 1 month and primary care physician in 2 weeks                  Who to Call     For medical emergencies, please call 911.  For non-urgent questions about your medical care, please call your primary care provider or clinic, [phone]          Attending Provider     Provider Specialty    Teddy Singh MD Internal Medicine    Edelmira Scott DO Internal Medicine       Primary Care Provider Office Phone # Fax #    Carlos Duenas MD, [phone]       [facility] [place] [facility] [address] [place] [address]        After Care Instructions     Activity       Your activity upon discharge: activity as tolerated            Diet       Follow this diet upon discharge: Orders Placed This Encounter      Advance Diet as Tolerated: Regular Diet Adult            Discharge Instructions       Follow up with Dr Higginbotham in 1month  Follow up with primary care physician in 2 weeks  You will need weekly lab (CBC, CMP) follow up  Please hold your diuretics furosemide and spironolactone until you see your  primary care physician                  Follow-up Appointments     Adult [facility] Follow-up and recommended labs and tests       Follow up with primary care provider, Carlos Duenas MD, within 7 days for hospital follow- up.  The following labs/tests are recommended: weekly CBC, CMP.      Follow with Dr Higginbotham in 1 month *Appointment scheduled on 3/20/17, 3:00PM labs 4:00PM with Dr. Higginbotham*    Appointments on [place] and/or [facility] (with [facility] or [facility] provider or service). Call [phone] if you haven't heard regarding these appointments within 7 days of discharge.            Follow Up and recommended labs and tests       Weekly CBC and BMP                  Your next 10 appointments already scheduled     Mar 09, 2017  9:25 AM CST   (Arrive by 9:10 AM)   Return Visit with Greg Concepcion MD   [facility] ([facility])    [address] [place] [address]  4th [facility] [address]   [phone]            Mar 20, 2017  3:00 PM CDT   Lab with  LAB   [facility] ([facility])    [address] [place] [address]  1st [facility] [address]   [phone]            Mar 20, 2017  4:00 PM CDT   (Arrive by 3:45 PM)   Return General Liver with Warren Higginbotham MD   [facility] ([facility])    [address] [place] [address]  3rd [facility] [address]   [phone]            Mar 22, 2017  6:00 PM CDT   (Arrive by 5:45 PM)   New Patient Visit with GILMAR Saleem [facility] IBD ([facility])    [address] [place] [address]  4th [facility] [address]   [phone]            Apr 11, 2017  9:00 AM CDT   Lab with UC LAB   [facility] ([facility])    [address] [place] [address]  1st [facility] [address]   [phone]            Apr 11, 2017 10:00 AM CDT   US ABDOMEN COMPLETE with UCUS1   [facility] ([facility])    [address] [place] [id number] [phone]           Please bring a list of your medicines (including vitamins, minerals and over-the-counter drugs). Also, tell your doctor about any allergies you may have. Wear comfortable clothes and leave your valuables at home.  Adults: No eating or drinking for 8 hours before the exam. You may take medicine with a small sip of water.  Children: - Children 6+ years: No food or drink for 6 hours before exam. - Children 1-5 years: No food or drink for 4 hours before exam. - Infants, breast-fed: may have breast milk up to 2 hours before exam. - Infants, formula: may have bottle until 4 hours before exam.  Please call the Imaging Department at your exam site with any questions.            Apr 11, 2017 11:00 AM CDT   (Arrive by 10:45 AM)   Return General Liver with Warren Higginbotham MD   [facility] ([facility])    [address] [place] [address]  3rd [facility] [id number] [phone]            Apr 11, 2017 12:00 PM CDT   (Arrive by 11:45 AM)   Return Visit with Micki Peter MD   [facility] ([facility])    [address] [place] [id number] [phone]              Additional Services     Home Care OT Referral for Hospital Discharge       [facility]  Ph: [phone]  Fax: [phone]    OT to eval and treat    Your provider has ordered home care - occupational therapy. If you have not been contacted within 2 days of your discharge please call the department phone number listed on the top of this document.            Home Care PT Referral for Hospital Discharge       [facility]  Ph: [phone]  Fax: [phone]    PT to eval and treat    Your provider has ordered home care - physical therapy. If you have not been contacted within 2 days of your discharge please call the department phone number  listed on the top of this document.            Home care nursing referral       [facility]  Ph: [phone]  Fax: [phone]    RN skilled nursing visit. RN to assess vital signs and weight, respiratory and cardiac status, pain level and activity tolerance, hydration, nutrition and bowel status and home safety.  RN to teach medication management.    Your provider has ordered home care nursing services. If you have not been contacted within 2 days of your discharge please call the inpatient department phone number at [phone] .                  Future tests that were ordered for you     CBC with platelets differential       Last Lab Result: Hemoglobin (g/dL)       Date                     Value                 02/16/2017               9.6 (L)          ----------            Comprehensive metabolic panel                 Further instructions from your care team       Please follow with your primary provider in 1 week  Please follow with Dr Higginbotham in 1month  Please hold furosemide and spironolactone until you see your primary care provider      We have Scheduled an appointment for you on Wednesday 2/22 at 1:00 pm [place] [address]  Phone #: [phone]    Pending Results     Date and Time Order Name Status Description    2/17/2017 0830 Methylmalonic acid In process     2/16/2017 0952 Blood culture Preliminary     2/16/2017 0952 Blood culture Preliminary     2/16/2017 0952 Blood Morphology Pathologist Review In process             Statement of Approval     Ordered          02/17/17 0917  I have reviewed and agree with all the recommendations and orders detailed in this document.  EFFECTIVE NOW     Approved and electronically signed by:  Edelmira Scott DO             Admission Information     Date & Time Provider Department Dept. Phone    2/15/2017 Edelmira Scott DO [address] [facility] [phone]      Your Vitals Were     Blood  Pressure Pulse Temperature Respirations Pulse Oximetry       121/61 74 97.1  F (36.2  C) (Oral) 16 100%       [employer] Information     [employer] gives you secure access to your electronic health record. If you see a primary care provider, you can also send messages to your care team and make appointments. If you have questions, please call your primary care clinic.  If you do not have a primary care provider, please call [phone] and they will assist you.        Care EveryWhere ID     This is your Care EveryWhere ID. This could be used by other organizations to access your [place] medical records  [id number]           Review of your medicines      CONTINUE these medicines which may have CHANGED, or have new prescriptions. If we are uncertain of the size of tablets/capsules you have at home, strength may be listed as something that might have changed.        Dose / Directions    * lactulose 20 GM/30ML Soln   This may have changed:  Another medication with the same name was added. Make sure you understand how and when to take each.   Used for:  Alcoholic cirrhosis of liver with ascites (H)        Dose:  30 mL   Take 30 mLs by mouth 3 times daily   Quantity:  1892 mL   Refills:  3       * lactulose 10 GM/15ML solution   Commonly known as:  CHRONULAC   This may have changed:  You were already taking a medication with the same name, and this prescription was added. Make sure you understand how and when to take each.   Used for:  Hepatic encephalopathy (H)        Dose:  30 mL   Take 30 mLs by mouth 3 times daily   Quantity:  500 mL   Refills:  0       * Notice:  This list has 2 medication(s) that are the same as other medications prescribed for you. Read the directions carefully, and ask your doctor or other care provider to review them with you.      CONTINUE these medicines which have NOT CHANGED        Dose / Directions    gabapentin 100 MG capsule   Commonly known as:  NEURONTIN   Used for:  Chronic low back pain  with sciatica, sciatica laterality unspecified, unspecified back pain laterality        Dose:  200 mg   Take 2 capsules (200 mg) by mouth At Bedtime   Quantity:  90 capsule   Refills:  1       ledipasvir-sofosbuvir  MG per tablet   Commonly known as:  ledipasvir-sofosbuvir   Used for:  Hepatitis C virus infection without hepatic coma, unspecified chronicity        Dose:  1 tablet   Take 1 tablet by mouth daily   Quantity:  28 tablet   Refills:  2       ribavirin 200 MG Tabs   Used for:  Chronic hepatitis C without hepatic coma (H)        Dose:  400 mg   Take 2 tablets (400 mg) by mouth 2 times daily   Quantity:  150 tablet   Refills:  2       rifaximin 550 MG Tabs tablet   Commonly known as:  XIFAXAN   Used for:  Hepatic encephalopathy (H)        Dose:  550 mg   Take 1 tablet (550 mg) by mouth 2 times daily   Quantity:  60 tablet   Refills:  11       traZODone 100 MG tablet   Commonly known as:  DESYREL   Used for:  Primary insomnia        Dose:  100 mg   Take 1 tablet (100 mg) by mouth nightly as needed for sleep   Quantity:  30 tablet   Refills:  1       vitamin D 15718 UNIT capsule   Commonly known as:  ERGOCALCIFEROL        Dose:  88902 Units   Take 50,000 Units by mouth Twice weekly Mon and Thurs   Refills:  0       ZOFRAN 4 MG tablet   Generic drug:  ondansetron        Dose:  4 mg   Take 1 tablet (4 mg) by mouth every 8 hours as needed for nausea   Refills:  0         STOP taking     furosemide 40 MG tablet   Commonly known as:  LASIX           spironolactone 100 MG tablet   Commonly known as:  ALDACTONE                Where to get your medicines      These medications were sent to [facility] [place], [place] - [address]   [address] [facility] [address]     Phone:  [phone]     lactulose 10 GM/15ML solution                Protect others around you: Learn how to safely use, store and throw away your medicines at www.disposemymeds.org.             Medication List: This is a  list of all your medications and when to take them. Check marks below indicate your daily home schedule. Keep this list as a reference.      Medications           Morning Afternoon Evening Bedtime As Needed    gabapentin 100 MG capsule   Commonly known as:  NEURONTIN   Take 2 capsules (200 mg) by mouth At Bedtime   Last time this was given:  200 mg on 2/16/2017  9:26 PM                                * lactulose 20 GM/30ML Soln   Take 30 mLs by mouth 3 times daily   Last time this was given:  30 mLs on 2/17/2017  7:57 AM                                * lactulose 10 GM/15ML solution   Commonly known as:  CHRONULAC   Take 30 mLs by mouth 3 times daily   Last time this was given:  30 mLs on 2/17/2017  7:57 AM                                ledipasvir-sofosbuvir  MG per tablet   Commonly known as:  ledipasvir-sofosbuvir   Take 1 tablet by mouth daily   Last time this was given:  1 tablet on 2/16/2017 11:11 AM                                ribavirin 200 MG Tabs   Take 2 tablets (400 mg) by mouth 2 times daily   Last time this was given:  200 mg on 2/16/2017  5:45 PM                                rifaximin 550 MG Tabs tablet   Commonly known as:  XIFAXAN   Take 1 tablet (550 mg) by mouth 2 times daily   Last time this was given:  550 mg on 2/17/2017  7:57 AM                                traZODone 100 MG tablet   Commonly known as:  DESYREL   Take 1 tablet (100 mg) by mouth nightly as needed for sleep                                vitamin D 88906 UNIT capsule   Commonly known as:  ERGOCALCIFEROL   Take 50,000 Units by mouth Twice weekly Mon and Thurs                                ZOFRAN 4 MG tablet   Take 1 tablet (4 mg) by mouth every 8 hours as needed for nausea   Last time this was given:  4 mg on 2/15/2017  7:36 PM   Generic drug:  ondansetron                                * Notice:  This list has 2 medication(s) that are the same as other medications prescribed for you. Read the directions carefully, and  ask your doctor or other care provider to review them with you.

## 2017-02-15 NOTE — PLAN OF CARE
Problem: Goal Outcome Summary  Goal: Goal Outcome Summary  3Physical Therapy Discharge Summary     Reason for therapy discharge:   transferring to [facility]      Progress towards therapy goal(s). See goals on Care Plan in [facility] electronic health record for goal details.  Goals partially met.  Barriers to achieving goals:   decreased strength/ balance; medical status.     Therapy recommendation(s):    Continued therapy is recommended.  Rationale/Recommendations: once medically stable-    to ensure independence w/ mobility     Sangita Jean Baptiste, PT

## 2017-02-15 NOTE — PROGRESS NOTES
[facility]  Transfer Triage Note    Date of call: 02/15/17  Time of call: 11:07 AM    Referring: [facility]  Reason for Transfer:Patient has established care here, needs subspecialty care available here.   Diagnosis: Hepatic Encephalopathy    Outside Records: Available  Additional records requested to be faxed to [phone].    Stability of Patient: Patient is vitally stable, with no critical labs, and will likely remain stable throughout the transfer process    Expected Time of Arrival for Transfer: 8-24 hours, maybe longer pending bed availability.     Recommendations for Management and Stabilization: Not needed    Additional Comments   ESLD with recent TIPS revision. Recently started on Harvoni for hep C as well. Came in with severe HE. Ended up requiring ICU and NG placement. Cleared significantly. Now worse again. Ammonia increased to 180 today. More confused. Increasing lactulose frequency. On rifaximin. Hospitalist at [facility] spoke with Dr. Higginbotham. Concerned that TIPS will need to be addressed.     Brendon Singh.

## 2017-02-15 NOTE — IP AVS SNAPSHOT
[address] [address]    Phone:  [phone]                                       After Visit Summary   2/15/2017    Scott Casale    MRN: [id number]           After Visit Summary Signature Page     I have received my discharge instructions, and my questions have been answered. I have discussed any challenges I see with this plan with the nurse or doctor.    ..........................................................................................................................................  Patient/Patient Representative Signature      ..........................................................................................................................................  Patient Representative Print Name and Relationship to Patient    ..................................................               ................................................  Date                                            Time    ..........................................................................................................................................  Reviewed by Signature/Title    ...................................................              ..............................................  Date                                                            Time

## 2017-02-15 NOTE — H&P
"    INTERNAL MEDICINE HISTORY & PHYSICAL   Scott Casale ([id number]) admitted on 2/15/2017  Primary care provider: Carlos Duenas MD         Chief Complaint:     Shakiness and disoriented         History of Present Illness     Scott Casale is a 61 year old male with history of cirrhosis 2/2 hepatitis C (on Harvoni and ribavirin) and pastEtOH abuse, hepatic encephalopathy (on lactulose and rifaxamin) s/p TIPS 2015 due to refractory ascites, thrombocytopenia, ankylosing spondylitis, UC, hx of testicular cancer, GERD, and depression, who is being transferred for worsening hepatic encephalopathy.    The patient stated that last week he started \"shaking\" and becoming more incoherent last week. He was not taking his lactulose as he should and his BMs had slowed to 1-2/day. The patient was admitted to [facility] with ammonia of 180. He was started on PO lactulose/rifaximin. His ammonia downtrended to 92 and was doing better, but on 2/12 decompensated after large emesis and the next morning was in \"coma-difficult to arouse state.\" The patient appeared dehydrated with elevated creatinine and his diuretics were discontinued. The patient remained encephalopathic and with worsening ammonia and was transferred here for possible TIPS revision. Furthermore, the outpatient hospitalist writes in discharge summary that on discharged  his ribavarin dose.    The patient had TIPS in 2015 and since then he has not had any issues with abdominal swelling or paracentesis. Denies abdominal pain. Patient does state he has a little nausea in the evening. Denies melena/hematochezia/hematemesis.  The patient states that he still has some shaking and confusion but has improved since his hospital course at [place].    The patient states he has had 2-3 loose-watery stools/day.  Denies fevers/chills/recent infections.  The patient feels weak, but denies one-sided weakness/loss of sensation. Reports slurring of speech which he states is normal " during these episodes.  Patient with no recent falls, no head trauma. No recent narcotic use.    The patient states he had similar episodes several weeks ago where he went to [facility].           Past Medical History       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     S/P TIPS (transjugular intrahepatic portosystemic shunt)     Hepatic encephalopathy (H)     Ascites of liver     Edema of lower extremity     Chronic hepatitis C virus infection (H)     Hepatic cirrhosis (H)     Hyponatremia     Liver mass     Portal hypertension (H)     Primary malignant neoplasm of testis (H)     Thrombocytopenia (H)     Encephalopathy acute           Past Surgical History     Past Surgical History   Procedure Laterality Date     C knee scope,clean/drain       bilateral     C appendectomy       Strabismus surgery       bilateral     Orchiectomy scrotal       prosthesis placed     Ir transven intrahepatic portosyst rev       Eye surgery       Esophagoscopy, gastroscopy, duodenoscopy (egd), combined N/A 1/17/2017     Procedure: COMBINED ESOPHAGOSCOPY, GASTROSCOPY, DUODENOSCOPY (EGD);  Surgeon: Guru Reina Palacios MD;  Location:  GI     Colonoscopy N/A 1/17/2017     Procedure: COLONOSCOPY;  Surgeon: Guru Reina Palacios MD;  Location:  GI            Medications       Current Facility-Administered Medications on File Prior to Encounter:  [COMPLETED] lactulose (CHRONULAC) solution 15 g   [DISCONTINUED] lactulose (CHRONULAC) solution 45 g   [DISCONTINUED] ribavirin TABS 400 mg   [DISCONTINUED] ribavirin TABS 200 mg   [DISCONTINUED] 0.9% sodium chloride infusion   [DISCONTINUED] lactulose (CHRONULAC) solution 30 g   [DISCONTINUED] cetirizine (zyrTEC) tablet 10 mg   [DISCONTINUED]  naloxone (NARCAN) injection 0.1-0.4 mg   [DISCONTINUED] lidocaine 1 % 1 mL   [DISCONTINUED] lidocaine (LMX4) kit   [DISCONTINUED] sodium chloride (PF) 0.9% PF flush 3 mL   [DISCONTINUED] sodium chloride (PF) 0.9% PF flush 3 mL   [DISCONTINUED] ondansetron (ZOFRAN-ODT) ODT tab 4 mg   [DISCONTINUED] ondansetron (ZOFRAN) injection 4 mg   [DISCONTINUED] pantoprazole (PROTONIX) 40 mg IV push injection   [DISCONTINUED] gabapentin (NEURONTIN) capsule 200 mg   [DISCONTINUED] ledipasvir-sofosbuvir (HARVONI)  MG per tablet TABS 1 tablet   [DISCONTINUED] ribavirin TABS 400 mg   [DISCONTINUED] ribavirin TABS 600 mg   [DISCONTINUED] rifaximin (XIFAXAN) tablet 550 mg     Current Outpatient Prescriptions on File Prior to Encounter:  rifaximin (XIFAXAN) 550 MG TABS tablet Take 1 tablet (550 mg) by mouth 2 times daily   ribavirin 200 MG TABS Take 2 tablets (400 mg) by mouth 2 times daily   gabapentin (NEURONTIN) 100 MG capsule Take 2 capsules (200 mg) by mouth At Bedtime   lactulose 20 GM/30ML SOLN Take 30 mLs by mouth 3 times daily   ledipasvir-sofosbuvir (LEDIPASVIR-SOFOSBUVIR)  MG per tablet Take 1 tablet by mouth daily   vitamin D (ERGOCALCIFEROL) 14649 UNIT capsule Take 50,000 Units by mouth Twice weekly Mon and Thurs   furosemide (LASIX) 40 MG tablet Take 40 mg by mouth daily    ondansetron (ZOFRAN) 4 MG tablet Take 1 tablet (4 mg) by mouth every 8 hours as needed for nausea   traZODone (DESYREL) 100 MG tablet Take 1 tablet (100 mg) by mouth nightly as needed for sleep   spironolactone (ALDACTONE) 100 MG tablet Take 100 mg by mouth daily             Allergies     Allergies   Allergen Reactions     Blood Transfusion Related (Informational Only) Other (See Comments)     Patient has a history of a clinically significant antibody against RBC antigens.  A delay in compatible RBCs may occur.     Percocet [Oxycodone-Acetaminophen] Nausea and Vomiting     Acetaminophen Nausea     Iodine-131 Hives     Motrin [Ibuprofen  Micronized] Nausea     Tylenol Nausea            Social History     Social History     Social History     Marital status:      Spouse name: N/A     Number of children: N/A     Years of education: N/A     Occupational History     Not on file.     Social History Main Topics     Smoking status: Former Smoker     Quit date: 1/1/1978     Smokeless tobacco: Never Used     Alcohol use No      Comment: quit in 2010     Drug use: No     Sexual activity: Yes     Partners: Female     Other Topics Concern     Parent/Sibling W/ Cabg, Mi Or Angioplasty Before 65f 55m? No     Social History Narrative    Admit 9/28:    Lives with new wife of 2 mos.    Tobacco:  Former smoker, quit 1978    Alcohol:  Sober since 2010, recovering alcoholic.    Drugs:  Denies            Family History     Family History   Problem Relation Age of Onset     CANCER Mother 70     brain tumor     Prostate Cancer No family hx of      Cancer - colorectal No family hx of      DIABETES Maternal Grandmother      CEREBROVASCULAR DISEASE Sister      HEART DISEASE Sister      Respiratory Son      CANCER Father      Uterine Cancer Sister             Review of Systems     Review Of Systems  10 point ROS negative other than that mentioned in HPI         Vitals and Exam     Physical exam:  /63 (BP Location: Right arm)  Pulse 70  Temp 97.5  F (36.4  C) (Axillary)  Resp 18  SpO2 100%  Wt Readings from Last 2 Encounters:   02/14/17 82.1 kg (181 lb)   01/13/17 84.4 kg (186 lb)     No intake or output data in the 24 hours ending 02/15/17 1730    Physical Exam:   General: Pleasant male  HEENT: No Scleral icterus. NCAT, MMM. PERRL, EOMI.  Cardiac: Normal rate, regular rhythm. No m/r/g. Normal S1, S2.  Pulm: CTAB, no wheezes, or crackles. Normal respiratory effort  Abd: Soft, non distended, mild TTP over old well-healed scars, +hepatomegaly  Skin: +jaundice,   Extremities: No LE edema, mild asterixis  Joints: No inflammation noted on joints  Neuro: A&Ox3, no focal  deficits, strength 5/5 b/l, sensation intact, CN II-XII intact         Labs   CBC    Recent Labs  Lab 02/15/17  2015 02/14/17  0610 02/13/17  1117 02/12/17  0610   WBC 4.3 5.7 6.0 4.4   RBC 2.88* 3.15* 3.58* 3.46*   HGB 10.3* 11.0* 12.5* 12.1*   HCT 29.9* 32.3* 36.3* 35.6*   * 103* 101* 103*   MCH 35.8* 34.9* 34.9* 35.0*   MCHC 34.4 34.1 34.4 34.0   RDW 14.5 13.6 13.4 13.7   PLT 47* 57* 63* 60*       BMP    Recent Labs  Lab 02/15/17  2015 02/14/17  0610 02/13/17  1117 02/12/17  0610    142 141 140   POTASSIUM 4.2 4.3 4.6 4.2   CHLORIDE 112* 111* 110* 108   CO2 23 23 23 25   ANIONGAP 7 8 8 7   * 113* 106* 135*   BUN 22 30 26 23   CR 1.09 1.27* 1.31* 1.41*   GFRESTIMATED 69 58* 55* 51*   GFRESTBLACK 83 70 67 62   ARACELI 8.3* 8.3* 8.9 8.5        INR    Recent Labs  Lab 02/15/17  2034   INR 1.49*       Liver panel    Recent Labs  Lab 02/15/17  2015 02/14/17  0610 02/13/17  1117 02/10/17  0625   PROTTOTAL 6.2* 6.3* 7.0 6.0*   ALBUMIN 2.2* 2.1* 2.4* 2.1*   BILITOTAL 4.3* 4.5* 5.3* 3.4*   ALKPHOS 55 47 55 50   AST 46* 42 48* 47*   ALT 30 31 32 29       Urinalysis  Recent Labs   Lab Test  01/04/17   2100   COLOR  Yellow   APPEARANCE  Clear   URINEGLC  Negative   URINEBILI  Negative   URINEKETONE  Negative   SG  1.015   UBLD  Moderate*   URINEPH  5.5   PROTEIN  10*   NITRITE  Negative   LEUKEST  Negative   RBCU  9*   WBCU  1         Imaging/procedure results:  Ordered abdominal ultrasound with doppler to evaluate for TIPS patency, pending    ASSESSMENT & PLAN :    Scott Casale is a 61 year old male with history of 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    #)AMS- Likely 2/2 hepatic encephalopathy. Patient with hx of liver  cirrhosis 2/2 hepatitis C, past EtOH abuse s/p TIPS in 2015. Transferred for worsening AMS, but here patient is A+Ox3 and talking appropriately. Unclear baseline. May be improving. BMs 3-4 loose stools/day at this time. No neuro deficits. No recent narcotic use. No concern for infection at this time. No evidence of SBP, abdomen soft, non-distended, mildly TTP which is chronic since he has had his TIPS placed. No concern for GI bleed. Evaluate for TIPS patency, though less likely to be contributing factor to hepatic encephalopathy as normally, TIPS placement in patients with cirrhosis, worsens hepatic encephalopathy.  -Continue 30 mL lactulose tid  -Continue rifaximine 550 mg bid  -Monitor stool output  -Abdominal U/S w/ Doppler to assess TIPS patency  -GI consult  -F/u NH3, LFTs, INR    #)Liver cirrhosis 2/2 hepatitis C, past EtOH abuse s/p TIPS 2015  #)Hyperammonenemia  MELD score 18. Patient here is A+Ox3, mild asterixis, slurring of speech, worsening NH3. No obvious trigger for hepatic encephalopathy, as above. In d/c summary mentioned they halved dose of ribavirin, but patient adamant he needs to take his regular dose. Ok to give him regular dose overnight and will discuss with GI tomorrow.  -Consulted GI  -Obtained Abdomen U/S w/ Doppler to assess TIPS patency  -Discuss with GI dose of ribavarin, continue Harvoni  -Tx hepatic encephalopathy as above  -Hold off on diuresis as per discharge summary, patient is euvolemic  -F/u NH3, LFTs, INR    #) Thrombycytopenia- Chronic, likely 2/2 to his liver disease.  -F/u CBC    #) KAREN- Appear to be resolved based on outside labs. Outside hospital thought pre-renal as improved with fluids, and patient dehydrated after had emesis and increased stools.  -Holding diuretics for now  -F/u BMP    #Insomnia- Pta trazodone qhs prn    FEN: None overnight, regular diet  Prophylaxis:  DVT: Mechanical, ambulate  GI: None  Disposition: Continue inpatient care  Code Status: FULL  CODE      Kelechi Boyer MD  Med-Derm, PGY-1  [phone] Pager Number    Patient was seen and discussed with attending physician Dr. Hernandez who agrees with above assessment and plan.

## 2017-02-15 NOTE — PLAN OF CARE
Problem: Goal Outcome Summary  Goal: Goal Outcome Summary  Occupational Therapy Discharge Summary     Reason for therapy discharge:    Transferring to [facility]      Progress towards therapy goal(s). See goals on Care Plan in [facility] electronic health record for goal details.  Goals partially met.  Barriers to achieving goals:   discharge from facility.     Therapy recommendation(s):    Continued therapy is recommended.  Rationale/Recommendations:  Continue therapy when medically appropriate. .

## 2017-02-15 NOTE — PROGRESS NOTES
02/15/17 1400   Signing Clinician's Name / Credentials   Signing clinician's name / credentials HELGA Arzate Adds   Rehab Discipline PT   Additional Documentation   Rehab Comments cancel- Pt not seen- transferring to the [place]

## 2017-02-15 NOTE — DISCHARGE SUMMARY
[facility]    Scott Casale MRN# [id number]   Age: 61 year old YOB: 1955     Date of Admission:  2/8/2017  Date of Discharge::  2/15/2017  Admitting Physician:  Jett Candelario MD  Discharge Physician:  Marshal Rivera MD  Primary Physician: Carlos Duenas MD  Transferring Facility: N/A     Home clinic: [address] , [facility]          Admission Diagnoses:   Hepatic encephalopathy (H) [K72.90]  Chronic hepatitis C without hepatic coma (H) [B18.2]          Discharge Diagnosis:   Principle diagnosis: ACUTE HEPATIC ENCEPHALOPATHY   Secondary diagnoses:  Principal Problem:    Encephalopathy acute  Active Problems:    S/P TIPS (transjugular intrahepatic portosystemic shunt)    Hepatic encephalopathy (H)    Chronic hepatitis C virus infection (H)    Hepatic cirrhosis (H)    Hyponatremia    Liver mass    Portal hypertension (H)    Mild major depression (H)    GERD (gastroesophageal reflux disease)    Chronic low back pain         Brief History of Presenting Illness:   As per admit hx  Scott Casale is a 61 year old male who presents with complaint of feeling more shaky as he does when his ammonia is elevated. Patient has known history of hepatic encephalopathy and is on lactulose and rifaximin, but he has not been taking his lactulose as he should and is bowel movements have slowed to 1 or 2 a day. He's had some nausea but no emesis for the last couple days. The patient's wife is in the emergency room due to angina and the patient came to see her. His wife told the ER physicians that her  needed to be seen because he is becoming more confused and will be unable to manage this without her since she is being hospitalized. Patient was found to have increased tremors and ammonia up to 135.        No results found for this or any previous visit (from the past 24 hour(s)).         Hospital Course:   HEPATIC ENCEPHALOPATHY STAGE 2  Presented with  shakiness, tremors and ammonia of 180. [place] to be due to non compliance with meds. S/p TIPS last year.   Was now admitted and started on po lactulose/rifaximin. Apparently was doing better and ammonia down to 92. Pt Decompensated 2/12 night. Had large emesis after PM meds and another one in middle of night. On 2/13, he was in coma-difficult to arouse state. Pt appeared dehydrated with elevated cr, poor po intake, emesis and was on lasix/spironolactone. D/licha diuretics, put in NG for po meds on 2/13 and started fluids after a bolus. Pt woke up in a couple hrs after iv fluids and has been awake/alert since. NG d/licha 2/13. Did well 1/14--took po meds. Had only couple stools yesterday. None since this AM. Continues to remain encephalopathic--more somnolant today. Ammonia worse.  Discussed with GI at . Will decrease ribivarin dose, transfer pt to  for TIPS rivision.     LIVER CIRRHOSIS DUE TO HEP C and ETOH in past  S/p TIPS--last year.Started Harvoni in 1/17.  -continue  harvoni     DISPO  tx pt to              Procedures:   No procedures performed during this admission         Allergies:      Allergies   Allergen Reactions     Blood Transfusion Related (Informational Only) Other (See Comments)     Patient has a history of a clinically significant antibody against RBC antigens.  A delay in compatible RBCs may occur.     Percocet [Oxycodone-Acetaminophen] Nausea and Vomiting     Acetaminophen Nausea     Iodine-131 Hives     Motrin [Ibuprofen Micronized] Nausea     Tylenol Nausea             Medications Prior to Admission:     Prescriptions Prior to Admission   Medication Sig Dispense Refill Last Dose     rifaximin (XIFAXAN) 550 MG TABS tablet Take 1 tablet (550 mg) by mouth 2 times daily 60 tablet 11 2/8/2017 at am     ribavirin 200 MG TABS Take 2 tablets (400 mg) by mouth 2 times daily 150 tablet 2 2/8/2017 at am     gabapentin (NEURONTIN) 100 MG capsule Take 2 capsules (200 mg) by mouth At Bedtime 90 capsule 1  2/7/2017 at hs     traZODone (DESYREL) 100 MG tablet Take 1 tablet (100 mg) by mouth nightly as needed for sleep 30 tablet 1 Past Month at Unknown time     lactulose 20 GM/30ML SOLN Take 30 mLs by mouth 3 times daily 1892 mL 3 2/8/2017 at 1100     ledipasvir-sofosbuvir (LEDIPASVIR-SOFOSBUVIR)  MG per tablet Take 1 tablet by mouth daily 28 tablet 2 2/8/2017 at am     furosemide (LASIX) 40 MG tablet Take 40 mg by mouth daily    2/8/2017 at am     spironolactone (ALDACTONE) 100 MG tablet Take 100 mg by mouth daily    2/7/2017 at am     ondansetron (ZOFRAN) 4 MG tablet Take 1 tablet (4 mg) by mouth every 8 hours as needed for nausea   2/7/2017 at hs     sildenafil (REVATIO/VIAGRA) 50 MG cap/tab Take 0.5-1 tablets (25-50 mg) by mouth daily as needed for erectile dysfunction Take 30 min to 4 hours before intercourse.  Never use with nitroglycerin, terazosin or doxazosin. 12 tablet 11 not started     vitamin D (ERGOCALCIFEROL) 20876 UNIT capsule Take 50,000 Units by mouth Twice weekly Mon and Thurs 2/6/2017             Discharge Medications:     Current Facility-Administered Medications   Medication     lactulose (CHRONULAC) solution 45 g     [START ON 2/16/2017] ribavirin TABS 400 mg     ribavirin TABS 200 mg     0.9% sodium chloride infusion     cetirizine (zyrTEC) tablet 10 mg     naloxone (NARCAN) injection 0.1-0.4 mg     lidocaine 1 % 1 mL     lidocaine (LMX4) kit     sodium chloride (PF) 0.9% PF flush 3 mL     sodium chloride (PF) 0.9% PF flush 3 mL     ondansetron (ZOFRAN-ODT) ODT tab 4 mg    Or     ondansetron (ZOFRAN) injection 4 mg     pantoprazole (PROTONIX) 40 mg IV push injection     gabapentin (NEURONTIN) capsule 200 mg     ledipasvir-sofosbuvir (HARVONI)  MG per tablet TABS 1 tablet     rifaximin (XIFAXAN) tablet 550 mg             Consultations:   No consultations were requested during this admission            Discharge Exam:   Blood pressure 128/68, pulse 76, temperature 97.7  F (36.5  C),  "temperature source Oral, resp. rate 18, height 1.803 m (5' 11\"), weight 82.1 kg (181 lb), SpO2 100 %.  GENERAL APPEARANCE: ill appearing, somnolant and no distress  EYES: cmild icterus, eyes grossly normal  HENT: external ears and nose normal   NECK: supple, no masses or adenopathy  RESP: lungs clear to auscultation - no rales, rhonchi or wheezes  CV: regular rate and rhythm, normal S1 S2, no S3 or S4 and no murmur, click or rub   ABDOMEN: soft,mild RUQ tenderness no HSM or masses and bowel sounds normal  MS: no clubbing, cyanosis; no edema  SKIN: clear without significant rashes or lesions  NEURO: non-focal, flappy arms/legs--moves all 4 extr    Unresulted Labs Ordered in the Past 30 Days of this Admission     No orders found from 12/10/2016 to 2/9/2017.          No results found for this or any previous visit (from the past 24 hour(s)).         Pending Tests at Discharge:   None         Discharge Instructions and Follow-Up:   Discharge diet: Regular   Discharge activity: Activity as tolerated   Discharge follow-up: As per U           Discharge Disposition:   Transferred to       Attestation:  I have reviewed today's vital signs, notes, medications, labs and imaging.    Time Spent on this Encounter   I, Marshal Rivera, personally saw the patient today and spent greater than 30 minutes discharging this patient.    Marshal Rivera MD       "

## 2017-02-15 NOTE — PROGRESS NOTES
Pt took all his bedtime meds without difficulty. VSS. Pt appears appropriate with answers to questions.

## 2017-02-15 NOTE — PROGRESS NOTES
[facility] TRANSPORT NOTE  Data: Reason for Transport:  Transfer to med/surg  Scott Casale was transported from 1005 via wheel chair at 1013.  Patient was accompanied by Nursing Assistant. Equipment used for transport: IV pump. Family was aware of reason for transport: yes  Action:Report: given to RASHAD Stout  Response:Patient's condition when transferred off unit was stable.  Bridger Oneal RN,BC, CCRN

## 2017-02-16 ENCOUNTER — APPOINTMENT (OUTPATIENT)
Dept: ULTRASOUND IMAGING | Facility: CLINIC | Age: 62
DRG: 442 | End: 2017-02-16
Attending: INTERNAL MEDICINE
Payer: MEDICARE

## 2017-02-16 ENCOUNTER — APPOINTMENT (OUTPATIENT)
Dept: PHYSICAL THERAPY | Facility: CLINIC | Age: 62
DRG: 442 | End: 2017-02-16
Attending: INTERNAL MEDICINE
Payer: MEDICARE

## 2017-02-16 LAB
ALBUMIN SERPL-MCNC: 2 G/DL (ref 3.4–5)
ALBUMIN UR-MCNC: 10 MG/DL
ALP SERPL-CCNC: 47 U/L (ref 40–150)
ALT SERPL W P-5'-P-CCNC: 28 U/L (ref 0–70)
ANION GAP SERPL CALCULATED.3IONS-SCNC: 7 MMOL/L (ref 3–14)
APPEARANCE UR: CLEAR
AST SERPL W P-5'-P-CCNC: 44 U/L (ref 0–45)
BILIRUB DIRECT SERPL-MCNC: 0.8 MG/DL (ref 0–0.2)
BILIRUB SERPL-MCNC: 4.4 MG/DL (ref 0.2–1.3)
BILIRUB UR QL STRIP: NEGATIVE
BUN SERPL-MCNC: 22 MG/DL (ref 7–30)
CALCIUM SERPL-MCNC: 8.2 MG/DL (ref 8.5–10.1)
CHLORIDE SERPL-SCNC: 112 MMOL/L (ref 94–109)
CO2 SERPL-SCNC: 22 MMOL/L (ref 20–32)
COLOR UR AUTO: YELLOW
CREAT SERPL-MCNC: 1.01 MG/DL (ref 0.66–1.25)
ERYTHROCYTE [DISTWIDTH] IN BLOOD BY AUTOMATED COUNT: 14.4 % (ref 10–15)
GFR SERPL CREATININE-BSD FRML MDRD: 75 ML/MIN/1.7M2
GLUCOSE SERPL-MCNC: 99 MG/DL (ref 70–99)
GLUCOSE UR STRIP-MCNC: NEGATIVE MG/DL
HCT VFR BLD AUTO: 28.8 % (ref 40–53)
HGB BLD-MCNC: 9.6 G/DL (ref 13.3–17.7)
HGB UR QL STRIP: ABNORMAL
INR PPP: 1.53 (ref 0.86–1.14)
KETONES UR STRIP-MCNC: NEGATIVE MG/DL
LEUKOCYTE ESTERASE UR QL STRIP: NEGATIVE
MAGNESIUM SERPL-MCNC: 2.1 MG/DL (ref 1.6–2.3)
MCH RBC QN AUTO: 34.5 PG (ref 26.5–33)
MCHC RBC AUTO-ENTMCNC: 33.3 G/DL (ref 31.5–36.5)
MCV RBC AUTO: 104 FL (ref 78–100)
MUCOUS THREADS #/AREA URNS LPF: PRESENT /LPF
NITRATE UR QL: NEGATIVE
PH UR STRIP: 5.5 PH (ref 5–7)
PHOSPHATE SERPL-MCNC: 2.8 MG/DL (ref 2.5–4.5)
PLATELET # BLD AUTO: 45 10E9/L (ref 150–450)
POTASSIUM SERPL-SCNC: 4 MMOL/L (ref 3.4–5.3)
PROT SERPL-MCNC: 5.8 G/DL (ref 6.8–8.8)
RBC # BLD AUTO: 2.78 10E12/L (ref 4.4–5.9)
RBC #/AREA URNS AUTO: 12 /HPF (ref 0–2)
SODIUM SERPL-SCNC: 141 MMOL/L (ref 133–144)
SP GR UR STRIP: 1.02 (ref 1–1.03)
SQUAMOUS #/AREA URNS AUTO: <1 /HPF (ref 0–1)
TRANS CELLS #/AREA URNS HPF: <1 /HPF (ref 0–1)
URN SPEC COLLECT METH UR: ABNORMAL
UROBILINOGEN UR STRIP-MCNC: NORMAL MG/DL (ref 0–2)
WBC # BLD AUTO: 3.5 10E9/L (ref 4–11)
WBC #/AREA URNS AUTO: 2 /HPF (ref 0–2)

## 2017-02-16 PROCEDURE — 97161 PT EVAL LOW COMPLEX 20 MIN: CPT | Mod: GP

## 2017-02-16 PROCEDURE — 81001 URINALYSIS AUTO W/SCOPE: CPT | Performed by: INTERNAL MEDICINE

## 2017-02-16 PROCEDURE — 36415 COLL VENOUS BLD VENIPUNCTURE: CPT | Performed by: INTERNAL MEDICINE

## 2017-02-16 PROCEDURE — 99233 SBSQ HOSP IP/OBS HIGH 50: CPT | Mod: GC | Performed by: INTERNAL MEDICINE

## 2017-02-16 PROCEDURE — 85027 COMPLETE CBC AUTOMATED: CPT | Performed by: STUDENT IN AN ORGANIZED HEALTH CARE EDUCATION/TRAINING PROGRAM

## 2017-02-16 PROCEDURE — 97116 GAIT TRAINING THERAPY: CPT | Mod: GP

## 2017-02-16 PROCEDURE — 85610 PROTHROMBIN TIME: CPT | Performed by: STUDENT IN AN ORGANIZED HEALTH CARE EDUCATION/TRAINING PROGRAM

## 2017-02-16 PROCEDURE — 83735 ASSAY OF MAGNESIUM: CPT | Performed by: STUDENT IN AN ORGANIZED HEALTH CARE EDUCATION/TRAINING PROGRAM

## 2017-02-16 PROCEDURE — 82248 BILIRUBIN DIRECT: CPT | Performed by: NURSE PRACTITIONER

## 2017-02-16 PROCEDURE — 36415 COLL VENOUS BLD VENIPUNCTURE: CPT | Performed by: STUDENT IN AN ORGANIZED HEALTH CARE EDUCATION/TRAINING PROGRAM

## 2017-02-16 PROCEDURE — 40000611 ZZHCL STATISTIC MORPHOLOGY W/INTERP HEMEPATH TC 85060: Performed by: INTERNAL MEDICINE

## 2017-02-16 PROCEDURE — 93975 VASCULAR STUDY: CPT | Mod: TC

## 2017-02-16 PROCEDURE — 84100 ASSAY OF PHOSPHORUS: CPT | Performed by: STUDENT IN AN ORGANIZED HEALTH CARE EDUCATION/TRAINING PROGRAM

## 2017-02-16 PROCEDURE — 82248 BILIRUBIN DIRECT: CPT | Performed by: STUDENT IN AN ORGANIZED HEALTH CARE EDUCATION/TRAINING PROGRAM

## 2017-02-16 PROCEDURE — 87040 BLOOD CULTURE FOR BACTERIA: CPT | Performed by: INTERNAL MEDICINE

## 2017-02-16 PROCEDURE — 97112 NEUROMUSCULAR REEDUCATION: CPT | Mod: GP

## 2017-02-16 PROCEDURE — 40000193 ZZH STATISTIC PT WARD VISIT

## 2017-02-16 PROCEDURE — 25000132 ZZH RX MED GY IP 250 OP 250 PS 637: Performed by: STUDENT IN AN ORGANIZED HEALTH CARE EDUCATION/TRAINING PROGRAM

## 2017-02-16 PROCEDURE — 80053 COMPREHEN METABOLIC PANEL: CPT | Performed by: STUDENT IN AN ORGANIZED HEALTH CARE EDUCATION/TRAINING PROGRAM

## 2017-02-16 PROCEDURE — 25000128 H RX IP 250 OP 636: Performed by: INTERNAL MEDICINE

## 2017-02-16 PROCEDURE — 12000001 ZZH R&B MED SURG/OB UMMC

## 2017-02-16 RX ORDER — SODIUM CHLORIDE 9 MG/ML
INJECTION, SOLUTION INTRAVENOUS CONTINUOUS
Status: DISCONTINUED | OUTPATIENT
Start: 2017-02-16 | End: 2017-02-17 | Stop reason: HOSPADM

## 2017-02-16 RX ORDER — RIBAVIRIN 200 MG/1
200 TABLET, FILM COATED ORAL 2 TIMES DAILY
Status: DISCONTINUED | OUTPATIENT
Start: 2017-02-16 | End: 2017-02-17 | Stop reason: HOSPADM

## 2017-02-16 RX ADMIN — RIFAXIMIN 550 MG: 550 TABLET ORAL at 21:26

## 2017-02-16 RX ADMIN — RIBAVIRIN 200 MG: 200 TABLET, FILM COATED ORAL at 17:45

## 2017-02-16 RX ADMIN — LACTULOSE 30 ML: 10 SOLUTION ORAL at 17:45

## 2017-02-16 RX ADMIN — LEDIPASVIR AND SOFOSBUVIR 1 TABLET: 90; 400 TABLET, FILM COATED ORAL at 11:11

## 2017-02-16 RX ADMIN — SODIUM CHLORIDE: 9 INJECTION, SOLUTION INTRAVENOUS at 19:11

## 2017-02-16 RX ADMIN — LACTULOSE 30 ML: 10 SOLUTION ORAL at 09:00

## 2017-02-16 RX ADMIN — SODIUM CHLORIDE: 9 INJECTION, SOLUTION INTRAVENOUS at 11:28

## 2017-02-16 RX ADMIN — RIFAXIMIN 550 MG: 550 TABLET ORAL at 04:50

## 2017-02-16 RX ADMIN — GABAPENTIN 200 MG: 100 CAPSULE ORAL at 21:26

## 2017-02-16 RX ADMIN — LACTULOSE 30 ML: 10 SOLUTION ORAL at 13:58

## 2017-02-16 RX ADMIN — RIBAVIRIN 200 MG: 200 TABLET, FILM COATED ORAL at 11:29

## 2017-02-16 ASSESSMENT — ACTIVITIES OF DAILY LIVING (ADL)
RETIRED_EATING: 0-->INDEPENDENT
WHICH_OF_THE_ABOVE_FUNCTIONAL_RISKS_HAD_A_RECENT_ONSET_OR_CHANGE?: COGNITION
DRESS: 0-->INDEPENDENT
RETIRED_COMMUNICATION: 0-->UNDERSTANDS/COMMUNICATES WITHOUT DIFFICULTY
BATHING: 0-->INDEPENDENT
AMBULATION: 0-->INDEPENDENT
TRANSFERRING: 0-->INDEPENDENT
SWALLOWING: 0-->SWALLOWS FOODS/LIQUIDS WITHOUT DIFFICULTY
FALL_HISTORY_WITHIN_LAST_SIX_MONTHS: NO
TOILETING: 0-->INDEPENDENT
COGNITION: 1 - ATTENTION OR MEMORY DEFICITS

## 2017-02-16 NOTE — PLAN OF CARE
Problem: Goal Outcome Summary  Goal: Goal Outcome Summary  Outcome: Improving  Patient thought he was at [facility] this am, oriented to name, date,forgetful at times,bed alarm on. Incontinent of stool after Lactulose. Good appetite, voiding spont, ua/uc sent.Up with sba to commode.PT to evaluate patient for safe discharge, probable discharge home tomorrow.

## 2017-02-16 NOTE — CONSULTS
Hepatology Consultation    Scott Casale   MRN# [id number]     Age: 61 year old YOB: 1955       Referring provider: Dr. Mcarthur  Attending Hepatologist: Dr. Johnston  Consult requested for:  Hepatic encephalopathy       Assessment and Recommendation:   Assessment:   Mr. Casale is a 61-year-old with a history of HCV/ETOH cirrhosis complicated by refractory ascites s/p TIPS, HE, depression, who was admitted with worsening HE and mild KAREN. He did have evidence of hemolysis with high indirect bili and worsening anemia which is likely related to ribavirin dose. His TIPS is patent and no ascites. He is being treated for his HCV with Harvoni and Ribavirin.       Recommendations:   1. Hepatic encephalopathy  2. Mild dehydration  3. Resolving KAREN  4. S/p TIPS 9/28/16  5. Anemia  - recent HE may be related to limiting lactulose at home and hemolysis  - Please evaluate for infection- BC, UA. No ascites to do para  - Continue on lactulose and rifaximin. Titrate lactulose for 4-5 stools per day.   - no evidence of GIB, no endoscopy given TIPS patent.   - Peripheral smear to confirm hemolysis.   - since this is only 2nd episode of HE would hold on narrowing TIPS. If HE becomes refractory, this may be an option.     6. HCV cirrhosis, genotype 1a  7. HCV treatment  - Decrease Ribavirin to 200 mg BID. Will need outpatient labs intermittently.  - Continue daily Harvoni  - Last MRI with no evidence of HCC, continue surveillance as outpatient.     Plan of care discussed with Dr. Johnston    Thank you for the opportunity to be involved in Scott Casale care. Please call with any questions or concerns.     Mora Hernandez, APRN, CNP  [phone]               History of Present Illness:   Scott Casale is a 61 year old male with a history of HCV and alcohol cirrhosis. He has been sober from alcohol for 5 years. He is genotype 1 a and is being treated for his HCV with Harvoni and Ribavirin for 12 weeks. Per patient, he was  taking 3 in the am and 2 in the pm for Ribavirin and has been compliant. The estimated start date was 1/24/17. His liver disease has been complicated by refractory ascites s/p TIPS 9/28/16, HE, LIRADS 3 lesion in the liver, Depression and GERD. He was transferred from [facility] with worsening HE after initially being admitted with HE. An US did have patent TIPS and no ascites. He was taking lactulose and rifaximin at home. He notes taking lactulose 2-3 times per day depending on if he needed to leave his house. He did have asterixis and his wife noted that he has been more confused at home.     Mr. Casale notes improvement in his mentation since increasing his lactulose. He denies melena or hematochezia, diarrhea or constipation. He has not had fevers. His wife did have what appears to be gastroenteritis and he did have a few episodes of vomiting while in the hospital a few days ago. He denies body aches. He has not had any respiratory changes and no new bruising or bleeding. He has felt weak recently and no dysuria.              Past Medical History:     Past Medical History   Diagnosis Date     Ankylosing spondylitis (H)      Ascites      diuretic refractory     H/O testicular cancer age 28     Hepatitis C      genotype 1A      Shoulder dislocation      Splenomegaly      Ulcerative colitis (H)              Past Surgical History:     Past Surgical History   Procedure Laterality Date     C knee scope,clean/drain       bilateral     C appendectomy       Strabismus surgery       bilateral     Orchiectomy scrotal       prosthesis placed     Ir transven intrahepatic portosyst rev       Eye surgery       Esophagoscopy, gastroscopy, duodenoscopy (egd), combined N/A 1/17/2017     Procedure: COMBINED ESOPHAGOSCOPY, GASTROSCOPY, DUODENOSCOPY (EGD);  Surgeon: Guru Reina Palacios MD;  Location:  GI     Colonoscopy N/A 1/17/2017     Procedure: COLONOSCOPY;  Surgeon: Guru Reina Palacios MD;   Location:  GI              Social History:     Social History   Substance Use Topics     Smoking status: Former Smoker     Quit date: 1/1/1978     Smokeless tobacco: Never Used     Alcohol use No      Comment: quit in 2010      Social History   Substance Use Topics     Smoking status: Former Smoker     Quit date: 1/1/1978     Smokeless tobacco: Never Used     Alcohol use No      Comment: quit in 2010           Family History:   The family history includes CANCER in his father; CANCER (age of onset: 70) in his mother; CEREBROVASCULAR DISEASE in his sister; DIABETES in his maternal grandmother; HEART DISEASE in his sister; Respiratory in his son; Uterine Cancer in his sister. There is no history of Prostate Cancer or Cancer - colorectal.             Allergies:     Allergies   Allergen Reactions     Blood Transfusion Related (Informational Only) Other (See Comments)     Patient has a history of a clinically significant antibody against RBC antigens.  A delay in compatible RBCs may occur.     Percocet [Oxycodone-Acetaminophen] Nausea and Vomiting     Acetaminophen Nausea     Iodine-131 Hives     Motrin [Ibuprofen Micronized] Nausea     Tylenol Nausea             Medications:   @  Prescriptions Prior to Admission   Medication Sig Dispense Refill Last Dose     rifaximin (XIFAXAN) 550 MG TABS tablet Take 1 tablet (550 mg) by mouth 2 times daily 60 tablet 11 2/15/2017 at 2000     ribavirin 200 MG TABS Take 2 tablets (400 mg) by mouth 2 times daily 150 tablet 2 2/15/2017 at 0800     gabapentin (NEURONTIN) 100 MG capsule Take 2 capsules (200 mg) by mouth At Bedtime 90 capsule 1 2/14/2017 at 2000     lactulose 20 GM/30ML SOLN Take 30 mLs by mouth 3 times daily 1892 mL 3 2/15/2017 at 1200     ledipasvir-sofosbuvir (LEDIPASVIR-SOFOSBUVIR)  MG per tablet Take 1 tablet by mouth daily 28 tablet 2 2/15/2017 at 0800     vitamin D (ERGOCALCIFEROL) 96047 UNIT capsule Take 50,000 Units by mouth Twice weekly Mon and Thurs   Past  Week at Unknown time     furosemide (LASIX) 40 MG tablet Take 40 mg by mouth daily    Past Week at Unknown time     ondansetron (ZOFRAN) 4 MG tablet Take 1 tablet (4 mg) by mouth every 8 hours as needed for nausea   2/14/2017 at 2000     traZODone (DESYREL) 100 MG tablet Take 1 tablet (100 mg) by mouth nightly as needed for sleep 30 tablet 1 Unknown at Unknown time     spironolactone (ALDACTONE) 100 MG tablet Take 100 mg by mouth daily    Unknown at Unknown time   @          Review of Systems:    ROS: 10 point ROS neg other than the symptoms noted above in the HPI.          Physical Exam:   Blood pressure 117/58, pulse 74, temperature 97  F (36.1  C), temperature source Oral, resp. rate 14, SpO2 99 %.    Intake/Output Summary (Last 24 hours) at 02/16/17 1255  Last data filed at 02/16/17 0900   Gross per 24 hour   Intake              580 ml   Output             1395 ml   Net             -815 ml     General: In no acute distress, mild facial muscle wasting  Neuro: AOx3, Trace asterixis  HEENT: PERRL No scleral icterus, No oral lesions  CV: S1/[id number] murmurs, Skin warm and dry  Lungs: clear to auscultation Respirations even and nonlabored on room air  Abd: Nondistended, Mildly tender with palpation RUQ. +BS  Extrem: Trace lower peripehral edema  Skin: No jaundice         Data:     Lab Results   Component Value Date    WBC 3.5 02/16/2017     Lab Results   Component Value Date    RBC 2.78 02/16/2017     Lab Results   Component Value Date    HGB 9.6 02/16/2017     Lab Results   Component Value Date    HCT 28.8 02/16/2017     No components found for: MCT  Lab Results   Component Value Date     02/16/2017     Lab Results   Component Value Date    MCH 34.5 02/16/2017     Lab Results   Component Value Date    MCHC 33.3 02/16/2017     Lab Results   Component Value Date    RDW 14.4 02/16/2017     Lab Results   Component Value Date    PLT 45 02/16/2017       Last Basic Metabolic Panel:  Lab Results   Component Value  Date     02/16/2017      Lab Results   Component Value Date    POTASSIUM 4.0 02/16/2017     Lab Results   Component Value Date    CHLORIDE 112 02/16/2017     Lab Results   Component Value Date    ARACELI 8.2 02/16/2017     Lab Results   Component Value Date    CO2 22 02/16/2017     Lab Results   Component Value Date    BUN 22 02/16/2017     Lab Results   Component Value Date    CR 1.01 02/16/2017     Lab Results   Component Value Date    GLC 99 02/16/2017       Liver Function Studies -   Recent Labs   Lab Test  02/16/17   0728   PROTTOTAL  5.8*   ALBUMIN  2.0*   BILITOTAL  4.4*   ALKPHOS  47   AST  44   ALT  28       Lab Results   Component Value Date    INR 1.53 02/16/2017       MELD-Na score: 17 at 2/16/2017  7:28 AM  MELD score: 17 at 2/16/2017  7:28 AM  Calculated from:  Serum Creatinine: 1.01 mg/dL at 2/16/2017  7:28 AM  Serum Sodium: 141 mmol/L (Rounded to 137) at 2/16/2017  7:28 AM  Total Bilirubin: 4.4 mg/dL at 2/16/2017  7:28 AM  INR(ratio): 1.53 at 2/16/2017  7:28 AM  Age: 61 years    IMAGING:  US doppler 2/16/17  Impression:   Patent TIPS with normal Doppler assessment of the liver vasculature.    US abd 2/13/17  IMPRESSION:   1. Cholelithiasis.  2. Cirrhotic appearance to the liver.  3. Patent TIPS shunt.  4. Splenomegaly.    MRI abd 12/19/16  IMPRESSION:   1. Cirrhotic appearing liver with evidence of portal hypertension  including, moderate ascites, splenomegaly and multiple portosystemic  collateral vessels, which appear slightly decreased status post TIPS.   2. Small subcentimeter arterial enhancing lesions, the most prominent  of which measures 7 mm in segment 8, unchanged as compared to  9/10/2016 exam. These are indeterminate. OPTN/LIRADS class 3.  Follow-up in 6 months recommended.  3. Cholelithiasis.  4. Two fat-containing ventral hernias.

## 2017-02-16 NOTE — PLAN OF CARE
Problem: Goal Outcome Summary  Goal: Goal Outcome Summary  Outcome: No Change  Pt arrived from [facility] at 1700.  VSS.  Pt complaining of nausea, antiemetic given.  Pt denies pain.  R PIV is saline locked, site is WDL.  Pt unsure of date and situation, other neuros intact.  At times, pt is unsteady on feet, claims he can get out of bed on his own.  Bed alarm on and audible.  Pt has diarrhea and a combination of mixed urine and stool.  Low UOP.  MD notified, no new orders obtained.  Plan of care: continue to monitor pt labs and mentation.

## 2017-02-16 NOTE — PLAN OF CARE
Problem: Goal Outcome Summary  Goal: Goal Outcome Summary  PT 7C: Evaluation complete and treatment indicated. Pt presenting with impairments in balance, B LE strength, and endurance which are limiting safety and independence with mobility. Pt performs bed mobility SBA to MOD I, sit <> stand SBA, gait without AD at CGA for 200ft and gait with FWW at CGA for 150ft, and ascending/descending 12 steps with 1 HR at SBA. Pt unsteady with and without an AD and FWW does not appear to improve pt's steadiness. Pt with most improved gait when arm-in-arm with CGA. PT does not anticipate pt would require TCU stay, but safe discharge to home would include 24 hour assist from wife and HH/OP PT.            Wetzel Balance Scale (BBS) Cutoff Scores: A score of ? 45/56 indicates an increased risk for falls.   Patient Score: 45/56    The BBS is a measure of static and dynamic standing balance that has been validated in community dwelling elderly individuals and individuals who have Parkinson's Disease, MS, and those who are s/p CVA and TBI. The test is administered without an assistive device. Scores from the Wetzel are used to determine the probability of falling based on the patient's previous history of falls and their test performance.     Minimal Detectable Change = 6.5   & Minimal Detectable Change (Parkinson's Disease) = 5 according to Bishop & Taiwoey 2008    Assessment (rationale for performing, application to patient s function & care plan): Fall history, recent unsteadiness, unsteadiness with gait training  Minutes billed as physical performance test: 0 - part of evaluation      Dynamic Gait Index (DGI):The DGI is a measure of balance during gait that is reliable and valid for the elderly and individuals with Parkinson's disease, MS, vestibular disorders, or s/p stroke. Gait assistive device used: none     Patient score: 13/24  Scores ?19/24 indicate an increased risk for falls according to Katarina et al 2000  Minimal Detectable  Change = 2.9 in community dwelling elderly according to Niranjan et al 2011    Assessment (rationale for performing, application to patient s function & care plan): Impaired gait during evaluation. Recent fall. Safety assessment for return home.   Minutes billed as physical performance test: 0 - part of evaluation

## 2017-02-16 NOTE — PLAN OF CARE
Problem: Goal Outcome Summary  Goal: Goal Outcome Summary  Outcome: Improving  Slept much of shift. Neuros intact, oriented to all but date. On bed alarm for safety, pt able to get from bed to commode with SBA, states he feels much more steady than yesterday. One mixed urine/watery stool. NPO for ultrasound today, had a sip with med. Denies nausea. No MIVF.

## 2017-02-16 NOTE — PROGRESS NOTES
INTERNAL MEDICINE PROGRESS NOTE    Scott Casale ([id number]) admitted on 2/15/2017  02/16/2017    Assessment & Plan:    Scott Casale is a 61 year old male with history of 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     #)AMS- Likely 2/2 hepatic encephalopathy. Patient with hx of liver cirrhosis 2/2 hepatitis C, past EtOH abuse s/p TIPS in 2015. Transferred for worsening AMS, but here patient is A+Ox3 and talking appropriately. Unclear baseline. May be improving. BMs 3-4 loose stools/day at this time. No neuro deficits. No recent narcotic use. No concern for infection at this time. No evidence of SBP, abdomen soft, non-distended, mildly TTP which is chronic since he has had his TIPS placed. No concern for GI bleed. TIPS is patent based on abdominal U/S w/ doppler. NH3 improved at 90 from 180s.  -Ordered blood cultures x2, UA to rule out infectious etiology  -Dehydration may be precipitant for hepatic encephalopathy, so giving MIVF  -F/u peripheral blood smear for hemolysis workup  -Discussed w/ GI, f/u further recs  -Continue 30 mL lactulose tid  -Continue rifaximine 550 mg bid  -Monitor stool output  -GI consult     #)Liver cirrhosis 2/2 hepatitis C, past EtOH abuse s/p TIPS 2015  #)Hyperammonenemia  MELD score 18. Patient here is A+Ox3, mild asterixis, slurring of speech, worsening NH3. No obvious trigger for hepatic encephalopathy yet, as above. In d/c summary mentioned they halved dose of ribavirin, which have changed today.  -F/u GI recs  -Ribavirin changed to 200 mg bid per GI, continue pta Harvoni  -Tx hepatic encephalopathy as above  -Hold off on diuresis as per discharge summary, patient is mildly hypovolemic     #)Pancytopenia -  Seen today, prior WBC count WNL. Macrocytic anemia. Leukopenia, most likely 2/2 ribavirin. Thrombocytopenia is chronic likely 2/2 liver disease.  -R/o infection given leukopenia, blood cultures pending  -F/u UA  -Peripheral blood smear  -Ordered B12, folate, methylmalonic acid, homocysteine levels       #) KAREN- Resolved.  -Holding diuretics, patient appears dry  - cc/hr MIVD     #Insomnia- Pta trazodone qhs prn     FEN: Regular diet,  cc/hr for MIVF  Prophylaxis: DVT: Mechanical, ambulate GI: None  Disposition: Continue inpatient care  Code Status: FULL CODE        Kelechi Boyer MD  Med-Derm, PGY-1  [phone] Pager Number       Patient was seen and discussed with  who agrees with above assessment and plan.    ==================================================================    24 hr events: No acute events    Subjective:  Patient feels well. Tremor has improved. Feels that he is less disoriented. Has had about 4 loose BMs since last night.      Objective:  Most recent vital signs:  /58 (BP Location: Right arm)  Pulse 74  Temp 96.9  F (36.1  C) (Oral)  Resp 18  SpO2 96%  Temp:  [96.7  F (35.9  C)-97.5  F (36.4  C)] 96.9  F (36.1  C)  Pulse:  [70-77] 74  Heart Rate:  [74-80] 80  Resp:  [14-18] 18  BP: (115-130)/(58-63) 130/58  SpO2:  [96 %-100 %] 96 %  Wt Readings from Last 2 Encounters:   02/14/17 82.1 kg (181 lb)   01/13/17 84.4 kg (186 lb)       Intake/Output Summary (Last 24 hours) at 02/16/17 0735  Last data filed at 02/16/17 0659   Gross per 24 hour   Intake              100 ml   Output              695 ml   Net             -595 ml       Physical exam:  General: Pleasant male  HEENT: No Scleral icterus. NCAT, MMM. PERRL, EOMI.  Cardiac: Normal rate, regular rhythm. No m/r/g. Normal S1, S2.  Pulm: CTAB, no wheezes, or crackles. Normal respiratory effort  Abd: Soft, non distended, mild TTP over old well-healed scars, +hepatomegaly  Skin: +mild jaundice,   Extremities: No LE  edema, no asterixis  Joints: No inflammation noted on joints  Neuro: A&Ox3, no focal deficits, strength 5/5 b/l, sensation intact, CN II-XII intact    Labs (Past three days):  CMP    Recent Labs  Lab 02/16/17  0728 02/15/17  2015 02/14/17  0610 02/13/17  1117    141 142 141   POTASSIUM 4.0 4.2 4.3 4.6   CHLORIDE 112* 112* 111* 110*   CO2 22 23 23 23   ANIONGAP 7 7 8 8   GLC 99 105* 113* 106*   BUN 22 22 30 26   CR 1.01 1.09 1.27* 1.31*   GFRESTIMATED 75 69 58* 55*   GFRESTBLACK >90African American GFR Calc 83 70 67   ARACELI 8.2* 8.3* 8.3* 8.9   PROTTOTAL 5.8* 6.2* 6.3* 7.0   ALBUMIN 2.0* 2.2* 2.1* 2.4*   BILITOTAL 4.4* 4.3* 4.5* 5.3*   ALKPHOS 47 55 47 55   AST 44 46* 42 48*   ALT 28 30 31 32     CBC    Recent Labs  Lab 02/16/17  0728 02/15/17  2015 02/14/17  0610 02/13/17  1117   WBC 3.5* 4.3 5.7 6.0   RBC 2.78* 2.88* 3.15* 3.58*   HGB 9.6* 10.3* 11.0* 12.5*   HCT 28.8* 29.9* 32.3* 36.3*   * 104* 103* 101*   MCH 34.5* 35.8* 34.9* 34.9*   MCHC 33.3 34.4 34.1 34.4   RDW 14.4 14.5 13.6 13.4   PLT 45* 47* 57* 63*     INR    Recent Labs  Lab 02/16/17  0728 02/15/17  2034   INR 1.53* 1.49*     Arterial Blood GasNo lab results found in last 7 days.      Imaging/procedure results: Reviewed and remarkable for:  US ABDOMEN OR PELVIS DOPPLER COMPLETE  Impression:   Patent TIPS with normal Doppler assessment of the liver vasculature.

## 2017-02-16 NOTE — PROVIDER NOTIFICATION
Notified NP at 2115 PM regarding critical results read back.      Spoke with: GILMAR Patricia CNP    Orders were not obtained.    Comments: Platelets 47

## 2017-02-17 ENCOUNTER — APPOINTMENT (OUTPATIENT)
Dept: PHYSICAL THERAPY | Facility: CLINIC | Age: 62
DRG: 442 | End: 2017-02-17
Attending: INTERNAL MEDICINE
Payer: MEDICARE

## 2017-02-17 ENCOUNTER — CARE COORDINATION (OUTPATIENT)
Dept: CARDIOLOGY | Facility: CLINIC | Age: 62
End: 2017-02-17

## 2017-02-17 VITALS
HEART RATE: 74 BPM | OXYGEN SATURATION: 100 % | TEMPERATURE: 97.1 F | SYSTOLIC BLOOD PRESSURE: 121 MMHG | DIASTOLIC BLOOD PRESSURE: 61 MMHG | RESPIRATION RATE: 16 BRPM

## 2017-02-17 LAB
ALBUMIN SERPL-MCNC: 2.2 G/DL (ref 3.4–5)
ALP SERPL-CCNC: 60 U/L (ref 40–150)
ALT SERPL W P-5'-P-CCNC: 32 U/L (ref 0–70)
ANION GAP SERPL CALCULATED.3IONS-SCNC: 12 MMOL/L (ref 3–14)
AST SERPL W P-5'-P-CCNC: 50 U/L (ref 0–45)
BASOPHILS # BLD AUTO: 0 10E9/L (ref 0–0.2)
BASOPHILS NFR BLD AUTO: 0.2 %
BILIRUB DIRECT SERPL-MCNC: 0.9 MG/DL (ref 0–0.2)
BILIRUB SERPL-MCNC: 3.6 MG/DL (ref 0.2–1.3)
BUN SERPL-MCNC: 21 MG/DL (ref 7–30)
CALCIUM SERPL-MCNC: 8.1 MG/DL (ref 8.5–10.1)
CHLORIDE SERPL-SCNC: 109 MMOL/L (ref 94–109)
CO2 SERPL-SCNC: 21 MMOL/L (ref 20–32)
COPATH REPORT: NORMAL
CREAT SERPL-MCNC: 1 MG/DL (ref 0.66–1.25)
DIFFERENTIAL METHOD BLD: ABNORMAL
EOSINOPHIL # BLD AUTO: 0.1 10E9/L (ref 0–0.7)
EOSINOPHIL NFR BLD AUTO: 3 %
ERYTHROCYTE [DISTWIDTH] IN BLOOD BY AUTOMATED COUNT: 15 % (ref 10–15)
FOLATE SERPL-MCNC: 13.1 NG/ML
GFR SERPL CREATININE-BSD FRML MDRD: 76 ML/MIN/1.7M2
GLUCOSE SERPL-MCNC: 108 MG/DL (ref 70–99)
HCT VFR BLD AUTO: 31.4 % (ref 40–53)
HGB BLD-MCNC: 10.6 G/DL (ref 13.3–17.7)
IMM GRANULOCYTES # BLD: 0 10E9/L (ref 0–0.4)
IMM GRANULOCYTES NFR BLD: 0 %
LYMPHOCYTES # BLD AUTO: 0.6 10E9/L (ref 0.8–5.3)
LYMPHOCYTES NFR BLD AUTO: 13.1 %
MCH RBC QN AUTO: 34.9 PG (ref 26.5–33)
MCHC RBC AUTO-ENTMCNC: 33.8 G/DL (ref 31.5–36.5)
MCV RBC AUTO: 103 FL (ref 78–100)
MONOCYTES # BLD AUTO: 0.6 10E9/L (ref 0–1.3)
MONOCYTES NFR BLD AUTO: 13.3 %
NEUTROPHILS # BLD AUTO: 3.3 10E9/L (ref 1.6–8.3)
NEUTROPHILS NFR BLD AUTO: 70.4 %
NRBC # BLD AUTO: 0 10*3/UL
NRBC BLD AUTO-RTO: 0 /100
PLATELET # BLD AUTO: 54 10E9/L (ref 150–450)
POTASSIUM SERPL-SCNC: 4.1 MMOL/L (ref 3.4–5.3)
PROT SERPL-MCNC: 6.4 G/DL (ref 6.8–8.8)
RBC # BLD AUTO: 3.04 10E12/L (ref 4.4–5.9)
RETICS # AUTO: 226.2 10E9/L (ref 25–95)
RETICS/RBC NFR AUTO: 7.6 % (ref 0.5–2)
SODIUM SERPL-SCNC: 142 MMOL/L (ref 133–144)
VIT B12 SERPL-MCNC: 988 PG/ML (ref 193–986)
WBC # BLD AUTO: 4.7 10E9/L (ref 4–11)

## 2017-02-17 PROCEDURE — 97112 NEUROMUSCULAR REEDUCATION: CPT | Mod: GP

## 2017-02-17 PROCEDURE — 40000141 ZZH STATISTIC PERIPHERAL IV START W/O US GUIDANCE

## 2017-02-17 PROCEDURE — 25000132 ZZH RX MED GY IP 250 OP 250 PS 637: Performed by: STUDENT IN AN ORGANIZED HEALTH CARE EDUCATION/TRAINING PROGRAM

## 2017-02-17 PROCEDURE — 80053 COMPREHEN METABOLIC PANEL: CPT | Performed by: INTERNAL MEDICINE

## 2017-02-17 PROCEDURE — 82248 BILIRUBIN DIRECT: CPT | Performed by: INTERNAL MEDICINE

## 2017-02-17 PROCEDURE — 25000128 H RX IP 250 OP 636: Performed by: INTERNAL MEDICINE

## 2017-02-17 PROCEDURE — 82746 ASSAY OF FOLIC ACID SERUM: CPT | Performed by: INTERNAL MEDICINE

## 2017-02-17 PROCEDURE — 83921 ORGANIC ACID SINGLE QUANT: CPT | Performed by: INTERNAL MEDICINE

## 2017-02-17 PROCEDURE — 36415 COLL VENOUS BLD VENIPUNCTURE: CPT | Performed by: INTERNAL MEDICINE

## 2017-02-17 PROCEDURE — 99239 HOSP IP/OBS DSCHRG MGMT >30: CPT | Mod: GC | Performed by: INTERNAL MEDICINE

## 2017-02-17 PROCEDURE — 85045 AUTOMATED RETICULOCYTE COUNT: CPT | Performed by: INTERNAL MEDICINE

## 2017-02-17 PROCEDURE — 97116 GAIT TRAINING THERAPY: CPT | Mod: GP

## 2017-02-17 PROCEDURE — 40000193 ZZH STATISTIC PT WARD VISIT

## 2017-02-17 PROCEDURE — 85025 COMPLETE CBC W/AUTO DIFF WBC: CPT | Performed by: INTERNAL MEDICINE

## 2017-02-17 PROCEDURE — 25000125 ZZHC RX 250: Performed by: STUDENT IN AN ORGANIZED HEALTH CARE EDUCATION/TRAINING PROGRAM

## 2017-02-17 PROCEDURE — 82607 VITAMIN B-12: CPT | Performed by: INTERNAL MEDICINE

## 2017-02-17 RX ORDER — LACTULOSE 10 G/15ML
30 SOLUTION ORAL 3 TIMES DAILY
Qty: 500 ML | Refills: 0 | Status: ON HOLD
Start: 2017-02-17 | End: 2017-03-14

## 2017-02-17 RX ADMIN — RIFAXIMIN 550 MG: 550 TABLET ORAL at 07:57

## 2017-02-17 RX ADMIN — SODIUM CHLORIDE: 9 INJECTION, SOLUTION INTRAVENOUS at 06:13

## 2017-02-17 RX ADMIN — LACTULOSE 30 ML: 10 SOLUTION ORAL at 07:57

## 2017-02-17 RX ADMIN — ONDANSETRON HYDROCHLORIDE 4 MG: 4 TABLET, FILM COATED ORAL at 11:15

## 2017-02-17 RX ADMIN — LEDIPASVIR AND SOFOSBUVIR 1 TABLET: 90; 400 TABLET, FILM COATED ORAL at 11:12

## 2017-02-17 RX ADMIN — RIBAVIRIN 200 MG: 200 TABLET, FILM COATED ORAL at 11:12

## 2017-02-17 RX ADMIN — RANITIDINE HYDROCHLORIDE 150 MG: 150 TABLET, FILM COATED ORAL at 02:50

## 2017-02-17 NOTE — PROGRESS NOTES
02/16/17 1616   Quick Adds   Type of Visit Initial PT Evaluation   Living Environment   Lives With significant other   Living Arrangements house   Home Accessibility bed not on first floor;stairs (1 railing present);stairs to enter home;stairs within home;tub/shower is not walk in   Number of Stairs to Enter Home 2   Number of Stairs Within Home 14   Stair Railings at Home inside, present on left side;inside, present on right side   Transportation Available family or friend will provide;car   Living Environment Comment Pt lives in house with wife, step-daughter and grandchildren. Pt and wife are not working currently and both are driving. Per pt, wife can be at home to assist pt as needed.    Self-Care   Dominant Hand right   Usual Activity Tolerance moderate   Current Activity Tolerance fair   Regular Exercise no   Equipment Currently Used at Home none   Activity/Exercise/Self-Care Comment Pt with no regular exercise, but stating he participates in aquatic therapy in OP PT. Pt reporting intermittent assistance from wife for bathing as needed.    Functional Level Prior   Ambulation 0-->independent  (Occasional assistance from wife)   Transferring 0-->independent   Toileting 0-->independent   Bathing 0-->independent  (occasional assistance from wife)   Dressing 0-->independent   Eating 0-->independent   Communication 0-->understands/communicates without difficulty   Swallowing 0-->swallows foods/liquids without difficulty   Cognition 1 - attention or memory deficits   Fall history within last six months yes   Number of times patient has fallen within last six months 1  (Pt reporting falling in icy parking lot in Dec 2017)   Which of the above functional risks had a recent onset or change? ambulation;transferring;cognition   Prior Functional Level Comment Pt was mainly independent for all ADLs and IADLs. Pt reporting recent difficulty with cognition just prior to hospitilization. Pt also with intermittent assistance  "from wife for ambulation and bathing if he is too fatigued. Pt reporting until recently he could ambulate about 1/4 mile before feeling fatigued.    General Information   Onset of Illness/Injury or Date of Surgery - Date 02/16/17  (date of PT orders)   Referring Physician Edelmira Scott, DO   Patient/Family Goals Statement To return home and \"get moving'   Pertinent History of Current Problem (include personal factors and/or comorbidities that impact the POC) Pt is a 61 year old male with history of 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 - per resident note   Precautions/Limitations fall precautions   Heart Disease Risk Factors Medical history   General Observations Pt sitting up in bed with family present and agreeable to treatment session   General Info Comments Activity: Up with assistance   Cognitive Status Examination   Orientation orientation to person, place and time   Level of Consciousness alert   Follows Commands and Answers Questions 100% of the time;able to follow multistep instructions  (follows multi-step instructions ~90% of the time)   Personal Safety and Judgment intact   Memory intact   Cognitive Comment Pt reporting improvement in cognitiion over the past few days.    Pain Assessment   Patient Currently in Pain No   Integumentary/Edema   Integumentary/Edema no deficits were identifed   Posture    Posture Forward head position;Protracted shoulders   Range of Motion (ROM)   ROM Comment B UE/LE WFL   Strength   Strength Comments B - hip flex 4/5, knee ext 4/5, knee flex 5/5, hip ext 4/5, ankle DF 5/5, ankle PF 3+/5. B UE WFL   Bed Mobility   Bed Mobility Comments Pt performing bed mobility SBA initially with HOB elevated " and use of bed railings.    Transfer Skills   Transfer Comments Pt performing sit <> stand transfers at close SBA.    Gait   Gait Comments Pt initially ambulating without AD at strong CGA. Pt with narrow RIGOBERTO, intermittent scissoring gait pattern, flexed trunk posture, and intermittent lateral LOB.    Balance   Balance Comments Poor dynamic standing balance. See Wetzel and DGI flow sheet forms. Pt scored 45/56 on Wetzel Balance Scale Assessment. Score of < 45/56 indicating increased risk of falls. Pt scored 13/24 on DGI indicating pt is at increased risk for falls.    Sensory Examination   Sensory Perception Comments Pt denies N/T in B LE/UE currently, but intermittently has numbness in B LE. Pt reporting history of LBP with radiating symptoms into B LE.    General Therapy Interventions   Planned Therapy Interventions balance training;bed mobility training;gait training;neuromuscular re-education;strengthening;transfer training;risk factor education;home program guidelines;progressive activity/exercise   Clinical Impression   Criteria for Skilled Therapeutic Intervention yes, treatment indicated   PT Diagnosis Impaired functional mobility   Influenced by the following impairments Impaired dynamic standing balance, B LE strength, endurance   Functional limitations due to impairments Decreased IND with functional mobility and decreased participation in the community   Clinical Presentation Stable/Uncomplicated   Clinical Presentation Rationale PLOF vs current function, stabilizing medical status, good famly support, PMHx   Clinical Decision Making (Complexity) Low complexity   Therapy Frequency` other (see comments)  (4x/week)   Predicted Duration of Therapy Intervention (days/wks) 1 week   Anticipated Equipment Needs at Discharge (None anticipated at this time)   Anticipated Discharge Disposition Home with Assist;Home with Outpatient Therapy   Risk & Benefits of therapy have been explained Yes   Patient, Family & other  "staff in agreement with plan of care Yes   [facility] TM \"6 Clicks\"   2016, Trustees of [facility], under license to [employer].  All rights reserved.   6 Clicks Short Forms Basic Mobility Inpatient Short Form   [facility]  \"6 Clicks\" V.2 Basic Mobility Inpatient Short Form   1. Turning from your back to your side while in a flat bed without using bedrails? 4 - None   2. Moving from lying on your back to sitting on the side of a flat bed without using bedrails? 4 - None   3. Moving to and from a bed to a chair (including a wheelchair)? 4 - None   4. Standing up from a chair using your arms (e.g., wheelchair, or bedside chair)? 4 - None   5. To walk in hospital room? 3 - A Little   6. Climbing 3-5 steps with a railing? 3 - A Little   Basic Mobility Raw Score (Score out of 24.Lower scores equate to lower levels of function) 22   Total Evaluation Time   Total Evaluation Time (Minutes) 16     "

## 2017-02-17 NOTE — PROGRESS NOTES
[facility]  HEPATOLOGY PROGRESS NOTE  Scott Casale [id number]     SUBJECTIVE:  Taking Lactulose with increased stools yesterday that decreased today. After working with PT and walking, he feels he will have BM's this morning. He denies fevers, change in mentation.     OBJECTIVE:  VS: /61  Pulse 74  Temp 97.1  F (36.2  C) (Oral)  Resp 16  SpO2 100%   General: In no acute distress, mild facial muscle wasting  Neuro: AOx3, trace asterixis, trace tremors  HEENT:  No scleral icterus  CV: Skin warm and dry  Lungs: Respirations even and nonlabored on room air  Abd: Mildly distended, nontended, +tympanny. +bs  Extrem: No peripehral edema  Skin: No jaundice    REVIEW OF LABORATORY, PATHOLOGY AND IMAGING RESULTS:  BMP  Recent Labs  Lab 02/17/17  0830 02/16/17  0728 02/15/17  2015 02/14/17  0610    141 141 142   POTASSIUM 4.1 4.0 4.2 4.3   CHLORIDE 109 112* 112* 111*   ARACELI 8.1* 8.2* 8.3* 8.3*   CO2 21 22 23 23   BUN 21 22 22 30   CR 1.00 1.01 1.09 1.27*   * 99 105* 113*     CBC  Recent Labs  Lab 02/17/17  0830 02/16/17  0728 02/15/17  2015 02/14/17  0610   WBC 4.7 3.5* 4.3 5.7   RBC 3.04* 2.78* 2.88* 3.15*   HGB 10.6* 9.6* 10.3* 11.0*   HCT 31.4* 28.8* 29.9* 32.3*   * 104* 104* 103*   MCH 34.9* 34.5* 35.8* 34.9*   MCHC 33.8 33.3 34.4 34.1   RDW 15.0 14.4 14.5 13.6   PLT 54* 45* 47* 57*     INR  Recent Labs  Lab 02/16/17  0728 02/15/17  2034   INR 1.53* 1.49*     LFTs  Recent Labs  Lab 02/17/17  0830 02/16/17  0728 02/15/17  2015 02/14/17  0610   ALKPHOS 60 47 55 47   AST 50* 44 46* 42   ALT 32 28 30 31   BILITOTAL 3.6* 4.4* 4.3* 4.5*   PROTTOTAL 6.4* 5.8* 6.2* 6.3*   ALBUMIN 2.2* 2.0* 2.2* 2.1*      PANCNo lab results found in last 7 days.   MELD-Na score: 16 at 2/17/2017  8:30 AM  MELD score: 16 at 2/17/2017  8:30 AM  Calculated from:  Serum Creatinine: 1.00 mg/dL at 2/17/2017  8:30 AM  Serum Sodium: 142 mmol/L (Rounded to 137) at 2/17/2017  8:30 AM  Total Bilirubin: 3.6 mg/dL at 2/17/2017  8:30  AM  INR(ratio): 1.53 at 2/16/2017  7:28 AM  Age: 61 years      Imaging Results:  US abd with doppler2/16/17  Impression:   Patent TIPS with normal Doppler assessment of the liver vasculature.    IMPRESSION:  Scott Casale is a 61 year old male with a history of HCV/ETOH cirrhosis complicated by refractory ascites s/p TIPS 2016, HE, depression, who was admitted with worsening HE and mild KAREN. He did have evidence of hemolysis with high indirect bili and worsening anemia which is likely related to ribavirin dose. His TIPS is patent and no ascites. He is being treated for his HCV with Harvoni and Ribavirin.     RECOMMENDATIONS:  1. Hepatic encephalopathy  - Improved with increased lactulose. Continue rifaximin. Discussed titrating lactulose at home and goal of 3-5 BM's per day.   - Worsening HE may be combination of slight dehydration and hemolysis.     2. HCV cirrhosis  3. HCV genotype 1a  4. Anemia, likely drug induced hemolysis  - slight improvement in hemoglobin today  - Continue Harvoni and advised Ribavirin 1 tab (200 mg) BID.   - US without evidence of HCC    5. Ascites s/p TIPS  6. Anasarca  - holding diuretics for now.   - may need complression stockings in the future if worsening edema.   - no ascites for para.     7. Hematuria  - due to history of testicular cancer, consider repeat follow up with Urology as outpatient.     He does have follow up apt with Dr. Higginbotham on 3/20. Monitoring for anemia. He does have apt with primary care on 3/9.     Patient was discussed with attending physician, Dr. Johnston.      Thank you for the opportunity to be involved with  Scott Casale care. Please call with any questions or concerns.    Mora Hernandez, APRN, CNP  [phone]

## 2017-02-17 NOTE — PLAN OF CARE
Problem: Goal Outcome Summary  Goal: Goal Outcome Summary  Outcome: Improving  Neuro's intact, alert/oriented x4, using call light appropriately. Up with SBA, used urinal at bedside. Denies pain, nausea. Zantac given for heartburn. Voiding good amts. One small BM at end of shift, not saved or seen by RN. New PIV placed for MIVF. In good spirits, hoping to discharge today.

## 2017-02-17 NOTE — PLAN OF CARE
Problem: Goal Outcome Summary  Goal: Goal Outcome Summary  Outcome: Adequate for Discharge Date Met:  02/17/17  Tolerating regular diet, alert and oriented x4 today, c/o generalized weakness, but improving.Voiding spont. Patient discharge home with wife.

## 2017-02-17 NOTE — PROGRESS NOTES
"[facility]  \"Hello, my name is Chloe Wei , and I am calling from the [facility].  I want to check in and see how you are doing, after leaving the hospital.  You may also receive a call from your Care Coordinator (care team), but I want to make sure you don t have any urgent needs.  I have a couple questions to review with you:     Post-Discharge Outreach                                                    Scott Casale is a 61 year old male     Follow-up Appointments           Adult [facility] Follow-up and recommended labs and tests       Follow up with primary care provider, Carlos Duenas MD, within 7 days for hospital follow- up. The following labs/tests are recommended: weekly CBC, CMP.      Follow with Dr Higginbotham in 1 month *Appointment scheduled on 3/20/17, 3:00PM labs 4:00PM with Dr. Higginbotham*     Appointments on [place] and/or [facility] (with [facility] or [facility] provider or service). Call [phone] if you haven't heard regarding these appointments within 7 days of discharge.              Follow Up and recommended labs and tests       Weekly CBC and BMP                       Your next 10 appointments already scheduled            Mar 09, 2017 9:25 AM CST   (Arrive by 9:10 AM)   Return Visit with Greg Concepcion MD   [facility] ([facility])     [address] [place] [address]  4th Floor  [facility] [id number] [phone]                  Mar 20, 2017 3:00 PM CDT   Lab with  LAB   [facility] ([facility])     [address]  1st Floor  [facility] [id number] [phone]                  Mar 20, 2017 4:00 PM CDT   (Arrive by 3:45 PM)   Return General Liver with Warren Higginbotham MD   [facility] ([facility])     [address]  3rd Floor  [facility] [id number] [phone]                  Mar 22, 2017 6:00 PM CDT   (Arrive by 5:45 PM)   New Patient Visit " with GILMAR Saleem CNP   [facility] ([facility])     [address]  4th Floor  [facility] [id number] [phone]                  Apr 11, 2017 9:00 AM CDT   Lab with UC LAB   [facility] ([facility])              Care Team:    Patient Care Team       Relationship Specialty Notifications Start End    Carlos Duenas MD, MD PCP - General Internal Medicine  6/15/16     Phone: [phone] Fax: [phone]         [facility] [address]    [place] [facility] [address]    Warren Higginbotham MD MD Gastroenterology  9/16/16     Comment:  referring to IR    Phone: [phone] Fax: [phone]         [address] [facility] [address]    Micki Peter MD MD Vascular and Interventional Radiology  9/16/16     Phone: [phone] Pager: [phone] Fax: [phone]        [address] [facility] [address]    Greg Concepcion MD Resident Student in organized health care education/training program  12/5/16     Phone: [phone] Fax: [phone]         [facility] [address] [facility] [address]    Caitlin Tipton PA-C Physician Assistant Physician Assistant  12/21/16     Phone: [phone] Fax: [phone]         [facility] [address] [facility] [address] [facility] [address] [facility] [address]    Caitlyn Wyatt NP Nurse Practitioner Nurse Practitioner  12/21/16     Phone: [phone] Fax: [phone]         [address] [facility] [address]            Transition of Care Review                                                      Patient was called three times and no answer so post 24 hr DC follow up calls will be closed out               Plan                                                      Thanks for your time.  Your Care Coordinator may follow-up within the next couple days.  In the meantime if you have questions, concerns  or problems call your care team.        Chloe Wei

## 2017-02-17 NOTE — DISCHARGE INSTRUCTIONS
Please follow with your primary provider in 1 week  Please follow with Dr Higginbotham in 1month  Please hold furosemide and spironolactone until you see your primary care provider      We have Scheduled an appointment for you on Wednesday 2/22 at 1:00 pm with Dr. Triana at [address] [address]  Phone #: [phone]

## 2017-02-17 NOTE — PROGRESS NOTES
Care Coordinator Progress Note     Admission Date/Time:  2/15/2017  Attending MD:  Edelmira Scott DO     Data  Chart reviewed, discussed with interdisciplinary team.   Patient was admitted for:    Chronic hepatitis C without hepatic coma (H)  Hepatic encephalopathy (H)  S/P TIPS (transjugular intrahepatic portosystemic shunt).    Concerns with insurance coverage for discharge needs: None.  Current Living Situation: Patient lives with spouse.  Support System: Supportive  Services Involved: Resources List: Home Care  Transportation: Family or Friend will provide  Barriers to Discharge:     Coordination of Care and Referrals: Provided patient/family with options for Home Care.        Assessment  Per therapy recommendations, patient would benefit from having home therapies post discharge. Met with patient at bedside to discuss discharge planning. Patient agreeable to homecare. After given choice, referral placed to [facility] for RN, PT, and OT services. Discharge orders updated. Patient medically cleared to discharge home today. Wife to provide transport.      Plan  Anticipated Discharge Date:  today  Anticipated Discharge Plan:  Home with home care    Kenisha Wray RN

## 2017-02-18 LAB — METHYLMALONATE SERPL-SCNC: 0.2

## 2017-02-18 NOTE — DISCHARGE SUMMARY
Medicine Discharge Summary  Scott Casale MRN: [id number]  1955  Primary care provider: Carlos Duenas MD  ___________________________________          Date of Admission:  2/15/2017  Date of Discharge:  2/17/2017   Admitting Physician:  Edelmira Scott DO  Discharge Physician:  No att. providers found   Discharging Service:  Internal Medicine, [place] [address]     Primary Provider: Carlos Duenas MD         Reason for Admission:   Altered mental status  Hyperammoninemia  Hepatic encephalopathy  Liver cirrhosis 2/2 hepatitis C, past EtOH abuse s/p TIPS 2015  Pancytopenia            Discharge Diagnosis:   Hepatic encephalopathy         Procedures & Significant Findings:   US abdomen or pelvis doppler complete:  Impression:   Patent TIPS with normal Doppler assessment of the liver vasculature.                    Consultations:   Hepatology         Hospital Course by Problem:    Scott Casale is a 61 year old male with history of cirrhosis 2/2 hepatitis C (on Harvoni and ribavirin) and past EtOH abuse, hepatic encephalopathy (on lactulose and rifaxamin) s/p TIPS 2015 due to refractory ascites, thrombocytopenia, ankylosing spondylitis, UC, hx of testicular cancer, GERD, and depression, who was transferred for worsening hepatic encephalopathy, started on lactulose and improved HE      #)AMS- Likely 2/2 hepatic encephalopathy. Patient with hx of liver cirrhosis 2/2 hepatitis C, past EtOH abuse s/p TIPS in 2015. Transferred for worsening AMS, but here patient is A+Ox3 and talking appropriately at the time of encounter.     BMs 3-4 loose stools/day in hospital. No neuro deficits. No recent narcotic use. No concern for infection at this time. No evidence of SBP, abdomen soft, non-distended, mildly TTP which is chronic since he has had his TIPS placed. No concern for GI bleed. TIPS is patent based on abdominal U/S w/ doppler. NH3 improved at 90 from  180s.  -continued with lactulose 30ml PO TID  -rifaximin 550mg PO BID    Mentation improved at the time of discharge; encephalopathy likely aggravated by nausea and vomiting with dehydration  Also furosemide and spironolactone held on discharge and plan to hold this until he follows primary care      #)Liver cirrhosis 2/2 hepatitis C, past EtOH abuse s/p TIPS 2015  #)Hyperammonenemia  MELD score 18. Patient here is A+Ox3, mild asterixis, slurring of speech, worsening NH3. No obvious trigger for hepatic encephalopathy yet, as above. In d/c summary mentioned they halved dose of ribavirin, which have changed today.    -Ribavirin changed to 200 mg bid per GI, continue pta Harvoni  -Tx hepatic encephalopathy as above  -diuretics held on discharge       #)Pancytopenia - Seen today, prior WBC count WNL. Macrocytic anemia. Leukopenia, most likely 2/2 ribavirin. Thrombocytopenia is chronic likely 2/2 liver disease.  -could be related to chronic liver disease, TIPS procedure or hepatitis C  -could be related to vitamin b12 deficiency or folate deficiency; this was sent on discharge with pending result.  -will need further evaluation as an outpatient; consider bone marrow biopsy as an outpatient if ongoing issue.      Physical Exam on day of Discharge:  Blood pressure 121/61, pulse 74, temperature 97.1  F (36.2  C), temperature source Oral, resp. rate 16, SpO2 100 %.   Physical exam:  General: Pleasant male  HEENT: No Scleral icterus. NCAT, MMM. PERRL, EOMI.  Cardiac: Normal rate, regular rhythm. No m/r/g. Normal S1, S2.  Pulm: CTAB, no wheezes, or crackles. Normal respiratory effort  Abd: Soft, distended, mild TTP over old well-healed scars, +hepatomegaly  Skin: +mild jaundice,   Extremities: No LE edema, no asterixis  Joints: No inflammation noted on joints  Neuro: A&Ox3, no focal deficits, strength 5/5 b/l, sensation intact, CN II-XII intact       Lines/Tubes:  none         Pending Results:   Vitamin b12  folate          Discharge Medications:     Discharge Medication List as of 2/17/2017 10:25 AM      CONTINUE these medications which have CHANGED    Details   !! lactulose (CHRONULAC) 10 GM/15ML solution Take 30 mLs by mouth 3 times daily, Disp-500 mL, R-0, Fax       !! - Potential duplicate medications found. Please discuss with provider.      CONTINUE these medications which have NOT CHANGED    Details   rifaximin (XIFAXAN) 550 MG TABS tablet Take 1 tablet (550 mg) by mouth 2 times daily, Disp-60 tablet, R-11, E-Prescribe      ribavirin 200 MG TABS Take 2 tablets (400 mg) by mouth 2 times daily, Disp-150 tablet, R-2, E-PrescribeTake 3 tablets and 2 PO in PM      gabapentin (NEURONTIN) 100 MG capsule Take 2 capsules (200 mg) by mouth At Bedtime, Disp-90 capsule, R-1, E-Prescribe      !! lactulose 20 GM/30ML SOLN Take 30 mLs by mouth 3 times daily, Disp-1892 mL, R-3, E-Prescribe      ledipasvir-sofosbuvir (LEDIPASVIR-SOFOSBUVIR)  MG per tablet Take 1 tablet by mouth daily, Disp-28 tablet, R-2, E-Prescribe      vitamin D (ERGOCALCIFEROL) 57310 UNIT capsule Take 50,000 Units by mouth Twice weekly Mon and Thurs, Historical      ondansetron (ZOFRAN) 4 MG tablet Take 1 tablet (4 mg) by mouth every 8 hours as needed for nausea, Historical      traZODone (DESYREL) 100 MG tablet Take 1 tablet (100 mg) by mouth nightly as needed for sleep, Disp-30 tablet, R-1, E-Prescribe       !! - Potential duplicate medications found. Please discuss with provider.      STOP taking these medications       furosemide (LASIX) 40 MG tablet Comments:   Reason for Stopping:         spironolactone (ALDACTONE) 100 MG tablet Comments:   Reason for Stopping:                    Discharge Instructions and Follow-Up:     Discharge Procedure Orders  CBC with platelets differential   Standing Status: Future  Standing Exp. Date: 04/17/17   Order Comments: Last Lab Result: Hemoglobin (g/dL)      Date                     Value                02/16/2017                9.6 (L)          ----------     Comprehensive metabolic panel   Standing Status: Future  Standing Exp. Date: 04/17/17     CBC with platelets   Standing Status: Future  Standing Exp. Date: 04/09/17   Order Comments: Last Lab Result: Hemoglobin (g/dL)      Date                     Value                02/17/2017               10.6 (L)         ----------     Comprehensive metabolic panel   Standing Status: Future  Standing Exp. Date: 04/18/17     Home care nursing referral   Referral Type: Home Health Therapies & Aides     Home Care PT Referral for Hospital Discharge   Referral Type: Home Health Therapies & Aides     Home Care OT Referral for Hospital Discharge     Reason for your hospital stay   Order Comments: You were admitted with hepatic encephalopathy; likely related to dehydration and nausea and vomiting  This has resolved with treatment but make sure you follow with Dr Higginbotham in 1 month and primary care physician in 2 weeks     Adult [facility] Follow-up and recommended labs and tests   Order Comments: Follow up with primary care provider, Carlos Duenas MD, within 7 days for hospital follow- up.  The following labs/tests are recommended: weekly CBC, CMP.      Follow with Dr Higginbotham in 1 month *Appointment scheduled on 3/20/17, 3:00PM labs 4:00PM with Dr. Higginbotham*    Appointments on [place] and/or [facility] (with [facility] or [facility] provider or service). Call [phone] if you haven't heard regarding these appointments within 7 days of discharge.     Follow Up and recommended labs and tests   Order Comments: Weekly CBC and BMP     Activity   Order Comments: Your activity upon discharge: activity as tolerated   Order Specific Question Answer Comments   Is discharge order? Yes      Discharge Instructions   Order Comments: Follow up with Dr Higginbotham in 1month  Follow up with primary care physician in 2 weeks  You will need weekly lab (CBC, CMP) follow up  Please hold your diuretics furosemide and spironolactone until you  see your primary care physician     Full Code     Diet   Order Comments: Follow this diet upon discharge: Orders Placed This Encounter     Advance Diet as Tolerated: Regular Diet Adult   Order Specific Question Answer Comments   Is discharge order? Yes             IV access none         Discharge Disposition:   home         Condition on Discharge:   Discharge condition: Stable   Code status on discharge: Full Code        Date of service: 2/17/2017    The patient was discussed with Dr. Scott.    Seth Islas  P; [phone]

## 2017-02-18 NOTE — PLAN OF CARE
Physical Therapy Discharge Summary    Reason for therapy discharge:    Discharged to home.    Progress towards therapy goal(s). See goals on Care Plan in Psychiatric electronic health record for goal details.  Goals partially met.  Barriers to achieving goals:   discharge from facility.    Therapy recommendation(s):    Continued therapy is recommended.  Rationale/Recommendations:  to progress strength, endurance, and balance to maximize safety with functional mobility and ADLs.

## 2017-02-20 ENCOUNTER — TELEPHONE (OUTPATIENT)
Dept: GASTROENTEROLOGY | Facility: CLINIC | Age: 62
End: 2017-02-20

## 2017-02-20 DIAGNOSIS — G89.29 CHRONIC LOW BACK PAIN WITH SCIATICA, SCIATICA LATERALITY UNSPECIFIED, UNSPECIFIED BACK PAIN LATERALITY: ICD-10-CM

## 2017-02-20 DIAGNOSIS — K70.31 ALCOHOLIC CIRRHOSIS OF LIVER WITH ASCITES (H): Primary | Chronic | ICD-10-CM

## 2017-02-20 DIAGNOSIS — M54.40 CHRONIC LOW BACK PAIN WITH SCIATICA, SCIATICA LATERALITY UNSPECIFIED, UNSPECIFIED BACK PAIN LATERALITY: ICD-10-CM

## 2017-02-20 NOTE — TELEPHONE ENCOUNTER
S: Client with different doses/instructions at home  B: Client with hepatic encephalopathy, discrepancy in: Gabapentin (ordered 2 - 100 mg tabs at hs, but client has 300 mg tabs in home) also: Ribavirin 200 mg (ordered 2 tabs BID, but client states he was told to take 1 tab BID due to n/v in hospital).   A: Please clarify above meds and if client needs CBC/BMP/ammonia level drawn at home   R: Please respond to [facility]  RN at [phone] or in EPIC

## 2017-02-21 DIAGNOSIS — B18.2 CHRONIC HEPATITIS C WITHOUT HEPATIC COMA (H): Primary | Chronic | ICD-10-CM

## 2017-02-22 ENCOUNTER — HOSPITAL ENCOUNTER (INPATIENT)
Facility: CLINIC | Age: 62
LOS: 2 days | Discharge: HOME HEALTH | DRG: 641 | End: 2017-02-24
Attending: INTERNAL MEDICINE | Admitting: INTERNAL MEDICINE
Payer: MEDICARE

## 2017-02-22 ENCOUNTER — DOCUMENTATION ONLY (OUTPATIENT)
Dept: GASTROENTEROLOGY | Facility: CLINIC | Age: 62
End: 2017-02-22

## 2017-02-22 ENCOUNTER — APPOINTMENT (OUTPATIENT)
Dept: ULTRASOUND IMAGING | Facility: CLINIC | Age: 62
DRG: 641 | End: 2017-02-22
Attending: INTERNAL MEDICINE
Payer: MEDICARE

## 2017-02-22 ENCOUNTER — HOSPITAL ENCOUNTER (EMERGENCY)
Facility: CLINIC | Age: 62
Discharge: ANOTHER HOSPITAL | End: 2017-02-22
Attending: EMERGENCY MEDICINE | Admitting: EMERGENCY MEDICINE
Payer: MEDICARE

## 2017-02-22 VITALS
TEMPERATURE: 98 F | WEIGHT: 204.37 LBS | SYSTOLIC BLOOD PRESSURE: 120 MMHG | DIASTOLIC BLOOD PRESSURE: 80 MMHG | BODY MASS INDEX: 28.5 KG/M2 | OXYGEN SATURATION: 100 % | RESPIRATION RATE: 16 BRPM

## 2017-02-22 DIAGNOSIS — E87.6 HYPOKALEMIA: ICD-10-CM

## 2017-02-22 DIAGNOSIS — B18.2 CHRONIC HEPATITIS C WITHOUT HEPATIC COMA (H): Primary | ICD-10-CM

## 2017-02-22 DIAGNOSIS — B18.2 CHRONIC HEPATITIS C WITHOUT HEPATIC COMA (H): ICD-10-CM

## 2017-02-22 DIAGNOSIS — K59.00 CONSTIPATION, UNSPECIFIED CONSTIPATION TYPE: ICD-10-CM

## 2017-02-22 DIAGNOSIS — R60.0 BILATERAL LOWER EXTREMITY EDEMA: ICD-10-CM

## 2017-02-22 DIAGNOSIS — K70.31 ALCOHOLIC CIRRHOSIS OF LIVER WITH ASCITES (H): Primary | Chronic | ICD-10-CM

## 2017-02-22 PROBLEM — E87.70 HYPERVOLEMIA: Status: ACTIVE | Noted: 2017-02-22

## 2017-02-22 LAB
ALBUMIN SERPL-MCNC: 2 G/DL (ref 3.4–5)
ALP SERPL-CCNC: 85 U/L (ref 40–150)
ALT SERPL W P-5'-P-CCNC: 29 U/L (ref 0–70)
AMMONIA PLAS-SCNC: 84 UMOL/L (ref 10–50)
ANION GAP SERPL CALCULATED.3IONS-SCNC: 8 MMOL/L (ref 3–14)
AST SERPL W P-5'-P-CCNC: 46 U/L (ref 0–45)
BACTERIA SPEC CULT: NO GROWTH
BACTERIA SPEC CULT: NO GROWTH
BASOPHILS # BLD AUTO: 0 10E9/L (ref 0–0.2)
BASOPHILS NFR BLD AUTO: 0.6 %
BILIRUB SERPL-MCNC: 1.9 MG/DL (ref 0.2–1.3)
BUN SERPL-MCNC: 20 MG/DL (ref 7–30)
CALCIUM SERPL-MCNC: 7.6 MG/DL (ref 8.5–10.1)
CHLORIDE SERPL-SCNC: 111 MMOL/L (ref 94–109)
CO2 SERPL-SCNC: 23 MMOL/L (ref 20–32)
CREAT SERPL-MCNC: 1.15 MG/DL (ref 0.66–1.25)
DIFFERENTIAL METHOD BLD: ABNORMAL
EOSINOPHIL # BLD AUTO: 0.2 10E9/L (ref 0–0.7)
EOSINOPHIL NFR BLD AUTO: 4.2 %
ERYTHROCYTE [DISTWIDTH] IN BLOOD BY AUTOMATED COUNT: 15.9 % (ref 10–15)
GFR SERPL CREATININE-BSD FRML MDRD: 64 ML/MIN/1.7M2
GLUCOSE SERPL-MCNC: 92 MG/DL (ref 70–99)
HCT VFR BLD AUTO: 27.9 % (ref 40–53)
HGB BLD-MCNC: 9.3 G/DL (ref 13.3–17.7)
IMM GRANULOCYTES # BLD: 0 10E9/L (ref 0–0.4)
IMM GRANULOCYTES NFR BLD: 0 %
LYMPHOCYTES # BLD AUTO: 0.6 10E9/L (ref 0.8–5.3)
LYMPHOCYTES NFR BLD AUTO: 16.4 %
MCH RBC QN AUTO: 35.9 PG (ref 26.5–33)
MCHC RBC AUTO-ENTMCNC: 33.3 G/DL (ref 31.5–36.5)
MCV RBC AUTO: 108 FL (ref 78–100)
MICRO REPORT STATUS: NORMAL
MICRO REPORT STATUS: NORMAL
MONOCYTES # BLD AUTO: 0.5 10E9/L (ref 0–1.3)
MONOCYTES NFR BLD AUTO: 15 %
NEUTROPHILS # BLD AUTO: 2.3 10E9/L (ref 1.6–8.3)
NEUTROPHILS NFR BLD AUTO: 63.8 %
NT-PROBNP SERPL-MCNC: 1021 PG/ML (ref 0–900)
PLATELET # BLD AUTO: 48 10E9/L (ref 150–450)
POTASSIUM SERPL-SCNC: 3.7 MMOL/L (ref 3.4–5.3)
PROT SERPL-MCNC: 5.7 G/DL (ref 6.8–8.8)
RBC # BLD AUTO: 2.59 10E12/L (ref 4.4–5.9)
SODIUM SERPL-SCNC: 142 MMOL/L (ref 133–144)
SPECIMEN SOURCE: NORMAL
SPECIMEN SOURCE: NORMAL
WBC # BLD AUTO: 3.5 10E9/L (ref 4–11)

## 2017-02-22 PROCEDURE — 99285 EMERGENCY DEPT VISIT HI MDM: CPT | Mod: 25

## 2017-02-22 PROCEDURE — 83880 ASSAY OF NATRIURETIC PEPTIDE: CPT | Performed by: EMERGENCY MEDICINE

## 2017-02-22 PROCEDURE — 82140 ASSAY OF AMMONIA: CPT | Performed by: EMERGENCY MEDICINE

## 2017-02-22 PROCEDURE — 12000001 ZZH R&B MED SURG/OB UMMC

## 2017-02-22 PROCEDURE — 80053 COMPREHEN METABOLIC PANEL: CPT | Performed by: EMERGENCY MEDICINE

## 2017-02-22 PROCEDURE — 25000128 H RX IP 250 OP 636: Performed by: EMERGENCY MEDICINE

## 2017-02-22 PROCEDURE — 85025 COMPLETE CBC W/AUTO DIFF WBC: CPT | Performed by: EMERGENCY MEDICINE

## 2017-02-22 PROCEDURE — 96374 THER/PROPH/DIAG INJ IV PUSH: CPT

## 2017-02-22 PROCEDURE — 99285 EMERGENCY DEPT VISIT HI MDM: CPT | Performed by: EMERGENCY MEDICINE

## 2017-02-22 PROCEDURE — 93970 EXTREMITY STUDY: CPT

## 2017-02-22 RX ORDER — POTASSIUM CHLORIDE 7.45 MG/ML
10 INJECTION INTRAVENOUS
Status: DISCONTINUED | OUTPATIENT
Start: 2017-02-22 | End: 2017-02-24 | Stop reason: HOSPADM

## 2017-02-22 RX ORDER — ACETAMINOPHEN 325 MG/1
650 TABLET ORAL EVERY 4 HOURS PRN
Status: DISCONTINUED | OUTPATIENT
Start: 2017-02-22 | End: 2017-02-22

## 2017-02-22 RX ORDER — RIBAVIRIN 200 MG/1
TABLET, FILM COATED ORAL
COMMUNITY
Start: 2017-02-22 | End: 2017-02-22

## 2017-02-22 RX ORDER — LEDIPASVIR AND SOFOSBUVIR 90; 400 MG/1; MG/1
1 TABLET, FILM COATED ORAL DAILY
Status: DISCONTINUED | OUTPATIENT
Start: 2017-02-23 | End: 2017-02-24 | Stop reason: HOSPADM

## 2017-02-22 RX ORDER — GABAPENTIN 100 MG/1
200 CAPSULE ORAL AT BEDTIME
Status: DISCONTINUED | OUTPATIENT
Start: 2017-02-22 | End: 2017-02-24 | Stop reason: HOSPADM

## 2017-02-22 RX ORDER — SPIRONOLACTONE 50 MG/1
50 TABLET, FILM COATED ORAL DAILY
Status: DISCONTINUED | OUTPATIENT
Start: 2017-02-23 | End: 2017-02-24 | Stop reason: HOSPADM

## 2017-02-22 RX ORDER — RIBAVIRIN 200 MG/1
200 TABLET, FILM COATED ORAL 2 TIMES DAILY
Status: DISCONTINUED | OUTPATIENT
Start: 2017-02-22 | End: 2017-02-24 | Stop reason: HOSPADM

## 2017-02-22 RX ORDER — ONDANSETRON 4 MG/1
4 TABLET, ORALLY DISINTEGRATING ORAL EVERY 6 HOURS PRN
Status: DISCONTINUED | OUTPATIENT
Start: 2017-02-22 | End: 2017-02-24 | Stop reason: HOSPADM

## 2017-02-22 RX ORDER — LACTULOSE 10 G/15ML
30 SOLUTION ORAL 3 TIMES DAILY
Status: DISCONTINUED | OUTPATIENT
Start: 2017-02-22 | End: 2017-02-24 | Stop reason: HOSPADM

## 2017-02-22 RX ORDER — POTASSIUM CHLORIDE 750 MG/1
20-40 TABLET, EXTENDED RELEASE ORAL
Status: DISCONTINUED | OUTPATIENT
Start: 2017-02-22 | End: 2017-02-24 | Stop reason: HOSPADM

## 2017-02-22 RX ORDER — AMOXICILLIN 250 MG
1-2 CAPSULE ORAL 2 TIMES DAILY PRN
Status: DISCONTINUED | OUTPATIENT
Start: 2017-02-22 | End: 2017-02-24 | Stop reason: HOSPADM

## 2017-02-22 RX ORDER — FUROSEMIDE 10 MG/ML
40 INJECTION INTRAMUSCULAR; INTRAVENOUS ONCE
Status: COMPLETED | OUTPATIENT
Start: 2017-02-22 | End: 2017-02-22

## 2017-02-22 RX ORDER — FUROSEMIDE 10 MG/ML
40 INJECTION INTRAMUSCULAR; INTRAVENOUS ONCE
Status: COMPLETED | OUTPATIENT
Start: 2017-02-22 | End: 2017-02-23

## 2017-02-22 RX ORDER — LIDOCAINE 40 MG/G
CREAM TOPICAL
Status: DISCONTINUED | OUTPATIENT
Start: 2017-02-22 | End: 2017-02-24 | Stop reason: HOSPADM

## 2017-02-22 RX ORDER — POTASSIUM CHLORIDE 1.5 G/1.58G
20-40 POWDER, FOR SOLUTION ORAL
Status: DISCONTINUED | OUTPATIENT
Start: 2017-02-22 | End: 2017-02-24 | Stop reason: HOSPADM

## 2017-02-22 RX ORDER — ONDANSETRON 4 MG/1
4 TABLET, FILM COATED ORAL EVERY 8 HOURS PRN
Status: DISCONTINUED | OUTPATIENT
Start: 2017-02-22 | End: 2017-02-22

## 2017-02-22 RX ORDER — RIBAVIRIN 200 MG/1
TABLET, FILM COATED ORAL
Qty: 60 TABLET | Refills: 1 | Status: SHIPPED | OUTPATIENT
Start: 2017-02-22 | End: 2017-05-11

## 2017-02-22 RX ORDER — MAGNESIUM SULFATE HEPTAHYDRATE 40 MG/ML
4 INJECTION, SOLUTION INTRAVENOUS EVERY 4 HOURS PRN
Status: DISCONTINUED | OUTPATIENT
Start: 2017-02-22 | End: 2017-02-24 | Stop reason: HOSPADM

## 2017-02-22 RX ORDER — ONDANSETRON 2 MG/ML
4 INJECTION INTRAMUSCULAR; INTRAVENOUS EVERY 6 HOURS PRN
Status: DISCONTINUED | OUTPATIENT
Start: 2017-02-22 | End: 2017-02-24 | Stop reason: HOSPADM

## 2017-02-22 RX ORDER — GABAPENTIN 100 MG/1
200 CAPSULE ORAL AT BEDTIME
Qty: 60 CAPSULE | Refills: 3 | Status: SHIPPED | OUTPATIENT
Start: 2017-02-22 | End: 2018-07-20

## 2017-02-22 RX ORDER — NALOXONE HYDROCHLORIDE 0.4 MG/ML
.1-.4 INJECTION, SOLUTION INTRAMUSCULAR; INTRAVENOUS; SUBCUTANEOUS
Status: DISCONTINUED | OUTPATIENT
Start: 2017-02-22 | End: 2017-02-24 | Stop reason: HOSPADM

## 2017-02-22 RX ORDER — POTASSIUM CHLORIDE 29.8 MG/ML
20 INJECTION INTRAVENOUS
Status: DISCONTINUED | OUTPATIENT
Start: 2017-02-22 | End: 2017-02-24 | Stop reason: HOSPADM

## 2017-02-22 RX ADMIN — FUROSEMIDE 40 MG: 10 INJECTION, SOLUTION INTRAVENOUS at 16:07

## 2017-02-22 NOTE — TELEPHONE ENCOUNTER
According to discharge summary, patient is to continue on ribavirin: 2 tablets in the AM with food (400mg), and 2 tablets in the PM with food (400mg).     Dr. Higginbotham - please confirm if you'd like him to stay at this dose, or decrease. Thank you!     Chloe - can you please follow up regarding other medications/instructions?

## 2017-02-22 NOTE — TELEPHONE ENCOUNTER
Shanell (nurse, [facility]) called re: message below. Waiting on Dr. Higginbotham's clarification. Please advise. Can be reached at [phone].

## 2017-02-22 NOTE — ED NOTES
Pt with renal failure and on harboni. Recent hosp. For increased ammonia levels and was transferred to [facility] m. D/c Saturday and has had 14 lb weight gain over 4 days and had is lasix while here. Pt feels achy tight skin and generalized edema.  +3 or more . Pt A&O and taking lactulose as prescribed

## 2017-02-22 NOTE — ED NOTES
Bed: IN03  Expected date:   Expected time:   Means of arrival:   Comments:  Scott Casale, coming in pvt car. Liver failure, 15 lb weight gain, call from Home Care

## 2017-02-22 NOTE — TELEPHONE ENCOUNTER
Per Dr. Higginbotham, patient is to decrease ribavirin dose to 200mg BID.     I called and spoke with Shanell (nurse, [facility]) and updated her with ribavirin dose. I will update the Rx in Epic to reflect the new dose. Shanell states she is still needing confirmation on gabapentin dose. Also, Shanell reports patient is on his way to the hospital now to be admitted due to a 15lb weight gain. Lasix had been D/C'd during last admission. I informed Shanell that the patient has a follow up hospital appointment/lab with Caitlyn Wyatt NP on Friday, 02/24/17. Shanell plans to update me at the end of the day with plan of care. Patient just started week 4 of HCV treatment, and is due for labs. Per hospital discharge summary, patient is to have weekly CBC/ammonia/BMP labs. I will place orders for these labs as standing for the time being.     Message routed to Caitlyn Wyatt NP and Dr. Warren Higginbotham.     Ciara Lopez LPN  [facility]         Message  Received: Today       Warren Higginbotham MD Nagel, Brooke, LPN       Caller: Unspecified (2 days ago, 11:07 AM)                      He need to be on the lower dose of 200 mg BID      Clarified Gabapentin dose with pharmacy and new RX sent to Asher Roman RN 2:54 PM on 2/22/2017.

## 2017-02-22 NOTE — ED PROVIDER NOTES
History     Chief Complaint   Patient presents with     Leg Swelling     Pt recently hospitalized for high ammonia level, transferred to the .  Pt discharged 5 days ago. Since then, pt has gained 18 pounds.  194 lbs at discharge, 212 lbs today.     HPI  Scott Casale is a 61 year old male with hepatitis C, cirrhosis and ascites who presents with leg swelling and estimated 14 pound weight since he was d/c from [facility] [place] 4 days ago. Lasix was discontinued for unclear reason, it appears that he is clinically dry and his creatinine mildly elevated.  Review of records reveal that he was on 40 mg daily.  He has tense, uncomfortable lower extremity edema but denies increased abdominal distention or abdominal pain.  No chest pain, cough, hemoptysis, shortness of breath.  No fever or chills.  He and his wife feel that he has mental status and mentation is at baseline with no acute confusion or symptoms to suggest recurrent hepatic encephalopathy.    Previous Records Reviewed:  Wt Readings from Last 3 Encounters:   02/22/17 92.6 kg (204 lb 1.6 oz)   02/22/17 92.7 kg (204 lb 5.9 oz)   02/14/17 82.1 kg (181 lb)     Creatinine   Date Value Ref Range Status   02/22/2017 1.15 0.66 - 1.25 mg/dL Final   02/17/2017 1.00 0.66 - 1.25 mg/dL Final   02/16/2017 1.01 0.66 - 1.25 mg/dL Final   02/15/2017 1.09 0.66 - 1.25 mg/dL Final   02/14/2017 1.27 (H) 0.66 - 1.25 mg/dL Final       I have reviewed the Medications, Allergies, Past Medical and Surgical History, and Social History in the Epic system.  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     S/P TIPS (transjugular intrahepatic portosystemic shunt)     Hepatic encephalopathy (H)     Ascites of liver     Edema of lower extremity     Chronic hepatitis C virus infection  (H)     Hepatic cirrhosis (H)     Hyponatremia     Liver mass     Portal hypertension (H)     Primary malignant neoplasm of testis (H)     Thrombocytopenia (H)     Encephalopathy acute     Hypervolemia     Past Medical History   Diagnosis Date     Ankylosing spondylitis (H)      Ascites      diuretic refractory     H/O testicular cancer age 28     Hepatitis C      genotype 1A      Shoulder dislocation      Splenomegaly      Ulcerative colitis (H)      Past Surgical History   Procedure Laterality Date     C knee scope,clean/drain       bilateral     C appendectomy       Strabismus surgery       bilateral     Orchiectomy scrotal       prosthesis placed     Ir transven intrahepatic portosyst rev       Eye surgery       Esophagoscopy, gastroscopy, duodenoscopy (egd), combined N/A 1/17/2017     Procedure: COMBINED ESOPHAGOSCOPY, GASTROSCOPY, DUODENOSCOPY (EGD);  Surgeon: Guru Reina Palacios MD;  Location: [facility]     Colonoscopy N/A 1/17/2017     Procedure: COLONOSCOPY;  Surgeon: Guru Reina Palacios MD;  Location: [facility]     No current facility-administered medications for this encounter.      No current outpatient prescriptions on file.     Facility-Administered Medications Ordered in Other Encounters   Medication     gabapentin (NEURONTIN) capsule 200 mg     lactulose (CHRONULAC) solution 30 mL     ledipasvir-sofosbuvir (HARVONI)  MG per tablet TABS 1 tablet     ribavirin TABS 200 mg     rifaximin (XIFAXAN) tablet 550 mg     naloxone (NARCAN) injection 0.1-0.4 mg     lidocaine 1 % 1 mL     lidocaine (LMX4) kit     sodium chloride (PF) 0.9% PF flush 3 mL     sodium chloride (PF) 0.9% PF flush 3 mL     potassium chloride SA (K-DUR/KLOR-CON M) CR tablet 20-40 mEq     potassium chloride (KLOR-CON) Packet 20-40 mEq     potassium chloride 10 mEq in 100 mL intermittent infusion     potassium chloride 10 mEq in 100 mL intermittent infusion with 10 mg lidocaine     potassium chloride 20 mEq  in 50 mL intermittent infusion     magnesium sulfate 4 g in 100 mL sterile water (premade)     senna-docusate (SENOKOT-S;PERICOLACE) 8.6-50 MG per tablet 1-2 tablet     ondansetron (ZOFRAN-ODT) ODT tab 4 mg    Or     ondansetron (ZOFRAN) injection 4 mg     furosemide (LASIX) injection 40 mg     spironolactone (ALDACTONE) tablet 50 mg     Allergies   Allergen Reactions     Blood Transfusion Related (Informational Only) Other (See Comments)     Patient has a history of a clinically significant antibody against RBC antigens.  A delay in compatible RBCs may occur.     Percocet [Oxycodone-Acetaminophen] Nausea and Vomiting     Acetaminophen Nausea     Iodine-131 Hives     Motrin [Ibuprofen Micronized] Nausea     Tylenol Nausea     Social History   Substance Use Topics     Smoking status: Former Smoker     Quit date: 1/1/1978     Smokeless tobacco: Never Used     Alcohol use No      Comment: quit in 2010     Family History   Problem Relation Age of Onset     CANCER Mother 70     brain tumor     Prostate Cancer No family hx of      Cancer - colorectal No family hx of      DIABETES Maternal Grandmother      CEREBROVASCULAR DISEASE Sister      HEART DISEASE Sister      Respiratory Son      CANCER Father      Uterine Cancer Sister        Review of Systems  As mentioned above in the history present illness.  All other systems were reviewed and are negative.    Physical Exam   BP: 118/73  Heart Rate: 80  Temp: 98  F (36.7  C)  Resp: 16  SpO2: 100 %    Physical Exam   Constitutional: He is oriented to person, place, and time. He appears well-developed and well-nourished. No distress.   HENT:   Head: Normocephalic and atraumatic.   Mouth/Throat: Oropharynx is clear and moist. No oropharyngeal exudate.   Eyes: Conjunctivae and EOM are normal. Pupils are equal, round, and reactive to light. Scleral icterus is present.   Neck: Normal range of motion. Neck supple.   Cardiovascular: Normal rate, regular rhythm and normal heart sounds.   Exam reveals no gallop and no friction rub.    No murmur heard.  Pulmonary/Chest: Effort normal and breath sounds normal. No respiratory distress. He has no wheezes. He has no rales.   Abdominal: Soft. Bowel sounds are normal. He exhibits no distension and no mass. There is no tenderness. There is no rebound and no guarding.   Musculoskeletal: Normal range of motion. He exhibits edema (3+ bilateral symmetrical lower extremity edema extending to the thighs bilaterally) and tenderness (lower extremity soft tissue tenderness due to tense edema).   Neurological: He is alert and oriented to person, place, and time.   Skin: Skin is warm and dry. No rash noted. He is not diaphoretic. No erythema. No pallor.   Psychiatric: He has a normal mood and affect. His behavior is normal.   Nursing note and vitals reviewed.      ED Course     ED Course     Procedures            Results for orders placed or performed during the hospital encounter of 02/22/17 (from the past 24 hour(s))   CBC with platelets, differential   Result Value Ref Range    WBC 3.5 (L) 4.0 - 11.0 10e9/L    RBC Count 2.59 (L) 4.4 - 5.9 10e12/L    Hemoglobin 9.3 (L) 13.3 - 17.7 g/dL    Hematocrit 27.9 (L) 40.0 - 53.0 %     (H) 78 - 100 fl    MCH 35.9 (H) 26.5 - 33.0 pg    MCHC 33.3 31.5 - 36.5 g/dL    RDW 15.9 (H) 10.0 - 15.0 %    Platelet Count 48 (LL) 150 - 450 10e9/L    Diff Method Automated Method     % Neutrophils 63.8 %    % Lymphocytes 16.4 %    % Monocytes 15.0 %    % Eosinophils 4.2 %    % Basophils 0.6 %    % Immature Granulocytes 0.0 %    Absolute Neutrophil 2.3 1.6 - 8.3 10e9/L    Absolute Lymphocytes 0.6 (L) 0.8 - 5.3 10e9/L    Absolute Monocytes 0.5 0.0 - 1.3 10e9/L    Absolute Eosinophils 0.2 0.0 - 0.7 10e9/L    Absolute Basophils 0.0 0.0 - 0.2 10e9/L    Abs Immature Granulocytes 0.0 0 - 0.4 10e9/L   Comprehensive metabolic panel   Result Value Ref Range    Sodium 142 133 - 144 mmol/L    Potassium 3.7 3.4 - 5.3 mmol/L    Chloride 111 (H) 94  - 109 mmol/L    Carbon Dioxide 23 20 - 32 mmol/L    Anion Gap 8 3 - 14 mmol/L    Glucose 92 70 - 99 mg/dL    Urea Nitrogen 20 7 - 30 mg/dL    Creatinine 1.15 0.66 - 1.25 mg/dL    GFR Estimate 64 >60 mL/min/1.7m2    GFR Estimate If Black 78 >60 mL/min/1.7m2    Calcium 7.6 (L) 8.5 - 10.1 mg/dL    Bilirubin Total 1.9 (H) 0.2 - 1.3 mg/dL    Albumin 2.0 (L) 3.4 - 5.0 g/dL    Protein Total 5.7 (L) 6.8 - 8.8 g/dL    Alkaline Phosphatase 85 40 - 150 U/L    ALT 29 0 - 70 U/L    AST 46 (H) 0 - 45 U/L   Ammonia (on ice)   Result Value Ref Range    Ammonia 84 (H) 10 - 50 umol/L   NT pro BNP   Result Value Ref Range    N-Terminal Pro BNP Inpatient 1021 (H) 0 - 900 pg/mL     Last available ammonia level on 2/15/17 was 90.     Creatinine   Date Value Ref Range Status   02/22/2017 1.15 0.66 - 1.25 mg/dL Final   02/17/2017 1.00 0.66 - 1.25 mg/dL Final   02/16/2017 1.01 0.66 - 1.25 mg/dL Final   02/15/2017 1.09 0.66 - 1.25 mg/dL Final   02/14/2017 1.27 (H) 0.66 - 1.25 mg/dL Final       Reviewed case with Rhiannon Krueger NP of the hospitalist service (for Dr. Chiang).      6:19 PM - Disc case with Dr. Johnston Liver Service at [facility]    6:38 PM - Disc case with Dr. Mckinney, medicine service at [facility].  She accepted Patient in transfer there.     Assessments & Plan (with Medical Decision Making)   Patient with hepatitis C and history of alcohol abuse with cirrhosis and ascites who was discharged from [facility] 5 days ago after admission for hepatic encephalopathy who has had an ~ 23.5 # weight gain in past 8 days, due to discontinuation of Lasix 40 daily during his hospitalization and since discharge.  He has severe lower extremity edema but denies any abdominal distention or pain or symptoms suggestive or some ascites.  He was given Lasix 40 mg IV with initiation of good diuresis.  I consulted the GI/liver service at [facility] and it was felt the patient should be transferred back to [facility] for continued  diuresis and further evaluation.     I have reviewed the nursing notes.    I have reviewed the findings, diagnosis, plan and need for follow up with the patient.    Discharge Medication List as of 2/22/2017  7:54 PM      START taking these medications    Details   gabapentin (NEURONTIN) 100 MG capsule Take 2 capsules (200 mg) by mouth At Bedtime, Disp-60 capsule, R-3, E-Prescribe      ribavirin 200 MG TABS Take one tablet (200mg) by mouth in the AM with food, and one tablet (200mg) in the PM with food., Disp-60 tablet, R-1, E-Prescribe             Final diagnoses:   Chronic hepatitis C without hepatic coma (H) - With cirrhosis and ascites   Bilateral lower extremity edema       2/22/2017   [facility] EMERGENCY DEPARTMENT     Kendall Ness MD  02/22/17 [id number]       Kendall Ness MD  02/23/17 0000

## 2017-02-22 NOTE — IP AVS SNAPSHOT
"    [facility]: [phone]                                              INTERAGENCY TRANSFER FORM - PHYSICIAN ORDERS   2017                    Hospital Admission Date: 2017  SCOTT CASALE   : 1955  Sex: Male        Attending Provider: (none)    Allergies:  Blood Transfusion Related (Informational Only), Percocet [Oxycodone-acetaminophen], Acetaminophen, Iodine-131, Motrin [Ibuprofen Micronized], Tylenol    Infection:  None   Service:  INTERNAL MED    Ht:  5' 11\" (1.803 m)   Wt:  195 lb 4.8 oz (88.6 kg)   Admission Wt:  204 lb 1.6 oz (92.6 kg)    BMI:  27.24 kg/m 2   BSA:  2.11 m 2            Patient PCP Information     Provider PCP Type    aCrlos Duenas MD, MD General      ED Clinical Impression     Diagnosis Description Comment Added By Time Added    Alcoholic cirrhosis of liver with ascites (H) [K70.31] Alcoholic cirrhosis of liver with ascites (H) [K70.31]  Kenisha Wray RN 2017  8:26 AM    Constipation, unspecified constipation type [K59.00] Constipation, unspecified constipation type [K59.00]  Kelechi Boyer MD 2017 10:35 AM    Hypokalemia [E87.6] Hypokalemia [E87.6]  Kelechi Boyer MD 2017 10:46 AM      Hospital Problems as of 2017              Priority Class Noted POA    Hypervolemia   2017 Yes      Non-Hospital Problems as of 2017              Priority Class Noted    AC separation   2008    Mild major depression (H)   2008    CARDIOVASCULAR SCREENING; LDL GOAL LESS THAN 130   2012    GERD (gastroesophageal reflux disease)   2012    Chronic low back pain   2012    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   2012    Edema of lower extremity   6/15/2016    Chronic hepatitis C virus infection (H)   6/15/2016    Hepatic cirrhosis (H)   6/15/2016    Liver mass   6/15/2016    Portal hypertension (H)   6/15/2016    Primary malignant neoplasm of testis (H)   6/15/2016    Thrombocytopenia (H)   " 6/15/2016    Hyponatremia   8/22/2016    Ascites of liver   9/27/2016    S/P TIPS (transjugular intrahepatic portosystemic shunt)   9/28/2016    Hepatic encephalopathy (H)   1/5/2017    Encephalopathy acute   2/8/2017      Code Status History     Date Active Date Inactive Code Status Order ID Comments User Context    2/24/2017 10:49 AM  Full Code [id number]  Kelechi Boyer MD Outpatient    2/22/2017 10:04 PM 2/24/2017 10:49 AM Full Code [id number]  Seth Islas MD Inpatient    2/16/2017  2:01 PM 2/22/2017 10:04 PM Full Code [id number]  Seth Islas MD Outpatient    2/15/2017  6:07 PM 2/16/2017  2:01 PM Full Code [id number]  Kelechi Boyer MD Inpatient    2/8/2017 11:39 PM 2/15/2017  5:57 PM Full Code [id number]  James Julien MD Inpatient    1/5/2017  7:33 AM 1/9/2017  1:06 PM Full Code [id number]  Rhiannon Krueger PA-C Inpatient    9/29/2016  1:29 PM 1/5/2017  7:33 AM Full Code [id number]  Mich Parr PA-C Outpatient    9/28/2016  5:49 PM 9/29/2016  1:29 PM Full Code [id number]  Katarina Rabago PA Inpatient         Medication Review      START taking        Dose / Directions Comments    furosemide 40 MG tablet   Commonly known as:  LASIX   Used for:  Alcoholic cirrhosis of liver with ascites (H)        Dose:  40 mg   Take 1 tablet (40 mg) by mouth 2 times daily Use this twice a day dose until goal weight of 185 pounds is reached. Weigh yourself everyday. Then may need to take once a day.   Quantity:  30 tablet   Refills:  0        potassium chloride SA 20 MEQ CR tablet   Commonly known as:  potassium chloride   Used for:  Hypokalemia        Dose:  20 mEq   Take 1 tablet (20 mEq) by mouth daily Take only while taking your twice a day dose of Lasix then stop. Have your potassium levels checked in 2-3 days.   Quantity:  30 tablet   Refills:  1        senna-docusate 8.6-50 MG per tablet   Commonly known as:  SENOKOT-S;PERICOLACE   Used for:  Constipation, unspecified  constipation type        Dose:  1-2 tablet   Take 1-2 tablets by mouth 2 times daily as needed (constipation )   Quantity:  100 tablet   Refills:  0        spironolactone 50 MG tablet   Commonly known as:  ALDACTONE   Used for:  Alcoholic cirrhosis of liver with ascites (H)        Dose:  50 mg   Take 1 tablet (50 mg) by mouth daily   Quantity:  30 tablet   Refills:  0          CONTINUE these medications which have NOT CHANGED        Dose / Directions Comments    gabapentin 100 MG capsule   Commonly known as:  NEURONTIN   Used for:  Chronic low back pain with sciatica, sciatica laterality unspecified, unspecified back pain laterality        Dose:  200 mg   Take 2 capsules (200 mg) by mouth At Bedtime   Quantity:  60 capsule   Refills:  3        lactulose 10 GM/15ML solution   Commonly known as:  CHRONULAC   Used for:  Hepatic encephalopathy (H)        Dose:  30 mL   Take 30 mLs by mouth 3 times daily   Quantity:  500 mL   Refills:  0        ledipasvir-sofosbuvir  MG per tablet   Commonly known as:  ledipasvir-sofosbuvir   Used for:  Hepatitis C virus infection without hepatic coma, unspecified chronicity        Dose:  1 tablet   Take 1 tablet by mouth daily   Quantity:  28 tablet   Refills:  2        ribavirin 200 MG Tabs   Used for:  Chronic hepatitis C without hepatic coma (H)        Take one tablet (200mg) by mouth in the AM with food, and one tablet (200mg) in the PM with food.   Quantity:  60 tablet   Refills:  1        rifaximin 550 MG Tabs tablet   Commonly known as:  XIFAXAN   Used for:  Hepatic encephalopathy (H)        Dose:  550 mg   Take 1 tablet (550 mg) by mouth 2 times daily   Quantity:  60 tablet   Refills:  11        vitamin D 68130 UNIT capsule   Commonly known as:  ERGOCALCIFEROL        Dose:  68346 Units   Take 50,000 Units by mouth Twice weekly Mon and Thurs   Refills:  0        ZOFRAN 4 MG tablet   Generic drug:  ondansetron        Dose:  4 mg   Take 1 tablet (4 mg) by mouth every 8 hours  as needed for nausea   Refills:  0                Summary of Visit     Reason for your hospital stay       You came here because you had lower extremity swelling because you were not on your usual diuretics. We have restarted you on diuretics and your swelling and weight is going down. You had an echocardiogram which did not show any abnormality of your heart.             After Care     Activity       Your activity upon discharge: activity as tolerated       Diet       Follow this diet upon discharge: Orders Placed This Encounter      Fluid restriction 2000 ML FLUID      Combination Diet Regular Diet Adult       Discharge Instructions       Take your Lasix as prescribed 40 mg twice a day until goal weight ~185 pounds is reached, then can take once a day or as directed by your primary care physician. If you gain 2-3 pounds in a day or 5 pounds in a week, this may mean that you need to increase your dose of diuretics, so please call primary care physician for further recommendations.  If you have any worse swelling ,weight gain, shortness of breath, chest pain, fevers, bleeding, or any other life-threatening or worsening symptoms, please contact a healthcare provider as soon as possible.    Take all of your medications as prescribed and go to all follow-up appointments.    See your primary care physician in 1 week and go for lab draw in 2-3 days, especially to check your electrolytes while on Lasix, spironolactone, and potassium replacement.             Referrals     Home Care OT Referral for Hospital Discharge       [facility]  Ph: [phone]  Fax: [phone]    OT to eval and treat    Your provider has ordered home care - occupational therapy. If you have not been contacted within 2 days of your discharge please call the department phone number listed on the top of this document.       Home Care PT Referral for Hospital Discharge       [facility]  Ph: [phone]  Fax:  [phone]    PT to eval and treat    Your provider has ordered home care - physical therapy. If you have not been contacted within 2 days of your discharge please call the department phone number listed on the top of this document.       Home care nursing referral       [facility]  Ph: [phone]  Fax: [phone]    RN skilled nursing visit. RN to assess vital signs and weight, respiratory and cardiac status, pain level and activity tolerance, hydration, nutrition and bowel status and home safety.  RN to teach medication management.    Your provider has ordered home care nursing services. If you have not been contacted within 2 days of your discharge please call the inpatient department phone number at [phone] .       Medication Therapy Management Referral       Reason for referral:  takes rifaxamin or lactulose     This service is designed to help you get the most from your medications.  A specially trained pharmacist will work closely with you and your doctors  to solve any problems related to your medications and to help you get the   best results from taking them.      The Medication Therapy Management staff will call you to schedule an appointment.             Your next 10 appointments already scheduled     Mar 09, 2017  9:25 AM CST   (Arrive by 9:10 AM)   Return Visit with Greg Concepcion MD   [facility] ([facility])    [address] [place] [address]  [address] [facility] [address] [phone]            Mar 20, 2017  3:00 PM CDT   Lab with  LAB   [facility] Lab ([facility])    [address] [place] [address]  [address] [facility] [address]   [phone]            Mar 20, 2017  4:00 PM CDT   (Arrive by 3:45 PM)   Return General Liver with Warren Higginbotham MD   [facility] ([facility])    [address] [place] [address]  [address] [facility] [address]   [phone]            Mar 22, 2017  6:00 PM  CDT   (Arrive by 5:45 PM)   New Patient Visit with GILMAR Saleem CNP   [facility] ([facility])    [address]  4th Floor  [facility] [id number] [phone]            Apr 11, 2017  9:00 AM CDT   Lab with  LAB   [facility] Lab ([facility])    [address]  [address] [facility] [id number] [phone]            Apr 11, 2017 10:00 AM CDT   US ABDOMEN COMPLETE with UCUS1   [facility] US ([facility])    [address]  [address]st [facility] [id number] [phone]           Please bring a list of your medicines (including vitamins, minerals and over-the-counter drugs). Also, tell your doctor about any allergies you may have. Wear comfortable clothes and leave your valuables at home.  Adults: No eating or drinking for 8 hours before the exam. You may take medicine with a small sip of water.  Children: - Children 6+ years: No food or drink for 6 hours before exam. - Children 1-5 years: No food or drink for 4 hours before exam. - Infants, breast-fed: may have breast milk up to 2 hours before exam. - Infants, formula: may have bottle until 4 hours before exam.  Please call the Imaging Department at your exam site with any questions.            Apr 11, 2017 11:00 AM CDT   (Arrive by 10:45 AM)   Return General Liver with Warren Higginbotham MD   [facility] ([facility])    [address] [place] [address]  3rd Floor  [facility] [address]   [phone]            Apr 11, 2017 12:00 PM CDT   (Arrive by 11:45 AM)   Return Visit with Micki Peter MD   [facility] ([facility])    [address]  2nd Floor  [facility] [id number] [phone]              Follow-Up Appointment Instructions     Future Labs/Procedures    Adult [facility] Follow-up and recommended labs and tests     Comments:     Follow up with primary care provider, Carlos Duenas MD, within 7 days for hospital follow- up.  The following labs/tests are recommended: BMP, Mg2+, CBC in 2-3 days.    Hepatology follow-up in 1 week.    Appointments on [place] and/or [facility] (with [facility] or [facility] provider or service). Call [phone] if you haven't heard regarding these appointments within 7 days of discharge.      Follow-Up Appointment Instructions     Adult [facility] Follow-up and recommended labs and tests       Follow up with primary care provider, Carlos Duenas MD, within 7 days for hospital follow- up.  The following labs/tests are recommended: BMP, Mg2+, CBC in 2-3 days.    Hepatology follow-up in 1 week.    Appointments on [place] and/or [facility] (with [facility] or [facility] provider or service). Call [phone] if you haven't heard regarding these appointments within 7 days of discharge.             Statement of Approval     Ordered          02/24/17 1050  I have reviewed and agree with all the recommendations and orders detailed in this document.  EFFECTIVE NOW     Approved and electronically signed by:  Kelechi Boyer MD

## 2017-02-22 NOTE — IP AVS SNAPSHOT
MRN:[id number]                      After Visit Summary   2/22/2017    Scott Casale    MRN: [id number]           Thank you!     Thank you for choosing [facility] for your care. Our goal is always to provide you with excellent care. Hearing back from our patients is one way we can continue to improve our services. Please take a few minutes to complete the written survey that you may receive in the mail after you visit with us. Thank you!        Patient Information     Date Of Birth          1955        About your hospital stay     You were admitted on:  February 22, 2017 You last received care in the:  [address] [facility]    You were discharged on:  February 24, 2017        Reason for your hospital stay       You came here because you had lower extremity swelling because you were not on your usual diuretics. We have restarted you on diuretics and your swelling and weight is going down. You had an echocardiogram which did not show any abnormality of your heart.                  Who to Call     For medical emergencies, please call 911.  For non-urgent questions about your medical care, please call your primary care provider or clinic, [phone]          Attending Provider     Provider Specialty    Norberto Mckinney MD Internal Medicine    Santa ClausCheo MD Internal Medicine       Primary Care Provider Office Phone # Fax #    Carlos Duenas MD, [phone]       [facility] [place] [address] [place] [address]        After Care Instructions     Activity       Your activity upon discharge: activity as tolerated            Diet       Follow this diet upon discharge: Orders Placed This Encounter      Fluid restriction 2000 ML FLUID      Combination Diet Regular Diet Adult            Discharge Instructions       Take your Lasix as prescribed 40 mg twice a day until goal weight ~185 pounds is reached, then can take once a day or as directed by your primary care  physician. If you gain 2-3 pounds in a day or 5 pounds in a week, this may mean that you need to increase your dose of diuretics, so please call primary care physician for further recommendations.  If you have any worse swelling ,weight gain, shortness of breath, chest pain, fevers, bleeding, or any other life-threatening or worsening symptoms, please contact a healthcare provider as soon as possible.    Take all of your medications as prescribed and go to all follow-up appointments.    See your primary care physician in 1 week and go for lab draw in 2-3 days, especially to check your electrolytes while on Lasix, spironolactone, and potassium replacement.                  Follow-up Appointments     Adult [facility] Follow-up and recommended labs and tests       Follow up with primary care provider, Carlos Duenas MD, within 7 days for hospital follow- up.  The following labs/tests are recommended: BMP, Mg2+, CBC in 2-3 days.    Hepatology follow-up in 1 week.    Appointments on [place] and/or [facility] (with [facility] or [facility] provider or service). Call [phone] if you haven't heard regarding these appointments within 7 days of discharge.                  Your next 10 appointments already scheduled     Mar 09, 2017  9:25 AM CST   (Arrive by 9:10 AM)   Return Visit with Greg Concepcion MD   [facility] ([facility])    [address] [place] [address]  [address] [facility] [id number] [phone]            Mar 20, 2017  3:00 PM CDT   Lab with  LAB   [facility] ([facility])    [address] [place] [address]  [facility] [facility] [address]   [phone]            Mar 20, 2017  4:00 PM CDT   (Arrive by 3:45 PM)   Return General Liver with Warren Higginbotham MD   [facility] ([facility])    [address] [place] [address]  [address] [facility] [address]   [phone]            Mar 22, 2017  6:00 PM CDT   (Arrive by 5:45 PM)   New  Patient Visit with GILMAR Saleem CNP   [facility] ([facility])    [address]  4th Floor  [facility] [id number] [phone]            Apr 11, 2017  9:00 AM CDT   Lab with  LAB   [facility] Lab ([facility])    [address]  [address]st [facility] [id number] [phone]            Apr 11, 2017 10:00 AM CDT   US ABDOMEN COMPLETE with UCUS1   [facility] US ([facility])    [address]  [address] [facility] [address] [phone]           Please bring a list of your medicines (including vitamins, minerals and over-the-counter drugs). Also, tell your doctor about any allergies you may have. Wear comfortable clothes and leave your valuables at home.  Adults: No eating or drinking for 8 hours before the exam. You may take medicine with a small sip of water.  Children: - Children 6+ years: No food or drink for 6 hours before exam. - Children 1-5 years: No food or drink for 4 hours before exam. - Infants, breast-fed: may have breast milk up to 2 hours before exam. - Infants, formula: may have bottle until 4 hours before exam.  Please call the Imaging Department at your exam site with any questions.            Apr 11, 2017 11:00 AM CDT   (Arrive by 10:45 AM)   Return General Liver with Warren Higginbotham MD   [facility] ([facility])    [address]  3rd Floor  [facility] [id number] [phone]            Apr 11, 2017 12:00 PM CDT   (Arrive by 11:45 AM)   Return Visit with Micki Peter MD   [facility] ([facility])    [address]  2nd Floor  [facility] [address] [phone]              Additional Services     Home Care OT Referral for Hospital Discharge       [facility]  Ph: [phone]  Fax: [phone]    OT to eval and treat    Your  provider has ordered home care - occupational therapy. If you have not been contacted within 2 days of your discharge please call the department phone number listed on the top of this document.            Home Care PT Referral for Hospital Discharge       [facility]  Ph: [phone]  Fax: [phone]    PT to eval and treat    Your provider has ordered home care - physical therapy. If you have not been contacted within 2 days of your discharge please call the department phone number listed on the top of this document.            Home care nursing referral       [facility]  Ph: [phone]  Fax: [phone]    RN skilled nursing visit. RN to assess vital signs and weight, respiratory and cardiac status, pain level and activity tolerance, hydration, nutrition and bowel status and home safety.  RN to teach medication management.    Your provider has ordered home care nursing services. If you have not been contacted within 2 days of your discharge please call the inpatient department phone number at [phone] .            Medication Therapy Management Referral       Reason for referral:  takes rifaxamin or lactulose     This service is designed to help you get the most from your medications.  A specially trained pharmacist will work closely with you and your doctors  to solve any problems related to your medications and to help you get the   best results from taking them.      The Medication Therapy Management staff will call you to schedule an appointment.                  Pending Results     Date and Time Order Name Status Description    2/23/2017 0856 EKG 12-lead, tracing only Preliminary             Statement of Approval     Ordered          02/24/17 1050  I have reviewed and agree with all the recommendations and orders detailed in this document.  EFFECTIVE NOW     Approved and electronically signed by:  Kelechi Boyer MD             Admission Information     Date & Time  "Provider Department Dept. Phone    2/22/2017 Cheo Radford MD [address] [facility] [phone]      Your Vitals Were     Blood Pressure Pulse Temperature Respirations Height Weight    125/59 (BP Location: Right arm) 80 97.2  F (36.2  C) (Oral) 16 1.803 m (5' 11\") 88.6 kg (195 lb 4.8 oz)    Pulse Oximetry BMI (Body Mass Index)                99% 27.24 kg/m2          [facility] Information     [facility] gives you secure access to your electronic health record. If you see a primary care provider, you can also send messages to your care team and make appointments. If you have questions, please call your primary care clinic.  If you do not have a primary care provider, please call [phone] and they will assist you.        Care EveryWhere ID     This is your Care EveryWhere ID. This could be used by other organizations to access your [facility] medical records  [id number]           Review of your medicines      START taking        Dose / Directions    furosemide 40 MG tablet   Commonly known as:  LASIX   Used for:  Alcoholic cirrhosis of liver with ascites (H)        Dose:  40 mg   Take 1 tablet (40 mg) by mouth 2 times daily Use this twice a day dose until goal weight of 185 pounds is reached. Weigh yourself everyday. Then may need to take once a day.   Quantity:  30 tablet   Refills:  0       potassium chloride SA 20 MEQ CR tablet   Commonly known as:  potassium chloride   Used for:  Hypokalemia        Dose:  20 mEq   Take 1 tablet (20 mEq) by mouth daily Take only while taking your twice a day dose of Lasix then stop. Have your potassium levels checked in 2-3 days.   Quantity:  30 tablet   Refills:  1       senna-docusate 8.6-50 MG per tablet   Commonly known as:  SENOKOT-S;PERICOLACE   Used for:  Constipation, unspecified constipation type        Dose:  1-2 tablet   Take 1-2 tablets by mouth 2 times daily as needed (constipation )   Quantity:  100 tablet   Refills:  0       spironolactone 50 MG tablet "   Commonly known as:  ALDACTONE   Used for:  Alcoholic cirrhosis of liver with ascites (H)        Dose:  50 mg   Take 1 tablet (50 mg) by mouth daily   Quantity:  30 tablet   Refills:  0         CONTINUE these medicines which have NOT CHANGED        Dose / Directions    gabapentin 100 MG capsule   Commonly known as:  NEURONTIN   Used for:  Chronic low back pain with sciatica, sciatica laterality unspecified, unspecified back pain laterality        Dose:  200 mg   Take 2 capsules (200 mg) by mouth At Bedtime   Quantity:  60 capsule   Refills:  3       lactulose 10 GM/15ML solution   Commonly known as:  CHRONULAC   Used for:  Hepatic encephalopathy (H)        Dose:  30 mL   Take 30 mLs by mouth 3 times daily   Quantity:  500 mL   Refills:  0       ledipasvir-sofosbuvir  MG per tablet   Commonly known as:  ledipasvir-sofosbuvir   Used for:  Hepatitis C virus infection without hepatic coma, unspecified chronicity        Dose:  1 tablet   Take 1 tablet by mouth daily   Quantity:  28 tablet   Refills:  2       ribavirin 200 MG Tabs   Used for:  Chronic hepatitis C without hepatic coma (H)        Take one tablet (200mg) by mouth in the AM with food, and one tablet (200mg) in the PM with food.   Quantity:  60 tablet   Refills:  1       rifaximin 550 MG Tabs tablet   Commonly known as:  XIFAXAN   Used for:  Hepatic encephalopathy (H)        Dose:  550 mg   Take 1 tablet (550 mg) by mouth 2 times daily   Quantity:  60 tablet   Refills:  11       vitamin D 38769 UNIT capsule   Commonly known as:  ERGOCALCIFEROL        Dose:  56385 Units   Take 50,000 Units by mouth Twice weekly Mon and Thurs   Refills:  0       ZOFRAN 4 MG tablet   Generic drug:  ondansetron        Dose:  4 mg   Take 1 tablet (4 mg) by mouth every 8 hours as needed for nausea   Refills:  0            Where to get your medicines      These medications were sent to [facility] - [place], [place] - [address]  [address]  [place] [address]     Phone:  [phone]     furosemide 40 MG tablet    potassium chloride SA 20 MEQ CR tablet    senna-docusate 8.6-50 MG per tablet    spironolactone 50 MG tablet                Protect others around you: Learn how to safely use, store and throw away your medicines at www.disposemymeds.org.             Medication List: This is a list of all your medications and when to take them. Check marks below indicate your daily home schedule. Keep this list as a reference.      Medications           Morning Afternoon Evening Bedtime As Needed    furosemide 40 MG tablet   Commonly known as:  LASIX   Take 1 tablet (40 mg) by mouth 2 times daily Use this twice a day dose until goal weight of 185 pounds is reached. Weigh yourself everyday. Then may need to take once a day.                                gabapentin 100 MG capsule   Commonly known as:  NEURONTIN   Take 2 capsules (200 mg) by mouth At Bedtime   Last time this was given:  200 mg on 2/23/2017  9:57 PM                                lactulose 10 GM/15ML solution   Commonly known as:  CHRONULAC   Take 30 mLs by mouth 3 times daily   Last time this was given:  30 mLs on 2/24/2017  8:15 AM                                ledipasvir-sofosbuvir  MG per tablet   Commonly known as:  ledipasvir-sofosbuvir   Take 1 tablet by mouth daily   Last time this was given:  1 tablet on 2/24/2017  8:16 AM                                potassium chloride SA 20 MEQ CR tablet   Commonly known as:  potassium chloride   Take 1 tablet (20 mEq) by mouth daily Take only while taking your twice a day dose of Lasix then stop. Have your potassium levels checked in 2-3 days.   Last time this was given:  40 mEq on 2/24/2017  1:40 AM                                ribavirin 200 MG Tabs   Take one tablet (200mg) by mouth in the AM with food, and one tablet (200mg) in the PM with food.   Last time this was given:  200 mg on 2/24/2017  8:16 AM                                rifaximin 550  MG Tabs tablet   Commonly known as:  XIFAXAN   Take 1 tablet (550 mg) by mouth 2 times daily   Last time this was given:  550 mg on 2/24/2017  8:16 AM                                senna-docusate 8.6-50 MG per tablet   Commonly known as:  SENOKOT-S;PERICOLACE   Take 1-2 tablets by mouth 2 times daily as needed (constipation )                                spironolactone 50 MG tablet   Commonly known as:  ALDACTONE   Take 1 tablet (50 mg) by mouth daily   Last time this was given:  50 mg on 2/24/2017  8:16 AM                                vitamin D 97076 UNIT capsule   Commonly known as:  ERGOCALCIFEROL   Take 50,000 Units by mouth Twice weekly Mon and Thurs                                ZOFRAN 4 MG tablet   Take 1 tablet (4 mg) by mouth every 8 hours as needed for nausea   Generic drug:  ondansetron

## 2017-02-22 NOTE — IP AVS SNAPSHOT
[address] [address]    Phone:  [phone]                                       After Visit Summary   2/22/2017    Scott Casale    MRN: [id number]           After Visit Summary Signature Page     I have received my discharge instructions, and my questions have been answered. I have discussed any challenges I see with this plan with the nurse or doctor.    ..........................................................................................................................................  Patient/Patient Representative Signature      ..........................................................................................................................................  Patient Representative Print Name and Relationship to Patient    ..................................................               ................................................  Date                                            Time    ..........................................................................................................................................  Reviewed by Signature/Title    ...................................................              ..............................................  Date                                                            Time

## 2017-02-22 NOTE — LETTER
Transition Communication Hand-off for Care Transitions to Next Level of Care Provider    Name: Scott Casale  MRN #: [id number]  Primary Care Provider: Carlos Duenas MD     Primary Clinic: [facility] [place] [address], [place] [address]     Reason for Hospitalization:  Cirrhosis of the Liver, Hypervolemia    Admit Date/Time: 2/22/2017  8:38 PM  Discharge Date: 2/24/2017    Payor Source: Payor: MEDICA / Plan: MEDICA [place] / Product Type: HMO /            Reason for Communication Hand-off Referral: Other [facility] Standard of Practice    Discharge Needs Assessment:  Needs       Most Recent Value    Equipment Currently Used at Home none    Transportation Available car, family or friend will provide    Home Care [facility] [phone], Fax: [phone]          Follow-up plan:  Future Appointments  Date Time Provider Department Center   2/25/2017 4:00 AM Shira Astorga OT [facility]   3/9/2017 9:25 AM Greg Concepcion MD [facility]   3/20/2017 3:00 PM  LAB [facility]   3/20/2017 4:00 PM Warren Higginbotham MD [facility]   3/22/2017 6:00 PM [facility]   4/11/2017 9:00 AM  LAB [facility]   4/11/2017 10:00 AM UCUS1 CUS [facility]   4/11/2017 11:00 AM Warren Higginbotham MD [facility]   4/11/2017 12:00 PM Micki Peter MD [facility]            Key Recommendations:  PLEASE REVIEW AVS          Referrals     Future Labs/Procedures    Home care nursing referral     Comments:    [facility]  Ph: [phone]  Fax: [phone]    RN skilled nursing visit. RN to assess vital signs and weight, respiratory and cardiac status, pain level and activity tolerance, hydration, nutrition and bowel status and home safety.  RN to teach medication management.    Your provider has ordered home care nursing services. If you have not been contacted within 2 days of your discharge please call the inpatient department phone number at  [phone] .    Home Care OT Referral for Hospital Discharge     Comments:    [facility]: [phone]  Fax: [phone]    OT to eval and treat    Your provider has ordered home care - occupational therapy. If you have not been contacted within 2 days of your discharge please call the department phone number listed on the top of this document.    Home Care PT Referral for Hospital Discharge     Comments:    [facility]  Ph: [phone]  Fax: [phone]    PT to eval and treat    Your provider has ordered home care - physical therapy. If you have not been contacted within 2 days of your discharge please call the department phone number listed on the top of this document.    Medication Therapy Management Referral     Comments:    Reason for referral:  takes rifaxamin or lactulose     This service is designed to help you get the most from your medications.  A specially trained pharmacist will work closely with you and your doctors  to solve any problems related to your medications and to help you get the   best results from taking them.      The Medication Therapy Management staff will call you to schedule an appointment.          Izabela WILLIAMN RN PHN  Patient Care Mgmt Coordinator       AVS/Discharge Summary is the source of truth; this is a helpful guide for improved communication of patient story

## 2017-02-22 NOTE — ED NOTES
"Pt wanting to take home medications - states, \"they always tell me that they don't have these medications here\" - spoke with Dr Ness - okay for patient to take own meds. Wife at bedside - wanting an update related to plan of care - discussed with primary RN - she will update patient.  "

## 2017-02-23 ENCOUNTER — APPOINTMENT (OUTPATIENT)
Dept: OCCUPATIONAL THERAPY | Facility: CLINIC | Age: 62
DRG: 641 | End: 2017-02-23
Attending: INTERNAL MEDICINE
Payer: MEDICARE

## 2017-02-23 ENCOUNTER — APPOINTMENT (OUTPATIENT)
Dept: PHYSICAL THERAPY | Facility: CLINIC | Age: 62
DRG: 641 | End: 2017-02-23
Attending: INTERNAL MEDICINE
Payer: MEDICARE

## 2017-02-23 ENCOUNTER — APPOINTMENT (OUTPATIENT)
Dept: CARDIOLOGY | Facility: CLINIC | Age: 62
DRG: 641 | End: 2017-02-23
Attending: INTERNAL MEDICINE
Payer: MEDICARE

## 2017-02-23 LAB
ALBUMIN SERPL-MCNC: 1.9 G/DL (ref 3.4–5)
ALBUMIN UR-MCNC: ABNORMAL MG/DL
ALBUMIN UR-MCNC: NEGATIVE MG/DL
ALP SERPL-CCNC: 56 U/L (ref 40–150)
ALT SERPL W P-5'-P-CCNC: 26 U/L (ref 0–70)
ANION GAP SERPL CALCULATED.3IONS-SCNC: 7 MMOL/L (ref 3–14)
ANION GAP SERPL CALCULATED.3IONS-SCNC: 8 MMOL/L (ref 3–14)
APPEARANCE UR: ABNORMAL
APPEARANCE UR: CLEAR
AST SERPL W P-5'-P-CCNC: 44 U/L (ref 0–45)
BILIRUB SERPL-MCNC: 2 MG/DL (ref 0.2–1.3)
BILIRUB UR QL STRIP: ABNORMAL
BILIRUB UR QL STRIP: NEGATIVE
BUN SERPL-MCNC: 22 MG/DL (ref 7–30)
BUN SERPL-MCNC: 22 MG/DL (ref 7–30)
CALCIUM SERPL-MCNC: 7.4 MG/DL (ref 8.5–10.1)
CALCIUM SERPL-MCNC: 7.7 MG/DL (ref 8.5–10.1)
CHLORIDE SERPL-SCNC: 106 MMOL/L (ref 94–109)
CHLORIDE SERPL-SCNC: 108 MMOL/L (ref 94–109)
CO2 SERPL-SCNC: 24 MMOL/L (ref 20–32)
CO2 SERPL-SCNC: 28 MMOL/L (ref 20–32)
COLOR UR AUTO: ABNORMAL
COLOR UR AUTO: ABNORMAL
CREAT SERPL-MCNC: 1.16 MG/DL (ref 0.66–1.25)
CREAT SERPL-MCNC: 1.29 MG/DL (ref 0.66–1.25)
ERYTHROCYTE [DISTWIDTH] IN BLOOD BY AUTOMATED COUNT: 16.3 % (ref 10–15)
GFR SERPL CREATININE-BSD FRML MDRD: 56 ML/MIN/1.7M2
GFR SERPL CREATININE-BSD FRML MDRD: 64 ML/MIN/1.7M2
GLUCOSE SERPL-MCNC: 108 MG/DL (ref 70–99)
GLUCOSE SERPL-MCNC: 99 MG/DL (ref 70–99)
GLUCOSE UR STRIP-MCNC: ABNORMAL MG/DL
GLUCOSE UR STRIP-MCNC: NEGATIVE MG/DL
HCT VFR BLD AUTO: 27.1 % (ref 40–53)
HGB BLD-MCNC: 9 G/DL (ref 13.3–17.7)
HGB UR QL STRIP: ABNORMAL
HGB UR QL STRIP: ABNORMAL
KETONES UR STRIP-MCNC: ABNORMAL MG/DL
KETONES UR STRIP-MCNC: NEGATIVE MG/DL
LEUKOCYTE ESTERASE UR QL STRIP: ABNORMAL
LEUKOCYTE ESTERASE UR QL STRIP: NEGATIVE
MAGNESIUM SERPL-MCNC: 2.1 MG/DL (ref 1.6–2.3)
MAGNESIUM SERPL-MCNC: 2.1 MG/DL (ref 1.6–2.3)
MCH RBC QN AUTO: 35.2 PG (ref 26.5–33)
MCHC RBC AUTO-ENTMCNC: 33.2 G/DL (ref 31.5–36.5)
MCV RBC AUTO: 106 FL (ref 78–100)
MUCOUS THREADS #/AREA URNS LPF: PRESENT /LPF
NITRATE UR QL: ABNORMAL
NITRATE UR QL: NEGATIVE
PH UR STRIP: 5.5 PH (ref 5–7)
PH UR STRIP: ABNORMAL PH (ref 5–7)
PLATELET # BLD AUTO: 45 10E9/L (ref 150–450)
POTASSIUM SERPL-SCNC: 3.4 MMOL/L (ref 3.4–5.3)
POTASSIUM SERPL-SCNC: 3.4 MMOL/L (ref 3.4–5.3)
PROT SERPL-MCNC: 5.3 G/DL (ref 6.8–8.8)
RBC # BLD AUTO: 2.56 10E12/L (ref 4.4–5.9)
RBC #/AREA URNS AUTO: 1 /HPF (ref 0–2)
SODIUM SERPL-SCNC: 139 MMOL/L (ref 133–144)
SODIUM SERPL-SCNC: 142 MMOL/L (ref 133–144)
SP GR UR STRIP: 1.01 (ref 1–1.03)
SP GR UR STRIP: ABNORMAL (ref 1–1.03)
URN SPEC COLLECT METH UR: ABNORMAL
URN SPEC COLLECT METH UR: ABNORMAL
UROBILINOGEN UR STRIP-MCNC: ABNORMAL MG/DL (ref 0–2)
UROBILINOGEN UR STRIP-MCNC: NORMAL MG/DL (ref 0–2)
WBC # BLD AUTO: 2.9 10E9/L (ref 4–11)
WBC #/AREA URNS AUTO: <1 /HPF (ref 0–2)

## 2017-02-23 PROCEDURE — 97161 PT EVAL LOW COMPLEX 20 MIN: CPT | Mod: GP

## 2017-02-23 PROCEDURE — 83735 ASSAY OF MAGNESIUM: CPT | Performed by: INTERNAL MEDICINE

## 2017-02-23 PROCEDURE — 97530 THERAPEUTIC ACTIVITIES: CPT | Mod: GP

## 2017-02-23 PROCEDURE — 25000128 H RX IP 250 OP 636: Performed by: STUDENT IN AN ORGANIZED HEALTH CARE EDUCATION/TRAINING PROGRAM

## 2017-02-23 PROCEDURE — 25000128 H RX IP 250 OP 636: Performed by: INTERNAL MEDICINE

## 2017-02-23 PROCEDURE — 80048 BASIC METABOLIC PNL TOTAL CA: CPT | Performed by: STUDENT IN AN ORGANIZED HEALTH CARE EDUCATION/TRAINING PROGRAM

## 2017-02-23 PROCEDURE — 40000193 ZZH STATISTIC PT WARD VISIT

## 2017-02-23 PROCEDURE — 81001 URINALYSIS AUTO W/SCOPE: CPT | Performed by: INTERNAL MEDICINE

## 2017-02-23 PROCEDURE — 83735 ASSAY OF MAGNESIUM: CPT | Performed by: STUDENT IN AN ORGANIZED HEALTH CARE EDUCATION/TRAINING PROGRAM

## 2017-02-23 PROCEDURE — 12000001 ZZH R&B MED SURG/OB UMMC

## 2017-02-23 PROCEDURE — 93005 ELECTROCARDIOGRAM TRACING: CPT

## 2017-02-23 PROCEDURE — 93010 ELECTROCARDIOGRAM REPORT: CPT | Performed by: INTERNAL MEDICINE

## 2017-02-23 PROCEDURE — 36415 COLL VENOUS BLD VENIPUNCTURE: CPT | Performed by: STUDENT IN AN ORGANIZED HEALTH CARE EDUCATION/TRAINING PROGRAM

## 2017-02-23 PROCEDURE — 93306 TTE W/DOPPLER COMPLETE: CPT

## 2017-02-23 PROCEDURE — 99233 SBSQ HOSP IP/OBS HIGH 50: CPT | Mod: GC | Performed by: PEDIATRICS

## 2017-02-23 PROCEDURE — 40000133 ZZH STATISTIC OT WARD VISIT: Performed by: OCCUPATIONAL THERAPIST

## 2017-02-23 PROCEDURE — 80053 COMPREHEN METABOLIC PANEL: CPT | Performed by: INTERNAL MEDICINE

## 2017-02-23 PROCEDURE — 93306 TTE W/DOPPLER COMPLETE: CPT | Mod: 26 | Performed by: INTERNAL MEDICINE

## 2017-02-23 PROCEDURE — 36415 COLL VENOUS BLD VENIPUNCTURE: CPT | Performed by: INTERNAL MEDICINE

## 2017-02-23 PROCEDURE — 85027 COMPLETE CBC AUTOMATED: CPT | Performed by: INTERNAL MEDICINE

## 2017-02-23 PROCEDURE — 97165 OT EVAL LOW COMPLEX 30 MIN: CPT | Mod: GO | Performed by: OCCUPATIONAL THERAPIST

## 2017-02-23 PROCEDURE — 25000132 ZZH RX MED GY IP 250 OP 250 PS 637: Performed by: INTERNAL MEDICINE

## 2017-02-23 RX ORDER — FUROSEMIDE 10 MG/ML
40 INJECTION INTRAMUSCULAR; INTRAVENOUS 3 TIMES DAILY
Status: DISCONTINUED | OUTPATIENT
Start: 2017-02-23 | End: 2017-02-24

## 2017-02-23 RX ORDER — FUROSEMIDE 10 MG/ML
40 INJECTION INTRAMUSCULAR; INTRAVENOUS DAILY
Status: DISCONTINUED | OUTPATIENT
Start: 2017-02-23 | End: 2017-02-23

## 2017-02-23 RX ORDER — FUROSEMIDE 10 MG/ML
40 INJECTION INTRAMUSCULAR; INTRAVENOUS ONCE
Status: DISCONTINUED | OUTPATIENT
Start: 2017-02-23 | End: 2017-02-23

## 2017-02-23 RX ADMIN — RIBAVIRIN 200 MG: 200 TABLET, FILM COATED ORAL at 19:56

## 2017-02-23 RX ADMIN — GABAPENTIN 200 MG: 100 CAPSULE ORAL at 00:14

## 2017-02-23 RX ADMIN — FUROSEMIDE 40 MG: 10 INJECTION, SOLUTION INTRAVENOUS at 10:05

## 2017-02-23 RX ADMIN — LACTULOSE 30 ML: 10 SOLUTION ORAL at 08:06

## 2017-02-23 RX ADMIN — RIBAVIRIN 200 MG: 200 TABLET, FILM COATED ORAL at 08:07

## 2017-02-23 RX ADMIN — FUROSEMIDE 40 MG: 10 INJECTION, SOLUTION INTRAVENOUS at 19:48

## 2017-02-23 RX ADMIN — RIFAXIMIN 550 MG: 550 TABLET ORAL at 19:56

## 2017-02-23 RX ADMIN — FUROSEMIDE 40 MG: 10 INJECTION, SOLUTION INTRAVENOUS at 14:29

## 2017-02-23 RX ADMIN — GABAPENTIN 200 MG: 100 CAPSULE ORAL at 21:57

## 2017-02-23 RX ADMIN — FUROSEMIDE 40 MG: 10 INJECTION, SOLUTION INTRAVENOUS at 00:17

## 2017-02-23 RX ADMIN — LACTULOSE 30 ML: 10 SOLUTION ORAL at 19:48

## 2017-02-23 RX ADMIN — RIFAXIMIN 550 MG: 550 TABLET ORAL at 00:14

## 2017-02-23 RX ADMIN — RIFAXIMIN 550 MG: 550 TABLET ORAL at 08:06

## 2017-02-23 RX ADMIN — LEDIPASVIR AND SOFOSBUVIR 1 TABLET: 90; 400 TABLET, FILM COATED ORAL at 08:06

## 2017-02-23 RX ADMIN — SPIRONOLACTONE 50 MG: 50 TABLET, FILM COATED ORAL at 08:06

## 2017-02-23 RX ADMIN — LACTULOSE 30 ML: 10 SOLUTION ORAL at 14:29

## 2017-02-23 NOTE — PROGRESS NOTES
INTERNAL MEDICINE PROGRESS NOTE    Scott Casale ([id number]) admitted on 2/22/2017 02/23/2017    Assessment & Plan:  Scott Casale is a 61 year old male with history of cirrhosis 2/2 hepatitis C (on Harvoni and ribavirin) and past EtOH abuse, hepatic encephalopathy (on lactulose and rifaxamin) s/p TIPS 2015 due to refractory ascites, thrombocytopenia, ankylosing spondylitis, UC, hx of testicular cancer, GERD, and depression, who was recently admitted with hepatic encephalopathy ; diuretics held on last discharge and now coming with weight gain and lower extremity swelling     #)Lower extremity edema  #) Hypervolemia     Likely related to recent admission and diuretics being held; diuretics were held at that time due to concern for dehydration  He had weight gain from 182 lb to 192 lb. Hypervolemia also due to hypoalbuminemia (albumin at 1.9) due to patient's liver disease.  -Order TTE to r/o cardiac cause for LE edema  -Lasix 40 mg IV tid; hold or back off if creatinine is rising  -Net goal output 1.5-2L net-out qday, closely monitoring renal function  -Continue pta spironolactone 50mg daily and further electrolyte replacement protocol            #)Liver cirrhosis 2/2 hepatitis C, past EtOH abuse s/p TIPS 2015- Meld of 16. Stable issue at this time. No s/s of decompensation.  Continue with lactulose 30 TID, rifaximin 550mg 2 times daily   -Continue pta spironolactone  -Lasix 40 mg IV tid; hold if developing KAREN     #)Hepatitis C   -has been on harvoni daily and ribavirin 200mg BID for hep C ; will continue with that         #) Pancytopenia- Chronic, likely 2/2 to his liver disease for thrombocytopenia (platelets in 50s at baseline). Leukopenia most likely 2/2 ribavirin. Macrocytic anemia with drop in hemoglobin by 1 point due to volume overload but will trend CBC qday for now. Low but stable.  -     -F/u CBC qday      #Insomnia- Pta trazodone qhs prn      FEN:  regular diet; fuid restriction to 2000ml per  "day  Prophylaxis: DVT: ambulate GI: None  Disposition: Continue inpatient care  Code Status: FULL CODE      Kelechi Boyer MD  Med-Derm, PGY-1  [phone] Pager Number    Patient was seen and discussed with Dr. Radford who agrees with above assessment and plan.    ==================================================================    24 hr events- No acute events; got Lasix 40 mg IV x1, with good UO    Subjective:  Patient feels well. No SOB currently. Denies orthopnea, PND. No CP. States he has gained about 15 pounds since discharge.      Objective:  Most recent vital signs:  /58 (BP Location: Right arm)  Pulse 75  Temp 98.4  F (36.9  C) (Oral)  Resp 18  Ht 1.803 m (5' 11\")  Wt 91.4 kg (201 lb 8 oz)  SpO2 99%  BMI 28.1 kg/m2  Temp:  [97.7  F (36.5  C)-98.5  F (36.9  C)] 98.4  F (36.9  C)  Pulse:  [72-79] 75  Heart Rate:  [80] 80  Resp:  [16-18] 18  BP: (102-120)/(37-80) 107/58  SpO2:  [96 %-100 %] 99 %  Wt Readings from Last 2 Encounters:   02/23/17 91.4 kg (201 lb 8 oz)   02/22/17 92.7 kg (204 lb 5.9 oz)       Intake/Output Summary (Last 24 hours) at 02/23/17 1232  Last data filed at 02/23/17 1200   Gross per 24 hour   Intake              840 ml   Output             2975 ml   Net            -2135 ml       Physical exam:  General: Pleasant male, NAD  HEENT: Scleral icterus. NCAT, MMM. PERRL, EOMI.  Cardiac: Normal rate, regular rhythm. No m/r/g. Normal S1, S2.  Pulm: CTAB, no wheezes, or crackles. Normal respiratory effort  Abd: Soft, distended abdomen with bowel sounds noted , hepatomegaly  Skin: No jaundice, No rash  Extremities: b/l lower extremity edema 2+ above knees b/l  Joints: No inflammation noted on joints  Neuro: A&Ox3, no focal deficits  Psych: good mood, full affect    Labs (Past three days):  CMP  Recent Labs  Lab 02/23/17  0800 02/22/17  1550 02/17/17  0830    142 142   POTASSIUM 3.4 3.7 4.1   CHLORIDE 108 111* 109   CO2 24 23 21   ANIONGAP 7 8 12   GLC 99 92 108*   BUN 22 20 21 "   CR 1.16 1.15 1.00   GFRESTIMATED 64 64 76   GFRESTBLACK 77 78 >90African American GFR Calc   ARACELI 7.4* 7.6* 8.1*   MAG 2.1  --   --    PROTTOTAL 5.3* 5.7* 6.4*   ALBUMIN 1.9* 2.0* 2.2*   BILITOTAL 2.0* 1.9* 3.6*   ALKPHOS 56 85 60   AST 44 46* 50*   ALT 26 29 32     CBC  Recent Labs  Lab 02/23/17  0800 02/22/17  1550 02/17/17  0830   WBC 2.9* 3.5* 4.7   RBC 2.56* 2.59* 3.04*   HGB 9.0* 9.3* 10.6*   HCT 27.1* 27.9* 31.4*   * 108* 103*   MCH 35.2* 35.9* 34.9*   MCHC 33.2 33.3 33.8   RDW 16.3* 15.9* 15.0   PLT 45* 48* 54*     INRNo lab results found in last 7 days.  Arterial Blood GasNo lab results found in last 7 days.      Imaging/procedure results:   TTE pending-

## 2017-02-23 NOTE — PLAN OF CARE
Problem: Goal Outcome Summary  Goal: Goal Outcome Summary  Outcome: No Change  Pt admitted from OSH for fluid overload. VSS on RA, A/Ox4. Up to BR with SBA. BLE US neg for DVT. Compression stockings on. IV lasix given x1. Urine sample sent. Hep C meds from home labeled and in pts bin.

## 2017-02-23 NOTE — PLAN OF CARE
Problem: Goal Outcome Summary  Goal: Goal Outcome Summary  OT:  OT eval completed only; no inpatient OT needs identified.  Lymphedema will see patient as well.  Patient completed bed mobility, g/h tasks at sink , and ambulation 250' with SBA without AE without any dizziness, SOB.  Does report having 14 steps to upstairs at home.  Would recommend start of home PT as planned when DC from hospital earlier this week.  Patient should be walking 2-3x/day while inpatient.

## 2017-02-23 NOTE — PLAN OF CARE
Problem: Goal Outcome Summary  Goal: Goal Outcome Summary  Outcome: No Change  A&Ox4, VSS on RA. Up SBA. Uses urinal at bedside. Compression stockings on. BLE edematous. IV lasix given x2 per MAR- good UOP today. Per pt, has had 1 BM today. Lactulose given as scheduled.  PT & OT evals today. No c/o pain or nausea. Hep C home meds labeled in med bin.  Will continue to monitor and follow POC.

## 2017-02-23 NOTE — PROGRESS NOTES
Care Coordinator Progress Note     Admission Date/Time:  2/22/2017  Attending MD:  Norberto Mckinney MD     Data  Chart reviewed, discussed with interdisciplinary team.   Patient was admitted for: Alcoholic cirrhosis of liver with ascites (H).    Concerns with insurance coverage for discharge needs: None.  Current Living Situation: Patient lives with spouse.  Support System: Supportive and Involved  Services Involved: Home Care  Transportation: Family or Friend will provide  Barriers to Discharge: medical course    Coordination of Care and Referrals: Provided patient/family with options for Home Care.        Assessment  Patient known to writer from previous recent admission. Admitted with increased edema. History of liver cirrhosis. Upon last discharge writer set patient up with [facility]. Met with patient at bedside and he is choosing to continue these services. Resumption order placed for RN, PT, and OT. Per MD, plan is for diuresis and will be discharged home in 2-3 days.      Plan  Anticipated Discharge Date:  2-3 days  Anticipated Discharge Plan:  Home with home care    Kenisha Wray RN

## 2017-02-23 NOTE — PLAN OF CARE
Problem: Goal Outcome Summary  Goal: Goal Outcome Summary  PT 5A: Addendum to PT note for today's evaluation: nursing advised of pt's functional ability and will follow-up with ambulation activity.

## 2017-02-23 NOTE — H&P
INTERNAL MEDICINE HISTORY & PHYSICAL   Scott Casale ([id number]) admitted on 2/22/2017  Primary care provider: Carlos Duenas MD    Chief Complaint:  hypervolemia    History of Present Illness:   Scott Casale is a 61 year old male with history of cirrhosis 2/2 hepatitis C (on Harvoni and ribavirin) and pastEtOH abuse, hepatic encephalopathy (on lactulose and rifaxamin) s/p TIPS 2015 due to refractory ascites, thrombocytopenia, ankylosing spondylitis, UC, hx of testicular cancer, GERD, and depression, who was recently admitted with hepatic encephalopathy and now admitting with b/l lower leg swelling and weight gain.  His recent admission his diuretics was held due to dehydration.  The patient had TIPS in 2015 and since then he has not had any issues with abdominal swelling or paracentesis.      Today he went to [place] ER as he was not able to move up stairs and leg pain 2/2 b/l lower leg swelling;  This started since Saturday after he was discharged from hospital admission here recently.   Lasix and spironolactone was held on discharge given dehydration on presentation; plan was to resumed them in BMP in 1 week was stable.  Meanwhile he progressively had lower leg swelling and difficult to move upstairs; swelling extending to upper thigh region;   Along with this had weight gain from 82 kg to 92.6 kg in few days;        Denies chest pain, shortness of breath, orthopnea, PND, abdominal pain, back pain or diarrhea or constipation  He feels he might be urinating less than before due to not taking lasix  But did get lasix in ER today and he was mentioning that he is urinating a lot since then    Denies any other symptoms and labs has been stable                    Past Medical History:   Patient Active Problem List   Diagnosis     AC separation     Mild major depression (H)     CARDIOVASCULAR SCREENING; LDL GOAL LESS THAN 130     GERD (gastroesophageal reflux disease)     Chronic low back pain     Sudden  idiopathic hearing loss of left ear     S/P TIPS (transjugular intrahepatic portosystemic shunt)     Hepatic encephalopathy (H)     Ascites of liver     Edema of lower extremity     Chronic hepatitis C virus infection (H)     Hepatic cirrhosis (H)     Hyponatremia     Liver mass     Portal hypertension (H)     Primary malignant neoplasm of testis (H)     Thrombocytopenia (H)     Encephalopathy acute     Hypervolemia       Past Surgical History:   Past Surgical History   Procedure Laterality Date     C knee scope,clean/drain       bilateral     C appendectomy       Strabismus surgery       bilateral     Orchiectomy scrotal       prosthesis placed     Ir transven intrahepatic portosyst rev       Eye surgery       Esophagoscopy, gastroscopy, duodenoscopy (egd), combined N/A 1/17/2017     Procedure: COMBINED ESOPHAGOSCOPY, GASTROSCOPY, DUODENOSCOPY (EGD);  Surgeon: Guru Reina Palacios MD;  Location: [facility]     Colonoscopy N/A 1/17/2017     Procedure: COLONOSCOPY;  Surgeon: Guru Reina Palacios MD;  Location:  GI       Medications (outpatient):    Current Facility-Administered Medications on File Prior to Encounter:  [COMPLETED] furosemide (LASIX) injection 40 mg     Current Outpatient Prescriptions on File Prior to Encounter:  gabapentin (NEURONTIN) 100 MG capsule Take 2 capsules (200 mg) by mouth At Bedtime   ribavirin 200 MG TABS Take one tablet (200mg) by mouth in the AM with food, and one tablet (200mg) in the PM with food.   lactulose (CHRONULAC) 10 GM/15ML solution Take 30 mLs by mouth 3 times daily   rifaximin (XIFAXAN) 550 MG TABS tablet Take 1 tablet (550 mg) by mouth 2 times daily   ledipasvir-sofosbuvir (LEDIPASVIR-SOFOSBUVIR)  MG per tablet Take 1 tablet by mouth daily   vitamin D (ERGOCALCIFEROL) 60909 UNIT capsule Take 50,000 Units by mouth Twice weekly Mon and Thurs   ondansetron (ZOFRAN) 4 MG tablet Take 1 tablet (4 mg) by mouth every 8 hours as needed for nausea    [DISCONTINUED] ribavirin 200 MG TABS Take one tablet (200mg) by mouth in the AM with food, and one tablet (200mg) in the PM with food.   [DISCONTINUED] ribavirin 200 MG TABS Take 2 tablets (400 mg) by mouth 2 times daily   [DISCONTINUED] gabapentin (NEURONTIN) 100 MG capsule Take 2 capsules (200 mg) by mouth At Bedtime       Allergy:  Allergies   Allergen Reactions     Blood Transfusion Related (Informational Only) Other (See Comments)     Patient has a history of a clinically significant antibody against RBC antigens.  A delay in compatible RBCs may occur.     Percocet [Oxycodone-Acetaminophen] Nausea and Vomiting     Acetaminophen Nausea     Iodine-131 Hives     Motrin [Ibuprofen Micronized] Nausea     Tylenol Nausea       Social History:   Social History     Social History     Marital status:      Spouse name: N/A     Number of children: N/A     Years of education: N/A     Occupational History     Not on file.     Social History Main Topics     Smoking status: Former Smoker     Quit date: 1/1/1978     Smokeless tobacco: Never Used     Alcohol use No      Comment: quit in 2010     Drug use: No     Sexual activity: Yes     Partners: Female     Other Topics Concern     Parent/Sibling W/ Cabg, Mi Or Angioplasty Before 65f 55m? No     Social History Narrative    Admit 9/28:    Lives with new wife of 2 mos.    Tobacco:  Former smoker, quit 1978    Alcohol:  Sober since 2010, recovering alcoholic.    Drugs:  Denies       Family History:  Family History   Problem Relation Age of Onset     CANCER Mother 70     brain tumor     Prostate Cancer No family hx of      Cancer - colorectal No family hx of      DIABETES Maternal Grandmother      CEREBROVASCULAR DISEASE Sister      HEART DISEASE Sister      Respiratory Son      CANCER Father      Uterine Cancer Sister        ROS:   Review Of Systems  ROS: 10 point ROS neg other than the symptoms noted above in the HPI.      Objective:  Physical exam:  /47  Pulse 77   "Temp 97.7  F (36.5  C) (Oral)  Resp 16  Ht 1.803 m (5' 11\")  Wt 92.6 kg (204 lb 1.6 oz)  SpO2 100%  BMI 28.47 kg/m2  Wt Readings from Last 2 Encounters:   02/22/17 92.6 kg (204 lb 1.6 oz)   02/22/17 92.7 kg (204 lb 5.9 oz)     No intake or output data in the 24 hours ending 02/22/17 2207    Physical Exam:   General: Pleasant male, NAD  HEENT:  Scleral icterus. NCAT, MMM. PERRL, EOMI.  Cardiac: Normal rate, regular rhythm. No m/r/g. Normal S1, S2.  Pulm: CTAB, no wheezes, or crackles. Normal respiratory effort  Abd: Soft, distended abdomen with bowel sounds noted , hepatomegaly  Skin: No jaundice, No rash  Extremities: b/l lower extremity edema 2+ and extending to thigh region  Joints: No inflammation noted on joints  Neuro: A&Ox3, no focal deficits  Psych: good mood, full affect    Labs (Past three days):  CBC  Recent Labs  Lab 02/22/17  1550 02/17/17  0830 02/16/17  0728   WBC 3.5* 4.7 3.5*   RBC 2.59* 3.04* 2.78*   HGB 9.3* 10.6* 9.6*   HCT 27.9* 31.4* 28.8*   * 103* 104*   MCH 35.9* 34.9* 34.5*   MCHC 33.3 33.8 33.3   RDW 15.9* 15.0 14.4   PLT 48* 54* 45*       BMP  Recent Labs  Lab 02/22/17  1550 02/17/17  0830 02/16/17  0728    142 141   POTASSIUM 3.7 4.1 4.0   CHLORIDE 111* 109 112*   CO2 23 21 22   ANIONGAP 8 12 7   GLC 92 108* 99   BUN 20 21 22   CR 1.15 1.00 1.01   GFRESTIMATED 64 76 75   GFRESTBLACK 78 >90African American GFR Calc >90African American GFR Calc   ARACELI 7.6* 8.1* 8.2*   MAG  --   --  2.1   PHOS  --   --  2.8        INR  Recent Labs  Lab 02/16/17  0728   INR 1.53*       Liver panel  Recent Labs  Lab 02/22/17  1550 02/17/17  0830 02/16/17  0728   PROTTOTAL 5.7* 6.4* 5.8*   ALBUMIN 2.0* 2.2* 2.0*   BILITOTAL 1.9* 3.6* 4.4*   ALKPHOS 85 60 47   AST 46* 50* 44   ALT 29 32 28       Urinalysis  Recent Labs   Lab Test  02/16/17   1345   COLOR  Yellow   APPEARANCE  Clear   URINEGLC  Negative   URINEBILI  Negative   URINEKETONE  Negative   SG  1.021   UBLD  Large*   URINEPH  5.5   PROTEIN  " 10*   NITRITE  Negative   LEUKEST  Negative   RBCU  12*   WBCU  2       Cultures  Blood: NGTD    Urine: unremarkable       ASSESSMENT & PLAN :    Scott Casale is a 61 year old male with history of cirrhosis 2/2 hepatitis C (on Harvoni and ribavirin) and past EtOH abuse, hepatic encephalopathy (on lactulose and rifaxamin) s/p TIPS 2015 due to refractory ascites, thrombocytopenia, ankylosing spondylitis, UC, hx of testicular cancer, GERD, and depression, who was recently admitted with hepatic encephalopathy ; diuretics held on last discharge and now coming with weight gain and lower extremity swelling      #)Lower extremity edema  #) Hypervolemia    Likely related to recent admission and diuretics being held; diuretics were held at that time due to concern for dehydration  He had weight gain from 182 lb to 192 lb;    Will send for other labs including UA, his LFT stable and further lower extremity swelling -will order doppler for lower extremity to r/o DVT  Also will give lasix 40mg IV once  Aim to diurese 1  l net negative per day  Strict I and Os; daily weight  Will resume spironolactone 50mg daily and further electrolyte replacement protocol         #)Liver cirrhosis 2/2 hepatitis C, past EtOH abuse s/p TIPS 2015  Stable at this time- 26  No sign of decompensation  Continue with lactulose 30 TID, rifaximin 550mg 2 times daily   Continue with spironolactone and lasix       -has been on harvoni daily and ribavirin 200mg BID for hep C ; will continue with that       #) Thrombycytopenia- Chronic, likely 2/2 to his liver disease.  -Plt to 48; was 54 in baseline           #Insomnia- Pta trazodone qhs prn     FEN:  regular diet; fuid restriction to 2000ml per day  Prophylaxis: DVT: ambulate GI: None  Disposition: Continue inpatient care  Code Status: FULL CODE        Seth SAAVEDRA  PGY 3 Internal Medicine    Case will be staffed in am by the primary team

## 2017-02-23 NOTE — PLAN OF CARE
Problem: Goal Outcome Summary  Goal: Goal Outcome Summary  Physical Therapy Discharge Summary     Reason for therapy discharge:    All goals and outcomes met, no further needs identified.     Progress towards therapy goal(s). See goals on Care Plan in [facility] electronic health record for goal details.  Goals met     Therapy recommendation(s):    Continued therapy is recommended.  Rationale/Recommendations:  needs home health PT assessment to determine safe functional mobility and exercise program development for functional endurance.

## 2017-02-23 NOTE — PROGRESS NOTES
02/23/17 1100   Quick Adds   Type of Visit Initial PT Evaluation   Living Environment   Lives With spouse;child(sraa), adult;grandchild(sara)   Living Arrangements house   Home Accessibility bed and bath are not on the first floor;stairs (1 railing present);stairs within home;tub/shower is not walk in   Number of Stairs to Enter Home 2   Number of Stairs Within Home 14   Stair Railings at Home inside, present on right side;other (see comments)  (2nd level of stairs has rail on the left.)   Transportation Available car;family or friend will provide   Living Environment Comment Working on having grab bars installed and getting a shower chair from a [employer]   Self-Care   Dominant Hand right   Usual Activity Tolerance moderate   Current Activity Tolerance fair   Regular Exercise no   Equipment Currently Used at Home none   Activity/Exercise/Self-Care Comment Has home health arranged for when he is discharged   Functional Level Prior   Ambulation 0-->independent   Transferring 0-->independent   Toileting 0-->independent   Bathing 0-->independent   Dressing 2-->assistive person   Eating 0-->independent   Communication 0-->understands/communicates without difficulty   Swallowing 0-->swallows foods/liquids without difficulty   Cognition 0 - no cognition issues reported   Fall history within last six months yes   Number of times patient has fallen within last six months 1   Which of the above functional risks had a recent onset or change? bathing;dressing;fall history;ambulation   General Information   Onset of Illness/Injury or Date of Surgery - Date 02/22/17   Referring Physician Seth Islas MD   Patient/Family Goals Statement Return home with assist and home health   Pertinent History of Current Problem (include personal factors and/or comorbidities that impact the POC) Scott Casale is a 61 year old male with history of cirrhosis 2/2 hepatitis C (on Harvoni and ribavirin) and pastEtOH abuse, hepatic  encephalopathy (on lactulose and rifaxamin) s/p TIPS 2015 due to refractory ascites, thrombocytopenia, ankylosing spondylitis, UC, hx of testicular cancer, GERD, and depression, who was recently admitted with hepatic encephalopathy and now admitting with b/l lower leg swelling and weight gain.   Precautions/Limitations other (see comments)  (On bedrest to decrease LE edema, can ambulate daily)   Cognitive Status Examination   Orientation orientation to person, place and time   Level of Consciousness alert   Follows Commands and Answers Questions 100% of the time   Personal Safety and Judgment intact   Memory intact   Integumentary/Edema   Integumentary/Edema Comments LE edema present (2+) and edema has decreased since admission with decreased LE soreness.   Posture    Posture Comments Intact   Range of Motion (ROM)   ROM Comment LE ROM is within normal limits   Strength   Strength Comments LE strength is 5/5 bilaterally   Bed Mobility   Bed Mobility Comments Independent   Transfer Skills   Transfer Comments Independent   Gait   Gait Comments No assistive device needed   Balance   Balance Comments Dynamic balance within normal limits   General Therapy Interventions   Planned Therapy Interventions progressive activity/exercise   Clinical Impression   Criteria for Skilled Therapeutic Intervention evaluation only   PT Diagnosis Gait abnormality   Influenced by the following impairments Hypervolemia   Functional limitations due to impairments Ambulation   Clinical Presentation Stable/Uncomplicated   Clinical Presentation Rationale Safety and functional mobility needs assessment to determine level of activity for safe return home   Clinical Decision Making (Complexity) Low complexity   Therapy Frequency` other (see comments)  (Evaluation and one treatment only)   Predicted Duration of Therapy Intervention (days/wks) 2/23/17   Anticipated Equipment Needs at Discharge shower chair;other (see comments)  (grab bars for  "bathtub/shower)   Anticipated Discharge Disposition Home with Home Therapy   Risk & Benefits of therapy have been explained Yes   Patient, Family & other staff in agreement with plan of care Yes   [facility] TM \"6 Clicks\"   2016, Trustees of [facility], under license to [employer].  All rights reserved.   6 Clicks Short Forms Basic Mobility Inpatient Short Form   [facility]  \"6 Clicks\" V.2 Basic Mobility Inpatient Short Form   1. Turning from your back to your side while in a flat bed without using bedrails? 4 - None   2. Moving from lying on your back to sitting on the side of a flat bed without using bedrails? 4 - None   3. Moving to and from a bed to a chair (including a wheelchair)? 4 - None   4. Standing up from a chair using your arms (e.g., wheelchair, or bedside chair)? 4 - None   5. To walk in hospital room? 4 - None   6. Climbing 3-5 steps with a railing? 4 - None   Basic Mobility Raw Score (Score out of 24.Lower scores equate to lower levels of function) 24   Total Evaluation Time   Total Evaluation Time (Minutes) 8     "

## 2017-02-23 NOTE — PLAN OF CARE
Problem: Goal Outcome Summary  Goal: Goal Outcome Summary  PT 5A: Pt agreeable to PT. PT evaluation and one treatment completed. He is independent with transfers, LE strength is normal, dynamic balance is normal, he can ambulate without an assistive device > 350' independently, and he can ascend/descend 18 steps with one handrail independently. There was minimal lightheadedness after the stairs and this cleared within 2 min with a seated rest. No further PT needs are identified at this time and the PT order is completed.     Home health service with PT is recommended and planned already following discharge for follow-up for safety and functional mobility skills in the home.

## 2017-02-24 ENCOUNTER — APPOINTMENT (OUTPATIENT)
Dept: OCCUPATIONAL THERAPY | Facility: CLINIC | Age: 62
DRG: 641 | End: 2017-02-24
Attending: INTERNAL MEDICINE
Payer: MEDICARE

## 2017-02-24 VITALS
HEIGHT: 71 IN | TEMPERATURE: 97.2 F | OXYGEN SATURATION: 99 % | DIASTOLIC BLOOD PRESSURE: 59 MMHG | WEIGHT: 195.3 LBS | HEART RATE: 80 BPM | RESPIRATION RATE: 16 BRPM | SYSTOLIC BLOOD PRESSURE: 125 MMHG | BODY MASS INDEX: 27.34 KG/M2

## 2017-02-24 LAB
ANION GAP SERPL CALCULATED.3IONS-SCNC: 8 MMOL/L (ref 3–14)
BUN SERPL-MCNC: 26 MG/DL (ref 7–30)
CALCIUM SERPL-MCNC: 7.7 MG/DL (ref 8.5–10.1)
CHLORIDE SERPL-SCNC: 108 MMOL/L (ref 94–109)
CO2 SERPL-SCNC: 25 MMOL/L (ref 20–32)
CREAT SERPL-MCNC: 1.19 MG/DL (ref 0.66–1.25)
ERYTHROCYTE [DISTWIDTH] IN BLOOD BY AUTOMATED COUNT: 16.1 % (ref 10–15)
GFR SERPL CREATININE-BSD FRML MDRD: 62 ML/MIN/1.7M2
GLUCOSE SERPL-MCNC: 98 MG/DL (ref 70–99)
HCT VFR BLD AUTO: 29 % (ref 40–53)
HGB BLD-MCNC: 9.5 G/DL (ref 13.3–17.7)
INTERPRETATION ECG - MUSE: NORMAL
MCH RBC QN AUTO: 34.8 PG (ref 26.5–33)
MCHC RBC AUTO-ENTMCNC: 32.8 G/DL (ref 31.5–36.5)
MCV RBC AUTO: 106 FL (ref 78–100)
PLATELET # BLD AUTO: 48 10E9/L (ref 150–450)
POTASSIUM SERPL-SCNC: 3.5 MMOL/L (ref 3.4–5.3)
RBC # BLD AUTO: 2.73 10E12/L (ref 4.4–5.9)
SODIUM SERPL-SCNC: 140 MMOL/L (ref 133–144)
WBC # BLD AUTO: 3.6 10E9/L (ref 4–11)

## 2017-02-24 PROCEDURE — 97165 OT EVAL LOW COMPLEX 30 MIN: CPT | Mod: GO | Performed by: OCCUPATIONAL THERAPIST

## 2017-02-24 PROCEDURE — 36415 COLL VENOUS BLD VENIPUNCTURE: CPT | Performed by: STUDENT IN AN ORGANIZED HEALTH CARE EDUCATION/TRAINING PROGRAM

## 2017-02-24 PROCEDURE — 97140 MANUAL THERAPY 1/> REGIONS: CPT | Mod: GO | Performed by: OCCUPATIONAL THERAPIST

## 2017-02-24 PROCEDURE — 99239 HOSP IP/OBS DSCHRG MGMT >30: CPT | Mod: GC | Performed by: PEDIATRICS

## 2017-02-24 PROCEDURE — 25000125 ZZHC RX 250: Performed by: INTERNAL MEDICINE

## 2017-02-24 PROCEDURE — 25000132 ZZH RX MED GY IP 250 OP 250 PS 637: Performed by: INTERNAL MEDICINE

## 2017-02-24 PROCEDURE — 85027 COMPLETE CBC AUTOMATED: CPT | Performed by: STUDENT IN AN ORGANIZED HEALTH CARE EDUCATION/TRAINING PROGRAM

## 2017-02-24 PROCEDURE — 25000128 H RX IP 250 OP 636: Performed by: STUDENT IN AN ORGANIZED HEALTH CARE EDUCATION/TRAINING PROGRAM

## 2017-02-24 PROCEDURE — 40000133 ZZH STATISTIC OT WARD VISIT: Performed by: OCCUPATIONAL THERAPIST

## 2017-02-24 PROCEDURE — 80048 BASIC METABOLIC PNL TOTAL CA: CPT | Performed by: STUDENT IN AN ORGANIZED HEALTH CARE EDUCATION/TRAINING PROGRAM

## 2017-02-24 RX ORDER — FUROSEMIDE 40 MG
40 TABLET ORAL
Qty: 30 TABLET | Refills: 0 | Status: SHIPPED
Start: 2017-02-24 | End: 2017-03-09

## 2017-02-24 RX ORDER — AMOXICILLIN 250 MG
1-2 CAPSULE ORAL 2 TIMES DAILY PRN
Qty: 100 TABLET | Refills: 0 | Status: SHIPPED
Start: 2017-02-24 | End: 2017-03-20 | Stop reason: ALTCHOICE

## 2017-02-24 RX ORDER — POTASSIUM CHLORIDE 1500 MG/1
20 TABLET, EXTENDED RELEASE ORAL DAILY
Qty: 30 TABLET | Refills: 1 | Status: SHIPPED
Start: 2017-02-24 | End: 2018-07-20

## 2017-02-24 RX ORDER — POTASSIUM CHLORIDE 1500 MG/1
20 TABLET, EXTENDED RELEASE ORAL DAILY
Qty: 30 TABLET | Refills: 1 | Status: SHIPPED
Start: 2017-02-24 | End: 2017-02-24

## 2017-02-24 RX ORDER — SPIRONOLACTONE 50 MG/1
50 TABLET, FILM COATED ORAL DAILY
Qty: 30 TABLET | Refills: 0 | Status: SHIPPED
Start: 2017-02-24 | End: 2017-03-09

## 2017-02-24 RX ORDER — POTASSIUM CHLORIDE 1500 MG/1
60 TABLET, EXTENDED RELEASE ORAL ONCE
Status: DISCONTINUED | OUTPATIENT
Start: 2017-02-24 | End: 2017-02-24

## 2017-02-24 RX ORDER — SIMETHICONE 80 MG
80 TABLET,CHEWABLE ORAL 4 TIMES DAILY PRN
Status: DISCONTINUED | OUTPATIENT
Start: 2017-02-24 | End: 2017-02-24 | Stop reason: HOSPADM

## 2017-02-24 RX ORDER — POTASSIUM CHLORIDE 750 MG/1
40 TABLET, EXTENDED RELEASE ORAL ONCE
Status: COMPLETED | OUTPATIENT
Start: 2017-02-24 | End: 2017-02-24

## 2017-02-24 RX ORDER — FUROSEMIDE 40 MG
40 TABLET ORAL
Status: DISCONTINUED | OUTPATIENT
Start: 2017-02-24 | End: 2017-02-24 | Stop reason: HOSPADM

## 2017-02-24 RX ADMIN — ONDANSETRON 4 MG: 4 TABLET, ORALLY DISINTEGRATING ORAL at 14:04

## 2017-02-24 RX ADMIN — LEDIPASVIR AND SOFOSBUVIR 1 TABLET: 90; 400 TABLET, FILM COATED ORAL at 08:16

## 2017-02-24 RX ADMIN — FUROSEMIDE 40 MG: 10 INJECTION, SOLUTION INTRAVENOUS at 08:16

## 2017-02-24 RX ADMIN — SPIRONOLACTONE 50 MG: 50 TABLET, FILM COATED ORAL at 08:16

## 2017-02-24 RX ADMIN — LACTULOSE 30 ML: 10 SOLUTION ORAL at 08:15

## 2017-02-24 RX ADMIN — RIBAVIRIN 200 MG: 200 TABLET, FILM COATED ORAL at 08:16

## 2017-02-24 RX ADMIN — RIFAXIMIN 550 MG: 550 TABLET ORAL at 08:16

## 2017-02-24 RX ADMIN — POTASSIUM CHLORIDE 40 MEQ: 750 TABLET, EXTENDED RELEASE ORAL at 01:40

## 2017-02-24 NOTE — PROGRESS NOTES
Care Coordinator Progress Note       Admission Date/Time:  2/22/2017  Attending MD:  Norberto Mckinney MD       Data  Chart reviewed, discussed with interdisciplinary team.   Patient was admitted for: Alcoholic cirrhosis of liver with ascites (H).      Concerns with insurance coverage for discharge needs: None.  Current Living Situation: Patient lives with spouse.  Support System: Supportive and Involved  Services Involved: Home Care  Transportation: MA transportation,  [employer] Provide a Ride [phone]  Barriers to Discharge: cleared for discharge home      Referrals: Provided patient/family with options for Home Care.          Assessment  Admitted with increased edema. History of liver cirrhosis. Upon last discharge prior RNCC set patient up with [facility]. Prior RNCC met with patient yesterday and he is choosing to continue these services. Resumption order placed for RN, PT, and OT. Per MD, plan is for diuresis and will be discharged home in 2-3 days.        Plan  Anticipated Discharge Date:  2/24/2017  Anticipated Discharge Plan:  Home with home care

## 2017-02-24 NOTE — PLAN OF CARE
Problem: Goal Outcome Summary  Goal: Goal Outcome Summary  Edema 5A: Initial edema evaluation completed. Patient presents with acute on chronic LE edema. Skin intact with 2+ pitting distally. Application of GCB from MTPs to knee creases for edema management. Remove if causing pain/numbness. Will transition to compression stockings when appropriate with fit for long term management of LE edema.

## 2017-02-24 NOTE — PLAN OF CARE
Problem: Goal Outcome Summary  Goal: Goal Outcome Summary  Outcome: Adequate for Discharge Date Met:  02/24/17  Pt A&Ox4, VSS on RA. C/o pain in feet from compression stockings. Lymphedema met with pt today. Discharge orders placed. Discharge instructions reviewed with pt and wife, verbalized understanding. Pt discharging home with wife. Will continue to monitor and follow POC.

## 2017-02-24 NOTE — DISCHARGE SUMMARY
Medicine Discharge Summary  Scott Casale MRN: [id number]  1955  Primary care provider: Carlos Duenas MD  ___________________________________          Date of Admission:  2/22/2017  Date of Discharge:  2/24/2017   Admitting Physician:  Norberto Mckinney MD  Discharge Physician:  Cheo Radford MD  Discharging Service:  Internal Medicine, [place] [address]     Primary Provider: Carlos Duenas MD         Reason for Admission:   Hypervolemia          Discharge Diagnosis:   Hypervolemia  Liver cirrhosis s/p TIPS 2015  HCV  Pancytopenia  Insomnia         Procedures & Significant Findings:   TTE:  Global and regional left ventricular function is normal with an EF of 60-65%.  The right ventricle is normal size. Global right ventricular function is  normal.  There is mild mitral regurgitation with a posteriorly directed jet.  Pulmonary artery systolic pressure is normal.  Dilation of the inferior vena cava is present with normal respiratory  variation in diameter. Estimated mean right atrial pressure is 3-8 mmHg.  No pericardial effusion is present.  Previous study not available for comparison.         Consultations:   None         Hospital Course by Problem:    Scott Casale is a 61 year old male with history of cirrhosis 2/2 hepatitis C (on Harvoni and ribavirin) and past EtOH abuse, hepatic encephalopathy (on lactulose and rifaxamin) s/p TIPS 2015 due to refractory ascites, thrombocytopenia, ankylosing spondylitis, UC, hx of testicular cancer, GERD, and depression, who was recently admitted with hepatic encephalopathy ; diuretics held on last discharge and now coming with weight gain and lower extremity swelling      #)Lower extremity edema  #) Hypervolemia      Likely related to recent admission and diuretics being held; diuretics were held at that time due to concern for dehydration  He had weight gain from 182 lb to 192 lb. Hypervolemia also due to  "hypoalbuminemia (albumin at 1.9) due to patient's liver disease. TTE was done to r/o cardiac cause and was WNL (EF 60-65%). Started Lasix 40 mg IV tid and patient had tremendous urine response, ~4L net out. In the morning, transitioned to PO dose of Lasix, 40 mg bid. Kidney function remains stable. We restarted his spironolactone 50 mg qday on admission and continued this. Being sent home with 20 mEq KCl, which he should take only with the bid dosing of Lasix, have BMP check in 2-3 days and follow-up with PCP within 1 week.  -Patient instructed if starting to gain weight to call PCP, for Lasix dose adjustment, and to see PCP within 1 week. Goal weight determined to be ~185 pounds and discharging at 195. Came in at 204 pounds. Discussed that he should back off Lasix when at goal weight to qday.        #)Liver cirrhosis 2/2 hepatitis C, past EtOH abuse s/p TIPS 2015- Meld of 16. Stable issue at this time. No s/s of decompensation.  Continue with lactulose 30 TID, rifaximin 550mg 2 times daily   -Continue pta spironolactone, Lasix     #)Hepatitis C   -has been on harvoni daily and ribavirin 200mg BID for hep C ; will continue with that          #) Pancytopenia- Chronic, likely 2/2 to his liver disease for thrombocytopenia (platelets in 50s at baseline). Leukopenia most likely 2/2 ribavirin, but levels stable. Macrocytic anemia with drop in hemoglobin by 1 point due to volume overload but will trend CBC qday for now. Low but stable.        #Insomnia- Pta trazodone qhs prn    Physical Exam on day of Discharge:  Blood pressure 125/59, pulse 80, temperature 97.2  F (36.2  C), temperature source Oral, resp. rate 16, height 1.803 m (5' 11\"), weight 88.6 kg (195 lb 4.8 oz), SpO2 99 %.  General: Pleasant male, NAD  HEENT: Scleral icterus. NCAT, MMM. PERRL, EOMI.  Cardiac: Normal rate, regular rhythm. No m/r/g. Normal S1, S2.  Pulm: CTAB, no wheezes, or crackles. Normal respiratory effort  Abd: Soft, distended abdomen with " bowel sounds noted , hepatomegaly  Skin: No jaundice, No rash  Extremities: b/l lower extremity edema 1+ to knees b/l  Joints: No inflammation noted on joints  Neuro: A&Ox3, no focal deficits, no asterixis  Psych: good mood, full affect     CMP  Recent Labs  Lab 02/24/17  0725 02/23/17 2053 02/23/17  0800 02/22/17  1550    142 139 142   POTASSIUM 3.5 3.4 3.4 3.7   CHLORIDE 108 106 108 111*   CO2 25 28 24 23   ANIONGAP 8 8 7 8   GLC 98 108* 99 92   BUN 26 22 22 20   CR 1.19 1.29* 1.16 1.15   GFRESTIMATED 62 56* 64 64   GFRESTBLACK 75 68 77 78   ARACELI 7.7* 7.7* 7.4* 7.6*   MAG  --  2.1 2.1  --    PROTTOTAL  --   --  5.3* 5.7*   ALBUMIN  --   --  1.9* 2.0*   BILITOTAL  --   --  2.0* 1.9*   ALKPHOS  --   --  56 85   AST  --   --  44 46*   ALT  --   --  26 29     CBC  Recent Labs  Lab 02/24/17 0725 02/23/17  0800 02/22/17  1550   WBC 3.6* 2.9* 3.5*   RBC 2.73* 2.56* 2.59*   HGB 9.5* 9.0* 9.3*   HCT 29.0* 27.1* 27.9*   * 106* 108*   MCH 34.8* 35.2* 35.9*   MCHC 32.8 33.2 33.3   RDW 16.1* 16.3* 15.9*   PLT 48* 45* 48*     INRNo lab results found in last 7 days.  Arterial Blood GasNo lab results found in last 7 days.           Pending Results:   None pending         Discharge Medications:     Current Discharge Medication List      START taking these medications    Details   furosemide (LASIX) 40 MG tablet Take 1 tablet (40 mg) by mouth 2 times daily Use this twice a day dose until goal weight of 185 pounds is reached. Weigh yourself everyday. Then may need to take once a day.  Qty: 30 tablet, Refills: 0    Associated Diagnoses: Alcoholic cirrhosis of liver with ascites (H)      spironolactone (ALDACTONE) 50 MG tablet Take 1 tablet (50 mg) by mouth daily  Qty: 30 tablet, Refills: 0    Associated Diagnoses: Alcoholic cirrhosis of liver with ascites (H)      senna-docusate (SENOKOT-S;PERICOLACE) 8.6-50 MG per tablet Take 1-2 tablets by mouth 2 times daily as needed (constipation )  Qty: 100 tablet, Refills: 0     Associated Diagnoses: Constipation, unspecified constipation type      potassium chloride SA (POTASSIUM CHLORIDE) 20 MEQ CR tablet Take 1 tablet (20 mEq) by mouth daily Take only while taking your twice a day dose of Lasix then stop. Have your potassium levels checked in 2-3 days.  Qty: 30 tablet, Refills: 1    Associated Diagnoses: Hypokalemia         CONTINUE these medications which have NOT CHANGED    Details   gabapentin (NEURONTIN) 100 MG capsule Take 2 capsules (200 mg) by mouth At Bedtime  Qty: 60 capsule, Refills: 3    Associated Diagnoses: Chronic low back pain with sciatica, sciatica laterality unspecified, unspecified back pain laterality      ribavirin 200 MG TABS Take one tablet (200mg) by mouth in the AM with food, and one tablet (200mg) in the PM with food.  Qty: 60 tablet, Refills: 1    Associated Diagnoses: Chronic hepatitis C without hepatic coma (H)      lactulose (CHRONULAC) 10 GM/15ML solution Take 30 mLs by mouth 3 times daily  Qty: 500 mL, Refills: 0    Associated Diagnoses: Hepatic encephalopathy (H)      rifaximin (XIFAXAN) 550 MG TABS tablet Take 1 tablet (550 mg) by mouth 2 times daily  Qty: 60 tablet, Refills: 11    Associated Diagnoses: Hepatic encephalopathy (H)      ledipasvir-sofosbuvir (LEDIPASVIR-SOFOSBUVIR)  MG per tablet Take 1 tablet by mouth daily  Qty: 28 tablet, Refills: 2    Associated Diagnoses: Hepatitis C virus infection without hepatic coma, unspecified chronicity      vitamin D (ERGOCALCIFEROL) 51569 UNIT capsule Take 50,000 Units by mouth Twice weekly Mon and Thurs      ondansetron (ZOFRAN) 4 MG tablet Take 1 tablet (4 mg) by mouth every 8 hours as needed for nausea                  Discharge Instructions and Follow-Up:     Discharge Procedure Orders  Home care nursing referral   Referral Type: Home Health Therapies & Aides     Home Care PT Referral for Hospital Discharge   Referral Type: Home Health Therapies & Aides     Home Care OT Referral for Hospital  Discharge     Medication Therapy Management Referral   Referral Type: Med Therapy Management     Reason for your hospital stay   Order Comments: You came here because you had lower extremity swelling because you were not on your usual diuretics. We have restarted you on diuretics and your swelling and weight is going down. You had an echocardiogram which did not show any abnormality of your heart.     Activity   Order Comments: Your activity upon discharge: activity as tolerated   Order Specific Question Answer Comments   Is discharge order? Yes      Adult [facility] Follow-up and recommended labs and tests   Order Comments: Follow up with primary care provider, Carlos Duenas MD, within 7 days for hospital follow- up.  The following labs/tests are recommended: BMP, Mg2+, CBC in 2-3 days.    Hepatology follow-up in 1 week.    Appointments on [place] and/or [facility] (with [facility] or [facility] provider or service). Call [phone] if you haven't heard regarding these appointments within 7 days of discharge.     Discharge Instructions   Order Comments: Take your Lasix as prescribed 40 mg twice a day until goal weight ~185 pounds is reached, then can take once a day or as directed by your primary care physician. If you gain 2-3 pounds in a day or 5 pounds in a week, this may mean that you need to increase your dose of diuretics, so please call primary care physician for further recommendations.  If you have any worse swelling ,weight gain, shortness of breath, chest pain, fevers, bleeding, or any other life-threatening or worsening symptoms, please contact a healthcare provider as soon as possible.    Take all of your medications as prescribed and go to all follow-up appointments.    See your primary care physician in 1 week and go for lab draw in 2-3 days, especially to check your electrolytes while on Lasix, spironolactone, and potassium replacement.     Full Code     Diet   Order Comments: Follow this diet upon  discharge: Orders Placed This Encounter     Fluid restriction 2000 ML FLUID     Combination Diet Regular Diet Adult   Order Specific Question Answer Comments   Is discharge order? Yes                    Discharge Disposition:   Home         Condition on Discharge:   Discharge condition: Good   Code status on discharge: Full Code        Date of service: 2/24/2017    The patient was discussed with Dr. Radford.    Kelechi Boyer MD  Med-Derm, PGY-1  [phone] pager #

## 2017-02-24 NOTE — PLAN OF CARE
Problem: Goal Outcome Summary  Goal: Goal Outcome Summary  Pt a/ o x 4. MD ordered for one time dose of oral 40 meq of potassium, recheck this AM. Pt reported right toe pain this shift, relieved with repositioning compression stockings. Pt PIV in right hand, saline locked. Pt uses urinal at bedside. Pt reports having excessive gas this shift from lactulose, no BM this shift. Pt VSS on RA. Will continue to monitor w/ POC.

## 2017-02-24 NOTE — PLAN OF CARE
Problem: Goal Outcome Summary  Goal: Goal Outcome Summary  Outcome: Improving  Alert and oriented x 4. Vital signs within normal limits. On room air. Denied pain. Good oral intake.  Ambulated in hallways with stand by assist.Patient stated felt oozy when walking. Plan: Ambulate with stand by assist. Continue care.  Patient has his home medications (expensive) in the blue bin, please return to patient when discharge.

## 2017-02-24 NOTE — PROGRESS NOTES
02/24/17 0800   Rehab Discipline   Discipline OT   Type of Visit   Type of visit Initial Edema Evaluation   General Information   Start of care 02/24/17   Referring physician Kelechi Boyer MD   Orders Evaluate and treat as indicated   Order date 02/23/17   Medical diagnosis 61 year old male with history of cirrhosis 2/2 hepatitis    Edema onset (acute on chronic )   Affected body parts RLE;LLE   Edema etiology comments liver disease    Surgical / medical history reviewed Yes   Prior level of functional mobility independent    Prior treatment ERIN hose   Living environment [place] [facility]   General observations Platelets 48 on 2/24. LE US negative for DVT.    Subjective Report   Patient report of symptoms patient reports LEs feel heavy with movement.    Pain   Pain comments patient reporting slight discomfort on dorsal feet.    Edema Exam / Assessment   Skin condition Pitting;Dryness;Intact   Pitting 2+   Pitting location MTPs to knee creases    Girth Measurements   Girth Measurements Refer to separate girth measurement flowsheet   Range of Motion   ROM No deficits were identified   Strength   Strength No deficits were identified   Sensory   Sensory perception Light touch   Light touch Intact   Planned Edema Interventions   Planned edema interventions Gradient compression bandaging;Fit for compression garment;Exercises;Precautions to prevent infection / exacerbation;Education   Planned edema intervention comments GCB applied from MTPs to knee creases bilaterally.    Clinical Impression   Criteria for skilled therapeutic intervention met Yes   Therapy diagnosis Recommend use of GCB at this time due to acute increases from baseline edema. Anticipate patient will require compression stockings for long term management of LE edema when appropriate with fit.   Influenced by the following impairments / conditions Edema   Assessment of Occupational Performance 1-3 Performance Deficits   Identified Performance  Deficits Decreased functional mobility    Clinical Decision Making (Complexity) Low complexity   Treatment frequency 5 times / week   Treatment duration 1 week    Patient / family and/or staff in agreement with plan of care Yes   Risks and benefits of therapy have been explained Yes   Total Evaluation Time   Total evaluation time 5

## 2017-02-27 ENCOUNTER — TELEPHONE (OUTPATIENT)
Dept: PHARMACY | Facility: OTHER | Age: 62
End: 2017-02-27

## 2017-02-27 ENCOUNTER — CARE COORDINATION (OUTPATIENT)
Dept: CARDIOLOGY | Facility: CLINIC | Age: 62
End: 2017-02-27

## 2017-02-27 LAB
ANION GAP SERPL CALCULATED.3IONS-SCNC: 8 MMOL/L (ref 3–14)
BASOPHILS # BLD AUTO: 0 10E9/L (ref 0–0.2)
BASOPHILS NFR BLD AUTO: 0.5 %
BUN SERPL-MCNC: 21 MG/DL (ref 7–30)
CALCIUM SERPL-MCNC: 8.6 MG/DL (ref 8.5–10.1)
CHLORIDE SERPL-SCNC: 108 MMOL/L (ref 94–109)
CO2 SERPL-SCNC: 27 MMOL/L (ref 20–32)
CREAT SERPL-MCNC: 1.25 MG/DL (ref 0.66–1.25)
DIFFERENTIAL METHOD BLD: ABNORMAL
EOSINOPHIL # BLD AUTO: 0.1 10E9/L (ref 0–0.7)
EOSINOPHIL NFR BLD AUTO: 2.3 %
ERYTHROCYTE [DISTWIDTH] IN BLOOD BY AUTOMATED COUNT: 14.9 % (ref 10–15)
GFR SERPL CREATININE-BSD FRML MDRD: 59 ML/MIN/1.7M2
GLUCOSE SERPL-MCNC: 116 MG/DL (ref 70–99)
HCT VFR BLD AUTO: 33 % (ref 40–53)
HGB BLD-MCNC: 11 G/DL (ref 13.3–17.7)
IMM GRANULOCYTES # BLD: 0 10E9/L (ref 0–0.4)
IMM GRANULOCYTES NFR BLD: 0.2 %
LYMPHOCYTES # BLD AUTO: 0.7 10E9/L (ref 0.8–5.3)
LYMPHOCYTES NFR BLD AUTO: 15.4 %
MAGNESIUM SERPL-MCNC: 2.2 MG/DL (ref 1.6–2.3)
MCH RBC QN AUTO: 35.9 PG (ref 26.5–33)
MCHC RBC AUTO-ENTMCNC: 33.3 G/DL (ref 31.5–36.5)
MCV RBC AUTO: 108 FL (ref 78–100)
MONOCYTES # BLD AUTO: 0.6 10E9/L (ref 0–1.3)
MONOCYTES NFR BLD AUTO: 14.1 %
NEUTROPHILS # BLD AUTO: 3 10E9/L (ref 1.6–8.3)
NEUTROPHILS NFR BLD AUTO: 67.5 %
PLATELET # BLD AUTO: 51 10E9/L (ref 150–450)
POTASSIUM SERPL-SCNC: 3.7 MMOL/L (ref 3.4–5.3)
RBC # BLD AUTO: 3.06 10E12/L (ref 4.4–5.9)
SODIUM SERPL-SCNC: 143 MMOL/L (ref 133–144)
WBC # BLD AUTO: 4.4 10E9/L (ref 4–11)

## 2017-02-27 PROCEDURE — 83735 ASSAY OF MAGNESIUM: CPT | Performed by: INTERNAL MEDICINE

## 2017-02-27 PROCEDURE — 85025 COMPLETE CBC W/AUTO DIFF WBC: CPT | Performed by: INTERNAL MEDICINE

## 2017-02-27 PROCEDURE — 80048 BASIC METABOLIC PNL TOTAL CA: CPT | Performed by: INTERNAL MEDICINE

## 2017-02-27 NOTE — TELEPHONE ENCOUNTER
MTM referral from: Transitions of Care (recent hospital discharge or ED visit)    MTM referral outreach attempt #1 on February 27, 2017 at 12:23 PM      Outcome: Left Message    Danika Mckinley MTM Coordinator

## 2017-02-27 NOTE — PROGRESS NOTES
"[facility]  \"Hello, my name is Chloe Wei , and I am calling from the [facility].  I want to check in and see how you are doing, after leaving the hospital.  You may also receive a call from your Care Coordinator (care team), but I want to make sure you don t have any urgent needs.  I have a couple questions to review with you:     Post-Discharge Outreach                                                    Scott Casale is a 61 year old male     Follow-up Appointments           Adult [facility] Follow-up and recommended labs and tests       Follow up with primary care provider, Carlos Duenas MD, within 7 days for hospital follow- up. The following labs/tests are recommended: BMP, Mg2+, CBC in 2-3 days.     Hepatology follow-up in 1 week.     Appointments on [place] and/or [facility] (with [facility] or [facility] provider or service). Call [phone] if you haven't heard regarding these appointments within 7 days of discharge.                       Your next 10 appointments already scheduled            Mar 09, 2017 9:25 AM CST   (Arrive by 9:10 AM)   Return Visit with Greg Concepcion MD   [facility] ([facility])     [address] [place] [address]  4th Floor  [facility] [id number] [phone]                  Mar 20, 2017 3:00 PM CDT   Lab with  LAB   [facility] ([facility])     [address] [place] [address]  1st Floor  [facility] [address]   [phone]                  Mar 20, 2017 4:00 PM CDT   (Arrive by 3:45 PM)   Return General Liver with Warren Higginbotham MD   [facility] ([facility])     [address] [place] [address]  3rd Floor  [facility] [address]   [phone]                  Mar 22, 2017 6:00 PM CDT   (Arrive by 5:45 PM)   New Patient Visit with GILMAR Saleem [facility] ([facility])     [address] [place] [address]  4th " Floor  [facility] [address]   [phone]                  Apr 11, 2017 9:00 AM CDT   Lab with  LAB   [facility] ([facility])     [address]  1st Floor  [facility] [address] [phone]                  Apr 11, 2017 10:00 AM CDT   US ABDOMEN COMPLETE with UCUS1   [facility] US ([facility])              Care Team:    Patient Care Team       Relationship Specialty Notifications Start End    Carlos Duenas MD, MD PCP - General Internal Medicine  6/15/16     Phone: [phone] Fax: [phone]         [facility] [address]    [facility] [address]    Warren Higginbotham MD MD Gastroenterology  9/16/16     Comment:  referring to IR    Phone: [phone] Fax: [phone]         [place] [id number] [facility] [address]    Micki Peter MD MD Vascular and Interventional Radiology  9/16/16     Phone: [phone] Pager: [phone] Fax: [phone]        [facility] [address] [facility] [address] [facility] [address]    Greg Concepcion MD Resident Student in organized health care education/training program  12/5/16     Phone: [phone] Fax: [phone]         [facility] [address] [facility] [address] [facility] [address]    Caitlin Tipton PA-C Physician Assistant Physician Assistant  12/21/16     Phone: [phone] Fax: [phone]         [facility] [address]  [facility] [address]    Caitlyn Wyatt NP Nurse Practitioner Nurse Practitioner  12/21/16     Phone: [phone] Fax: [phone]         [facility] [address] [facility] [address]            Transition of Care Review                                                      Patient was called three times and no answer so post 24 hr DC follow up calls will be closed out             Plan                                                      Thanks for your time.  Your Care Coordinator may  follow-up within the next couple days.  In the meantime if you have questions, concerns or problems call your care team.        Chloe Wei

## 2017-02-28 ENCOUNTER — ALLIED HEALTH/NURSE VISIT (OUTPATIENT)
Dept: PHARMACY | Facility: CLINIC | Age: 62
End: 2017-02-28
Payer: COMMERCIAL

## 2017-02-28 DIAGNOSIS — E55.9 VITAMIN D DEFICIENCY: ICD-10-CM

## 2017-02-28 DIAGNOSIS — G89.29 CHRONIC LOW BACK PAIN WITH SCIATICA, SCIATICA LATERALITY UNSPECIFIED, UNSPECIFIED BACK PAIN LATERALITY: ICD-10-CM

## 2017-02-28 DIAGNOSIS — M54.40 CHRONIC LOW BACK PAIN WITH SCIATICA, SCIATICA LATERALITY UNSPECIFIED, UNSPECIFIED BACK PAIN LATERALITY: ICD-10-CM

## 2017-02-28 DIAGNOSIS — R11.0 NAUSEA: ICD-10-CM

## 2017-02-28 DIAGNOSIS — K70.31 ALCOHOLIC CIRRHOSIS OF LIVER WITH ASCITES (H): Chronic | ICD-10-CM

## 2017-02-28 DIAGNOSIS — B18.2 CHRONIC HEPATITIS C WITHOUT HEPATIC COMA (H): Primary | Chronic | ICD-10-CM

## 2017-02-28 DIAGNOSIS — K76.82 HEPATIC ENCEPHALOPATHY (H): ICD-10-CM

## 2017-02-28 PROCEDURE — 99607 MTMS BY PHARM ADDL 15 MIN: CPT | Performed by: PHARMACIST

## 2017-02-28 PROCEDURE — 99605 MTMS BY PHARM NP 15 MIN: CPT | Performed by: PHARMACIST

## 2017-02-28 NOTE — PROGRESS NOTES
SUBJECTIVE/OBJECTIVE:                                                    Scott Casale is a 61 year old male called for a transitions of care visit.  He was discharged from [facility] on 2/22 for hypervolemia, cirrhosis of liver.     Chief Complaint: None, he is 'feeling better'    Allergies/ADRs: Reviewed in Epic  Tobacco: No tobacco use   Alcohol: not currently using  Caffeine: 1 cup/day of coffee  PMH: Reviewed in Epic    Medication Adherence: issues found and discussed below    Liver Cirrhosis/Encephalopathy: Current therapy includes lactulose BID, rifaximin 550 mg BID, spironolactone 50 mg daily, furosemide 40 mg BID until weight of 185, when it may go down to once daily. He also takes 20 mEq potassium daily, he sometimes forgets to take this. Weight of 192 today. He is not feeling shaky or dizzy, so he feels his ammonia level must be good. He is having 2-3 stools per day. He is not needing to take senna.      Hepatitis C: Current therapy includes ribavirin 200 mg BID and Harvoni 1 tablet daily. Is followed by gastroenterology Dr. Higginbotham.    Nerve Pain: Current therapy includes gabapentin 200 mg at bedtime. He was previously taking morphine for nerve pain. He also feels this is helpful for sleeping. He feels this is effective, reports not having much pain. No side effects.     Nausea: Current therapy includes ondansetron 4 mg Q8Hr PRN. He may take this a few times per week. This is helpful when needed.     Vitamin D Deficiency: Current therapy includes vitamin D 93958 units twice weekly. He says he is forgetting to take this one. He does not use anything to help organize his medications.        Current labs include:  BP Readings from Last 3 Encounters:   02/24/17 125/59   02/22/17 120/80   02/17/17 121/61     Lab Results   Component Value Date    CHOL 141 12/19/2016     Lab Results   Component Value Date    TRIG 66 12/19/2016     Lab Results   Component Value Date    HDL 66 12/19/2016     Lab Results   Component Value  Date    LDL 61 12/19/2016     Lab Results   Component Value Date    ALT 26 02/23/2017     No results found for: UCRR, MICROL, UMALCR    Last Basic Metabolic Panel:  Lab Results   Component Value Date     02/27/2017      Lab Results   Component Value Date    POTASSIUM 3.7 02/27/2017     Lab Results   Component Value Date    CHLORIDE 108 02/27/2017     Lab Results   Component Value Date    BUN 21 02/27/2017     Lab Results   Component Value Date    CR 1.25 02/27/2017     GFR Estimate   Date Value Ref Range Status   02/27/2017 59 (L) >60 mL/min/1.7m2 Final     Comment:     Non  GFR Calc   02/24/2017 62 >60 mL/min/1.7m2 Final     Comment:     Non  GFR Calc   02/23/2017 56 (L) >60 mL/min/1.7m2 Final     Comment:     Non  GFR Calc     GFR Estimate If Black   Date Value Ref Range Status   02/27/2017 71 >60 mL/min/1.7m2 Final     Comment:      GFR Calc   02/24/2017 75 >60 mL/min/1.7m2 Final     Comment:      GFR Calc   02/23/2017 68 >60 mL/min/1.7m2 Final     Comment:      GFR Calc     TSH   Date Value Ref Range Status   02/04/2013 1.30 0.4 - 5.0 mU/L Final   ]    Most Recent Immunizations   Administered Date(s) Administered     Influenza (IIV3) 10/16/2016     Pneumococcal 23 valent 02/12/2017     TDAP (ADACEL AGES 11-64) 12/06/2012     ASSESSMENT:                                                       Current medications were reviewed today.      Medication Adherence: advised use of pill boxes to help med adherence    Liver Cirrhosis/Encephalopathy: Recommended potassium daily as prescribed as long as he in on furosemide BID.      Hepatitis C:stable.  He confirmed his apt with Dr. Higginbotham on 3/20    Nerve Pain: stable    Nausea: stable    Vitamin D Deficiency: pt to continue vit D twice weekly - see note above regarding pill boxes    PLAN:                                                      Post Discharge Medication  Reconciliation Status: discharge medications reconciled, continue medications without change.    1. Pt to take potassium daily while on furosemide BID  2. Pill box to help with medications  3. Take vitamin D twice weekly    I spent 20 minutes with this patient today.  All changes were made via collaborative practice agreement with Carlos Duenas MD. A copy of the visit note was provided to the patient's primary care provider.    Will follow up in 1 month.     The patient was sent via [employer] a summary of these recommendations as an after visit summary.    Radha De La Torre PharmD  [phone] (phone)  [phone] (pager)  Medication Therapy Management Pharmacist      Zahira Ramirez PharmD  Medication Therapy Management Resident  Pager: [phone]

## 2017-02-28 NOTE — TELEPHONE ENCOUNTER
MTM referral from: Transitions of Care (recent hospital discharge or ED visit)    MTM referral outreach attempt #2 on February 28, 2017 at 10:58 AM      Outcome: Patient not reachable after several attempts, will route to MTM Pharmacist/Provider as an FYI. Thank you for the referral.    Christen Bateman, MTM Coordinator Intern

## 2017-02-28 NOTE — MR AVS SNAPSHOT
After Visit Summary   2/28/2017    Scott Casale    MRN: [id number]           Patient Information     Date Of Birth          1955        Visit Information        Provider Department      2/28/2017 12:30 PM Radha De La Torre [facility]        Today's Diagnoses     Chronic hepatitis C without hepatic coma (H)    -  1    Alcoholic cirrhosis of liver with ascites (H)        Chronic low back pain with sciatica, sciatica laterality unspecified, unspecified back pain laterality        Nausea        Vitamin D deficiency        Hepatic encephalopathy (H)           Follow-ups after your visit        Your next 10 appointments already scheduled     Mar 09, 2017  9:25 AM CST   (Arrive by 9:10 AM)   Return Visit with Greg Concepcion MD   [facility] ([facility])    [address]  4th Floor  [place] [id number] [phone]            Mar 20, 2017  3:00 PM CDT   Lab with  LAB   [facility] ([facility])    [address]  1st Floor  [place] [address]   [phone]            Mar 20, 2017  4:00 PM CDT   (Arrive by 3:45 PM)   Return General Liver with Warren Higginbotham MD   [facility] ([facility])    [address]  3rd Floor  [place] [address]   [phone]            Mar 22, 2017  6:00 PM CDT   (Arrive by 5:45 PM)   New Patient Visit with GILMAR Saleem CNP   [facility] ([facility])    [address]  4th Floor  [place] [address]   [phone]            Mar 28, 2017 12:30 PM CDT   TELEMEDICINE with Radha De La Torre [facility] ([facility])    [address] [id number] [phone]           Note: this is not an onsite visit; there is no need to come to the facility.            Apr 11, 2017  9:00 AM CDT   Lab  with  LAB   [facility] ([facility])    [address]  [address] [facility] [id number] [phone]            Apr 11, 2017 10:00 AM CDT   US ABDOMEN COMPLETE with UCUS1   [facility] Imaging [facility] US ([facility])    [address]  [address] [facility] [id number] [phone]           Please bring a list of your medicines (including vitamins, minerals and over-the-counter drugs). Also, tell your doctor about any allergies you may have. Wear comfortable clothes and leave your valuables at home.  Adults: No eating or drinking for 8 hours before the exam. You may take medicine with a small sip of water.  Children: - Children 6+ years: No food or drink for 6 hours before exam. - Children 1-5 years: No food or drink for 4 hours before exam. - Infants, breast-fed: may have breast milk up to 2 hours before exam. - Infants, formula: may have bottle until 4 hours before exam.  Please call the Imaging Department at your exam site with any questions.            Apr 11, 2017 11:00 AM CDT   (Arrive by 10:45 AM)   Return General Liver with Warren Higginbotham MD   [facility] Hepatology ([facility])    [address] [place] [address]  [address] [facility] [id number] [phone]            Apr 11, 2017 12:00 PM CDT   (Arrive by 11:45 AM)   Return Visit with Micki Peter MD   [facility] ([facility])    [address] [place] [address]  [address] [facility] [id number] [phone]              Who to contact     If you have questions or need follow up information about today's clinic visit or your schedule please contact [facility] directly at [phone].  Normal or non-critical lab and imaging results will be communicated to you by MyChart, letter or phone within 4 business days after the clinic has received the results. If you do not hear from us within 7 days, please  contact the clinic through [employer] or phone. If you have a critical or abnormal lab result, we will notify you by phone as soon as possible.  Submit refill requests through [employer] or call your pharmacy and they will forward the refill request to us. Please allow 3 business days for your refill to be completed.          Additional Information About Your Visit        [employer] Information     [employer] gives you secure access to your electronic health record. If you see a primary care provider, you can also send messages to your care team and make appointments. If you have questions, please call your primary care clinic.  If you do not have a primary care provider, please call [phone] and they will assist you.        Care EveryWhere ID     This is your Care EveryWhere ID. This could be used by other organizations to access your [facility] medical records  [id number]         Blood Pressure from Last 3 Encounters:   02/24/17 125/59   02/22/17 120/80   02/17/17 121/61    Weight from Last 3 Encounters:   02/24/17 195 lb 4.8 oz (88.6 kg)   02/22/17 204 lb 5.9 oz (92.7 kg)   02/14/17 181 lb (82.1 kg)              Today, you had the following     No orders found for display       Primary Care Provider Office Phone # Fax #    Carlos Duenas MD, [phone]       [facility] [address] [place] [address]        Thank you!     Thank you for choosing [facility]  for your care. Our goal is always to provide you with excellent care. Hearing back from our patients is one way we can continue to improve our services. Please take a few minutes to complete the written survey that you may receive in the mail after your visit with us. Thank you!             Your Updated Medication List - Protect others around you: Learn how to safely use, store and throw away your medicines at www.disposemymeds.org.          This list is accurate as of: 2/28/17  2:05 PM.  Always use  your most recent med list.                   Brand Name Dispense Instructions for use    furosemide 40 MG tablet    LASIX    30 tablet    Take 1 tablet (40 mg) by mouth 2 times daily Use this twice a day dose until goal weight of 185 pounds is reached. Weigh yourself everyday. Then may need to take once a day.       gabapentin 100 MG capsule    NEURONTIN    60 capsule    Take 2 capsules (200 mg) by mouth At Bedtime       lactulose 10 GM/15ML solution    CHRONULAC    500 mL    Take 30 mLs by mouth 3 times daily       ledipasvir-sofosbuvir  MG per tablet    ledipasvir-sofosbuvir    28 tablet    Take 1 tablet by mouth daily       potassium chloride SA 20 MEQ CR tablet    potassium chloride    30 tablet    Take 1 tablet (20 mEq) by mouth daily Take only while taking your twice a day dose of Lasix then stop. Have your potassium levels checked in 2-3 days.       ribavirin 200 MG Tabs     60 tablet    Take one tablet (200mg) by mouth in the AM with food, and one tablet (200mg) in the PM with food.       rifaximin 550 MG Tabs tablet    XIFAXAN    60 tablet    Take 1 tablet (550 mg) by mouth 2 times daily       senna-docusate 8.6-50 MG per tablet    SENOKOT-S;PERICOLACE    100 tablet    Take 1-2 tablets by mouth 2 times daily as needed (constipation )       spironolactone 50 MG tablet    ALDACTONE    30 tablet    Take 1 tablet (50 mg) by mouth daily       vitamin D 57789 UNIT capsule    ERGOCALCIFEROL     Take 50,000 Units by mouth Twice weekly Mon and Thurs       ZOFRAN 4 MG tablet   Generic drug:  ondansetron      Take 1 tablet (4 mg) by mouth every 8 hours as needed for nausea

## 2017-03-01 ENCOUNTER — PRE VISIT (OUTPATIENT)
Dept: GASTROENTEROLOGY | Facility: CLINIC | Age: 62
End: 2017-03-01

## 2017-03-01 NOTE — TELEPHONE ENCOUNTER
1.  Date/reason for appt: 3/22/17 -- left lower quadrant abd pain  2.  Referring provider: Caitlyn Wyatt  3.  Call to patient (Yes / No - short description): no, referred  4.  Previous care at / records requested from: [facility] -- records and referral in [facility].  Last colonoscopy 1/17/17 in [facility].

## 2017-03-08 ENCOUNTER — TELEPHONE (OUTPATIENT)
Dept: GASTROENTEROLOGY | Facility: CLINIC | Age: 62
End: 2017-03-08

## 2017-03-08 DIAGNOSIS — B18.2 CHRONIC HEPATITIS C WITHOUT HEPATIC COMA (H): Primary | Chronic | ICD-10-CM

## 2017-03-08 NOTE — TELEPHONE ENCOUNTER
Patient is on week 6 of HCV Tx with Harvoni and ribavirin. He is due for a hemoglobin check at this time. Spoke with patient regarding this. He has an appointment here tomorrow with his PCP. He will stop at the lab after this appointment. Lab order for hgb placed. Patient states understanding with no further questions at this time.     Ciara Lopez LPN  [facility]

## 2017-03-09 ENCOUNTER — OFFICE VISIT (OUTPATIENT)
Dept: INTERNAL MEDICINE | Facility: CLINIC | Age: 62
End: 2017-03-09

## 2017-03-09 VITALS
BODY MASS INDEX: 27.57 KG/M2 | SYSTOLIC BLOOD PRESSURE: 123 MMHG | HEART RATE: 74 BPM | DIASTOLIC BLOOD PRESSURE: 74 MMHG | RESPIRATION RATE: 16 BRPM | WEIGHT: 197.7 LBS

## 2017-03-09 DIAGNOSIS — K70.31 ALCOHOLIC CIRRHOSIS OF LIVER WITH ASCITES (H): Chronic | ICD-10-CM

## 2017-03-09 DIAGNOSIS — E55.9 VITAMIN D DEFICIENCY: Primary | ICD-10-CM

## 2017-03-09 DIAGNOSIS — E55.9 VITAMIN D DEFICIENCY: ICD-10-CM

## 2017-03-09 DIAGNOSIS — B18.2 CHRONIC HEPATITIS C WITHOUT HEPATIC COMA (H): Chronic | ICD-10-CM

## 2017-03-09 LAB — HGB BLD-MCNC: 12.1 G/DL (ref 13.3–17.7)

## 2017-03-09 RX ORDER — FUROSEMIDE 40 MG
40 TABLET ORAL
Qty: 30 TABLET | Refills: 3 | Status: SHIPPED
Start: 2017-03-09 | End: 2017-04-12

## 2017-03-09 RX ORDER — ERGOCALCIFEROL 1.25 MG/1
CAPSULE, LIQUID FILLED ORAL
Qty: 30 CAPSULE | Refills: 3 | Status: ON HOLD | OUTPATIENT
Start: 2017-03-09 | End: 2017-04-15

## 2017-03-09 RX ORDER — SPIRONOLACTONE 50 MG/1
50 TABLET, FILM COATED ORAL DAILY
Qty: 30 TABLET | Refills: 3 | Status: ON HOLD | OUTPATIENT
Start: 2017-03-09 | End: 2017-11-28

## 2017-03-09 RX ORDER — ERGOCALCIFEROL 1.25 MG/1
50000 CAPSULE, LIQUID FILLED ORAL WEEKLY
Qty: 30 CAPSULE | Refills: 3 | Status: SHIPPED | OUTPATIENT
Start: 2017-03-09 | End: 2017-03-09

## 2017-03-09 ASSESSMENT — PAIN SCALES - GENERAL: PAINLEVEL: NO PAIN (0)

## 2017-03-09 NOTE — PROGRESS NOTES
[facility] Office Visit  Date: March 9, 2017  Resident: Greg Valdez MD  Attending: Yvette    SUBJECTIVE:  Scott Casale is a 61 year old male with pmh of   Past Medical History   Diagnosis Date     Ankylosing spondylitis (H)      Ascites      diuretic refractory     H/O testicular cancer age 28     Hepatitis C      genotype 1A      Shoulder dislocation      Splenomegaly      Ulcerative colitis (H)      who comes in for follow up after recent hospital discharge. Patent was admitted from 2/22/17 - 2/24/17 due to increased leg swelling and weight gain of 10-15lbs.  Target weight 180-190lbs. He responded well to furosemide and had 4L net out. to establish care today. His renal function was stable. He was discharged with increased dose of lasix and to continue home dose of spironolactone. He is taking KCL while on the increased dose of lasix. Patient instructed to follow up with PCP for BMP and weight monitor.      He otherwise feels wells and denies symptoms of fever, chills, night sweats, cough, nasal congestion, chest pain, shortness of breath, abdominal pain, nausea, vomiting, hematochezia/melena or hematuria. He does report increased urinary output since being on higher diuretic dose.       Also, since last visit his hematuria count increased and had cystoscopy which was negative.     Health maintenance  Recent Labs   Lab Test  12/19/16   1335   CHOL  141   HDL  66   LDL  61   TRIG  66     Lab Results   Component Value Date     02/27/2017      Tetanus/pertussis vaccination status reviewed: last tetanus booster within 10 years.    Cancer screening: up to date  Exercise:  minimal    Past Surgical History   Procedure Laterality Date     C knee scope,clean/drain       bilateral     C appendectomy       Strabismus surgery       bilateral     Orchiectomy scrotal       prosthesis placed     Ir transven intrahepatic portosyst rev       Eye surgery       Esophagoscopy,  gastroscopy, duodenoscopy (egd), combined N/A 1/17/2017     Procedure: COMBINED ESOPHAGOSCOPY, GASTROSCOPY, DUODENOSCOPY (EGD);  Surgeon: Guru Reina Palacios MD;  Location:  GI     Colonoscopy N/A 1/17/2017     Procedure: COLONOSCOPY;  Surgeon: Guru Reina Palacios MD;  Location:  GI        Medications:  Current Outpatient Prescriptions   Medication Sig Dispense Refill     furosemide (LASIX) 40 MG tablet Take 1 tablet (40 mg) by mouth 2 times daily Use this twice a day dose until goal weight of 185 pounds is reached. Weigh yourself everyday. Then may need to take once a day. 30 tablet 0     spironolactone (ALDACTONE) 50 MG tablet Take 1 tablet (50 mg) by mouth daily 30 tablet 0     senna-docusate (SENOKOT-S;PERICOLACE) 8.6-50 MG per tablet Take 1-2 tablets by mouth 2 times daily as needed (constipation ) 100 tablet 0     potassium chloride SA (POTASSIUM CHLORIDE) 20 MEQ CR tablet Take 1 tablet (20 mEq) by mouth daily Take only while taking your twice a day dose of Lasix then stop. Have your potassium levels checked in 2-3 days. 30 tablet 1     gabapentin (NEURONTIN) 100 MG capsule Take 2 capsules (200 mg) by mouth At Bedtime 60 capsule 3     ribavirin 200 MG TABS Take one tablet (200mg) by mouth in the AM with food, and one tablet (200mg) in the PM with food. 60 tablet 1     lactulose (CHRONULAC) 10 GM/15ML solution Take 30 mLs by mouth 3 times daily 500 mL 0     rifaximin (XIFAXAN) 550 MG TABS tablet Take 1 tablet (550 mg) by mouth 2 times daily 60 tablet 11     ledipasvir-sofosbuvir (LEDIPASVIR-SOFOSBUVIR)  MG per tablet Take 1 tablet by mouth daily 28 tablet 2     vitamin D (ERGOCALCIFEROL) 88466 UNIT capsule Take 50,000 Units by mouth Twice weekly Mon and Thurs       ondansetron (ZOFRAN) 4 MG tablet Take 1 tablet (4 mg) by mouth every 8 hours as needed for nausea         Family History   Problem Relation Age of Onset     CANCER Mother 70     brain tumor     Prostate  Cancer No family hx of      Cancer - colorectal No family hx of      DIABETES Maternal Grandmother      CEREBROVASCULAR DISEASE Sister      HEART DISEASE Sister      Respiratory Son      CANCER Father      Uterine Cancer Sister        Social History   Substance Use Topics     Smoking status: Former Smoker     Quit date: 1/1/1978     Smokeless tobacco: Never Used     Alcohol use No      Comment: quit in 2010     Social History     Social History Narrative    Admit 9/28:    Lives with new wife of 2 mos.    Tobacco:  Former smoker, quit 1978    Alcohol:  Sober since 2010, recovering alcoholic.    Drugs:  Denies     ROS  Complete review of systems negative unless otherwise specified in HPI    OBJECTIVE:  /74  Pulse 74  Resp 16  There is no height or weight on file to calculate BMI.   Gen: Well-developed, well-nourished and in no apparent distress  HEENT:  Normocephalic, atraumatic, PERRL, positive scleral icterus  Neck: supple, no LAD, no thyromegaly  CV: regular rate and rhythm, normal S1 S2, soft systolic murmur 2nd left intercostal space, no rub  Resp: clear to ausculation bilaterally, normal respiratory effort  Abd: bowel sounds +, RUQ mild tenderness at baseline, hepatomegaly, non distended, no masses  Ext: WWP.  +1 edema in legs    Skin: warm and dry  Psych: normal mood/affect, appropriately oriented  Neuro: AAOX3, cooperative, cranial nerves II-XII intact    ASSESSMENT/PLAN:  #Volume overload  #Bilateral leg swelling  #HCV cirrhosis on Harvoni + Ribavirin  Presented after discharge on 2/24/17 for above symptoms.  Has been responding well to increased dose of lasix. At home weighing close to 190lbs.Taking furosemide BID, instructed to change to QD once weight around 185.  He is also to stop potassium medications once he is on furosemide QD, but for now to continue taking these. Today, no electrolyte abnormalities on BMP,  Cr 1.25 his discharge was 1.19. . His baseline range 0.9-1.3  in the past year. Patient  has appointment with Dr. Higginbotham in April 2017 for follow up on initiating Harvoni therapy. Hemoglobin today 12.1, 11.0 on discharge.  - Refilled furosemide, spironolactone  And vitamin D  - To continue Harvoni + Ribavirin  - GI appointment April 2017    #Health maintenance  All uptodate. Colonoscopy in 1/17 with diverticula but no polyps.  - RTC in 3 months     This patient was seen and staffed with my attending, Dr. Crawford, who agrees with my assessment and plan.     Greg Valdez MD  Internal Medicine, PGY-1  Pager [phone]

## 2017-03-09 NOTE — NURSING NOTE
Chief Complaint   Patient presents with     Recheck Medication     pt is here for a medication follow up        Bertha Pretty CMA at 9:18 AM on 3/9/2017

## 2017-03-09 NOTE — PATIENT INSTRUCTIONS
Primary Care Center Phone Number [phone]  Primary Care Center Medication Refill Request Information:  * Please contact your pharmacy regarding ANY request for medication refills.  ** [facility] Prescription Fax = [phone]  * Please allow 3 business days for routine medication refills.  * Please allow 5 business days for controlled substance medication refills.     Primary Care Center Test Result notification information:  *You will be notified with in 7-10 days of your appointment day regarding the results of your test.  If you are on MyChart you will be notified as soon as the provider has reviewed the results and signed off on them.

## 2017-03-09 NOTE — MR AVS SNAPSHOT
After Visit Summary   3/9/2017    Scott Casale    MRN: [id number]           Patient Information     Date Of Birth          1955        Visit Information        Provider Department      3/9/2017 9:25 AM Greg Concepcion MD [facility]        Today's Diagnoses     Vitamin D deficiency    -  1    Alcoholic cirrhosis of liver with ascites (H)          Care Instructions    Primary Care Center Phone Number [phone]  Primary Care Center Medication Refill Request Information:  * Please contact your pharmacy regarding ANY request for medication refills.  ** [facility] Prescription Fax = [phone]  * Please allow 3 business days for routine medication refills.  * Please allow 5 business days for controlled substance medication refills.     Primary Care Center Test Result notification information:  *You will be notified with in 7-10 days of your appointment day regarding the results of your test.  If you are on MyChart you will be notified as soon as the provider has reviewed the results and signed off on them.                Follow-ups after your visit        Follow-up notes from your care team     Return in about 3 months (around 6/9/2017) for Lab Work.      Your next 10 appointments already scheduled     Mar 20, 2017  3:00 PM CDT   Lab with  LAB   [facility] ([facility])    [address]  1st Floor  [facility] [address] [phone]            Mar 20, 2017  4:00 PM CDT   (Arrive by 3:45 PM)   Return General Liver with Warren Higginbotham MD   [facility] ([facility])    [address]  3rd Floor  [facility] [address] [phone]            Mar 22, 2017  6:00 PM CDT   (Arrive by 5:45 PM)   New Patient Visit with GILMAR Saleem CNP   [facility] ([facility])    [address]  4th Floor  [facility] [address] [phone]            Mar 28, 2017  12:30 PM CDT   TELEMEDICINE with Radha De La Torre [facility] [place] ([facility])    [address] [address] [phone]           Note: this is not an onsite visit; there is no need to come to the facility.            Apr 11, 2017  9:00 AM CDT   Lab with  LAB   [facility] ([facility])    [address] [place] [address]  [address] [facility] [address] [phone]            Apr 11, 2017 10:00 AM CDT   US ABDOMEN COMPLETE with UCUS1   [facility] ([facility])    [address] [place] [address] [phone]           Please bring a list of your medicines (including vitamins, minerals and over-the-counter drugs). Also, tell your doctor about any allergies you may have. Wear comfortable clothes and leave your valuables at home.  Adults: No eating or drinking for 8 hours before the exam. You may take medicine with a small sip of water.  Children: - Children 6+ years: No food or drink for 6 hours before exam. - Children 1-5 years: No food or drink for 4 hours before exam. - Infants, breast-fed: may have breast milk up to 2 hours before exam. - Infants, formula: may have bottle until 4 hours before exam.  Please call the Imaging Department at your exam site with any questions.            Apr 11, 2017 11:00 AM CDT   (Arrive by 10:45 AM)   Return General Liver with Warren Higginbotham MD   [facility] ([facility])    [address] [place] [address]  [address] [facility] [id number] [phone]            Apr 11, 2017 12:00 PM CDT   (Arrive by 11:45 AM)   Return Visit with Micki Peter MD   [facility] ([facility])    [address] [place] [address]  [address] [facility] [id number] [phone]              Future tests that were ordered for you today     Open Future Orders        Priority Expected  Expires Ordered    Hemoglobin Routine  4/7/2017 3/8/2017            Who to contact     Please call your clinic at [phone] to:    Ask questions about your health    Make or cancel appointments    Discuss your medicines    Learn about your test results    Speak to your doctor   If you have compliments or concerns about an experience at your clinic, or if you wish to file a complaint, please contact [facility] Patient Relations at [phone] or email us at [email or web address]         Additional Information About Your Visit        [employer] Information     [employer] gives you secure access to your electronic health record. If you see a primary care provider, you can also send messages to your care team and make appointments. If you have questions, please call your primary care clinic.  If you do not have a primary care provider, please call [phone] and they will assist you.      En[employer] is an electronic gateway that provides easy, online access to your medical records. With [employer], you can request a clinic appointment, read your test results, renew a prescription or communicate with your care team.     To access your existing account, please contact your [facility] Clinic or call [phone] for assistance.        Care EveryWhere ID     This is your Care EveryWhere ID. This could be used by other organizations to access your [facility] medical records  [id number]        Your Vitals Were     Pulse Respirations BMI (Body Mass Index)             74 16 27.57 kg/m2          Blood Pressure from Last 3 Encounters:   03/09/17 123/74   02/24/17 125/59   02/22/17 120/80    Weight from Last 3 Encounters:   03/09/17 89.7 kg (197 lb 11.2 oz)   02/24/17 88.6 kg (195 lb 4.8 oz)   02/22/17 92.7 kg (204 lb 5.9 oz)              Today, you had the following     No orders found for display         Today's Medication Changes          These changes are accurate as of:  3/9/17 11:00 AM.  If you have any questions, ask your nurse or doctor.               These medicines have changed or have updated prescriptions.        Dose/Directions    vitamin D 08040 UNIT capsule   Commonly known as:  ERGOCALCIFEROL   This may have changed:  when to take this   Used for:  Alcoholic cirrhosis of liver with ascites (H), Vitamin D deficiency   Changed by:  Greg Concepcion MD        Dose:  15851 Units   Take 1 capsule (50,000 Units) by mouth once a week Twice weekly Mon and Thurs   Quantity:  30 capsule   Refills:  3            Where to get your medicines      These medications were sent to [id number] - [place], [place] - [address]   [address] [facility] [address]     Phone:  [phone]     furosemide 40 MG tablet    spironolactone 50 MG tablet    vitamin D 10884 UNIT capsule                Primary Care Provider Office Phone # Fax #    Carlos Duenas MD, [phone]       [facility] [address]     [facility] [address]        Thank you!     Thank you for choosing [facility]  for your care. Our goal is always to provide you with excellent care. Hearing back from our patients is one way we can continue to improve our services. Please take a few minutes to complete the written survey that you may receive in the mail after your visit with us. Thank you!             Your Updated Medication List - Protect others around you: Learn how to safely use, store and throw away your medicines at www.disposemymeds.org.          This list is accurate as of: 3/9/17 11:00 AM.  Always use your most recent med list.                   Brand Name Dispense Instructions for use    furosemide 40 MG tablet    LASIX    30 tablet    Take 1 tablet (40 mg) by mouth 2 times daily Use this twice a day dose until goal weight of 185 pounds is reached. Weigh yourself everyday. Then may need to take once a day.       gabapentin 100 MG capsule     NEURONTIN    60 capsule    Take 2 capsules (200 mg) by mouth At Bedtime       lactulose 10 GM/15ML solution    CHRONULAC    500 mL    Take 30 mLs by mouth 3 times daily       ledipasvir-sofosbuvir  MG per tablet    ledipasvir-sofosbuvir    28 tablet    Take 1 tablet by mouth daily       potassium chloride SA 20 MEQ CR tablet    potassium chloride    30 tablet    Take 1 tablet (20 mEq) by mouth daily Take only while taking your twice a day dose of Lasix then stop. Have your potassium levels checked in 2-3 days.       ribavirin 200 MG Tabs     60 tablet    Take one tablet (200mg) by mouth in the AM with food, and one tablet (200mg) in the PM with food.       rifaximin 550 MG Tabs tablet    XIFAXAN    60 tablet    Take 1 tablet (550 mg) by mouth 2 times daily       senna-docusate 8.6-50 MG per tablet    SENOKOT-S;PERICOLACE    100 tablet    Take 1-2 tablets by mouth 2 times daily as needed (constipation )       spironolactone 50 MG tablet    ALDACTONE    30 tablet    Take 1 tablet (50 mg) by mouth daily       vitamin D 70216 UNIT capsule    ERGOCALCIFEROL    30 capsule    Take 1 capsule (50,000 Units) by mouth once a week Twice weekly Mon and Thurs       ZOFRAN 4 MG tablet   Generic drug:  ondansetron      Take 1 tablet (4 mg) by mouth every 8 hours as needed for nausea

## 2017-03-10 DIAGNOSIS — K70.31 ALCOHOLIC CIRRHOSIS OF LIVER WITH ASCITES (H): Chronic | ICD-10-CM

## 2017-03-10 LAB
AMMONIA PLAS-SCNC: 46 UMOL/L (ref 10–50)
ANION GAP SERPL CALCULATED.3IONS-SCNC: 6 MMOL/L (ref 3–14)
BUN SERPL-MCNC: 25 MG/DL (ref 7–30)
CALCIUM SERPL-MCNC: 8.3 MG/DL (ref 8.5–10.1)
CHLORIDE SERPL-SCNC: 103 MMOL/L (ref 94–109)
CO2 SERPL-SCNC: 28 MMOL/L (ref 20–32)
CREAT SERPL-MCNC: 1.41 MG/DL (ref 0.66–1.25)
ERYTHROCYTE [DISTWIDTH] IN BLOOD BY AUTOMATED COUNT: 13.2 % (ref 10–15)
GFR SERPL CREATININE-BSD FRML MDRD: 51 ML/MIN/1.7M2
GLUCOSE SERPL-MCNC: 105 MG/DL (ref 70–99)
HCT VFR BLD AUTO: 32.9 % (ref 40–53)
HGB BLD-MCNC: 11.4 G/DL (ref 13.3–17.7)
MCH RBC QN AUTO: 36.7 PG (ref 26.5–33)
MCHC RBC AUTO-ENTMCNC: 34.7 G/DL (ref 31.5–36.5)
MCV RBC AUTO: 106 FL (ref 78–100)
PLATELET # BLD AUTO: 55 10E9/L (ref 150–450)
POTASSIUM SERPL-SCNC: 3.6 MMOL/L (ref 3.4–5.3)
RBC # BLD AUTO: 3.11 10E12/L (ref 4.4–5.9)
SODIUM SERPL-SCNC: 137 MMOL/L (ref 133–144)
WBC # BLD AUTO: 4.5 10E9/L (ref 4–11)

## 2017-03-10 PROCEDURE — 85027 COMPLETE CBC AUTOMATED: CPT | Performed by: INTERNAL MEDICINE

## 2017-03-10 PROCEDURE — 82140 ASSAY OF AMMONIA: CPT | Performed by: INTERNAL MEDICINE

## 2017-03-10 PROCEDURE — 80048 BASIC METABOLIC PNL TOTAL CA: CPT | Performed by: INTERNAL MEDICINE

## 2017-03-10 PROCEDURE — 36415 COLL VENOUS BLD VENIPUNCTURE: CPT | Performed by: INTERNAL MEDICINE

## 2017-03-12 ENCOUNTER — HOSPITAL ENCOUNTER (INPATIENT)
Facility: CLINIC | Age: 62
LOS: 1 days | Discharge: HOME HEALTH | DRG: 433 | End: 2017-03-14
Attending: FAMILY MEDICINE | Admitting: INTERNAL MEDICINE
Payer: MEDICARE

## 2017-03-12 DIAGNOSIS — K76.82 HEPATIC ENCEPHALOPATHY (H): ICD-10-CM

## 2017-03-12 DIAGNOSIS — N17.9 ACUTE ON CHRONIC RENAL FAILURE (H): ICD-10-CM

## 2017-03-12 DIAGNOSIS — K76.6 PORTAL HYPERTENSION (H): Chronic | ICD-10-CM

## 2017-03-12 DIAGNOSIS — N18.9 ACUTE ON CHRONIC RENAL FAILURE (H): ICD-10-CM

## 2017-03-12 DIAGNOSIS — B18.2 CHRONIC HEPATITIS C WITHOUT HEPATIC COMA (H): Chronic | ICD-10-CM

## 2017-03-12 DIAGNOSIS — K70.31 ALCOHOLIC CIRRHOSIS OF LIVER WITH ASCITES (H): ICD-10-CM

## 2017-03-12 DIAGNOSIS — Z95.828 S/P TIPS (TRANSJUGULAR INTRAHEPATIC PORTOSYSTEMIC SHUNT): Chronic | ICD-10-CM

## 2017-03-12 LAB
ALBUMIN SERPL-MCNC: 2.4 G/DL (ref 3.4–5)
ALP SERPL-CCNC: 74 U/L (ref 40–150)
ALT SERPL W P-5'-P-CCNC: 29 U/L (ref 0–70)
AMMONIA PLAS-SCNC: 102 UMOL/L (ref 10–50)
ANION GAP SERPL CALCULATED.3IONS-SCNC: 9 MMOL/L (ref 3–14)
AST SERPL W P-5'-P-CCNC: 49 U/L (ref 0–45)
BASE EXCESS BLDV CALC-SCNC: 3 MMOL/L
BASOPHILS # BLD AUTO: 0 10E9/L (ref 0–0.2)
BASOPHILS NFR BLD AUTO: 0.3 %
BILIRUB DIRECT SERPL-MCNC: 0.6 MG/DL (ref 0–0.2)
BILIRUB SERPL-MCNC: 2.6 MG/DL (ref 0.2–1.3)
BUN SERPL-MCNC: 25 MG/DL (ref 7–30)
CALCIUM SERPL-MCNC: 8 MG/DL (ref 8.5–10.1)
CHLORIDE SERPL-SCNC: 105 MMOL/L (ref 94–109)
CO2 SERPL-SCNC: 24 MMOL/L (ref 20–32)
CREAT SERPL-MCNC: 1.38 MG/DL (ref 0.66–1.25)
CRP SERPL-MCNC: <2.9 MG/L (ref 0–8)
DIFFERENTIAL METHOD BLD: ABNORMAL
EOSINOPHIL # BLD AUTO: 0.1 10E9/L (ref 0–0.7)
EOSINOPHIL NFR BLD AUTO: 1.8 %
ERYTHROCYTE [DISTWIDTH] IN BLOOD BY AUTOMATED COUNT: 13.1 % (ref 10–15)
GFR SERPL CREATININE-BSD FRML MDRD: 52 ML/MIN/1.7M2
GLUCOSE SERPL-MCNC: 123 MG/DL (ref 70–99)
HCO3 BLDV-SCNC: 28 MMOL/L (ref 21–28)
HCT VFR BLD AUTO: 33.9 % (ref 40–53)
HGB BLD-MCNC: 11.4 G/DL (ref 13.3–17.7)
IMM GRANULOCYTES # BLD: 0 10E9/L (ref 0–0.4)
IMM GRANULOCYTES NFR BLD: 0 %
INR PPP: 1.31 (ref 0.86–1.14)
LACTATE BLD-SCNC: 2.5 MMOL/L (ref 0.7–2.1)
LYMPHOCYTES # BLD AUTO: 0.6 10E9/L (ref 0.8–5.3)
LYMPHOCYTES NFR BLD AUTO: 16.1 %
MCH RBC QN AUTO: 35.5 PG (ref 26.5–33)
MCHC RBC AUTO-ENTMCNC: 33.6 G/DL (ref 31.5–36.5)
MCV RBC AUTO: 106 FL (ref 78–100)
MONOCYTES # BLD AUTO: 0.6 10E9/L (ref 0–1.3)
MONOCYTES NFR BLD AUTO: 15.3 %
NEUTROPHILS # BLD AUTO: 2.6 10E9/L (ref 1.6–8.3)
NEUTROPHILS NFR BLD AUTO: 66.5 %
PCO2 BLDV: 43 MM HG (ref 40–50)
PH BLDV: 7.42 PH (ref 7.32–7.43)
PLATELET # BLD AUTO: 48 10E9/L (ref 150–450)
PO2 BLDV: 28 MM HG (ref 25–47)
POTASSIUM SERPL-SCNC: 3.3 MMOL/L (ref 3.4–5.3)
PROT SERPL-MCNC: 6.7 G/DL (ref 6.8–8.8)
RBC # BLD AUTO: 3.21 10E12/L (ref 4.4–5.9)
SODIUM SERPL-SCNC: 138 MMOL/L (ref 133–144)
WBC # BLD AUTO: 3.9 10E9/L (ref 4–11)

## 2017-03-12 PROCEDURE — 82140 ASSAY OF AMMONIA: CPT | Performed by: FAMILY MEDICINE

## 2017-03-12 PROCEDURE — 83605 ASSAY OF LACTIC ACID: CPT | Performed by: FAMILY MEDICINE

## 2017-03-12 PROCEDURE — 86140 C-REACTIVE PROTEIN: CPT | Performed by: FAMILY MEDICINE

## 2017-03-12 PROCEDURE — 85025 COMPLETE CBC W/AUTO DIFF WBC: CPT | Performed by: FAMILY MEDICINE

## 2017-03-12 PROCEDURE — 99285 EMERGENCY DEPT VISIT HI MDM: CPT | Performed by: FAMILY MEDICINE

## 2017-03-12 PROCEDURE — 25000128 H RX IP 250 OP 636: Performed by: FAMILY MEDICINE

## 2017-03-12 PROCEDURE — 82803 BLOOD GASES ANY COMBINATION: CPT | Performed by: FAMILY MEDICINE

## 2017-03-12 PROCEDURE — 80048 BASIC METABOLIC PNL TOTAL CA: CPT | Performed by: FAMILY MEDICINE

## 2017-03-12 PROCEDURE — 80076 HEPATIC FUNCTION PANEL: CPT | Performed by: FAMILY MEDICINE

## 2017-03-12 PROCEDURE — 96360 HYDRATION IV INFUSION INIT: CPT

## 2017-03-12 PROCEDURE — 85610 PROTHROMBIN TIME: CPT | Performed by: FAMILY MEDICINE

## 2017-03-12 PROCEDURE — 99285 EMERGENCY DEPT VISIT HI MDM: CPT | Mod: 25

## 2017-03-12 PROCEDURE — G0378 HOSPITAL OBSERVATION PER HR: HCPCS

## 2017-03-12 RX ORDER — SODIUM CHLORIDE 9 MG/ML
1000 INJECTION, SOLUTION INTRAVENOUS CONTINUOUS
Status: DISCONTINUED | OUTPATIENT
Start: 2017-03-12 | End: 2017-03-13

## 2017-03-12 RX ADMIN — SODIUM CHLORIDE 1000 ML: 9 INJECTION, SOLUTION INTRAVENOUS at 23:16

## 2017-03-12 ASSESSMENT — ENCOUNTER SYMPTOMS
LIGHT-HEADEDNESS: 1
FATIGUE: 0
HEADACHES: 0
WEAKNESS: 1
SHORTNESS OF BREATH: 0
DIZZINESS: 1
CONFUSION: 1
TREMORS: 1
CHILLS: 0
NAUSEA: 0
VOMITING: 0
NERVOUS/ANXIOUS: 1
ABDOMINAL PAIN: 0
EYE PAIN: 0
EYE REDNESS: 0
FEVER: 0
BACK PAIN: 0
SORE THROAT: 0
DYSURIA: 0
HALLUCINATIONS: 0

## 2017-03-12 NOTE — IP AVS SNAPSHOT
[facility]    [address] [facility] [address]    Phone:  [phone]    Fax:  [phone]                                       After Visit Summary   3/12/2017    Scott Casale    MRN: [id number]           After Visit Summary Signature Page     I have received my discharge instructions, and my questions have been answered. I have discussed any challenges I see with this plan with the nurse or doctor.    ..........................................................................................................................................  Patient/Patient Representative Signature      ..........................................................................................................................................  Patient Representative Print Name and Relationship to Patient    ..................................................               ................................................  Date                                            Time    ..........................................................................................................................................  Reviewed by Signature/Title    ...................................................              ..............................................  Date                                                            Time

## 2017-03-12 NOTE — ED NOTES
"Patient said, \"Last night and today a little bit I have been shaking.  I think my ammonia level is high.  My lactulose is not working I only have gas.  I have not went to the bathroom only this morning.\"    "

## 2017-03-12 NOTE — ED PROVIDER NOTES
History     Chief Complaint   Patient presents with     Abnormal Labs     states uncontrolled shaking. the last time this happened his ammonia was high     HPI  Scott Casale is a 61 year old male who is brought to the emergency department by his wife with concern that his ammonia was going up. The patient had a check 2 days ago. He is much more shaky today. He is still oriented to person place and time but he feels mentally foggy. She doesn't feel he is as bad as he has been in the past but did not want to wait. He's had no fever or known infection. He is a little nauseated without vomiting.      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     S/P TIPS (transjugular intrahepatic portosystemic shunt)     Hepatic encephalopathy (H)     Ascites of liver     Edema of lower extremity     Chronic hepatitis C virus infection (H)     Hepatic cirrhosis (H)     Hyponatremia     Liver mass     Portal hypertension (H)     Primary malignant neoplasm of testis (H)     Thrombocytopenia (H)     Encephalopathy acute     Hypervolemia     Current Facility-Administered Medications   Medication     0.9% sodium chloride BOLUS     0.9% sodium chloride BOLUS    Followed by     0.9% sodium chloride infusion     Current Outpatient Prescriptions   Medication     furosemide (LASIX) 40 MG tablet     spironolactone (ALDACTONE) 50 MG tablet     vitamin D (ERGOCALCIFEROL) 95878 UNIT capsule     senna-docusate (SENOKOT-S;PERICOLACE) 8.6-50 MG per tablet     potassium chloride SA (POTASSIUM CHLORIDE) 20 MEQ CR tablet     gabapentin (NEURONTIN) 100 MG capsule     ribavirin 200 MG TABS     lactulose (CHRONULAC) 10 GM/15ML solution     rifaximin (XIFAXAN) 550 MG TABS tablet     ledipasvir-sofosbuvir (LEDIPASVIR-SOFOSBUVIR)  MG per  tablet     ondansetron (ZOFRAN) 4 MG tablet     Allergies   Allergen Reactions     Blood Transfusion Related (Informational Only) Other (See Comments)     Patient has a history of a clinically significant antibody against RBC antigens.  A delay in compatible RBCs may occur.     Percocet [Oxycodone-Acetaminophen] Nausea and Vomiting     Acetaminophen Nausea     Iodine-131 Hives     Motrin [Ibuprofen Micronized] Nausea     Tylenol Nausea     Past Surgical History   Procedure Laterality Date     C knee scope,clean/drain       bilateral     C appendectomy       Strabismus surgery       bilateral     Orchiectomy scrotal       prosthesis placed     Ir transven intrahepatic portosyst rev       Eye surgery       Esophagoscopy, gastroscopy, duodenoscopy (egd), combined N/A 1/17/2017     Procedure: COMBINED ESOPHAGOSCOPY, GASTROSCOPY, DUODENOSCOPY (EGD);  Surgeon: Guru Reina Palacios MD;  Location:  GI     Colonoscopy N/A 1/17/2017     Procedure: COLONOSCOPY;  Surgeon: Guru eRina Palacios MD;  Location:  GI     Social History     Social History     Marital status:      Spouse name: N/A     Number of children: N/A     Years of education: N/A     Occupational History     Not on file.     Social History Main Topics     Smoking status: Former Smoker     Quit date: 1/1/1978     Smokeless tobacco: Never Used     Alcohol use No      Comment: quit in 2010     Drug use: No     Sexual activity: Yes     Partners: Female     Other Topics Concern     Parent/Sibling W/ Cabg, Mi Or Angioplasty Before 65f 55m? No     Social History Narrative    Admit 9/28:    Lives with new wife of 2 mos.    Tobacco:  Former smoker, quit 1978    Alcohol:  Sober since 2010, recovering alcoholic.    Drugs:  Denies         I have reviewed the Medications, Allergies, Past Medical and Surgical History, and Social History in the Epic system.    Review of Systems   Constitutional: Negative for chills, fatigue and fever.  "  HENT: Negative for congestion and sore throat.    Eyes: Negative for pain and redness.   Respiratory: Negative for shortness of breath.    Cardiovascular: Negative for chest pain.   Gastrointestinal: Negative for abdominal pain, nausea and vomiting.   Genitourinary: Negative for dysuria.   Musculoskeletal: Negative for back pain.   Neurological: Positive for dizziness, tremors, weakness and light-headedness. Negative for headaches.   Psychiatric/Behavioral: Positive for confusion. Negative for hallucinations. The patient is nervous/anxious.        Physical Exam   BP: 138/74  Pulse: 77  Temp: 98.3  F (36.8  C)  Resp: 16  Height: 180.3 cm (5' 11\")  Weight: 88.5 kg (195 lb)  SpO2: 100 %  Physical Exam   Constitutional: No distress.   Frail cachectic 61-year-old male. Appears older than his stated age. He has shaking and tremor at rest which is significantly worse with any intention. When he stands up he has myoclonic jerking athetoid movements and gross shaking. He is very unsteady and needs the assistance of 2. He has asterixis. He is pleasant cooperative and follows directions. He is oriented to person his wife the ER and why he is here.   HENT:   Head: Normocephalic.   Right Ear: External ear normal.   Left Ear: External ear normal.   Nose: Nose normal.   Mouth/Throat: Oropharynx is clear and moist.   Eyes: Conjunctivae are normal.   Neck: Normal range of motion. Neck supple.   Cardiovascular: Normal rate, regular rhythm and normal heart sounds.    Pulmonary/Chest: Effort normal and breath sounds normal.   Abdominal: Soft. Bowel sounds are normal. He exhibits distension. There is no tenderness. There is no rebound and no guarding.   Musculoskeletal: Normal range of motion.   Neurological: He is alert. He displays tremor and abnormal reflex. No cranial nerve deficit or sensory deficit. He exhibits abnormal muscle tone. He displays no seizure activity. Coordination and gait abnormal. GCS eye subscore is 4. GCS " verbal subscore is 5. GCS motor subscore is 6.   Skin: Skin is warm and dry.       ED Course     ED Course     Results for orders placed or performed during the hospital encounter of 03/12/17 (from the past 48 hour(s))   CBC with platelets differential   Result Value Ref Range    WBC 3.9 (L) 4.0 - 11.0 10e9/L    RBC Count 3.21 (L) 4.4 - 5.9 10e12/L    Hemoglobin 11.4 (L) 13.3 - 17.7 g/dL    Hematocrit 33.9 (L) 40.0 - 53.0 %     (H) 78 - 100 fl    MCH 35.5 (H) 26.5 - 33.0 pg    MCHC 33.6 31.5 - 36.5 g/dL    RDW 13.1 10.0 - 15.0 %    Platelet Count 48 (LL) 150 - 450 10e9/L    Diff Method Automated Method     % Neutrophils 66.5 %    % Lymphocytes 16.1 %    % Monocytes 15.3 %    % Eosinophils 1.8 %    % Basophils 0.3 %    % Immature Granulocytes 0.0 %    Absolute Neutrophil 2.6 1.6 - 8.3 10e9/L    Absolute Lymphocytes 0.6 (L) 0.8 - 5.3 10e9/L    Absolute Monocytes 0.6 0.0 - 1.3 10e9/L    Absolute Eosinophils 0.1 0.0 - 0.7 10e9/L    Absolute Basophils 0.0 0.0 - 0.2 10e9/L    Abs Immature Granulocytes 0.0 0 - 0.4 10e9/L   Basic metabolic panel   Result Value Ref Range    Sodium 138 133 - 144 mmol/L    Potassium 3.3 (L) 3.4 - 5.3 mmol/L    Chloride 105 94 - 109 mmol/L    Carbon Dioxide 24 20 - 32 mmol/L    Anion Gap 9 3 - 14 mmol/L    Glucose 123 (H) 70 - 99 mg/dL    Urea Nitrogen 25 7 - 30 mg/dL    Creatinine 1.38 (H) 0.66 - 1.25 mg/dL    GFR Estimate 52 (L) >60 mL/min/1.7m2    GFR Estimate If Black 63 >60 mL/min/1.7m2    Calcium 8.0 (L) 8.5 - 10.1 mg/dL   Hepatic panel   Result Value Ref Range    Bilirubin Direct 0.6 (H) 0.0 - 0.2 mg/dL    Bilirubin Total 2.6 (H) 0.2 - 1.3 mg/dL    Albumin 2.4 (L) 3.4 - 5.0 g/dL    Protein Total 6.7 (L) 6.8 - 8.8 g/dL    Alkaline Phosphatase 74 40 - 150 U/L    ALT 29 0 - 70 U/L    AST 49 (H) 0 - 45 U/L   Ammonia (on ice)   Result Value Ref Range    Ammonia 102 (HH) 10 - 50 umol/L   Blood gas venous   Result Value Ref Range    Ph Venous 7.42 7.32 - 7.43 pH    PCO2 Venous 43 40 - 50  mm Hg    PO2 Venous 28 25 - 47 mm Hg    Bicarbonate Venous 28 21 - 28 mmol/L    Base Excess Venous 3.0 mmol/L   CRP Inflammation   Result Value Ref Range    CRP Inflammation <2.9 0.0 - 8.0 mg/L   INR   Result Value Ref Range    INR 1.31 (H) 0.86 - 1.14   Lactic acid whole blood   Result Value Ref Range    Lactic Acid 2.5 (H) 0.7 - 2.1 mmol/L       Procedures              Critical Care time:  none                   Assessments & Plan (with Medical Decision Making)   MDM--61-year-old male with 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 He has been sober 7 years. He has recurring bouts of hepatic encephalopathy related to elevated ammonia. He is on lactulose and he and his wife state that he has been compliant with this. He has no symptoms or sign of infection with no fever, no complaints and a normal white count. His lactate is elevated I believe because of vascular dehydration. The patient is on chronic diuretics for his portal hypertension. Had a Tipps procedure. He is very unsteady and shaky especially with any movement. He has asterixis. He is still mentating. His wife is concerned as she cannot help him with his inability to stand and with his shaking. Patient will be admitted. Observation status. He was given a liter of IV fluids. Restart lactulose in the morning.   I have reviewed the nursing notes.    I have reviewed the findings, diagnosis, plan and need for follow up with the patient.    New Prescriptions    No medications on file       Final diagnoses:   Hepatic encephalopathy (H)   Acute on chronic renal failure (H)   Alcoholic cirrhosis of liver with ascites (H)   S/P TIPS (transjugular intrahepatic portosystemic shunt)   Chronic hepatitis C without hepatic coma (H)   Portal hypertension (H)       3/12/2017   [facility] EMERGENCY DEPARTMENT     Salty, Nicolas VIDES MD  03/12/17 7126

## 2017-03-12 NOTE — IP AVS SNAPSHOT
"    [facility] SURGICAL: [phone]                                              INTERAGENCY TRANSFER FORM - PHYSICIAN ORDERS   3/12/2017                    Hospital Admission Date: 3/12/2017  SCOTT CASALE   : 1955  Sex: Male        Attending Provider: (none)    Allergies:  Blood Transfusion Related (Informational Only), Percocet [Oxycodone-acetaminophen], Acetaminophen, Iodine-131, Motrin [Ibuprofen Micronized], Tylenol    Infection:  None   Service:  GENERAL [facility]    Ht:  5' 11\" (1.803 m)   Wt:  192 lb 14.4 oz (87.5 kg)   Admission Wt:  195 lb (88.5 kg)    BMI:  26.9 kg/m 2   BSA:  2.09 m 2            Patient PCP Information     Provider PCP Type    Greg Concepcion MD General      ED Clinical Impression     Diagnosis Description Comment Added By Time Added    Hepatic encephalopathy (H) [K72.90] Hepatic encephalopathy (H) [K72.90]  Nicolas Pond MD 3/12/2017 11:12 PM    Acute on chronic renal failure (H) [N17.9, N18.9] Acute on chronic renal failure (H) [N17.9, N18.9]  Nicolas Pond MD 3/12/2017 11:13 PM    Alcoholic cirrhosis of liver with ascites (H) [K70.31] Alcoholic cirrhosis of liver with ascites (H) [K70.31]  Nicolas Pond MD 3/12/2017 11:13 PM    S/P TIPS (transjugular intrahepatic portosystemic shunt) [Z95.828] S/P TIPS (transjugular intrahepatic portosystemic shunt) [Z95.828]  Nicolas Pond MD 3/12/2017 11:13 PM    Chronic hepatitis C without hepatic coma (H) [B18.2] Chronic hepatitis C without hepatic coma (H) [B18.2]  Nicolas Pond MD 3/12/2017 11:13 PM    Portal hypertension (H) [K76.6] Portal hypertension (H) [K76.6]  Nicolas Pond MD 3/12/2017 11:13 PM      Hospital Problems as of 3/14/2017              Priority Class Noted POA    * (Principal)Acute hepatic encephalopathy   3/13/2017 Yes      Non-Hospital Problems as of 3/14/2017              Priority Class Noted    Mild major depression (H)   2008    CARDIOVASCULAR SCREENING; LDL GOAL LESS THAN 130   " 6/25/2012    GERD (gastroesophageal reflux disease)   6/26/2012    Chronic low back pain   12/6/2012    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   12/6/2012    Edema of lower extremity   6/15/2016    Chronic hepatitis C virus infection (H)   6/15/2016    Hepatic cirrhosis (H)   6/15/2016    Liver mass   6/15/2016    Portal hypertension (H)   6/15/2016    Primary malignant neoplasm of testis (H)   6/15/2016    Thrombocytopenia (H)   6/15/2016    Hyponatremia   8/22/2016    Ascites of liver   9/27/2016    S/P TIPS (transjugular intrahepatic portosystemic shunt)   9/28/2016    Hepatic encephalopathy (H)   1/5/2017    Encephalopathy acute   2/8/2017    Hypervolemia   2/22/2017    Ankylosing spondylitis (H)   Unknown      Code Status History     Date Active Date Inactive Code Status Order ID Comments User Context    4/13/2017  7:13 PM 4/15/2017  3:53 PM Full Code [id number]  Marshal Rivera MD Inpatient    3/13/2017  8:53 AM 3/14/2017  1:18 PM Full Code [id number]  Rylee Tran PA-C Inpatient    2/24/2017 10:49 AM 3/13/2017  8:53 AM Full Code [id number]  Kelechi Boyer MD Outpatient    2/22/2017 10:04 PM 2/24/2017 10:49 AM Full Code [id number]  Seth Islas MD Inpatient    2/16/2017  2:01 PM 2/22/2017 10:04 PM Full Code [id number]  Seth Islas MD Outpatient    2/15/2017  6:07 PM 2/16/2017  2:01 PM Full Code [id number]  Kelechi Boyer MD Inpatient    2/8/2017 11:39 PM 2/15/2017  5:57 PM Full Code [id number]  James Julien MD Inpatient    1/5/2017  7:33 AM 1/9/2017  1:06 PM Full Code [id number]  Rhiannon Krueger PA-C Inpatient    9/29/2016  1:29 PM 1/5/2017  7:33 AM Full Code [id number]  Mich Parr PA-C Outpatient    9/28/2016  5:49 PM 9/29/2016  1:29 PM Full Code [id number]  Katarina Rabago PA Inpatient         Medication Review      CONTINUE these medications which may have CHANGED, or have new prescriptions. If we are uncertain of the size of tablets/capsules  you have at home, strength may be listed as something that might have changed.        Dose / Directions Comments    potassium chloride SA 20 MEQ CR tablet   Commonly known as:  potassium chloride   This may have changed:    - when to take this  - additional instructions   Used for:  Hypokalemia        Dose:  20 mEq   Take 1 tablet (20 mEq) by mouth daily Take only while taking your twice a day dose of Lasix then stop. Have your potassium levels checked in 2-3 days.   Quantity:  30 tablet   Refills:  1        ribavirin 200 MG Tabs   This may have changed:    - how much to take  - how to take this  - when to take this  - additional instructions   Used for:  Chronic hepatitis C without hepatic coma (H)        Take one tablet (200mg) by mouth in the AM with food, and one tablet (200mg) in the PM with food.   Quantity:  60 tablet   Refills:  1          CONTINUE these medications which have NOT CHANGED        Dose / Directions Comments    gabapentin 100 MG capsule   Commonly known as:  NEURONTIN   Used for:  Chronic low back pain with sciatica, sciatica laterality unspecified, unspecified back pain laterality        Dose:  200 mg   Take 2 capsules (200 mg) by mouth At Bedtime   Quantity:  60 capsule   Refills:  3        ledipasvir-sofosbuvir  MG per tablet   Commonly known as:  ledipasvir-sofosbuvir   Used for:  Hepatitis C virus infection without hepatic coma, unspecified chronicity        Dose:  1 tablet   Take 1 tablet by mouth daily   Quantity:  28 tablet   Refills:  2        rifaximin 550 MG Tabs tablet   Commonly known as:  XIFAXAN   Used for:  Hepatic encephalopathy (H)        Dose:  550 mg   Take 1 tablet (550 mg) by mouth 2 times daily   Quantity:  60 tablet   Refills:  11        spironolactone 50 MG tablet   Commonly known as:  ALDACTONE   Used for:  Alcoholic cirrhosis of liver with ascites (H)        Dose:  50 mg   Take 1 tablet (50 mg) by mouth daily   Quantity:  30 tablet   Refills:  3          STOP  taking     furosemide 40 MG tablet   Commonly known as:  LASIX           lactulose 10 GM/15ML solution   Commonly known as:  CHRONULAC           senna-docusate 8.6-50 MG per tablet   Commonly known as:  SENOKOT-S;PERICOLACE           vitamin D 25523 UNIT capsule   Commonly known as:  ERGOCALCIFEROL           ZOFRAN 4 MG tablet   Generic drug:  ondansetron                   After Care     Activity       Your activity upon discharge: activity as tolerated       Diet       Follow this diet upon discharge: Orders Placed This Encounter      2 Gram Sodium Diet             Referrals     Home Care Referral       ___________________________________________________________________    Your provider has referred you to: [facility]: [facility] [phone]   [email or web address]    Extended Emergency Contact Information  Primary Emergency Contact: CASALE,CINDY  Address: [address] [place] [address] [facility]  Home Phone: [phone]  Mobile Phone: [phone]  Relation: Spouse  Secondary Emergency Contact: OLIVE GARSIAI  Address: [address] [place] [address] [facility]  Home Phone: [phone]  Mobile Phone: [phone]  Relation: Sister    Patient Anticipated Discharge Date: 1-2 days   RN, PT, HHA to begin 24 - 48 hours after discharge.  PLEASE EVALUATE AND TREAT (Evaluation timeline is 24 - 48 hrs. Please call if there is need for a variance to this timeline).    REASON FOR REFERRAL: To resume home care services.    ADDITIONAL SERVICES NEEDED: None    OTHER PERTINENT INFORMATION: Patient was last seen by provider on 3/13/17 for encephalitis.    No current outpatient prescriptions on file.    Patient Active Problem List:     Mild major depression (H)     CARDIOVASCULAR SCREENING; LDL GOAL LESS THAN 130     GERD (gastroesophageal reflux disease)     Chronic low back pain     Sudden idiopathic hearing loss of  left ear     S/P TIPS (transjugular intrahepatic portosystemic shunt)     Hepatic encephalopathy (H)     Ascites of liver     Edema of lower extremity     Chronic hepatitis C virus infection (H)     Hepatic cirrhosis (H)     Hyponatremia     Liver mass     Portal hypertension (H)     Primary malignant neoplasm of testis (H)     Thrombocytopenia (H)     Encephalopathy acute     Hypervolemia     Ankylosing spondylitis (H)     Acute hepatic encephalopathy      Documentation of Face to Face and Certification for Home Health Services    I certify that patient, Scott Casale is under my care and that I, or a Nurse Practitioner or Physician's Assistant working with me, had a face-to-face encounter that meets the physician face-to-face encounter requirements with this patient on: 3/13/2017.    This encounter with the patient was in whole, or in part, for the following medical condition, which is the primary reason for Home Health Care: Resumption of home care services.    I certify that, based on my findings, the following services are medically necessary Home Health Services: Nursing    My clinical findings support the need for the above services because: To resume home care.    Further, I certify that my clinical findings support that this patient is homebound (i.e. absences from home require considerable and taxing effort and are for medical reasons or [employer] services or infrequently or of short duration when for other reasons) because: Requires assistance of another person or specialized equipment to access medical services because patient: Requires supervision of another for safe transfer..    Based on the above findings, I certify that this patient is confined to the home and needs intermittent skilled nursing care, physical therapy and/or speech therapy.  The patient is under my care, and I have initiated the establishment of the plan of care.  This patient will be followed by a physician who will periodically  review the plan of care.    Physician/Provider to provide follow up care: Carlos Duenas MD    Responsible PECOS certified Physician at time of discharge: Dr. Rivera    Please be aware that coverage of these services is subject to the terms and limitations of your health insurance plan.  Call member services at your health plan with any benefit or coverage questions.             Your next 10 appointments already scheduled     Buzz 15, 2017  8:30 AM CDT   Lab with  LAB   [facility] ([facility])    [address] [place] [id number] [phone]            Buzz 15, 2017  9:00 AM CDT   US ABDOMEN COMPLETE with [id number]   [facility] ([facility])    [address] [place] [id number] [phone]           Please bring a list of your medicines (including vitamins, minerals and over-the-counter drugs). Also, tell your doctor about any allergies you may have. Wear comfortable clothes and leave your valuables at home.  Adults: No eating or drinking for 8 hours before the exam. You may take medicine with a small sip of water.  Children: - Children 6+ years: No food or drink for 6 hours before exam. - Children 1-5 years: No food or drink for 4 hours before exam. - Infants, breast-fed: may have breast milk up to 2 hours before exam. - Infants, formula: may have bottle until 4 hours before exam.  Please call the Imaging Department at your exam site with any questions.            Buzz 15, 2017 10:00 AM CDT   (Arrive by 9:45 AM)   Return General Liver with Warren Higginbotham MD   [facility] ([facility])    [address] [place] [address]  3rd [facility] [address] [phone]              Follow-Up Appointment Instructions     Future Labs/Procedures    Follow-up and recommended labs and tests      Comments:    Follow up with primary care provider, Greg Concepcion, within 2weeks, for  hospital follow- up.      Follow-Up Appointment Instructions     Follow-up and recommended labs and tests        Follow up with primary care provider, Greg Concepcion, within 2weeks, for hospital follow- up.             Statement of Approval     Ordered          03/14/17 1051  I have reviewed and agree with all the recommendations and orders detailed in this document.  EFFECTIVE NOW     Approved and electronically signed by:  Marshal Rivera MD

## 2017-03-12 NOTE — IP AVS SNAPSHOT
MRN:[id number]                      After Visit Summary   3/12/2017    Scott Casale    MRN: [id number]           Thank you!     Thank you for choosing [facility] for your care. Our goal is always to provide you with excellent care. Hearing back from our patients is one way we can continue to improve our services. Please take a few minutes to complete the written survey that you may receive in the mail after you visit with us. Thank you!        Patient Information     Date Of Birth          1955        About your hospital stay     You were admitted on:  March 12, 2017 You last received care in the:  [facility]    You were discharged on:  March 14, 2017       Who to Call     For medical emergencies, please call 911.  For non-urgent questions about your medical care, please call your primary care provider or clinic, [phone]          Attending Provider     Provider Specialty    Salty, Nicolas VIDES MD Emergency Medicine    [facility], Jett DO MD Family [facility]    Marshal Rivera MD Internal Medicine       Primary Care Provider Office Phone # Fax #    Greg Concepcion [phone]       [facility] [address] [facility] [address]  [facility] [address]        After Care Instructions     Activity       Your activity upon discharge: activity as tolerated            Diet       Follow this diet upon discharge: Orders Placed This Encounter      2 Gram Sodium Diet                  Follow-up Appointments     Follow-up and recommended labs and tests        Follow up with primary care provider, Greg Concepcion, within 2weeks, for hospital follow- up.                  Your next 10 appointments already scheduled     Mar 20, 2017  3:00 PM CDT   Lab with  LAB    Health Lab ([facility])    [address]  [facility] [address] [phone]            Mar 20, 2017  4:00 PM CDT   (Arrive by 3:45 PM)   Return General Liver with MD LIZETH Donald  Health Hepatology ([facility])    [address] [facility]  3rd Floor  [facility] [address]   [phone]            Mar 22, 2017  6:00 PM CDT   (Arrive by 5:45 PM)   New Patient Visit with GILMAR Saleem CNP   [facility] Gastroenterology and IBD ([facility])    [address] [facility]  4th Floor  [facility] [address]   [phone]            Mar 28, 2017 12:30 PM CDT   TELEMEDICINE with Radha De La Torre [facility] ([facility])    [address] [facility] [address] [phone]           Note: this is not an onsite visit; there is no need to come to the facility.            Apr 11, 2017  9:00 AM CDT   Lab with  LAB   [facility] Lab ([facility])    [address] [facility]  [address] [facility] [address] [phone]            Apr 11, 2017 10:00 AM CDT   US ABDOMEN COMPLETE with UCUS1   [facility] Imaging Center US ([facility])    [address] [facility]  [address] [facility] [address] [phone]           Please bring a list of your medicines (including vitamins, minerals and over-the-counter drugs). Also, tell your doctor about any allergies you may have. Wear comfortable clothes and leave your valuables at home.  Adults: No eating or drinking for 8 hours before the exam. You may take medicine with a small sip of water.  Children: - Children 6+ years: No food or drink for 6 hours before exam. - Children 1-5 years: No food or drink for 4 hours before exam. - Infants, breast-fed: may have breast milk up to 2 hours before exam. - Infants, formula: may have bottle until 4 hours before exam.  Please call the Imaging Department at your exam site with any questions.            Apr 11, 2017 11:00 AM CDT   (Arrive by 10:45 AM)   Return General Liver with Warren Higginbotham MD   [facility] Hepatology ([facility])    [address]  [facility]  3rd Floor  [facility] [address] [phone]            Apr 11, 2017 12:00 PM CDT   (Arrive by 11:45 AM)   Return Visit with Micki Peter MD   [facility] ([facility])    [address]  2nd [facility] [address] [phone]              Additional Services     Home Care Referral       ___________________________________________________________________    Your provider has referred you to: [facility]: [facility] [phone]   [email or web address]    Extended Emergency Contact Information  Primary Emergency Contact: CASALE,CINDY  Address: [address] [place] [address] [facility]  Home Phone: [phone]  Mobile Phone: [phone]  Relation: Spouse  Secondary Emergency Contact: CHRISTINE GARSIA  Address: [address] [place] [address] [facility]  Home Phone: [phone]  Mobile Phone: [phone]  Relation: Sister    Patient Anticipated Discharge Date: 1-2 days   RN, PT, HHA to begin 24 - 48 hours after discharge.  PLEASE EVALUATE AND TREAT (Evaluation timeline is 24 - 48 hrs. Please call if there is need for a variance to this timeline).    REASON FOR REFERRAL: To resume home care services.    ADDITIONAL SERVICES NEEDED: None    OTHER PERTINENT INFORMATION: Patient was last seen by provider on 3/13/17 for encephalitis.    No current outpatient prescriptions on file.    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     S/P TIPS (transjugular intrahepatic portosystemic shunt)     Hepatic encephalopathy (H)     Ascites of liver     Edema of lower extremity     Chronic hepatitis C virus infection (H)     Hepatic cirrhosis (H)      Hyponatremia     Liver mass     Portal hypertension (H)     Primary malignant neoplasm of testis (H)     Thrombocytopenia (H)     Encephalopathy acute     Hypervolemia     Ankylosing spondylitis (H)     Acute hepatic encephalopathy      Documentation of Face to Face and Certification for Home Health Services    I certify that patient, Scott Casale is under my care and that I, or a Nurse Practitioner or Physician's Assistant working with me, had a face-to-face encounter that meets the physician face-to-face encounter requirements with this patient on: 3/13/2017.    This encounter with the patient was in whole, or in part, for the following medical condition, which is the primary reason for Home Health Care: Resumption of home care services.    I certify that, based on my findings, the following services are medically necessary Home Health Services: Nursing    My clinical findings support the need for the above services because: To resume home care.    Further, I certify that my clinical findings support that this patient is homebound (i.e. absences from home require considerable and taxing effort and are for medical reasons or [employer] services or infrequently or of short duration when for other reasons) because: Requires assistance of another person or specialized equipment to access medical services because patient: Requires supervision of another for safe transfer..    Based on the above findings, I certify that this patient is confined to the home and needs intermittent skilled nursing care, physical therapy and/or speech therapy.  The patient is under my care, and I have initiated the establishment of the plan of care.  This patient will be followed by a physician who will periodically review the plan of care.    Physician/Provider to provide follow up care: Carlos Duenas MD    Responsible [place] certified Physician at time of discharge: Dr. Rivera    Please be aware that coverage of these services is subject  "to the terms and limitations of your health insurance plan.  Call member services at your health plan with any benefit or coverage questions.                  Pending Results     No orders found from 3/10/2017 to 3/13/2017.            Statement of Approval     Ordered          03/14/17 1051  I have reviewed and agree with all the recommendations and orders detailed in this document.  EFFECTIVE NOW     Approved and electronically signed by:  Marshal Rivera MD             Admission Information     Date & Time Provider Department Dept. Phone    3/12/2017 Marshal Rivera MD [facility] [phone]      Your Vitals Were     Blood Pressure Pulse Temperature Respirations Height Weight    109/49 74 98.4  F (36.9  C) (Oral) 16 1.803 m (5' 11\") 87.5 kg (192 lb 14.4 oz)    Pulse Oximetry BMI (Body Mass Index)                100% 26.9 kg/m2          MyChart Information     [employer] gives you secure access to your electronic health record. If you see a primary care provider, you can also send messages to your care team and make appointments. If you have questions, please call your primary care clinic.  If you do not have a primary care provider, please call [phone] and they will assist you.        Care EveryWhere ID     This is your Care EveryWhere ID. This could be used by other organizations to access your [place] medical records  [id number]           Review of your medicines      CONTINUE these medicines which may have CHANGED, or have new prescriptions. If we are uncertain of the size of tablets/capsules you have at home, strength may be listed as something that might have changed.        Dose / Directions    lactulose 10 GM/15ML solution   Commonly known as:  CHRONULAC   This may have changed:  how much to take   Used for:  Hepatic encephalopathy (H)        Dose:  45 mL   Take 45 mLs by mouth 3 times daily   Quantity:  500 mL   Refills:  0       potassium chloride SA 20 MEQ CR tablet   Commonly known " as:  potassium chloride   This may have changed:    - when to take this  - additional instructions   Used for:  Hypokalemia        Dose:  20 mEq   Take 1 tablet (20 mEq) by mouth daily Take only while taking your twice a day dose of Lasix then stop. Have your potassium levels checked in 2-3 days.   Quantity:  30 tablet   Refills:  1       ribavirin 200 MG Tabs   This may have changed:    - how much to take  - how to take this  - when to take this  - additional instructions   Used for:  Chronic hepatitis C without hepatic coma (H)        Take one tablet (200mg) by mouth in the AM with food, and one tablet (200mg) in the PM with food.   Quantity:  60 tablet   Refills:  1       vitamin D 09856 UNIT capsule   Commonly known as:  ERGOCALCIFEROL   This may have changed:    - how much to take  - how to take this  - when to take this  - additional instructions   Used for:  Alcoholic cirrhosis of liver with ascites (H), Vitamin D deficiency        Twice weekly every Mon and Thurs   Quantity:  30 capsule   Refills:  3         CONTINUE these medicines which have NOT CHANGED        Dose / Directions    furosemide 40 MG tablet   Commonly known as:  LASIX   Used for:  Alcoholic cirrhosis of liver with ascites (H)        Dose:  40 mg   Take 1 tablet (40 mg) by mouth 2 times daily Use this twice a day dose until goal weight of 185 pounds is reached. Weigh yourself everyday. Then may need to take once a day.   Quantity:  30 tablet   Refills:  3       gabapentin 100 MG capsule   Commonly known as:  NEURONTIN   Used for:  Chronic low back pain with sciatica, sciatica laterality unspecified, unspecified back pain laterality        Dose:  200 mg   Take 2 capsules (200 mg) by mouth At Bedtime   Quantity:  60 capsule   Refills:  3       ledipasvir-sofosbuvir  MG per tablet   Commonly known as:  ledipasvir-sofosbuvir   Used for:  Hepatitis C virus infection without hepatic coma, unspecified chronicity        Dose:  1 tablet   Take 1  tablet by mouth daily   Quantity:  28 tablet   Refills:  2       rifaximin 550 MG Tabs tablet   Commonly known as:  XIFAXAN   Used for:  Hepatic encephalopathy (H)        Dose:  550 mg   Take 1 tablet (550 mg) by mouth 2 times daily   Quantity:  60 tablet   Refills:  11       senna-docusate 8.6-50 MG per tablet   Commonly known as:  SENOKOT-S;PERICOLACE   Used for:  Constipation, unspecified constipation type        Dose:  1-2 tablet   Take 1-2 tablets by mouth 2 times daily as needed (constipation )   Quantity:  100 tablet   Refills:  0       spironolactone 50 MG tablet   Commonly known as:  ALDACTONE   Used for:  Alcoholic cirrhosis of liver with ascites (H)        Dose:  50 mg   Take 1 tablet (50 mg) by mouth daily   Quantity:  30 tablet   Refills:  3       ZOFRAN 4 MG tablet   Generic drug:  ondansetron        Dose:  4 mg   Take 1 tablet (4 mg) by mouth every 8 hours as needed for nausea   Refills:  0            Where to get your medicines      These medications were sent to [facility] [address] [place] [address] [address] [place] [address]  [address] [place] [address]     Phone:  [phone]     lactulose 10 GM/15ML solution                Protect others around you: Learn how to safely use, store and throw away your medicines at www.disposemymeds.org.             Medication List: This is a list of all your medications and when to take them. Check marks below indicate your daily home schedule. Keep this list as a reference.      Medications           Morning Afternoon Evening Bedtime As Needed    furosemide 40 MG tablet   Commonly known as:  LASIX   Take 1 tablet (40 mg) by mouth 2 times daily Use this twice a day dose until goal weight of 185 pounds is reached. Weigh yourself everyday. Then may need to take once a day.   Last time this was given:  40 mg on 3/14/2017  8:09 AM                                      gabapentin 100 MG capsule   Commonly known as:  NEURONTIN   Take 2 capsules (200 mg) by mouth At  Bedtime   Last time this was given:  200 mg on 3/13/2017  9:03 PM                                   lactulose 10 GM/15ML solution   Commonly known as:  CHRONULAC   Take 45 mLs by mouth 3 times daily   Last time this was given:  30 mLs on 3/14/2017  8:09 AM                                         ledipasvir-sofosbuvir  MG per tablet   Commonly known as:  ledipasvir-sofosbuvir   Take 1 tablet by mouth daily   Last time this was given:  1 tablet on 3/14/2017  8:10 AM                                   potassium chloride SA 20 MEQ CR tablet   Commonly known as:  potassium chloride   Take 1 tablet (20 mEq) by mouth daily Take only while taking your twice a day dose of Lasix then stop. Have your potassium levels checked in 2-3 days.                                   ribavirin 200 MG Tabs   Take one tablet (200mg) by mouth in the AM with food, and one tablet (200mg) in the PM with food.   Last time this was given:  200 mg on 3/14/2017  8:10 AM                                      rifaximin 550 MG Tabs tablet   Commonly known as:  XIFAXAN   Take 1 tablet (550 mg) by mouth 2 times daily   Last time this was given:  550 mg on 3/14/2017  8:10 AM                                      senna-docusate 8.6-50 MG per tablet   Commonly known as:  SENOKOT-S;PERICOLACE   Take 1-2 tablets by mouth 2 times daily as needed (constipation )                                   spironolactone 50 MG tablet   Commonly known as:  ALDACTONE   Take 1 tablet (50 mg) by mouth daily   Last time this was given:  50 mg on 3/14/2017  8:09 AM                                   vitamin D 32025 UNIT capsule   Commonly known as:  ERGOCALCIFEROL   Twice weekly every Mon and Thurs   Last time this was given:  50,000 Units on 3/13/2017  8:24 PM                                ZOFRAN 4 MG tablet   Take 1 tablet (4 mg) by mouth every 8 hours as needed for nausea   Generic drug:  ondansetron

## 2017-03-13 ENCOUNTER — CARE COORDINATION (OUTPATIENT)
Dept: GASTROENTEROLOGY | Facility: CLINIC | Age: 62
End: 2017-03-13

## 2017-03-13 PROBLEM — K76.82 ACUTE HEPATIC ENCEPHALOPATHY (H): Status: ACTIVE | Noted: 2017-03-13

## 2017-03-13 PROBLEM — G04.90 ENCEPHALITIS: Status: ACTIVE | Noted: 2017-03-13

## 2017-03-13 LAB
ALBUMIN SERPL-MCNC: 2.1 G/DL (ref 3.4–5)
ALP SERPL-CCNC: 60 U/L (ref 40–150)
ALT SERPL W P-5'-P-CCNC: 23 U/L (ref 0–70)
AMMONIA PLAS-SCNC: 133 UMOL/L (ref 10–50)
ANION GAP SERPL CALCULATED.3IONS-SCNC: 7 MMOL/L (ref 3–14)
AST SERPL W P-5'-P-CCNC: 36 U/L (ref 0–45)
BILIRUB DIRECT SERPL-MCNC: 0.7 MG/DL (ref 0–0.2)
BILIRUB SERPL-MCNC: 2.2 MG/DL (ref 0.2–1.3)
BUN SERPL-MCNC: 24 MG/DL (ref 7–30)
CALCIUM SERPL-MCNC: 8 MG/DL (ref 8.5–10.1)
CHLORIDE SERPL-SCNC: 111 MMOL/L (ref 94–109)
CO2 SERPL-SCNC: 26 MMOL/L (ref 20–32)
CREAT SERPL-MCNC: 1.39 MG/DL (ref 0.66–1.25)
ERYTHROCYTE [DISTWIDTH] IN BLOOD BY AUTOMATED COUNT: 12.9 % (ref 10–15)
GFR SERPL CREATININE-BSD FRML MDRD: 52 ML/MIN/1.7M2
GLUCOSE SERPL-MCNC: 93 MG/DL (ref 70–99)
HCT VFR BLD AUTO: 30.5 % (ref 40–53)
HGB BLD-MCNC: 10.4 G/DL (ref 13.3–17.7)
INR PPP: 1.47 (ref 0.86–1.14)
LACTATE BLD-SCNC: 1.1 MMOL/L (ref 0.7–2.1)
MCH RBC QN AUTO: 35.5 PG (ref 26.5–33)
MCHC RBC AUTO-ENTMCNC: 34.1 G/DL (ref 31.5–36.5)
MCV RBC AUTO: 104 FL (ref 78–100)
PLATELET # BLD AUTO: 43 10E9/L (ref 150–450)
POTASSIUM SERPL-SCNC: 3.8 MMOL/L (ref 3.4–5.3)
PROT SERPL-MCNC: 5.8 G/DL (ref 6.8–8.8)
RBC # BLD AUTO: 2.93 10E12/L (ref 4.4–5.9)
SODIUM SERPL-SCNC: 144 MMOL/L (ref 133–144)
TSH SERPL DL<=0.005 MIU/L-ACNC: 1.52 MU/L (ref 0.4–4)
WBC # BLD AUTO: 3.3 10E9/L (ref 4–11)

## 2017-03-13 PROCEDURE — 82248 BILIRUBIN DIRECT: CPT | Performed by: FAMILY MEDICINE

## 2017-03-13 PROCEDURE — 85027 COMPLETE CBC AUTOMATED: CPT | Performed by: FAMILY MEDICINE

## 2017-03-13 PROCEDURE — 99223 1ST HOSP IP/OBS HIGH 75: CPT | Mod: AI | Performed by: PHYSICIAN ASSISTANT

## 2017-03-13 PROCEDURE — 12000000 ZZH R&B MED SURG/OB

## 2017-03-13 PROCEDURE — 83605 ASSAY OF LACTIC ACID: CPT | Performed by: PHYSICIAN ASSISTANT

## 2017-03-13 PROCEDURE — 96361 HYDRATE IV INFUSION ADD-ON: CPT

## 2017-03-13 PROCEDURE — 80053 COMPREHEN METABOLIC PANEL: CPT | Performed by: FAMILY MEDICINE

## 2017-03-13 PROCEDURE — 84443 ASSAY THYROID STIM HORMONE: CPT | Performed by: PHYSICIAN ASSISTANT

## 2017-03-13 PROCEDURE — 25000132 ZZH RX MED GY IP 250 OP 250 PS 637: Performed by: PHYSICIAN ASSISTANT

## 2017-03-13 PROCEDURE — 85610 PROTHROMBIN TIME: CPT | Performed by: FAMILY MEDICINE

## 2017-03-13 PROCEDURE — 36415 COLL VENOUS BLD VENIPUNCTURE: CPT | Performed by: FAMILY MEDICINE

## 2017-03-13 PROCEDURE — G0378 HOSPITAL OBSERVATION PER HR: HCPCS

## 2017-03-13 PROCEDURE — 25000128 H RX IP 250 OP 636: Performed by: PHYSICIAN ASSISTANT

## 2017-03-13 PROCEDURE — 82140 ASSAY OF AMMONIA: CPT | Performed by: PHYSICIAN ASSISTANT

## 2017-03-13 PROCEDURE — 25000128 H RX IP 250 OP 636: Performed by: FAMILY MEDICINE

## 2017-03-13 RX ORDER — NALOXONE HYDROCHLORIDE 0.4 MG/ML
.1-.4 INJECTION, SOLUTION INTRAMUSCULAR; INTRAVENOUS; SUBCUTANEOUS
Status: DISCONTINUED | OUTPATIENT
Start: 2017-03-13 | End: 2017-03-14 | Stop reason: HOSPADM

## 2017-03-13 RX ORDER — LEDIPASVIR AND SOFOSBUVIR 90; 400 MG/1; MG/1
1 TABLET, FILM COATED ORAL DAILY
Status: DISCONTINUED | OUTPATIENT
Start: 2017-03-13 | End: 2017-03-14 | Stop reason: HOSPADM

## 2017-03-13 RX ORDER — LACTULOSE 10 G/15ML
30 SOLUTION ORAL
Status: DISCONTINUED | OUTPATIENT
Start: 2017-03-13 | End: 2017-03-13

## 2017-03-13 RX ORDER — LACTULOSE 10 G/15ML
30 SOLUTION ORAL 3 TIMES DAILY
Status: DISCONTINUED | OUTPATIENT
Start: 2017-03-13 | End: 2017-03-13

## 2017-03-13 RX ORDER — DIPHENHYDRAMINE HCL 25 MG
25-50 CAPSULE ORAL EVERY 6 HOURS PRN
Status: DISCONTINUED | OUTPATIENT
Start: 2017-03-13 | End: 2017-03-14 | Stop reason: HOSPADM

## 2017-03-13 RX ORDER — SODIUM CHLORIDE 9 MG/ML
INJECTION, SOLUTION INTRAVENOUS CONTINUOUS
Status: DISCONTINUED | OUTPATIENT
Start: 2017-03-13 | End: 2017-03-13

## 2017-03-13 RX ORDER — ONDANSETRON 2 MG/ML
4 INJECTION INTRAMUSCULAR; INTRAVENOUS EVERY 6 HOURS PRN
Status: DISCONTINUED | OUTPATIENT
Start: 2017-03-13 | End: 2017-03-14 | Stop reason: HOSPADM

## 2017-03-13 RX ORDER — ONDANSETRON 4 MG/1
4 TABLET, ORALLY DISINTEGRATING ORAL EVERY 6 HOURS PRN
Status: DISCONTINUED | OUTPATIENT
Start: 2017-03-13 | End: 2017-03-14 | Stop reason: HOSPADM

## 2017-03-13 RX ORDER — FUROSEMIDE 40 MG
40 TABLET ORAL
Status: DISCONTINUED | OUTPATIENT
Start: 2017-03-13 | End: 2017-03-14 | Stop reason: HOSPADM

## 2017-03-13 RX ORDER — RIBAVIRIN 200 MG/1
200 TABLET, FILM COATED ORAL EVERY MORNING
Status: DISCONTINUED | OUTPATIENT
Start: 2017-03-13 | End: 2017-03-13

## 2017-03-13 RX ORDER — RIBAVIRIN 200 MG/1
200 TABLET, FILM COATED ORAL 2 TIMES DAILY
Status: DISCONTINUED | OUTPATIENT
Start: 2017-03-13 | End: 2017-03-14 | Stop reason: HOSPADM

## 2017-03-13 RX ORDER — POLYETHYLENE GLYCOL 3350 17 G/17G
17 POWDER, FOR SOLUTION ORAL ONCE
Status: COMPLETED | OUTPATIENT
Start: 2017-03-13 | End: 2017-03-13

## 2017-03-13 RX ORDER — ACETAMINOPHEN 325 MG/1
325 TABLET ORAL EVERY 4 HOURS PRN
Status: DISCONTINUED | OUTPATIENT
Start: 2017-03-13 | End: 2017-03-13

## 2017-03-13 RX ORDER — LACTULOSE 10 G/15ML
30 SOLUTION ORAL 3 TIMES DAILY
Status: DISCONTINUED | OUTPATIENT
Start: 2017-03-13 | End: 2017-03-14 | Stop reason: HOSPADM

## 2017-03-13 RX ORDER — NALOXONE HYDROCHLORIDE 0.4 MG/ML
.1-.4 INJECTION, SOLUTION INTRAMUSCULAR; INTRAVENOUS; SUBCUTANEOUS
Status: DISCONTINUED | OUTPATIENT
Start: 2017-03-13 | End: 2017-03-13

## 2017-03-13 RX ORDER — ERGOCALCIFEROL 1.25 MG/1
50000 CAPSULE ORAL
Status: DISCONTINUED | OUTPATIENT
Start: 2017-03-13 | End: 2017-03-14 | Stop reason: HOSPADM

## 2017-03-13 RX ORDER — AMOXICILLIN 250 MG
1-2 CAPSULE ORAL 2 TIMES DAILY PRN
Status: DISCONTINUED | OUTPATIENT
Start: 2017-03-13 | End: 2017-03-14 | Stop reason: HOSPADM

## 2017-03-13 RX ORDER — GABAPENTIN 100 MG/1
200 CAPSULE ORAL AT BEDTIME
Status: DISCONTINUED | OUTPATIENT
Start: 2017-03-13 | End: 2017-03-14 | Stop reason: HOSPADM

## 2017-03-13 RX ORDER — SPIRONOLACTONE 25 MG/1
50 TABLET ORAL DAILY
Status: DISCONTINUED | OUTPATIENT
Start: 2017-03-13 | End: 2017-03-14 | Stop reason: HOSPADM

## 2017-03-13 RX ADMIN — ERGOCALCIFEROL 50000 UNITS: 1.25 CAPSULE ORAL at 10:09

## 2017-03-13 RX ADMIN — GABAPENTIN 200 MG: 100 CAPSULE ORAL at 21:03

## 2017-03-13 RX ADMIN — LEDIPASVIR AND SOFOSBUVIR 1 TABLET: 90; 400 TABLET, FILM COATED ORAL at 10:46

## 2017-03-13 RX ADMIN — SODIUM CHLORIDE: 9 INJECTION, SOLUTION INTRAVENOUS at 12:38

## 2017-03-13 RX ADMIN — LACTULOSE 30 ML: 10 SOLUTION ORAL at 15:45

## 2017-03-13 RX ADMIN — LACTULOSE 30 ML: 10 SOLUTION ORAL at 13:54

## 2017-03-13 RX ADMIN — RIFAXIMIN 550 MG: 550 TABLET ORAL at 20:24

## 2017-03-13 RX ADMIN — SPIRONOLACTONE 50 MG: 25 TABLET ORAL at 14:29

## 2017-03-13 RX ADMIN — FUROSEMIDE 40 MG: 40 TABLET ORAL at 15:45

## 2017-03-13 RX ADMIN — LACTULOSE 30 ML: 10 SOLUTION ORAL at 20:24

## 2017-03-13 RX ADMIN — GABAPENTIN 200 MG: 100 CAPSULE ORAL at 00:49

## 2017-03-13 RX ADMIN — LACTULOSE 30 ML: 10 SOLUTION ORAL at 08:26

## 2017-03-13 RX ADMIN — SODIUM CHLORIDE: 9 INJECTION, SOLUTION INTRAVENOUS at 01:21

## 2017-03-13 RX ADMIN — POLYETHYLENE GLYCOL 3350 17 G: 17 POWDER, FOR SOLUTION ORAL at 08:25

## 2017-03-13 RX ADMIN — RIFAXIMIN 550 MG: 550 TABLET ORAL at 08:26

## 2017-03-13 RX ADMIN — RIBAVIRIN 200 MG: 200 TABLET, FILM COATED ORAL at 21:01

## 2017-03-13 RX ADMIN — ERGOCALCIFEROL 50000 UNITS: 1.25 CAPSULE ORAL at 20:24

## 2017-03-13 RX ADMIN — RIBAVIRIN 200 MG: 200 TABLET, FILM COATED ORAL at 10:46

## 2017-03-13 ASSESSMENT — ACTIVITIES OF DAILY LIVING (ADL)
FALL_HISTORY_WITHIN_LAST_SIX_MONTHS: NO
TOILETING: 0-->INDEPENDENT
DRESS: 0-->INDEPENDENT
COGNITION: 0 - NO COGNITION ISSUES REPORTED
TRANSFERRING: 0-->INDEPENDENT
RETIRED_EATING: 0-->INDEPENDENT
BATHING: 0-->INDEPENDENT
SWALLOWING: 0-->SWALLOWS FOODS/LIQUIDS WITHOUT DIFFICULTY
AMBULATION: 0-->INDEPENDENT
RETIRED_COMMUNICATION: 0-->UNDERSTANDS/COMMUNICATES WITHOUT DIFFICULTY

## 2017-03-13 NOTE — PLAN OF CARE
Problem: Goal Outcome Summary  Goal: Goal Outcome Summary  Outcome: Improving  A&Ox4, VSS on RA. Denies pain. No stools overnight, passing gas. Per MD lactulose to resume this am. Patient displaying asterixis causing difficulty w/ambulation, A1-2 w/walker. Tolerated sandwich upon arrival to floor. Belly distended w/ascites, had recent TIPS procedure, per patient fluid accumulation has slowed down drastically. Diuretics on hold r/t soft BPs. 2+PRIMO. Voiding per urinal. Lactic initially elevated at 2.5, received 1LNS bolus before arriving to floor & has maintenance infusing-per MD recheck this am is sufficient. K+ slightly low at 3.3, again per MD we are NOT replacing. Patient's wife to bring in home meds for hep C regimen today.

## 2017-03-13 NOTE — ED NOTES
Pt is alert and oriented at this time. He is passing large amounts of flatus. Pt is extremely shaky while sitting up at the edge of the bed.gait appears unsteady.

## 2017-03-13 NOTE — PROGRESS NOTES
[facility]    Hospital Medicine   Cross Cover Note  Date of Service: 3/13/2017     I was called due to stooling 5x in the last hour.  Per discussion with robles ANDERSON, GI at the [facility] recommended lactulose Q2 hours until the patient began stooling.  She was instructed that should be begin to profusely stool, he should resume home TID 30mL lactulose.  Given patient's frequent stooling, he was placed back on home regimen at 1815 today.  Next lactulose dose 2000.      Rhiannon Krueger PA-C

## 2017-03-13 NOTE — ED NOTES
Patient has  [place] to Observation  order. Patient has been given Patient Bill of Rights, Observation brochure and  What does Observation mean to me  forms.  Patient has been given the opportunity to ask questions about observation status and their plan of care.      KIM LOYA

## 2017-03-13 NOTE — PROGRESS NOTES
"[facility] ADMISSION NOTE    Patient admitted to room 2306 at approximately 0030 via cart from emergency room. Patient was accompanied by transport tech.     Verbal SBAR report received from Renetta SOLORZANO prior to patient arrival.     Patient ambulated to bed with one assist. Patient alert and oriented X 3. The patient is not having any pain.  . Admission vital signs: Blood pressure 120/63, pulse 73, temperature 97.7  F (36.5  C), temperature source Oral, resp. rate 18, height 1.803 m (5' 11\"), weight 88 kg (194 lb 0.1 oz), SpO2 100 %. Patient was oriented to plan of care, call light, bed controls, tv, telephone, bathroom and visiting hours.     The following safety risks were identified during admission: fall. Yellow risk band applied: YES.     Katarzyna Blanc    "

## 2017-03-13 NOTE — PROGRESS NOTES
Received report from Renetta SOLORZANO, per MD neuro checks are to be discontinued & K+ level of 3.3 does not warrant replacement at this time.

## 2017-03-13 NOTE — CONSULTS
Reason for Referral: D/C Planning    Presenting Problem: Encephalitis    Cognitive Behavioral Status: awake, alert and oriented    Support system: Wife    Current Living Situation:   Home Setting: unknown   Living Situation: Spouse   Function Status / Assistive Device: no assistive devices    Employment/ Financial/ Insurance Concerns: disabled          No Insurance issues identified      Housing Concerns: No      Assessment: Patient is from home with wife.  He currently has [facility] for RN, O/T and P/T.  Patient will likely have a P/T eval when medically appropriate to assess if patient needs more than current services.    Plan: home with resumed [facility]    [place]IA [place]  [facility] [phone]/ [facility] [phone]

## 2017-03-13 NOTE — H&P
[facility]    History and Physical  Hospital Medicine       Date of Admission:  3/12/2017  Date of Service: 3/13/2017     Assessment & Plan   Scott Casale is a 61 year old male with liver cirrhosis due to hepatitis C (on Harvoni and ribavirin) and past alcohol abuse (sober since 2010), s/p TIPS (2015) due to refractory ascites, lower extremity edema, chronic pancytopenia, UC, ankylosing spondylitis, chronic low back pain and distant testicular cancer who presents with increased generalized shakiness.    Acute Hepatic Encephalopathy, Mild  End-Stage Liver Disease due to Hepatitis C and prior Alcohol Abuse  Portal Hypertension s/p TIPS (2015)   Presented with shaking, thought ammonia was elevated. NH3 109->133. Lab show liver disease with hypoalbuminemia, elevated bilirubin, thrombocytopenia and elevated INR. Likely due to missing doses of lactulose - no evidence of infection (CRP normal, no new abdominal pain without respiratory of urinary complaints). Discussed with GI (Dr. Higginbotham) and would not recommend transfer at this time as they are not going to pursue TIPS revision. Advised more aggressive lactulose until stooling.  -recheck ammonia level in AM  -give Miralax x1  -give lactulose q2h until stooling then resume TID dosing  -continue PTA rifaximin   -PT consult pending    Lower Extremity Edema  Chronic Hypoalbuminemia   Likely due to liver disease. Albumin 2.1, baseline near 2.0. Weight currently 88kg (194lbs) and believes lower extremity edema has greatly improved over past weeks and continues to improve.  -resume PTA lasix and spironolactone  -daily weights and I/Os    Elevated Creatinine   Appears borderline for CKD Stage II. Cr 1.39, GFR 52 with baseline 1.1-1.3 and baseline GFR 60s- high 50s. Likely related to lasix. Diuretics held due to soft pressures but improved with IVF.  -continue to monitor  -resume PTA lasix and spironolactone  -holding PTA potassium, like will not need if  continues diuretics on discharge    Elevated Lactate   Lactate 2.5 on admission. Improved to 1.1 with IVF. Does not appear to have infection. Likely due to hypovolemia.    Hypokalemia   K 3.3 on admission, improved with recheck.   -holding PTA potassium supplementation    Chronic Hepatitis C   Ribavirin dosing was recently decreased. Followed by Dr. Neftaly MIRANDA.  -continue PTA Harvoni and ribavirin, use home medications    Chronic Pancytopenia   Leukopenia likely due to Harvoni, baseline WBC 3-4. Thrombocytopenia likely due to liver disease, baseline platelet count 50s and macrocytic anemia unclear etiology, as vit b12 and folate were recently normal. Hg baseline 10-12.  -TSHr pending  -continue to montior    Chronic Hyponatremia   Na 144. likely history due to liver disease.   -continue to montior    Chronic Low Back Pain  Ankylosing Spondylitis   Has had narcotic contract in past (12/2012) - no narcotics currently.   -continue PTA gabapentin    Ulcerative Colitis   Stable. No PTA medications.    Insomnia  Depression   Stable. Takes trazodone PRN at home but not regularly, gabapentin is helping with sleep.    Hx of Testicular Cancer   s/p orchiectomy at age 28 with RPLND.    FEN:  -d/c NS 75mL/hour  -Will monitor electrolytes and replace as needed  -Advance diet as tolerated - low sodium    DVT Prophylaxis: Pneumatic Compression Devices  Code Status: Full Code    Disposition: Anticipate discharge in 2-3 days once mentally clear, less shakiness and ambulating safely. Appropriate for INPATIENT care    I have discussed patient and formulated plan with Dr. Rivera. Assessment and plan as above.     Rylee Tran PA-C  [facility]        Primary Care Physician   Carlos Duenas -[phone]    History is obtained from the patient, ED notes and review of the EMR.    Past Medical History    Past Medical History   Diagnosis Date     AC separation 8/20/2008     left     Ascites      diuretic refractory     H/O  testicular cancer age 28     Hepatitis C      genotype 1A      Shoulder dislocation      Splenomegaly      Ulcerative colitis (H)          Diagnosis Date Noted     Acute hepatic encephalopathy 03/13/2017     Priority: Medium     Ankylosing spondylitis (H)      Priority: Medium     Hypervolemia 02/22/2017     Priority: Medium     Encephalopathy acute 02/08/2017     Priority: Medium     Hepatic encephalopathy (H) 01/05/2017     Priority: Medium     S/P TIPS (transjugular intrahepatic portosystemic shunt) 09/28/2016     Priority: Medium     Ascites of liver 09/27/2016     Priority: Medium     Overview:   Low MELD score   S/p TIPS procedure at [facility] (09/28/2016)       Hyponatremia 08/22/2016     Priority: Medium     Overview:   Chronic        Edema of lower extremity 06/15/2016     Priority: Medium     Overview:   Resistant to diuretic therapy        Chronic hepatitis C virus infection (H) 06/15/2016     Priority: Medium     Overview:   Dr Higginbotham [facility]   Harvoni Ribavirin (10/2016)       Hepatic cirrhosis (H) 06/15/2016     Priority: Medium     Overview:   Hepatitis C and history of alcohol use ( no use currently)  in the past        Liver mass 06/15/2016     Priority: Medium     Overview:   0.8 cm right lobe hyperlucency per CT 04/2016 suspicious for HCC   Liver biopsy due - seeing Liver clinic at [facility]       Portal hypertension (H) 06/15/2016     Priority: Medium     Overview:   Furosemide Spironolactone Lactulose   Intolerance to Propranolol with hypotension, dizziness       Primary malignant neoplasm of testis (H) 06/15/2016     Priority: Medium     Overview:   At age 28 s/p orchiectomy with RPLND        Thrombocytopenia (H) 06/15/2016     Priority: Medium     Overview:   6/12/2016  PLT: 48       Chronic low back pain 12/06/2012     Narcotic Contract signed 12/6/12 and sent to be scanned    Uses soma at night to help him sleep due to back pain. Has had  back problems since 1986, since fell in big hole, collapsed lower discs. Also has ankylosing spondylitis. Has had much Physical Therapy and chiropractic work over years, was told surgery was only option.  Was accompanied by [place] records.    Narcotic contract -- soma #30 R2, at 3 mo can call for 3 more refills, but be seen q 6 mo for refills.  Uses for sleep.  Flexeril for daytime if needed, unrestricted.           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 12/06/2012     Since summer; ENT referral 12/6/12       GERD (gastroesophageal reflux disease) 06/26/2012     Mild major depression (H) 08/27/2008      Past Surgical History   Past Surgical History   Procedure Laterality Date     C knee scope,clean/drain       bilateral     C appendectomy       Strabismus surgery       bilateral     Orchiectomy scrotal       prosthesis placed     Ir transven intrahepatic portosyst rev       Eye surgery       Esophagoscopy, gastroscopy, duodenoscopy (egd), combined N/A 1/17/2017     Procedure: COMBINED ESOPHAGOSCOPY, GASTROSCOPY, DUODENOSCOPY (EGD);  Surgeon: Guru Reina Palacios MD;  Location:  GI     Colonoscopy N/A 1/17/2017     Procedure: COLONOSCOPY;  Surgeon: Guru Reina Palacios MD;  Location:  GI     History of Present Illness   Scott Casale is a 61 year old male who presents with increased shakiness.    Patient presents with 1 day history of increased tremulousness and poor balance with sitting up and ambulation. Has history of this when his ammonia is increased. States he has been trying to keep up with lactulose 3x daily and has only been successful with about 2x/daily. No stools for 3 days until yesterday did have a few loose stools but tremulousness has already set in and wife convinced him to present. Other than the tremulousness, patient feels great and the best he has felt in a while. Denies any mental fogging or confusion.     Has chronic upper abdominal pain s/p TIPS procedure in  2016. Has intermittent nausea - relieved with Zofran. Has chronic diarrhea due to lactulose. Has baseline lower extremity edema - but better than baseline currently.  Denies any fever, chills, myalgias, cold-like symptoms (congestion, pharyngitis, sinus pressure, rhinorrhea), CP, palpitations/flutters, SOB, cough, wheezes, emesis, dysuria, hematuria, headaches, lightheadedness, dizziness, joint swelling, joint pain other than chronic back pain, rashes    Multiple recent hospitalizations:  Admitted [facility] 2/8-15/2017 with eventual transfer to the [facility] for treatment of mild hepatic encephalopathy and dehydration due to diuretics, improved with fluids and possible TIPS revision.     Admitted to [facility] 2/15-17/2017 for continued treatment of AMS, hyperammoniemia due to hepatic encephalopathy - diuretics were held until follow-up and ribavirin dose was decreased. No TIPS revision performed - would consider narrowing the TIPS with refractory HE episodes.     Admitted to [facility] 2/22-24/2017 with increased lower extremity edema, weight up 82kg to 92 kg, improved with lasix 40mg then transitioned to lasix 40mg po BID with goal to decrease dose to once a day once at goal weight of 185lb.    Prior to Admission Medications   Prior to Admission Medications   Prescriptions Last Dose Informant Patient Reported? Taking?   furosemide (LASIX) 40 MG tablet 3/12/2017 at Unknown time  No Yes   Sig: Take 1 tablet (40 mg) by mouth 2 times daily Use this twice a day dose until goal weight of 185 pounds is reached. Weigh yourself everyday. Then may need to take once a day.   gabapentin (NEURONTIN) 100 MG capsule 3/11/2017 at pm Self No No   Sig: Take 2 capsules (200 mg) by mouth At Bedtime   lactulose (CHRONULAC) 10 GM/15ML solution 3/12/2017 at 2pm Self No Yes   Sig: Take 30 mLs by mouth 3 times daily   ledipasvir-sofosbuvir (LEDIPASVIR-SOFOSBUVIR)  MG per tablet 3/12/2017 at am Self No Yes   Sig: Take 1 tablet  by mouth daily   ondansetron (ZOFRAN) 4 MG tablet  Self Yes No   Sig: Take 1 tablet (4 mg) by mouth every 8 hours as needed for nausea   potassium chloride SA (POTASSIUM CHLORIDE) 20 MEQ CR tablet 3/12/2017  No Yes   Sig: Take 1 tablet (20 mEq) by mouth daily Take only while taking your twice a day dose of Lasix then stop. Have your potassium levels checked in 2-3 days.   ribavirin 200 MG TABS 3/12/2017 at pm Self No Yes   Sig: Take one tablet (200mg) by mouth in the AM with food, and one tablet (200mg) in the PM with food.   rifaximin (XIFAXAN) 550 MG TABS tablet 3/12/2017 at Unknown time Self No Yes   Sig: Take 1 tablet (550 mg) by mouth 2 times daily   senna-docusate (SENOKOT-S;PERICOLACE) 8.6-50 MG per tablet More than a month at Unknown time  No No   Sig: Take 1-2 tablets by mouth 2 times daily as needed (constipation )   spironolactone (ALDACTONE) 50 MG tablet 3/12/2017 at Unknown time  No Yes   Sig: Take 1 tablet (50 mg) by mouth daily   vitamin D (ERGOCALCIFEROL) 59956 UNIT capsule 3/12/2017 at Unknown time  No Yes   Sig: Twice weekly every Mon and Thurs      Facility-Administered Medications: None     Allergies   Allergies   Allergen Reactions     Blood Transfusion Related (Informational Only) Other (See Comments)     Patient has a history of a clinically significant antibody against RBC antigens.  A delay in compatible RBCs may occur.     Percocet [Oxycodone-Acetaminophen] Nausea and Vomiting     Acetaminophen Nausea     Iodine-131 Hives     Motrin [Ibuprofen Micronized] Nausea     Tylenol Nausea     Family History    Family History   Problem Relation Age of Onset     CANCER Mother 70     brain tumor     Prostate Cancer No family hx of      Cancer - colorectal No family hx of      DIABETES Maternal Grandmother      CEREBROVASCULAR DISEASE Sister      HEART DISEASE Sister      Respiratory Son      CANCER Father      Uterine Cancer Sister        Social History   Social History     Social History     Marital  "status:      Spouse name: N/A     Number of children: N/A     Years of education: N/A     Occupational History     Not on file.     Social History Main Topics     Smoking status: Former Smoker     Quit date: 1/1/1978     Smokeless tobacco: Never Used     Alcohol use No      Comment: quit in 2010     Drug use: No     Sexual activity: Yes     Partners: Female     Other Topics Concern     Parent/Sibling W/ Cabg, Mi Or Angioplasty Before 65f 55m? No     Social History Narrative    Admit 9/28:    Lives with new wife of 2 mos.    Tobacco:  Former smoker, quit 1978    Alcohol:  Sober since 2010, recovering alcoholic.    Drugs:  Denies      Review of Systems   The 10 point Review of Systems is negative other than noted in the HPI.    Physical Exam   BP 99/53 (BP Location: Right arm)  Pulse 69  Temp 98.8  F (37.1  C) (Oral)  Resp 18  Ht 1.803 m (5' 11\")  Wt 88 kg (194 lb 0.1 oz)  SpO2 98%  BMI 27.06 kg/m2     Weight: 194 lbs .08 oz Body mass index is 27.06 kg/(m^2).     Constitutional: Alert & oriented x4, struggles only slightly with naming months of year backwards, cooperative, no apparent distress, appears nontoxic, Appears stated age.  Eyes: Sclera mildly icteric, EOMI, PEERL.  HENT: Normocephalic. Atraumatic. MMM.  Lymph/Hematologic: No preauricular, postauricular, occipital, sub-mandibular, tonsillar, sub-mental, anterior or posterior cervical, or supraclavicular lymphadenopathy is appreciated.  Cardiovascular: Regular rate and rhythm, normal S1 and S2, and no murmur, rubs or gallops noted. Radial pulses are 2+ bilaterally. Distal pulses are intact. Trace lower extremity edema of ankles bilaterally.  Respiratory: No accessory muscle usage. Speaking in full sentences. Clear to auscultation bilaterally without wheezes, crackles or rhonchi.   GI:well healed surgical scar vertically across abdomen - small ventral hernia present, bowel sounds present, soft, mildly tender near hernia, mildly distended. No rebound or " guarding. Negative fluid wave. Dullness to percussion.  Genitourinary: Deferred  Musculoskeletal: Normal muscle bulk and tone. Moves all extremities appropriately.  Skin: Warm and dry, no rashes.   Neurologic: Neck supple. Cranial nerves 3-12 are grossly intact.  is symmetric but with tremors noted. Strength is 5/5 of upper and lower extremities bilaterally and symmetric. DTRs (biceps and patellar) 2+ bilaterally. No clonus or babinski present. Asterixis present, tongue fasciculations present. Coordination of upper extremities intact and symmetric with finger-to-nose. Did not assess gait due to unsteadiness.    Data   Data reviewed today:     Recent Labs  Lab 03/13/17  0635 03/12/17  1805 03/10/17  1343   WBC 3.3* 3.9* 4.5   HGB 10.4* 11.4* 11.4*   * 106* 106*   PLT 43* 48* 55*   INR 1.47* 1.31*  --     138 137   POTASSIUM 3.8 3.3* 3.6   CHLORIDE 111* 105 103   CO2 26 24 28   BUN 24 25 25   CR 1.39* 1.38* 1.41*   ANIONGAP 7 9 6   ARACELI 8.0* 8.0* 8.3*   GLC 93 123* 105*   ALBUMIN 2.1* 2.4*  --    PROTTOTAL 5.8* 6.7*  --    BILITOTAL 2.2* 2.6*  --    ALKPHOS 60 74  --    ALT 23 29  --    AST 36 49*  --        No results found for this or any previous visit (from the past 24 hour(s)).    I personally reviewed no images or EKG's today.    I have discussed patient and formulated plan with Dr. Rivera. Assessment and plan as above.     Chart documentation with keystrokes and/or Dragon voice recognition software. Although reviewed after completion, some word and grammatical error may remain.  Rylee Tran PA-C  [facility]

## 2017-03-13 NOTE — PROGRESS NOTES
3/13/2017  11:10 AM      Hep C Care Coordination Call   Patient is currently in the hospital for Acute hepatic encephalopathy. Patient is currently on Hep C treatment;     Hepatitis C Treatment  Treatment: Harvoni and Ribavirin x 12 weeks  Genotype: 1a  Stage Fibrosis: F4  Treatment Naive         Start Date: 01/24/17     Week 2  Hgb Lab Due: 2/7/2017. Please have this lab drawn on or after 2/7/17. You do not need to fast for this lab. Please call [phone] to set up a lab appointment.       Week 4  Hgb, Hepatic panel, BMP Labs Due: 2/21/2017  Office Visit Date: Please set up an appointment to see provider Dr. Higginbotham or provider Caitlyn Wyatt NP on or within one week after the above date of 2/21/17. Please book an appointment to have labs drawn one hour prior to your provider appointment. You do not need to fast for these labs. To set up an appointment, please call [phone].      Week 6  Hgb Due 3/7/2017. Please have this lab drawn on or after 3/7/17. You do not need to fast for this lab. Please call [phone] to set up a lab appointment.      Week 8  Hgb Lab Due: 3/21/2017. Please have this lab drawn on or after 3/21/17. You do not need to fast for this lab. Please call [phone] to set up a lab appointment.       Week 12 - End of Treatment  HCV RNA Quant Lab Due: 4/18/2017. Please have this lab drawn on or after 4/18/17. You do not need to fast for this lab. Please call [phone] to set up a lab appointment. *NOTE* It is important to have this lab drawn to see if treatment has worked so far. You will then have to wait three more months to have a final lab draw. Please see the date below.            3 Months Post Treatment  HCV RNA Quant Lab Due: 7/17/2017. Please have this lab drawn on or after 7/17/17. You do not need to fast for this lab.Please call [phone] to set up a lab appointment. *NOTE* It is important to have this final lab drawn. Without his lab, we will be unable to determine if  Hepatitis C treatment was successful.       Hgb drawn   Exam Date Exam Time Accession # Results    3/13/17  6:35 AM [id number]    Component Results   Component Value Flag Ref Range Units Status Collected Lab   Hemoglobin 10.4 (L) 13.3 - 17.7 g/dL Final 03/13/2017  6:35 AM 59     No changes to Hep C POC at this time. Will continue to monitor patient status via Epic. Hep C care team updated on patient status.         Michelle Vanegas RN, BSN, PHN  [facility]   RN Care Coordinator for Hepatology Specialty Clinic/Program

## 2017-03-13 NOTE — PLAN OF CARE
Problem: Goal Outcome Summary  Goal: Goal Outcome Summary  Outcome: No Change  Lactulose, Miralax dose given this am. No stool, + flatus, abdomen distended, baseline ascites per pt. Pt has severe muscle jerks/spasms when position change, sit/stand. Assist 1 with transfers. Pt frustrated. Cooperative with cares. Urine output dark nichole, diuretics held at this time.  Wife brought in Ribavirin,Harvoni, verified with pharmacy.

## 2017-03-14 VITALS
BODY MASS INDEX: 27.01 KG/M2 | SYSTOLIC BLOOD PRESSURE: 109 MMHG | WEIGHT: 192.9 LBS | HEIGHT: 71 IN | TEMPERATURE: 98.4 F | DIASTOLIC BLOOD PRESSURE: 49 MMHG | OXYGEN SATURATION: 100 % | HEART RATE: 74 BPM | RESPIRATION RATE: 16 BRPM

## 2017-03-14 LAB
ALBUMIN SERPL-MCNC: 2.2 G/DL (ref 3.4–5)
ALP SERPL-CCNC: 51 U/L (ref 40–150)
ALT SERPL W P-5'-P-CCNC: 23 U/L (ref 0–70)
AMMONIA PLAS-SCNC: 78 UMOL/L (ref 10–50)
ANION GAP SERPL CALCULATED.3IONS-SCNC: 7 MMOL/L (ref 3–14)
AST SERPL W P-5'-P-CCNC: 39 U/L (ref 0–45)
BILIRUB SERPL-MCNC: 3.5 MG/DL (ref 0.2–1.3)
BUN SERPL-MCNC: 22 MG/DL (ref 7–30)
CALCIUM SERPL-MCNC: 8.2 MG/DL (ref 8.5–10.1)
CHLORIDE SERPL-SCNC: 110 MMOL/L (ref 94–109)
CO2 SERPL-SCNC: 25 MMOL/L (ref 20–32)
CREAT SERPL-MCNC: 1.28 MG/DL (ref 0.66–1.25)
ERYTHROCYTE [DISTWIDTH] IN BLOOD BY AUTOMATED COUNT: 12.7 % (ref 10–15)
GFR SERPL CREATININE-BSD FRML MDRD: 57 ML/MIN/1.7M2
GLUCOSE SERPL-MCNC: 97 MG/DL (ref 70–99)
HCT VFR BLD AUTO: 31.3 % (ref 40–53)
HGB BLD-MCNC: 10.6 G/DL (ref 13.3–17.7)
MCH RBC QN AUTO: 35.3 PG (ref 26.5–33)
MCHC RBC AUTO-ENTMCNC: 33.9 G/DL (ref 31.5–36.5)
MCV RBC AUTO: 104 FL (ref 78–100)
PLATELET # BLD AUTO: 43 10E9/L (ref 150–450)
POTASSIUM SERPL-SCNC: 3.9 MMOL/L (ref 3.4–5.3)
PROT SERPL-MCNC: 6 G/DL (ref 6.8–8.8)
RBC # BLD AUTO: 3 10E12/L (ref 4.4–5.9)
SODIUM SERPL-SCNC: 142 MMOL/L (ref 133–144)
WBC # BLD AUTO: 3.4 10E9/L (ref 4–11)

## 2017-03-14 PROCEDURE — 80053 COMPREHEN METABOLIC PANEL: CPT | Performed by: PHYSICIAN ASSISTANT

## 2017-03-14 PROCEDURE — 36415 COLL VENOUS BLD VENIPUNCTURE: CPT | Performed by: PHYSICIAN ASSISTANT

## 2017-03-14 PROCEDURE — 25000132 ZZH RX MED GY IP 250 OP 250 PS 637: Performed by: PHYSICIAN ASSISTANT

## 2017-03-14 PROCEDURE — 85027 COMPLETE CBC AUTOMATED: CPT | Performed by: PHYSICIAN ASSISTANT

## 2017-03-14 PROCEDURE — 82140 ASSAY OF AMMONIA: CPT | Performed by: PHYSICIAN ASSISTANT

## 2017-03-14 PROCEDURE — 99239 HOSP IP/OBS DSCHRG MGMT >30: CPT | Performed by: INTERNAL MEDICINE

## 2017-03-14 RX ORDER — LACTULOSE 10 G/15ML
45 SOLUTION ORAL 3 TIMES DAILY
Qty: 500 ML | Refills: 0 | Status: SHIPPED | OUTPATIENT
Start: 2017-03-14 | End: 2017-03-20

## 2017-03-14 RX ADMIN — LACTULOSE 30 ML: 10 SOLUTION ORAL at 08:09

## 2017-03-14 RX ADMIN — RIBAVIRIN 200 MG: 200 TABLET, FILM COATED ORAL at 08:10

## 2017-03-14 RX ADMIN — LEDIPASVIR AND SOFOSBUVIR 1 TABLET: 90; 400 TABLET, FILM COATED ORAL at 08:10

## 2017-03-14 RX ADMIN — FUROSEMIDE 40 MG: 40 TABLET ORAL at 08:09

## 2017-03-14 RX ADMIN — RIFAXIMIN 550 MG: 550 TABLET ORAL at 08:10

## 2017-03-14 RX ADMIN — SPIRONOLACTONE 50 MG: 25 TABLET ORAL at 08:09

## 2017-03-14 NOTE — PLAN OF CARE
Problem: Liver Failure, Acute/Chronic (Adult)  Goal: Signs and Symptoms of Listed Potential Problems Will be Absent or Manageable (Liver Failure, Acute/Chronic)  Signs and symptoms of listed potential problems will be absent or manageable by discharge/transition of care (reference Liver Failure, Acute/Chronic (Adult) CPG).   Outcome: Improving  Patient reports feeling better. Denies pain or discomfort.   Fluid /electrolyte imbalance returning to normal.  Up several times this shift to void.  Alert and oriented.  Uses call light appropriately.  Bed alarm activated for safety.  Resting well between cares.

## 2017-03-14 NOTE — PROGRESS NOTES
Attestation:  I, Devorah Calderón, saw this patient with the resident and agree with the resident s findings and plan of care as documented in the resident s note.      Devorah Calderón MD

## 2017-03-14 NOTE — PHARMACY - DISCHARGE MEDICATION RECONCILIATION
Discharge medication review for this patient is complete. Pharmacist assisted with medication reconciliation of discharge medications with prior to admission medications.     The following changes were made to the discharge medication list based on pharmacist review:  Added:  NA  Discontinued: NA  Changed:  Increase lactulose to 45 ml tid      Patient's Discharge Medication List  - medications as listed on After Visit Summary (AVS)     Review of your medicines      CONTINUE these medicines which may have CHANGED, or have new prescriptions. If we are uncertain of the size of tablets/capsules you have at home, strength may be listed as something that might have changed.       Dose / Directions    lactulose 10 GM/15ML solution   Commonly known as:  CHRONULAC   This may have changed:  how much to take   Used for:  Hepatic encephalopathy (H)        Dose:  45 mL   Take 45 mLs by mouth 3 times daily   Quantity:  500 mL   Refills:  0       potassium chloride SA 20 MEQ CR tablet   Commonly known as:  potassium chloride   This may have changed:    - when to take this  - additional instructions   Used for:  Hypokalemia        Dose:  20 mEq   Take 1 tablet (20 mEq) by mouth daily Take only while taking your twice a day dose of Lasix then stop. Have your potassium levels checked in 2-3 days.   Quantity:  30 tablet   Refills:  1       ribavirin 200 MG Tabs   This may have changed:    - how much to take  - how to take this  - when to take this  - additional instructions   Used for:  Chronic hepatitis C without hepatic coma (H)        Take one tablet (200mg) by mouth in the AM with food, and one tablet (200mg) in the PM with food.   Quantity:  60 tablet   Refills:  1       vitamin D 77734 UNIT capsule   Commonly known as:  ERGOCALCIFEROL   This may have changed:    - how much to take  - how to take this  - when to take this  - additional instructions   Used for:  Alcoholic cirrhosis of liver with ascites (H), Vitamin D deficiency         Twice weekly every Mon and Thurs   Quantity:  30 capsule   Refills:  3         CONTINUE these medicines which have NOT CHANGED       Dose / Directions    furosemide 40 MG tablet   Commonly known as:  LASIX   Used for:  Alcoholic cirrhosis of liver with ascites (H)        Dose:  40 mg   Take 1 tablet (40 mg) by mouth 2 times daily Use this twice a day dose until goal weight of 185 pounds is reached. Weigh yourself everyday. Then may need to take once a day.   Quantity:  30 tablet   Refills:  3       gabapentin 100 MG capsule   Commonly known as:  NEURONTIN   Used for:  Chronic low back pain with sciatica, sciatica laterality unspecified, unspecified back pain laterality        Dose:  200 mg   Take 2 capsules (200 mg) by mouth At Bedtime   Quantity:  60 capsule   Refills:  3       ledipasvir-sofosbuvir  MG per tablet   Commonly known as:  ledipasvir-sofosbuvir   Used for:  Hepatitis C virus infection without hepatic coma, unspecified chronicity        Dose:  1 tablet   Take 1 tablet by mouth daily   Quantity:  28 tablet   Refills:  2       rifaximin 550 MG Tabs tablet   Commonly known as:  XIFAXAN   Used for:  Hepatic encephalopathy (H)        Dose:  550 mg   Take 1 tablet (550 mg) by mouth 2 times daily   Quantity:  60 tablet   Refills:  11       senna-docusate 8.6-50 MG per tablet   Commonly known as:  SENOKOT-S;PERICOLACE   Used for:  Constipation, unspecified constipation type        Dose:  1-2 tablet   Take 1-2 tablets by mouth 2 times daily as needed (constipation )   Quantity:  100 tablet   Refills:  0       spironolactone 50 MG tablet   Commonly known as:  ALDACTONE   Used for:  Alcoholic cirrhosis of liver with ascites (H)        Dose:  50 mg   Take 1 tablet (50 mg) by mouth daily   Quantity:  30 tablet   Refills:  3       ZOFRAN 4 MG tablet   Generic drug:  ondansetron        Dose:  4 mg   Take 1 tablet (4 mg) by mouth every 8 hours as needed for nausea   Refills:  0            Where to get your  medicines      These medications were sent to [id number] - [place], [place] - [address]  [address], [facility] [address]     Phone:  [phone]      lactulose 10 GM/15ML solution

## 2017-03-14 NOTE — DISCHARGE SUMMARY
[facility] Discharge Summary    Scott Casale MRN# [id number]   Age: 61 year old YOB: 1955     Date of Admission:  3/12/2017  Date of Discharge::  3/14/2017  Admitting Physician:  Marshal Rivera MD  Discharge Physician:  Marshal Rivera MD  Primary Physician: Greg Concepcion  Transferring Facility: N/A     Home clinic: unknown          Admission Diagnoses:   Hepatic encephalopathy (H) [K72.90]  Portal hypertension (H) [K76.6]  S/P TIPS (transjugular intrahepatic portosystemic shunt) [Z95.828]  Acute on chronic renal failure (H) [N17.9, N18.9]  Alcoholic cirrhosis of liver with ascites (H) [K70.31]  Chronic hepatitis C without hepatic coma (H) [B18.2]          Discharge Diagnosis:   Principle diagnosis: Acute Hepatic Encephalopathy  Secondary diagnoses:  Principal Problem:    Acute hepatic encephalopathy         Brief History of Presenting Illness:   As per admit hx  Patient presents with 1 day history of increased tremulousness and poor balance with sitting up and ambulation. Has history of this when his ammonia is increased. States he has been trying to keep up with lactulose 3x daily and has only been successful with about 2x/daily. No stools for 3 days until yesterday did have a few loose stools but tremulousness has already set in and wife convinced him to present. Other than the tremulousness, patient feels great and the best he has felt in a while. Denies any mental fogging or confusion.      Has chronic upper abdominal pain s/p TIPS procedure in 2016. Has intermittent nausea - relieved with Zofran. Has chronic diarrhea due to lactulose. Has baseline lower extremity edema - but better than baseline currently.  Denies any fever, chills, myalgias, cold-like symptoms (congestion, pharyngitis, sinus pressure, rhinorrhea), CP, palpitations/flutters, SOB, cough, wheezes, emesis, dysuria, hematuria, headaches, lightheadedness, dizziness, joint swelling, joint pain other than chronic back pain,  gino     Multiple recent hospitalizations:  Admitted [facility] 2/8-15/2017 with eventual transfer to the [facility] for treatment of mild hepatic encephalopathy and dehydration due to diuretics, improved with fluids and possible TIPS revision.      Admitted to [facility] 2/15-17/2017 for continued treatment of AMS, hyperammoniemia due to hepatic encephalopathy - diuretics were held until follow-up and ribavirin dose was decreased. No TIPS revision performed - would consider narrowing the TIPS with refractory HE episodes.      Admitted to [facility] 2/22-24/2017 with increased lower extremity edema, weight up 82kg to 92 kg, improved with lasix 40mg then transitioned to lasix 40mg po BID with goal to decrease dose to once a day once at goal weight of 185lb.       No results found for this or any previous visit (from the past 24 hour(s)).         Hospital Course:   Acute Hepatic Encephalopathy, Mild  End-Stage Liver Disease due to Hepatitis C and prior Alcohol Abuse  Portal Hypertension s/p TIPS (2015)  Presented with shaking, thought ammonia was elevated. NH3 109->133. Lab show liver disease with hypoalbuminemia, elevated bilirubin, thrombocytopenia and elevated INR. Likely due to missing doses of lactulose - no evidence of infection (CRP normal, no new abdominal pain without respiratory of urinary complaints). Discussed with GI (Dr. Higginbotham) -did not recommend transfer as they are not going to pursue TIPS revision. Advised more aggressive lactulose until stooling.  Lactulose was started q2 hrs--had multiple stools--ammonia 78 today. Pt back to baseline and wants to go home  -will increase lactulose to 45 tid from 30 tid.      Lower Extremity Edema  Chronic Hypoalbuminemia  Likely due to liver disease. Albumin 2.1, baseline near 2.0. Weight currently 88kg (194lbs) and believes lower extremity edema has greatly improved over past weeks and continues to improve.  Continue  PTA lasix and spironolactone     Elevated  Creatinine  Appears borderline for CKD Stage II. Cr 1.39, GFR 52 with baseline 1.1-1.3 and baseline GFR 60s- high 50s. Likely related to lasix. Diuretics held due to soft pressures but improved with IVF.  Continue meds     Elevated Lactate  Lactate 2.5 on admission. Improved to 1.1 with IVF. Does not appear to have infection. Likely due to hypovolemia.     Hypokalemia  K 3.3 on admission, improved with recheck.   Stable. Continue meds     Chronic Hepatitis C  Ribavirin dosing was recently decreased. Followed by Dr. Neftaly MIRANDA.  -continue PTA Harvoni and ribavirin, use home medications     Chronic Pancytopenia  Leukopenia likely due to Harvoni, baseline WBC 3-4. Thrombocytopenia likely due to liver disease, baseline platelet count 50s and macrocytic anemia unclear etiology, as vit b12 and folate were recently normal. Hg baseline 10-12.     Chronic Hyponatremia  Na 144. likely history due to liver disease.      Chronic Low Back Pain  Ankylosing Spondylitis   Has had narcotic contract in past (12/2012) - no narcotics currently.   -continue PTA gabapentin     Ulcerative Colitis  Stable. No PTA medications.     Insomnia  Depression  Stable. Takes trazodone PRN at home but not regularly, gabapentin is helping with sleep.     Hx of Testicular Cancer  s/p orchiectomy at age 28 with RPLND.          Procedures:   No procedures performed during this admission         Allergies:      Allergies   Allergen Reactions     Blood Transfusion Related (Informational Only) Other (See Comments)     Patient has a history of a clinically significant antibody against RBC antigens.  A delay in compatible RBCs may occur.     Percocet [Oxycodone-Acetaminophen] Nausea and Vomiting     Acetaminophen Nausea     Iodine-131 Hives     Motrin [Ibuprofen Micronized] Nausea     Tylenol Nausea             Medications Prior to Admission:     Prescriptions Prior to Admission   Medication Sig Dispense Refill Last Dose     furosemide (LASIX) 40 MG tablet  Take 1 tablet (40 mg) by mouth 2 times daily Use this twice a day dose until goal weight of 185 pounds is reached. Weigh yourself everyday. Then may need to take once a day. 30 tablet 3 3/12/2017 at am     spironolactone (ALDACTONE) 50 MG tablet Take 1 tablet (50 mg) by mouth daily 30 tablet 3 3/12/2017 at am     vitamin D (ERGOCALCIFEROL) 38115 UNIT capsule Twice weekly every Mon and Thurs (Patient taking differently: Take 50,000 Units by mouth in the morning, Mon and Thur Twice weekly every Mon and Thurs) 30 capsule 3 3/9/2017     potassium chloride SA (POTASSIUM CHLORIDE) 20 MEQ CR tablet Take 1 tablet (20 mEq) by mouth daily Take only while taking your twice a day dose of Lasix then stop. Have your potassium levels checked in 2-3 days. (Patient taking differently: Take 20 mEq by mouth every evening Take only while taking your twice a day dose of Lasix then stop. Have your potassium levels checked in 2-3 days.) 30 tablet 1 3/11/2017 at pm     ribavirin 200 MG TABS Take one tablet (200mg) by mouth in the AM with food, and one tablet (200mg) in the PM with food. (Patient taking differently: Take 1 tablet by mouth 2 times daily (with meals) Take one tablet (200mg) by mouth in the AM with food, and one tablet (200mg) in the PM with food.) 60 tablet 1 3/12/2017 at pm     rifaximin (XIFAXAN) 550 MG TABS tablet Take 1 tablet (550 mg) by mouth 2 times daily 60 tablet 11 3/12/2017 at am     ledipasvir-sofosbuvir (LEDIPASVIR-SOFOSBUVIR)  MG per tablet Take 1 tablet by mouth daily 28 tablet 2 3/12/2017 at am     ondansetron (ZOFRAN) 4 MG tablet Take 1 tablet (4 mg) by mouth every 8 hours as needed for nausea   3/11/2017 at pm     senna-docusate (SENOKOT-S;PERICOLACE) 8.6-50 MG per tablet Take 1-2 tablets by mouth 2 times daily as needed (constipation ) 100 tablet 0 More than a month at Unknown time     gabapentin (NEURONTIN) 100 MG capsule Take 2 capsules (200 mg) by mouth At Bedtime 60 capsule 3 3/11/2017 at pm              Discharge Medications:     Current Discharge Medication List      CONTINUE these medications which have CHANGED    Details   lactulose (CHRONULAC) 10 GM/15ML solution Take 45 mLs by mouth 3 times daily  Qty: 500 mL, Refills: 0    Associated Diagnoses: Hepatic encephalopathy (H)         CONTINUE these medications which have NOT CHANGED    Details   furosemide (LASIX) 40 MG tablet Take 1 tablet (40 mg) by mouth 2 times daily Use this twice a day dose until goal weight of 185 pounds is reached. Weigh yourself everyday. Then may need to take once a day.  Qty: 30 tablet, Refills: 3    Comments: Continue with BID until stable weight, can discontinue BID and transition to QDAY once target weight is reached. Should continue to do weight checks and if increasing swelling or weight gain resume with BID.  Associated Diagnoses: Alcoholic cirrhosis of liver with ascites (H)      spironolactone (ALDACTONE) 50 MG tablet Take 1 tablet (50 mg) by mouth daily  Qty: 30 tablet, Refills: 3    Associated Diagnoses: Alcoholic cirrhosis of liver with ascites (H)      vitamin D (ERGOCALCIFEROL) 65070 UNIT capsule Twice weekly every Mon and Thurs  Qty: 30 capsule, Refills: 3    Associated Diagnoses: Alcoholic cirrhosis of liver with ascites (H); Vitamin D deficiency      potassium chloride SA (POTASSIUM CHLORIDE) 20 MEQ CR tablet Take 1 tablet (20 mEq) by mouth daily Take only while taking your twice a day dose of Lasix then stop. Have your potassium levels checked in 2-3 days.  Qty: 30 tablet, Refills: 1    Associated Diagnoses: Hypokalemia      ribavirin 200 MG TABS Take one tablet (200mg) by mouth in the AM with food, and one tablet (200mg) in the PM with food.  Qty: 60 tablet, Refills: 1    Associated Diagnoses: Chronic hepatitis C without hepatic coma (H)      rifaximin (XIFAXAN) 550 MG TABS tablet Take 1 tablet (550 mg) by mouth 2 times daily  Qty: 60 tablet, Refills: 11    Associated Diagnoses: Hepatic encephalopathy (H)     "  ledipasvir-sofosbuvir (LEDIPASVIR-SOFOSBUVIR)  MG per tablet Take 1 tablet by mouth daily  Qty: 28 tablet, Refills: 2    Associated Diagnoses: Hepatitis C virus infection without hepatic coma, unspecified chronicity      ondansetron (ZOFRAN) 4 MG tablet Take 1 tablet (4 mg) by mouth every 8 hours as needed for nausea      senna-docusate (SENOKOT-S;PERICOLACE) 8.6-50 MG per tablet Take 1-2 tablets by mouth 2 times daily as needed (constipation )  Qty: 100 tablet, Refills: 0    Associated Diagnoses: Constipation, unspecified constipation type      gabapentin (NEURONTIN) 100 MG capsule Take 2 capsules (200 mg) by mouth At Bedtime  Qty: 60 capsule, Refills: 3    Associated Diagnoses: Chronic low back pain with sciatica, sciatica laterality unspecified, unspecified back pain laterality                   Consultations:   No consultations were requested during this admission            Discharge Exam:   Blood pressure 109/49, pulse 74, temperature 98.4  F (36.9  C), temperature source Oral, resp. rate 16, height 1.803 m (5' 11\"), weight 87.5 kg (192 lb 14.4 oz), SpO2 100 %.  GENERAL APPEARANCE: healthy, alert and no distress  EYES: conjunctiva clear, eyes grossly normal  HENT: external ears and nose normal   NECK: supple, no masses or adenopathy  RESP: lungs clear to auscultation - no rales, rhonchi or wheezes  CV: regular rate and rhythm, normal S1 S2, no S3 or S4 and no murmur, click or rub   ABDOMEN: soft, nontender, no HSM or masses and bowel sounds normal  MS: no clubbing, cyanosis; no edema  SKIN: clear without significant rashes or lesions  NEURO: Normal strength and tone, sensory exam grossly normal, mentation intact and speech normal    Unresulted Labs Ordered in the Past 30 Days of this Admission     No orders found from 1/11/2017 to 3/13/2017.          No results found for this or any previous visit (from the past 24 hour(s)).         Pending Tests at Discharge:   None         Discharge Instructions and " Follow-Up:   Discharge diet: Regular   Discharge activity: Activity as tolerated   Discharge follow-up: Follow up with primary care provider in 2 weeks           Discharge Disposition:   Discharged to home      Attestation:  I have reviewed today's vital signs, notes, medications, labs and imaging.    Time Spent on this Encounter   I, Marshal Rivera, personally saw the patient today and spent greater than 30 minutes discharging this patient.    Marshal Rivera MD

## 2017-03-14 NOTE — PLAN OF CARE
Problem: Discharge Planning  Goal: Discharge Planning (Adult, OB, Behavioral, Peds)  Outcome: Adequate for Discharge Date Met:  03/14/17  JENNIFER BRANCH DISCHARGE NOTE     Patient discharged to home at 11:16 AM via wheel chair. Accompanied by spouse and staff. Discharge instructions reviewed with patient and spouse, opportunity offered to ask questions. Prescriptions - None ordered for discharge. All belongings sent with patient.     Allyson Worthy

## 2017-03-14 NOTE — PROGRESS NOTES
Pt alert, oriented, feels function at baseline. No further jerking/tremors today. Ammonia level 78 today. Update given to Dr Rivera, plan to dc today. Pt ambulated sifuentes with STAND BY ASSIST> 300ft, tolerated well. Await wife arrival for transport, review dc instructions.

## 2017-03-20 ENCOUNTER — OFFICE VISIT (OUTPATIENT)
Dept: GASTROENTEROLOGY | Facility: CLINIC | Age: 62
End: 2017-03-20
Attending: INTERNAL MEDICINE
Payer: MEDICARE

## 2017-03-20 VITALS
DIASTOLIC BLOOD PRESSURE: 69 MMHG | OXYGEN SATURATION: 99 % | HEIGHT: 71 IN | TEMPERATURE: 98.2 F | WEIGHT: 196.9 LBS | BODY MASS INDEX: 27.56 KG/M2 | RESPIRATION RATE: 20 BRPM | SYSTOLIC BLOOD PRESSURE: 116 MMHG | HEART RATE: 80 BPM

## 2017-03-20 DIAGNOSIS — K76.82 HEPATIC ENCEPHALOPATHY (H): ICD-10-CM

## 2017-03-20 DIAGNOSIS — B18.2 CHRONIC HEPATITIS C WITHOUT HEPATIC COMA (H): Primary | Chronic | ICD-10-CM

## 2017-03-20 DIAGNOSIS — B18.2 CHRONIC HEPATITIS C WITHOUT HEPATIC COMA (H): ICD-10-CM

## 2017-03-20 DIAGNOSIS — K70.30 ALCOHOLIC CIRRHOSIS OF LIVER WITHOUT ASCITES (H): Primary | ICD-10-CM

## 2017-03-20 LAB
ALBUMIN SERPL-MCNC: 2.6 G/DL (ref 3.4–5)
ALP SERPL-CCNC: 75 U/L (ref 40–150)
ALT SERPL W P-5'-P-CCNC: 30 U/L (ref 0–70)
AMMONIA PLAS-SCNC: 92 UMOL/L (ref 10–50)
ANION GAP SERPL CALCULATED.3IONS-SCNC: 8 MMOL/L (ref 3–14)
AST SERPL W P-5'-P-CCNC: 49 U/L (ref 0–45)
BILIRUB DIRECT SERPL-MCNC: 0.9 MG/DL (ref 0–0.2)
BILIRUB SERPL-MCNC: 3 MG/DL (ref 0.2–1.3)
BUN SERPL-MCNC: 23 MG/DL (ref 7–30)
CALCIUM SERPL-MCNC: 8.2 MG/DL (ref 8.5–10.1)
CHLORIDE SERPL-SCNC: 104 MMOL/L (ref 94–109)
CO2 SERPL-SCNC: 27 MMOL/L (ref 20–32)
CREAT SERPL-MCNC: 1.49 MG/DL (ref 0.66–1.25)
ERYTHROCYTE [DISTWIDTH] IN BLOOD BY AUTOMATED COUNT: 14.1 % (ref 10–15)
GFR SERPL CREATININE-BSD FRML MDRD: 48 ML/MIN/1.7M2
GLUCOSE SERPL-MCNC: 107 MG/DL (ref 70–99)
HCT VFR BLD AUTO: 32.1 % (ref 40–53)
HGB BLD-MCNC: 11.3 G/DL (ref 13.3–17.7)
INR PPP: 1.38 (ref 0.86–1.14)
MCH RBC QN AUTO: 36.2 PG (ref 26.5–33)
MCHC RBC AUTO-ENTMCNC: 35.2 G/DL (ref 31.5–36.5)
MCV RBC AUTO: 103 FL (ref 78–100)
PLATELET # BLD AUTO: 60 10E9/L (ref 150–450)
POTASSIUM SERPL-SCNC: 4 MMOL/L (ref 3.4–5.3)
PROT SERPL-MCNC: 7 G/DL (ref 6.8–8.8)
RBC # BLD AUTO: 3.12 10E12/L (ref 4.4–5.9)
SODIUM SERPL-SCNC: 139 MMOL/L (ref 133–144)
WBC # BLD AUTO: 4.7 10E9/L (ref 4–11)

## 2017-03-20 PROCEDURE — 80048 BASIC METABOLIC PNL TOTAL CA: CPT | Performed by: INTERNAL MEDICINE

## 2017-03-20 PROCEDURE — 36415 COLL VENOUS BLD VENIPUNCTURE: CPT | Performed by: INTERNAL MEDICINE

## 2017-03-20 PROCEDURE — 85610 PROTHROMBIN TIME: CPT | Performed by: INTERNAL MEDICINE

## 2017-03-20 PROCEDURE — 99213 OFFICE O/P EST LOW 20 MIN: CPT | Mod: ZF

## 2017-03-20 PROCEDURE — 85027 COMPLETE CBC AUTOMATED: CPT | Performed by: INTERNAL MEDICINE

## 2017-03-20 PROCEDURE — 82140 ASSAY OF AMMONIA: CPT | Performed by: INTERNAL MEDICINE

## 2017-03-20 PROCEDURE — 80076 HEPATIC FUNCTION PANEL: CPT | Performed by: INTERNAL MEDICINE

## 2017-03-20 RX ORDER — LACTULOSE 10 G/15ML
45 SOLUTION ORAL 3 TIMES DAILY
Qty: 5000 ML | Refills: 3 | Status: SHIPPED | OUTPATIENT
Start: 2017-03-20 | End: 2017-04-12

## 2017-03-20 ASSESSMENT — PAIN SCALES - GENERAL: PAINLEVEL: NO PAIN (0)

## 2017-03-20 NOTE — LETTER
3/20/2017      RE: Scott Casale  [address]  [place] [address]       HISTORY OF PRESENT ILLNESS:  I had the pleasure of seeing Scott Casale for followup in the Liver Clinic at the [facility] on 03/20/2017.  Mr. 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  He is currently on treatment for his hepatitis C.  He has 1 more month to go.  He has been hospitalized twice since I saw him last for hepatic encephalopathy.  He was, both times, started up to [facility] and the first time had to be transferred here where gradually he did improve.  At present, he is really doing quite well.  On his current dose of lactulose, he is having 3 bowel movements per day and he is on rifaximin as well.        He does have some abdominal pain over his midline hernia.  He denies any fevers or chills, cough or shortness of breath.  He denies any nausea or vomiting.  His stools are as noted above.  He states his appetite is good.  His weight is improved.  He has certainly put on muscle mass and he reports his fatigue is as good as it has been for quite some time.  He has not had any gastrointestinal bleeding and he did have an ultrasound that suggested his TIPS is working well.       Current Outpatient Prescriptions   Medication     lactulose (CHRONULAC) 10 GM/15ML solution     spironolactone (ALDACTONE) 50 MG tablet     vitamin D (ERGOCALCIFEROL) 82843 UNIT capsule     potassium chloride SA (POTASSIUM CHLORIDE) 20 MEQ CR tablet     gabapentin (NEURONTIN) 100 MG capsule     ribavirin 200 MG TABS     rifaximin (XIFAXAN) 550 MG TABS tablet     ledipasvir-sofosbuvir (LEDIPASVIR-SOFOSBUVIR)  MG per tablet     ondansetron (ZOFRAN) 4 MG tablet     furosemide (LASIX) 40 MG tablet     No current facility-administered medications for this visit.      B/P: 116/69, T: 98.2, P: 80, R: 20    HEENT exam shows some scleral icterus and no temple muscle wasting.   Chest is clear.  Abdominal exam shows no increase in girth.  No masses or tenderness to palpation are present.  His liver is 10-11 cm in span with a slightly prominent left lobe.  No spleen tip is palpable.  Extremity exam shows no edema.  Skin exam shows some palmar erythema and a few spider angioma.  Neurologic exam shows no asterixis.       Recent Results (from the past 168 hour(s))   Comprehensive metabolic panel    Collection Time: 03/14/17  6:40 AM   Result Value Ref Range    Sodium 142 133 - 144 mmol/L    Potassium 3.9 3.4 - 5.3 mmol/L    Chloride 110 (H) 94 - 109 mmol/L    Carbon Dioxide 25 20 - 32 mmol/L    Anion Gap 7 3 - 14 mmol/L    Glucose 97 70 - 99 mg/dL    Urea Nitrogen 22 7 - 30 mg/dL    Creatinine 1.28 (H) 0.66 - 1.25 mg/dL    GFR Estimate 57 (L) >60 mL/min/1.7m2    GFR Estimate If Black 69 >60 mL/min/1.7m2    Calcium 8.2 (L) 8.5 - 10.1 mg/dL    Bilirubin Total 3.5 (H) 0.2 - 1.3 mg/dL    Albumin 2.2 (L) 3.4 - 5.0 g/dL    Protein Total 6.0 (L) 6.8 - 8.8 g/dL    Alkaline Phosphatase 51 40 - 150 U/L    ALT 23 0 - 70 U/L    AST 39 0 - 45 U/L   CBC with platelets    Collection Time: 03/14/17  6:40 AM   Result Value Ref Range    WBC 3.4 (L) 4.0 - 11.0 10e9/L    RBC Count 3.00 (L) 4.4 - 5.9 10e12/L    Hemoglobin 10.6 (L) 13.3 - 17.7 g/dL    Hematocrit 31.3 (L) 40.0 - 53.0 %     (H) 78 - 100 fl    MCH 35.3 (H) 26.5 - 33.0 pg    MCHC 33.9 31.5 - 36.5 g/dL    RDW 12.7 10.0 - 15.0 %    Platelet Count 43 (LL) 150 - 450 10e9/L   Ammonia    Collection Time: 03/14/17  6:40 AM   Result Value Ref Range    Ammonia 78 (H) 10 - 50 umol/L   Basic metabolic panel    Collection Time: 03/20/17  2:44 PM   Result Value Ref Range    Sodium 139 133 - 144 mmol/L    Potassium 4.0 3.4 - 5.3 mmol/L    Chloride 104 94 - 109 mmol/L    Carbon Dioxide 27 20 - 32 mmol/L    Anion Gap 8 3 - 14 mmol/L    Glucose 107 (H) 70 - 99 mg/dL    Urea Nitrogen 23 7 - 30 mg/dL    Creatinine 1.49 (H) 0.66 - 1.25 mg/dL    GFR Estimate 48 (L)  >60 mL/min/1.7m2    GFR Estimate If Black 58 (L) >60 mL/min/1.7m2    Calcium 8.2 (L) 8.5 - 10.1 mg/dL   CBC with platelets    Collection Time: 03/20/17  2:44 PM   Result Value Ref Range    WBC 4.7 4.0 - 11.0 10e9/L    RBC Count 3.12 (L) 4.4 - 5.9 10e12/L    Hemoglobin 11.3 (L) 13.3 - 17.7 g/dL    Hematocrit 32.1 (L) 40.0 - 53.0 %     (H) 78 - 100 fl    MCH 36.2 (H) 26.5 - 33.0 pg    MCHC 35.2 31.5 - 36.5 g/dL    RDW 14.1 10.0 - 15.0 %    Platelet Count 60 (L) 150 - 450 10e9/L   Hepatic panel    Collection Time: 03/20/17  2:44 PM   Result Value Ref Range    Bilirubin Direct 0.9 (H) 0.0 - 0.2 mg/dL    Bilirubin Total 3.0 (H) 0.2 - 1.3 mg/dL    Albumin 2.6 (L) 3.4 - 5.0 g/dL    Protein Total 7.0 6.8 - 8.8 g/dL    Alkaline Phosphatase 75 40 - 150 U/L    ALT 30 0 - 70 U/L    AST 49 (H) 0 - 45 U/L   INR    Collection Time: 03/20/17  2:44 PM   Result Value Ref Range    INR 1.38 (H) 0.86 - 1.14   Ammonia    Collection Time: 03/20/17  2:44 PM   Result Value Ref Range    Ammonia 92 (H) 10 - 50 umol/L      My impression is that Mr. 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  He is status post TIPS for diuretic refractory ascites.  He really is doing quite well at this visit.  His mental status is much improved.  His ascites is well controlled.  We just have to make sure that he continues on the correct amount of lactulose and rifaximin.  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  He is up-to-date with regard to vaccines, and my plan will be to see him back in the clinic again in 6 weeks.      Thank you very much for allowing me to participate in the care of this patient.  If you have any questions regarding my recommendations, please do not hesitate to contact me.       Warren Higginbotham MD      Professor of Medicine  [employer]      Executive Medical Director, Solid Organ Transplant  Program  [facility]

## 2017-03-20 NOTE — MR AVS SNAPSHOT
After Visit Summary   3/20/2017    Scott Casale    MRN: [id number]           Patient Information     Date Of Birth          1955        Visit Information        Provider Department      3/20/2017 4:00 PM Warren Higginbotham MD [facility] Hepatology        Today's Diagnoses     Alcoholic cirrhosis of liver without ascites (H)    -  1    Hepatic encephalopathy (H)           Follow-ups after your visit        Follow-up notes from your care team     Return in about 3 months (around 6/20/2017).      Your next 10 appointments already scheduled     Mar 22, 2017  6:00 PM CDT   (Arrive by 5:45 PM)   New Patient Visit with GILMAR Saleem CNP   [facility] Gastroenterology and IBD ([facility])    [address] [place] [address]  4th Floor  [facility] [id number] [phone]            Mar 28, 2017 12:30 PM CDT   TELEMEDICINE with Radha De La Torre [facility] ([facility])    [address] [place] [id number] [phone]           Note: this is not an onsite visit; there is no need to come to the facility.            Apr 11, 2017 12:00 PM CDT   (Arrive by 11:45 AM)   Return Visit with Micki Peter MD   [facility] ([facility])    [address] [place] [address]  2nd Floor  [facility] [id number] [phone]            Buzz 15, 2017  8:30 AM CDT   Lab with  LAB   [facility] Lab ([facility])    [address] [place] [address]  1st [facility] [id number] [phone]            Buzz 15, 2017  9:00 AM CDT   US ABDOMEN COMPLETE with [email or web address]   [facility] US ([facility])    [address] [place] [id number] [phone]           Please bring a list of your medicines (including vitamins, minerals and over-the-counter drugs). Also, tell your doctor about any allergies you may have. Wear comfortable clothes  and leave your valuables at home.  Adults: No eating or drinking for 8 hours before the exam. You may take medicine with a small sip of water.  Children: - Children 6+ years: No food or drink for 6 hours before exam. - Children 1-5 years: No food or drink for 4 hours before exam. - Infants, breast-fed: may have breast milk up to 2 hours before exam. - Infants, formula: may have bottle until 4 hours before exam.  Please call the Imaging Department at your exam site with any questions.            Buzz 15, 2017 10:00 AM CDT   (Arrive by 9:45 AM)   Return General Liver with Warren Higginbotham MD   [facility] Hepatology ([facility])    [address] [facility]  3rd [facility] [address] [phone]              Who to contact     If you have questions or need follow up information about today's clinic visit or your schedule please contact [facility] HEPATOLOGY directly at [phone].  Normal or non-critical lab and imaging results will be communicated to you by [email or web address], letter or phone within 4 business days after the clinic has received the results. If you do not hear from us within 7 days, please contact the clinic through [employer] or phone. If you have a critical or abnormal lab result, we will notify you by phone as soon as possible.  Submit refill requests through [employer]cherlink or call your pharmacy and they will forward the refill request to us. Please allow 3 business days for your refill to be completed.          Additional Information About Your Visit        Voucherlink Information     [employer]link gives you secure access to your electronic health record. If you see a primary care provider, you can also send messages to your care team and make appointments. If you have questions, please call your primary care clinic.  If you do not have a primary care provider, please call [phone] and they will assist you.        Care EveryWhere ID     This is your Care EveryWhere ID. This could be used by other  "organizations to access your [place] medical records  [id number]        Your Vitals Were     Pulse Temperature Respirations Height Pulse Oximetry BMI (Body Mass Index)    80 98.2  F (36.8  C) (Oral) 20 1.803 m (5' 10.98\") 99% 27.47 kg/m2       Blood Pressure from Last 3 Encounters:   03/20/17 116/69   03/14/17 109/49   03/09/17 123/74    Weight from Last 3 Encounters:   03/20/17 89.3 kg (196 lb 14.4 oz)   03/14/17 87.5 kg (192 lb 14.4 oz)   03/09/17 89.7 kg (197 lb 11.2 oz)              We Performed the Following     Schedule follow up appointments          Today's Medication Changes          These changes are accurate as of: 3/20/17 11:59 PM.  If you have any questions, ask your nurse or doctor.               These medicines have changed or have updated prescriptions.        Dose/Directions    potassium chloride SA 20 MEQ CR tablet   Commonly known as:  potassium chloride   This may have changed:    - when to take this  - additional instructions   Used for:  Hypokalemia        Dose:  20 mEq   Take 1 tablet (20 mEq) by mouth daily Take only while taking your twice a day dose of Lasix then stop. Have your potassium levels checked in 2-3 days.   Quantity:  30 tablet   Refills:  1       ribavirin 200 MG Tabs   This may have changed:    - how much to take  - how to take this  - when to take this  - additional instructions   Used for:  Chronic hepatitis C without hepatic coma (H)        Take one tablet (200mg) by mouth in the AM with food, and one tablet (200mg) in the PM with food.   Quantity:  60 tablet   Refills:  1       vitamin D 36308 UNIT capsule   Commonly known as:  ERGOCALCIFEROL   This may have changed:    - how much to take  - how to take this  - when to take this  - additional instructions   Used for:  Alcoholic cirrhosis of liver with ascites (H), Vitamin D deficiency        Twice weekly every Mon and Thurs   Quantity:  30 capsule   Refills:  3         Stop taking these medicines if you haven't already. " Please contact your care team if you have questions.     senna-docusate 8.6-50 MG per tablet   Commonly known as:  SENOKOT-S;PERICOLACE   Stopped by:  Warren Higginbotham MD                Where to get your medicines      These medications were sent to [id number] - [place], [place] - [address]  [address], [facility] [address]     Phone:  [phone]     lactulose 10 GM/15ML solution                Primary Care Provider Office Phone # Fax #    Greg Concepcion [phone]       [address]  [facility] [address]        Thank you!     Thank you for choosing [facility] HEPATOLOGY  for your care. Our goal is always to provide you with excellent care. Hearing back from our patients is one way we can continue to improve our services. Please take a few minutes to complete the written survey that you may receive in the mail after your visit with us. Thank you!             Your Updated Medication List - Protect others around you: Learn how to safely use, store and throw away your medicines at www.disposemymeds.org.          This list is accurate as of: 3/20/17 11:59 PM.  Always use your most recent med list.                   Brand Name Dispense Instructions for use    furosemide 40 MG tablet    LASIX    30 tablet    Take 1 tablet (40 mg) by mouth 2 times daily Use this twice a day dose until goal weight of 185 pounds is reached. Weigh yourself everyday. Then may need to take once a day.       gabapentin 100 MG capsule    NEURONTIN    60 capsule    Take 2 capsules (200 mg) by mouth At Bedtime       lactulose 10 GM/15ML solution    CHRONULAC    5000 mL    Take 45 mLs by mouth 3 times daily       ledipasvir-sofosbuvir  MG per tablet    ledipasvir-sofosbuvir    28 tablet    Take 1 tablet by mouth daily       potassium chloride SA 20 MEQ CR tablet    potassium chloride    30 tablet    Take 1 tablet (20 mEq) by mouth daily Take only while taking your twice a day dose  of Lasix then stop. Have your potassium levels checked in 2-3 days.       ribavirin 200 MG Tabs     60 tablet    Take one tablet (200mg) by mouth in the AM with food, and one tablet (200mg) in the PM with food.       rifaximin 550 MG Tabs tablet    XIFAXAN    60 tablet    Take 1 tablet (550 mg) by mouth 2 times daily       spironolactone 50 MG tablet    ALDACTONE    30 tablet    Take 1 tablet (50 mg) by mouth daily       vitamin D 01923 UNIT capsule    ERGOCALCIFEROL    30 capsule    Twice weekly every Mon and Thurs       ZOFRAN 4 MG tablet   Generic drug:  ondansetron      Take 1 tablet (4 mg) by mouth every 8 hours as needed for nausea

## 2017-03-20 NOTE — NURSING NOTE
"Chief Complaint   Patient presents with     Hospital F/U     admitted to [facility] from 3/12-3/14/17 due to HE     RECHECK     alcohol and Hep C cirrhosis       Initial /69 (BP Location: Left arm, Patient Position: Chair, Cuff Size: Adult Regular)  Pulse 80  Temp 98.2  F (36.8  C) (Oral)  Resp 20  Ht 1.803 m (5' 10.98\")  Wt 89.3 kg (196 lb 14.4 oz)  SpO2 99%  BMI 27.47 kg/m2 Estimated body mass index is 27.47 kg/(m^2) as calculated from the following:    Height as of this encounter: 1.803 m (5' 10.98\").    Weight as of this encounter: 89.3 kg (196 lb 14.4 oz).  Medication Reconciliation: complete    "

## 2017-03-20 NOTE — PROGRESS NOTES
HISTORY OF PRESENT ILLNESS:  I had the pleasure of seeing Scott Casale for followup in the Liver Clinic at the [facility] on 03/20/2017.  Mr. 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  He is currently on treatment for his hepatitis C.  He has 1 more month to go.  He has been hospitalized twice since I saw him last for hepatic encephalopathy.  He was, both times, started up to [facility] and the first time had to be transferred here where gradually he did improve.  At present, he is really doing quite well.  On his current dose of lactulose, he is having 3 bowel movements per day and he is on rifaximin as well.        He does have some abdominal pain over his midline hernia.  He denies any fevers or chills, cough or shortness of breath.  He denies any nausea or vomiting.  His stools are as noted above.  He states his appetite is good.  His weight is improved.  He has certainly put on muscle mass and he reports his fatigue is as good as it has been for quite some time.  He has not had any gastrointestinal bleeding and he did have an ultrasound that suggested his TIPS is working well.       Current Outpatient Prescriptions   Medication     lactulose (CHRONULAC) 10 GM/15ML solution     spironolactone (ALDACTONE) 50 MG tablet     vitamin D (ERGOCALCIFEROL) 50026 UNIT capsule     potassium chloride SA (POTASSIUM CHLORIDE) 20 MEQ CR tablet     gabapentin (NEURONTIN) 100 MG capsule     ribavirin 200 MG TABS     rifaximin (XIFAXAN) 550 MG TABS tablet     ledipasvir-sofosbuvir (LEDIPASVIR-SOFOSBUVIR)  MG per tablet     ondansetron (ZOFRAN) 4 MG tablet     furosemide (LASIX) 40 MG tablet     No current facility-administered medications for this visit.      B/P: 116/69, T: 98.2, P: 80, R: 20    HEENT exam shows some scleral icterus and no temple muscle wasting.  Chest is clear.  Abdominal exam shows no increase in girth.  No masses or tenderness to  palpation are present.  His liver is 10-11 cm in span with a slightly prominent left lobe.  No spleen tip is palpable.  Extremity exam shows no edema.  Skin exam shows some palmar erythema and a few spider angioma.  Neurologic exam shows no asterixis.       Recent Results (from the past 168 hour(s))   Comprehensive metabolic panel    Collection Time: 03/14/17  6:40 AM   Result Value Ref Range    Sodium 142 133 - 144 mmol/L    Potassium 3.9 3.4 - 5.3 mmol/L    Chloride 110 (H) 94 - 109 mmol/L    Carbon Dioxide 25 20 - 32 mmol/L    Anion Gap 7 3 - 14 mmol/L    Glucose 97 70 - 99 mg/dL    Urea Nitrogen 22 7 - 30 mg/dL    Creatinine 1.28 (H) 0.66 - 1.25 mg/dL    GFR Estimate 57 (L) >60 mL/min/1.7m2    GFR Estimate If Black 69 >60 mL/min/1.7m2    Calcium 8.2 (L) 8.5 - 10.1 mg/dL    Bilirubin Total 3.5 (H) 0.2 - 1.3 mg/dL    Albumin 2.2 (L) 3.4 - 5.0 g/dL    Protein Total 6.0 (L) 6.8 - 8.8 g/dL    Alkaline Phosphatase 51 40 - 150 U/L    ALT 23 0 - 70 U/L    AST 39 0 - 45 U/L   CBC with platelets    Collection Time: 03/14/17  6:40 AM   Result Value Ref Range    WBC 3.4 (L) 4.0 - 11.0 10e9/L    RBC Count 3.00 (L) 4.4 - 5.9 10e12/L    Hemoglobin 10.6 (L) 13.3 - 17.7 g/dL    Hematocrit 31.3 (L) 40.0 - 53.0 %     (H) 78 - 100 fl    MCH 35.3 (H) 26.5 - 33.0 pg    MCHC 33.9 31.5 - 36.5 g/dL    RDW 12.7 10.0 - 15.0 %    Platelet Count 43 (LL) 150 - 450 10e9/L   Ammonia    Collection Time: 03/14/17  6:40 AM   Result Value Ref Range    Ammonia 78 (H) 10 - 50 umol/L   Basic metabolic panel    Collection Time: 03/20/17  2:44 PM   Result Value Ref Range    Sodium 139 133 - 144 mmol/L    Potassium 4.0 3.4 - 5.3 mmol/L    Chloride 104 94 - 109 mmol/L    Carbon Dioxide 27 20 - 32 mmol/L    Anion Gap 8 3 - 14 mmol/L    Glucose 107 (H) 70 - 99 mg/dL    Urea Nitrogen 23 7 - 30 mg/dL    Creatinine 1.49 (H) 0.66 - 1.25 mg/dL    GFR Estimate 48 (L) >60 mL/min/1.7m2    GFR Estimate If Black 58 (L) >60 mL/min/1.7m2    Calcium 8.2 (L) 8.5  - 10.1 mg/dL   CBC with platelets    Collection Time: 03/20/17  2:44 PM   Result Value Ref Range    WBC 4.7 4.0 - 11.0 10e9/L    RBC Count 3.12 (L) 4.4 - 5.9 10e12/L    Hemoglobin 11.3 (L) 13.3 - 17.7 g/dL    Hematocrit 32.1 (L) 40.0 - 53.0 %     (H) 78 - 100 fl    MCH 36.2 (H) 26.5 - 33.0 pg    MCHC 35.2 31.5 - 36.5 g/dL    RDW 14.1 10.0 - 15.0 %    Platelet Count 60 (L) 150 - 450 10e9/L   Hepatic panel    Collection Time: 03/20/17  2:44 PM   Result Value Ref Range    Bilirubin Direct 0.9 (H) 0.0 - 0.2 mg/dL    Bilirubin Total 3.0 (H) 0.2 - 1.3 mg/dL    Albumin 2.6 (L) 3.4 - 5.0 g/dL    Protein Total 7.0 6.8 - 8.8 g/dL    Alkaline Phosphatase 75 40 - 150 U/L    ALT 30 0 - 70 U/L    AST 49 (H) 0 - 45 U/L   INR    Collection Time: 03/20/17  2:44 PM   Result Value Ref Range    INR 1.38 (H) 0.86 - 1.14   Ammonia    Collection Time: 03/20/17  2:44 PM   Result Value Ref Range    Ammonia 92 (H) 10 - 50 umol/L      My impression is that Mr. 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  He is status post TIPS for diuretic refractory ascites.  He really is doing quite well at this visit.  His mental status is much improved.  His ascites is well controlled.  We just have to make sure that he continues on the correct amount of lactulose and rifaximin.  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  He is up-to-date with regard to vaccines, and my plan will be to see him back in the clinic again in 6 weeks.      Thank you very much for allowing me to participate in the care of this patient.  If you have any questions regarding my recommendations, please do not hesitate to contact me.       Warren Higginbotham MD      Professor of Medicine  [employer]      Executive Medical Director, Solid Organ Transplant Program  [facility]

## 2017-03-28 ENCOUNTER — TELEPHONE (OUTPATIENT)
Dept: PHARMACY | Facility: CLINIC | Age: 62
End: 2017-03-28

## 2017-03-28 NOTE — TELEPHONE ENCOUNTER
Called pt for MTM IDALIA f/up. Unable to leave message as phone was disconnected.     Obie ChadwickD  Medication Therapy Management Resident  Pager: [phone]

## 2017-03-29 DIAGNOSIS — K70.31 ALCOHOLIC CIRRHOSIS OF LIVER WITH ASCITES (H): Chronic | ICD-10-CM

## 2017-03-29 LAB
AMMONIA PLAS-SCNC: 96 UMOL/L (ref 10–50)
ANION GAP SERPL CALCULATED.3IONS-SCNC: 6 MMOL/L (ref 3–14)
BUN SERPL-MCNC: 27 MG/DL (ref 7–30)
CALCIUM SERPL-MCNC: 8.6 MG/DL (ref 8.5–10.1)
CHLORIDE SERPL-SCNC: 106 MMOL/L (ref 94–109)
CO2 SERPL-SCNC: 28 MMOL/L (ref 20–32)
CREAT SERPL-MCNC: 1.59 MG/DL (ref 0.66–1.25)
ERYTHROCYTE [DISTWIDTH] IN BLOOD BY AUTOMATED COUNT: 13.8 % (ref 10–15)
GFR SERPL CREATININE-BSD FRML MDRD: 44 ML/MIN/1.7M2
GLUCOSE SERPL-MCNC: 174 MG/DL (ref 70–99)
HCT VFR BLD AUTO: 35.3 % (ref 40–53)
HGB BLD-MCNC: 12.3 G/DL (ref 13.3–17.7)
MCH RBC QN AUTO: 37.5 PG (ref 26.5–33)
MCHC RBC AUTO-ENTMCNC: 34.8 G/DL (ref 31.5–36.5)
MCV RBC AUTO: 108 FL (ref 78–100)
PLATELET # BLD AUTO: 51 10E9/L (ref 150–450)
POTASSIUM SERPL-SCNC: 4.2 MMOL/L (ref 3.4–5.3)
RBC # BLD AUTO: 3.28 10E12/L (ref 4.4–5.9)
SODIUM SERPL-SCNC: 140 MMOL/L (ref 133–144)
WBC # BLD AUTO: 4.2 10E9/L (ref 4–11)

## 2017-03-29 PROCEDURE — 36415 COLL VENOUS BLD VENIPUNCTURE: CPT | Performed by: INTERNAL MEDICINE

## 2017-03-29 PROCEDURE — 82140 ASSAY OF AMMONIA: CPT | Performed by: INTERNAL MEDICINE

## 2017-03-29 PROCEDURE — 85027 COMPLETE CBC AUTOMATED: CPT | Performed by: INTERNAL MEDICINE

## 2017-03-29 PROCEDURE — 80048 BASIC METABOLIC PNL TOTAL CA: CPT | Performed by: INTERNAL MEDICINE

## 2017-04-04 DIAGNOSIS — R18.8 ASCITES: Primary | ICD-10-CM

## 2017-04-05 DIAGNOSIS — K70.31 ALCOHOLIC CIRRHOSIS OF LIVER WITH ASCITES (H): Chronic | ICD-10-CM

## 2017-04-05 DIAGNOSIS — D69.6 THROMBOCYTOPENIA (H): Chronic | ICD-10-CM

## 2017-04-05 DIAGNOSIS — K76.82 HEPATIC ENCEPHALOPATHY (H): ICD-10-CM

## 2017-04-05 DIAGNOSIS — Z95.828 S/P TIPS (TRANSJUGULAR INTRAHEPATIC PORTOSYSTEMIC SHUNT): Chronic | ICD-10-CM

## 2017-04-05 DIAGNOSIS — B18.2 CHRONIC HEPATITIS C WITHOUT HEPATIC COMA (H): Chronic | ICD-10-CM

## 2017-04-05 LAB
ALBUMIN SERPL-MCNC: 2.8 G/DL (ref 3.4–5)
ALP SERPL-CCNC: 69 U/L (ref 40–150)
ALT SERPL W P-5'-P-CCNC: 30 U/L (ref 0–70)
AMMONIA PLAS-SCNC: 86 UMOL/L (ref 10–50)
ANION GAP SERPL CALCULATED.3IONS-SCNC: 7 MMOL/L (ref 3–14)
AST SERPL W P-5'-P-CCNC: 50 U/L (ref 0–45)
BASOPHILS # BLD AUTO: 0 10E9/L (ref 0–0.2)
BASOPHILS NFR BLD AUTO: 0.3 %
BILIRUB DIRECT SERPL-MCNC: 0.8 MG/DL (ref 0–0.2)
BILIRUB SERPL-MCNC: 3.7 MG/DL (ref 0.2–1.3)
BUN SERPL-MCNC: 34 MG/DL (ref 7–30)
CALCIUM SERPL-MCNC: 8.4 MG/DL (ref 8.5–10.1)
CHLORIDE SERPL-SCNC: 102 MMOL/L (ref 94–109)
CO2 SERPL-SCNC: 27 MMOL/L (ref 20–32)
CREAT SERPL-MCNC: 1.69 MG/DL (ref 0.66–1.25)
DIFFERENTIAL METHOD BLD: ABNORMAL
EOSINOPHIL # BLD AUTO: 0.1 10E9/L (ref 0–0.7)
EOSINOPHIL NFR BLD AUTO: 1.4 %
ERYTHROCYTE [DISTWIDTH] IN BLOOD BY AUTOMATED COUNT: 13.2 % (ref 10–15)
GFR SERPL CREATININE-BSD FRML MDRD: 41 ML/MIN/1.7M2
GLUCOSE SERPL-MCNC: 137 MG/DL (ref 70–99)
HCT VFR BLD AUTO: 35.1 % (ref 40–53)
HGB BLD-MCNC: 12.1 G/DL (ref 13.3–17.7)
IMM GRANULOCYTES # BLD: 0 10E9/L (ref 0–0.4)
IMM GRANULOCYTES NFR BLD: 0.2 %
INR PPP: 1.3 (ref 0.86–1.14)
LYMPHOCYTES # BLD AUTO: 0.8 10E9/L (ref 0.8–5.3)
LYMPHOCYTES NFR BLD AUTO: 13.8 %
MCH RBC QN AUTO: 35.4 PG (ref 26.5–33)
MCHC RBC AUTO-ENTMCNC: 34.5 G/DL (ref 31.5–36.5)
MCV RBC AUTO: 103 FL (ref 78–100)
MONOCYTES # BLD AUTO: 0.8 10E9/L (ref 0–1.3)
MONOCYTES NFR BLD AUTO: 14.2 %
NEUTROPHILS # BLD AUTO: 4 10E9/L (ref 1.6–8.3)
NEUTROPHILS NFR BLD AUTO: 70.1 %
PLATELET # BLD AUTO: 60 10E9/L (ref 150–450)
POTASSIUM SERPL-SCNC: 4.2 MMOL/L (ref 3.4–5.3)
PROT SERPL-MCNC: 7.3 G/DL (ref 6.8–8.8)
RBC # BLD AUTO: 3.42 10E12/L (ref 4.4–5.9)
SODIUM SERPL-SCNC: 136 MMOL/L (ref 133–144)
WBC # BLD AUTO: 5.7 10E9/L (ref 4–11)

## 2017-04-05 PROCEDURE — 36415 COLL VENOUS BLD VENIPUNCTURE: CPT | Performed by: INTERNAL MEDICINE

## 2017-04-05 PROCEDURE — 80053 COMPREHEN METABOLIC PANEL: CPT | Performed by: INTERNAL MEDICINE

## 2017-04-05 PROCEDURE — 82140 ASSAY OF AMMONIA: CPT | Performed by: INTERNAL MEDICINE

## 2017-04-05 PROCEDURE — 82248 BILIRUBIN DIRECT: CPT | Performed by: INTERNAL MEDICINE

## 2017-04-05 PROCEDURE — 85610 PROTHROMBIN TIME: CPT | Performed by: INTERNAL MEDICINE

## 2017-04-05 PROCEDURE — 85025 COMPLETE CBC W/AUTO DIFF WBC: CPT | Performed by: INTERNAL MEDICINE

## 2017-04-12 ENCOUNTER — HOSPITAL ENCOUNTER (EMERGENCY)
Facility: CLINIC | Age: 62
Discharge: HOME | End: 2017-04-12
Attending: FAMILY MEDICINE | Admitting: FAMILY MEDICINE
Payer: MEDICARE

## 2017-04-12 ENCOUNTER — APPOINTMENT (OUTPATIENT)
Dept: CT IMAGING | Facility: CLINIC | Age: 62
End: 2017-04-12
Attending: FAMILY MEDICINE
Payer: MEDICARE

## 2017-04-12 VITALS
OXYGEN SATURATION: 100 % | HEIGHT: 71 IN | SYSTOLIC BLOOD PRESSURE: 117 MMHG | BODY MASS INDEX: 27.3 KG/M2 | RESPIRATION RATE: 16 BRPM | TEMPERATURE: 97.6 F | WEIGHT: 195 LBS | DIASTOLIC BLOOD PRESSURE: 68 MMHG

## 2017-04-12 DIAGNOSIS — K76.82 HEPATIC ENCEPHALOPATHY (H): ICD-10-CM

## 2017-04-12 DIAGNOSIS — Z95.828 S/P TIPS (TRANSJUGULAR INTRAHEPATIC PORTOSYSTEMIC SHUNT): Chronic | ICD-10-CM

## 2017-04-12 DIAGNOSIS — R10.13 EPIGASTRIC PAIN: Primary | ICD-10-CM

## 2017-04-12 DIAGNOSIS — K70.31 ALCOHOLIC CIRRHOSIS OF LIVER WITH ASCITES (H): Chronic | ICD-10-CM

## 2017-04-12 DIAGNOSIS — K70.30 ALCOHOLIC CIRRHOSIS OF LIVER WITHOUT ASCITES (H): Chronic | ICD-10-CM

## 2017-04-12 LAB
ABO + RH BLD: ABNORMAL
ABO + RH BLD: ABNORMAL
ALBUMIN SERPL-MCNC: 2.6 G/DL (ref 3.4–5)
ALBUMIN UR-MCNC: NEGATIVE MG/DL
ALP SERPL-CCNC: 54 U/L (ref 40–150)
ALT SERPL W P-5'-P-CCNC: 33 U/L (ref 0–70)
AMMONIA PLAS-SCNC: 109 UMOL/L (ref 10–50)
ANION GAP SERPL CALCULATED.3IONS-SCNC: 8 MMOL/L (ref 3–14)
APPEARANCE UR: CLEAR
AST SERPL W P-5'-P-CCNC: 49 U/L (ref 0–45)
BILIRUB DIRECT SERPL-MCNC: 0.7 MG/DL (ref 0–0.2)
BILIRUB SERPL-MCNC: 2.2 MG/DL (ref 0.2–1.3)
BILIRUB UR QL STRIP: NEGATIVE
BLD GP AB INVEST PLASRBC-IMP: ABNORMAL
BLD GP AB SCN SERPL QL: ABNORMAL
BLOOD BANK CMNT PATIENT-IMP: ABNORMAL
BLOOD BANK CMNT PATIENT-IMP: ABNORMAL
BUN SERPL-MCNC: 29 MG/DL (ref 7–30)
CALCIUM SERPL-MCNC: 8.4 MG/DL (ref 8.5–10.1)
CHLORIDE SERPL-SCNC: 106 MMOL/L (ref 94–109)
CO2 SERPL-SCNC: 29 MMOL/L (ref 20–32)
COLOR UR AUTO: YELLOW
CREAT SERPL-MCNC: 1.66 MG/DL (ref 0.66–1.25)
ERYTHROCYTE [DISTWIDTH] IN BLOOD BY AUTOMATED COUNT: 13.6 % (ref 10–15)
GFR SERPL CREATININE-BSD FRML MDRD: 42 ML/MIN/1.7M2
GLUCOSE SERPL-MCNC: 142 MG/DL (ref 70–99)
GLUCOSE UR STRIP-MCNC: NEGATIVE MG/DL
HCT VFR BLD AUTO: 34.5 % (ref 40–53)
HGB BLD-MCNC: 11.6 G/DL (ref 13.3–17.7)
HGB UR QL STRIP: ABNORMAL
KETONES UR STRIP-MCNC: NEGATIVE MG/DL
LACTATE SERPL-SCNC: 1.3 MMOL/L (ref 0.4–2)
LEUKOCYTE ESTERASE UR QL STRIP: NEGATIVE
LIPASE SERPL-CCNC: 441 U/L (ref 73–393)
MCH RBC QN AUTO: 35.4 PG (ref 26.5–33)
MCHC RBC AUTO-ENTMCNC: 33.6 G/DL (ref 31.5–36.5)
MCV RBC AUTO: 105 FL (ref 78–100)
NITRATE UR QL: NEGATIVE
PH UR STRIP: 6 PH (ref 5–7)
PLATELET # BLD AUTO: 52 10E9/L (ref 150–450)
POTASSIUM SERPL-SCNC: 3.9 MMOL/L (ref 3.4–5.3)
PROT SERPL-MCNC: 6.9 G/DL (ref 6.8–8.8)
RBC # BLD AUTO: 3.28 10E12/L (ref 4.4–5.9)
RBC #/AREA URNS AUTO: 8 /HPF (ref 0–2)
SODIUM SERPL-SCNC: 143 MMOL/L (ref 133–144)
SP GR UR STRIP: 1.01 (ref 1–1.03)
SPECIMEN EXP DATE BLD: ABNORMAL
URN SPEC COLLECT METH UR: ABNORMAL
UROBILINOGEN UR STRIP-MCNC: NORMAL MG/DL (ref 0–2)
WBC # BLD AUTO: 4.3 10E9/L (ref 4–11)
WBC #/AREA URNS AUTO: <1 /HPF (ref 0–2)

## 2017-04-12 PROCEDURE — 25000125 ZZHC RX 250: Performed by: FAMILY MEDICINE

## 2017-04-12 PROCEDURE — 74177 CT ABD & PELVIS W/CONTRAST: CPT

## 2017-04-12 PROCEDURE — A9270 NON-COVERED ITEM OR SERVICE: HCPCS | Mod: GY | Performed by: FAMILY MEDICINE

## 2017-04-12 PROCEDURE — 81001 URINALYSIS AUTO W/SCOPE: CPT | Performed by: FAMILY MEDICINE

## 2017-04-12 PROCEDURE — 86905 BLOOD TYPING RBC ANTIGENS: CPT | Performed by: FAMILY MEDICINE

## 2017-04-12 PROCEDURE — 93005 ELECTROCARDIOGRAM TRACING: CPT

## 2017-04-12 PROCEDURE — 83605 ASSAY OF LACTIC ACID: CPT | Performed by: FAMILY MEDICINE

## 2017-04-12 PROCEDURE — 83690 ASSAY OF LIPASE: CPT | Performed by: FAMILY MEDICINE

## 2017-04-12 PROCEDURE — 82140 ASSAY OF AMMONIA: CPT | Performed by: INTERNAL MEDICINE

## 2017-04-12 PROCEDURE — 36415 COLL VENOUS BLD VENIPUNCTURE: CPT | Performed by: INTERNAL MEDICINE

## 2017-04-12 PROCEDURE — 96361 HYDRATE IV INFUSION ADD-ON: CPT

## 2017-04-12 PROCEDURE — 99284 EMERGENCY DEPT VISIT MOD MDM: CPT | Performed by: FAMILY MEDICINE

## 2017-04-12 PROCEDURE — 80076 HEPATIC FUNCTION PANEL: CPT | Performed by: FAMILY MEDICINE

## 2017-04-12 PROCEDURE — 85027 COMPLETE CBC AUTOMATED: CPT | Performed by: INTERNAL MEDICINE

## 2017-04-12 PROCEDURE — 96375 TX/PRO/DX INJ NEW DRUG ADDON: CPT

## 2017-04-12 PROCEDURE — 25000132 ZZH RX MED GY IP 250 OP 250 PS 637: Mod: GY | Performed by: FAMILY MEDICINE

## 2017-04-12 PROCEDURE — 86870 RBC ANTIBODY IDENTIFICATION: CPT | Performed by: FAMILY MEDICINE

## 2017-04-12 PROCEDURE — 25000125 ZZHC RX 250: Performed by: RADIOLOGY

## 2017-04-12 PROCEDURE — 96365 THER/PROPH/DIAG IV INF INIT: CPT | Mod: 59

## 2017-04-12 PROCEDURE — 86901 BLOOD TYPING SEROLOGIC RH(D): CPT | Performed by: FAMILY MEDICINE

## 2017-04-12 PROCEDURE — 25000128 H RX IP 250 OP 636: Performed by: FAMILY MEDICINE

## 2017-04-12 PROCEDURE — 86900 BLOOD TYPING SEROLOGIC ABO: CPT | Performed by: FAMILY MEDICINE

## 2017-04-12 PROCEDURE — 80048 BASIC METABOLIC PNL TOTAL CA: CPT | Performed by: INTERNAL MEDICINE

## 2017-04-12 PROCEDURE — 25500064 ZZH RX 255 OP 636: Performed by: RADIOLOGY

## 2017-04-12 PROCEDURE — 99285 EMERGENCY DEPT VISIT HI MDM: CPT | Mod: 25

## 2017-04-12 PROCEDURE — 86850 RBC ANTIBODY SCREEN: CPT | Performed by: FAMILY MEDICINE

## 2017-04-12 RX ORDER — FUROSEMIDE 40 MG
40 TABLET ORAL 2 TIMES DAILY
Qty: 60 TABLET | Refills: 1 | Status: ON HOLD | OUTPATIENT
Start: 2017-04-12 | End: 2017-11-28

## 2017-04-12 RX ORDER — LACTULOSE 10 G/15ML
45 SOLUTION ORAL 3 TIMES DAILY
Status: DISCONTINUED | OUTPATIENT
Start: 2017-04-12 | End: 2017-04-12 | Stop reason: HOSPADM

## 2017-04-12 RX ORDER — LACTULOSE 10 G/15ML
45 SOLUTION ORAL 3 TIMES DAILY
Qty: 4000 ML | Refills: 0 | Status: ON HOLD | OUTPATIENT
Start: 2017-04-12 | End: 2017-11-28

## 2017-04-12 RX ORDER — DIPHENHYDRAMINE HYDROCHLORIDE 50 MG/ML
50 INJECTION INTRAMUSCULAR; INTRAVENOUS ONCE
Status: COMPLETED | OUTPATIENT
Start: 2017-04-12 | End: 2017-04-12

## 2017-04-12 RX ORDER — LACTULOSE 10 G/15ML
45 SOLUTION ORAL; RECTAL 3 TIMES DAILY
Qty: 973 ML | Refills: 0 | Status: ON HOLD | OUTPATIENT
Start: 2017-04-12 | End: 2017-04-15

## 2017-04-12 RX ORDER — IOPAMIDOL 755 MG/ML
95 INJECTION, SOLUTION INTRAVASCULAR ONCE
Status: DISCONTINUED | OUTPATIENT
Start: 2017-04-12 | End: 2017-04-12

## 2017-04-12 RX ORDER — SODIUM CHLORIDE 9 MG/ML
1000 INJECTION, SOLUTION INTRAVENOUS CONTINUOUS
Status: DISCONTINUED | OUTPATIENT
Start: 2017-04-12 | End: 2017-04-12 | Stop reason: HOSPADM

## 2017-04-12 RX ADMIN — IOPAMIDOL 95 ML: 755 INJECTION, SOLUTION INTRAVENOUS at 11:44

## 2017-04-12 RX ADMIN — SODIUM CHLORIDE 64 ML: 9 INJECTION, SOLUTION INTRAVENOUS at 11:44

## 2017-04-12 RX ADMIN — DIPHENHYDRAMINE HYDROCHLORIDE 50 MG: 50 INJECTION, SOLUTION INTRAMUSCULAR; INTRAVENOUS at 10:50

## 2017-04-12 RX ADMIN — SODIUM CHLORIDE 1000 ML: 9 INJECTION, SOLUTION INTRAVENOUS at 10:27

## 2017-04-12 RX ADMIN — PANTOPRAZOLE SODIUM 40 MG: 40 INJECTION, POWDER, FOR SOLUTION INTRAVENOUS at 10:49

## 2017-04-12 RX ADMIN — SODIUM CHLORIDE 1000 ML: 9 INJECTION, SOLUTION INTRAVENOUS at 11:40

## 2017-04-12 RX ADMIN — LACTULOSE 45 ML: 10 SOLUTION ORAL at 12:45

## 2017-04-12 RX ADMIN — FAMOTIDINE 20 MG: 20 INJECTION, SOLUTION INTRAVENOUS at 10:51

## 2017-04-12 ASSESSMENT — ENCOUNTER SYMPTOMS
CONSTIPATION: 1
BLOOD IN STOOL: 0
FREQUENCY: 0
SINUS PRESSURE: 0
NAUSEA: 0
DIARRHEA: 0
SHORTNESS OF BREATH: 1
FEVER: 0
COUGH: 0
SORE THROAT: 0
PALPITATIONS: 0
ABDOMINAL PAIN: 1
VOMITING: 0
DYSURIA: 0
CHILLS: 0
WHEEZING: 0
HEADACHES: 0
DIAPHORESIS: 0

## 2017-04-12 NOTE — ED NOTES
Pt still has not heard from his wife or family members, we have gone and picked up his meds from pharmacy for his take home. Pt is up set that she has not come for him. Pt remains a/o x 4 and understands that he shouldn't drive today until he is feeling better. Lunch bag given to take home along with juice. Taxi voucher given.

## 2017-04-12 NOTE — ED PROVIDER NOTES
"  History     Chief Complaint   Patient presents with     Abdominal Pain     \"feels like I have been punched in liver, \" had labs done this am. wt gain     HPI  Scott Casale is a 61 year old male who presents with history hepatitis C, cirrhosis s/p TIPS, hepatic encephalopathy, epigastric pain for the last 4 days.  primary care at [facility], Dr. Higginbotham is hepatologist.  no radiation.  no alcohol. no bloody stools.  no ulcers or varices.      out of lactulose for 3 days, more tired, and shaky,.  called from lab with ammonia level - 109.  labs drawn routinely today.    normal urine output.      195 lb on home scale today.  has been up to 203 lb last week, 208 one month    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     S/P TIPS (transjugular intrahepatic portosystemic shunt)     Hepatic encephalopathy (H)     Ascites of liver     Edema of lower extremity     Chronic hepatitis C virus infection (H)     Hepatic cirrhosis (H)     Hyponatremia     Liver mass     Portal hypertension (H)     Primary malignant neoplasm of testis (H)     Thrombocytopenia (H)     Encephalopathy acute     Hypervolemia     Ankylosing spondylitis (H)     Acute hepatic encephalopathy     Current Outpatient Prescriptions   Medication Sig Dispense Refill     lactulose (CHRONULAC) 10 GM/15ML solution Take 45 mLs by mouth 3 times daily 5000 mL 3     furosemide (LASIX) 40 MG tablet Take 1 tablet (40 mg) by mouth 2 times daily Use this twice a day dose until goal weight of 185 pounds is reached. Weigh yourself everyday. Then may need to take once a day. 30 tablet 3     spironolactone (ALDACTONE) 50 MG tablet Take 1 tablet (50 mg) by mouth daily 30 tablet 3     vitamin D (ERGOCALCIFEROL) 30282 UNIT capsule Twice weekly every Mon and Thurs (Patient taking differently: " Take 50,000 Units by mouth in the morning, Mon and Thur Twice weekly every Mon and Thurs) 30 capsule 3     potassium chloride SA (POTASSIUM CHLORIDE) 20 MEQ CR tablet Take 1 tablet (20 mEq) by mouth daily Take only while taking your twice a day dose of Lasix then stop. Have your potassium levels checked in 2-3 days. (Patient taking differently: Take 20 mEq by mouth every evening Take only while taking your twice a day dose of Lasix then stop. Have your potassium levels checked in 2-3 days.) 30 tablet 1     gabapentin (NEURONTIN) 100 MG capsule Take 2 capsules (200 mg) by mouth At Bedtime 60 capsule 3     ribavirin 200 MG TABS Take one tablet (200mg) by mouth in the AM with food, and one tablet (200mg) in the PM with food. (Patient taking differently: Take 1 tablet by mouth 2 times daily (with meals) Take one tablet (200mg) by mouth in the AM with food, and one tablet (200mg) in the PM with food.) 60 tablet 1     rifaximin (XIFAXAN) 550 MG TABS tablet Take 1 tablet (550 mg) by mouth 2 times daily 60 tablet 11     ledipasvir-sofosbuvir (LEDIPASVIR-SOFOSBUVIR)  MG per tablet Take 1 tablet by mouth daily 28 tablet 2     ondansetron (ZOFRAN) 4 MG tablet Take 1 tablet (4 mg) by mouth every 8 hours as needed for nausea           I have reviewed the Medications, Allergies, Past Medical and Surgical History, and Social History in the Epic system.    Review of Systems   Constitutional: Negative for chills, diaphoresis and fever.   HENT: Negative for ear pain, sinus pressure and sore throat.    Eyes: Negative for visual disturbance.   Respiratory: Positive for shortness of breath (with exertion). Negative for cough and wheezing.    Cardiovascular: Negative for chest pain and palpitations.   Gastrointestinal: Positive for abdominal pain and constipation. Negative for blood in stool, diarrhea, nausea and vomiting.   Genitourinary: Negative for dysuria, frequency and urgency.   Skin: Negative for rash.   Neurological:  "Negative for headaches.   All other systems reviewed and are negative.      Physical Exam   BP: 114/62  Heart Rate: 75  Temp: 97.6  F (36.4  C)  Resp: 16  Height: 180.3 cm (5' 11\")  Weight: 88.5 kg (195 lb)  SpO2: 100 %  Physical Exam   Constitutional: He is oriented to person, place, and time. No distress.   HENT:   Head: Atraumatic.   Mouth/Throat: Oropharynx is clear and moist.   Eyes: Conjunctivae are normal.   Neck: Neck supple.   Cardiovascular: Normal rate and regular rhythm.  Exam reveals no gallop and no friction rub.    No murmur heard.  Pulmonary/Chest: Effort normal and breath sounds normal. No respiratory distress. He has no wheezes. He has no rales.   Abdominal: Soft. He exhibits no distension. There is tenderness. There is no rebound.   Musculoskeletal: He exhibits no edema.   Neurological: He is alert and oriented to person, place, and time. He displays no tremor. No cranial nerve deficit. He exhibits normal muscle tone. Coordination normal.   Skin: No rash noted.     one beat clonus/asterixis arms - minimal    ED Course     ED Course     Procedures              Critical Care time:  none               Results for orders placed or performed during the hospital encounter of 04/12/17   CT Abdomen Pelvis w Contrast    Narrative    CT ABDOMEN PELVIS W CONTRAST 4/12/2017 12:04 PM    HISTORY: Pain. History of cirrhosis and TIPS procedure. Prior  testicular cancer.    CONTRAST:  95 mL Isovue-370.    TECHNIQUE: CT of the abdomen and pelvis is performed with IV contrast.    Routine assessed structures include the liver, spleen, pancreas,  adrenal glands, and kidneys. Other assessed structures include the  retroperitoneum, abdominal aorta, visualized gastrointestinal tract,  and abdominal wall. Intrapelvic anatomy is also assessed.    Radiation dose for this scan is reduced using automated exposure  control, adjustment of the mA and/or kV according to patient size, or  iterative reconstruction " technique.    COMPARISON: 9/20/2016.    FINDINGS:    Abdomen: A cirrhotic liver is again seen with no discrete focal mass.  There is evidence for a prior TIPS procedure. The vascular graft is  patent. Trace perihepatic fluid is present. Collateral vessels are  again identified related to the underlying portal hypertension.  Splenomegaly is again seen but measures slightly smaller compared to  the prior study. It is 16.1 cm in cranial-caudal dimension today  compared to 17.5 cm on the prior study. No focal splenic lesion is  demonstrated. Gallstones are again seen in the gallbladder. Modest  stool is present in the colon. No significantly dilated bowel loops  are demonstrated. Two fat-containing ventral hernias are again seen on  images 38 and 47. There is a stable 0.8 cm the left paraaortic lymph  node on image #53. This can be upper normal.    Pelvis:  No significant abnormality is demonstrated.      Impression    IMPRESSION:    1. Stable cirrhosis and portal hypertension.  2. No acute finding nor significant free peritoneal fluid  demonstrated.    JAELYN OWUSU MD   Hepatic panel   Result Value Ref Range    Bilirubin Direct 0.7 (H) 0.0 - 0.2 mg/dL    Bilirubin Total 2.2 (H) 0.2 - 1.3 mg/dL    Albumin 2.6 (L) 3.4 - 5.0 g/dL    Protein Total 6.9 6.8 - 8.8 g/dL    Alkaline Phosphatase 54 40 - 150 U/L    ALT 33 0 - 70 U/L    AST 49 (H) 0 - 45 U/L   Lipase   Result Value Ref Range    Lipase 441 (H) 73 - 393 U/L   Lactic acid   Result Value Ref Range    Lactic Acid 1.3 0.4 - 2.0 mmol/L   UA reflex to Microscopic   Result Value Ref Range    Color Urine Yellow     Appearance Urine Clear     Glucose Urine Negative NEG mg/dL    Bilirubin Urine Negative NEG    Ketones Urine Negative NEG mg/dL    Specific Gravity Urine 1.006 1.003 - 1.035    Blood Urine Small (A) NEG    pH Urine 6.0 5.0 - 7.0 pH    Protein Albumin Urine Negative NEG mg/dL    Urobilinogen mg/dL Normal 0.0 - 2.0 mg/dL    Nitrite Urine Negative NEG    Leukocyte  Esterase Urine Negative NEG    Source Midstream Urine     RBC Urine 8 (H) 0 - 2 /HPF    WBC Urine <1 0 - 2 /HPF   ABO/Rh type and screen   Result Value Ref Range    ABO A     RH(D)  Pos     Antibody Screen Pos (A)     Test Valid Only At [facility]     Specimen Expires 04/15/2017          Assessments & Plan (with Medical Decision Making)     MDM: Scott Casale is a 61 year old male Who presented with A history of alcoholic cirrhosis, hepatitis C, hepatic encephalopathy, status post TIPS without history of esophageal varices or peptic ulcer disease and presenting with epigastric pain intermittent. He has run out of his lactulose and is not taken this for the last three days due to insurance issues. No blood in stools or black tarry stools. Vital signs are reassuring as is exam.  Despite an ammonia of 109, he is alert and oriented to person place time situation.  He he can relate a lucid history.  CT imaging was performed today and demonstrated no intra-abdominal significant change. Labs are also reassuring. I reviewed findings with Dr. Pascal who agrees with the plan of discharge and close interval follow-up.      The patient has been without his lactulose which is likely contributing to some of his pain due to constipation And of course the increase in his ammonia.   Is unclear with the insurance holdups are but aapparently this is the reason for his lactulose not being filled.  Social work was consulted and will be able to spend some medications under anahy care. I have filled lactulose for 30 days in and follow-up in his primary clinic. I've given him indications for return.    I have reviewed the nursing notes.    I have reviewed the findings, diagnosis, plan and need for follow up with the patient.    New Prescriptions    No medications on file       Final diagnoses:   Hepatic encephalopathy (H) - ammonia is increased today and lactulose needs restart.  stay around family.  return for worsening.    Alcoholic cirrhosis of liver without ascites (H)   S/P TIPS (transjugular intrahepatic portosystemic shunt)   Epigastric pain - no serious causes. take prilosec 20 mg orally daily for the next 3 weeks.   Resume lactulose 45 ml three times.  return immed for bloody stools, fever, worsening.       4/12/2017   [facility] EMERGENCY DEPARTMENT     Leonel Goodson MD  04/12/17 6810

## 2017-04-12 NOTE — DISCHARGE INSTRUCTIONS
ICD-10-CM    1. Epigastric pain R10.13     no serious causes. take prilosec 20 mg orally daily for the next 3 weeks.   Resume lactulose 45 ml three times.  return immed for bloody stools, fever, worsening.   2. Hepatic encephalopathy (H) K72.90     ammonia is increased today and lactulose needs restart.  stay around family.  return for worsening.   3. Alcoholic cirrhosis of liver without ascites (H) K70.30    4. S/P TIPS (transjugular intrahepatic portosystemic shunt) Z95.828

## 2017-04-12 NOTE — ED NOTES
Pt c/o RUQ pain with nausea, which he has taken Zofran for. States it feels like someone is punching his liver. Has been out of of his lactulose for 4 days. Has no pain now, or nausea. States he can feel that he is getting shaky from not having his lactulose. Eating and drinking normal. Elimination are normal except they are more firm due to no lactulose. His PCP wanted to see him yesterday at the [facility] but he couldn't get there due to finical reasons.

## 2017-04-12 NOTE — ED AVS SNAPSHOT
[facility] Emergency Department    [address] [address]    Phone:  [phone]    Fax:  [phone]                                       Scott Casale   MRN: [id number]    Department:  [facility] Emergency Department   Date of Visit:  4/12/2017           After Visit Summary Signature Page     I have received my discharge instructions, and my questions have been answered. I have discussed any challenges I see with this plan with the nurse or doctor.    ..........................................................................................................................................  Patient/Patient Representative Signature      ..........................................................................................................................................  Patient Representative Print Name and Relationship to Patient    ..................................................               ................................................  Date                                            Time    ..........................................................................................................................................  Reviewed by Signature/Title    ...................................................              ..............................................  Date                                                            Time

## 2017-04-12 NOTE — ED AVS SNAPSHOT
[facility] Emergency Department    [address] [address]    Phone:  [phone]    Fax:  [phone]                                       Scott Casale   MRN: [id number]    Department:  [facility] Emergency Department   Date of Visit:  4/12/2017           Patient Information     Date Of Birth          1955        Your diagnoses for this visit were:     Hepatic encephalopathy (H) ammonia is increased today and lactulose needs restart.  stay around family.  return for worsening.    Alcoholic cirrhosis of liver without ascites (H)     S/P TIPS (transjugular intrahepatic portosystemic shunt)     Epigastric pain no serious causes. take prilosec 20 mg orally daily for the next 3 weeks.   Resume lactulose 45 ml three times.  return immed for bloody stools, fever, worsening.    Hepatic encephalopathy (H)     Alcoholic cirrhosis of liver with ascites (H)        You were seen by Leonel Goodson MD.      Follow-up Information     Follow up with Greg Concepcion MD In 2 days.    Specialty:  Student in organized health care education/training program    Contact information:    [address]  [facility] [id number] [phone]          Follow up with [facility] Emergency Department.    Specialty:  EMERGENCY MEDICINE    Why:  As needed, If symptoms worsen    Contact information:    [address] [place] [address] [phone]    Additional information:    The medical center is located at   [address]. (between [address] and   [address] in [place], [place] miles north   of [place]).        Discharge Instructions         ICD-10-CM    1. Epigastric pain R10.13     no serious causes. take prilosec 20 mg orally daily for the next 3 weeks.   Resume lactulose 45 ml three times.  return immed for bloody stools, fever, worsening.   2. Hepatic encephalopathy (H) K72.90     ammonia is increased today and lactulose needs restart.  stay around family.   return for worsening.   3. Alcoholic cirrhosis of liver without ascites (H) K70.30    4. S/P TIPS (transjugular intrahepatic portosystemic shunt) Z95.828          Future Appointments        Provider Department Dept Phone Center    6/15/2017 8:30 AM Lab [facility] Lab [phone] [facility]    6/15/2017 9:00 AM [facility] IMAGING CENTER ULTRASOUND ROOM 3 [facility] Imaging Center [phone] [facility]    6/15/2017 10:00 AM Warren Higginbotham MD [facility] Hepatology [phone] [facility]      24 Hour Appointment Hotline       To make an appointment at any [facility], call [phone] ([phone]). If you don't have a family doctor or clinic, we will help you find one. [place] clinics are conveniently located to serve the needs of you and your family.             Review of your medicines      START taking        Dose / Directions Last dose taken    lactulose encephalopathy 10 GM/15ML SOLUTION   Commonly known as:  CHRONULAC   Dose:  45 mL   Quantity:  973 mL        Take 45 mLs (30 g) by mouth 3 times daily   Refills:  0          CONTINUE these medicines which may have CHANGED, or have new prescriptions. If we are uncertain of the size of tablets/capsules you have at home, strength may be listed as something that might have changed.        Dose / Directions Last dose taken    furosemide 40 MG tablet   Commonly known as:  LASIX   Dose:  40 mg   What changed:    - when to take this  - additional instructions   Quantity:  60 tablet        Take 1 tablet (40 mg) by mouth 2 times daily   Refills:  1          Our records show that you are taking the medicines listed below. If these are incorrect, please call your family doctor or clinic.        Dose / Directions Last dose taken    gabapentin 100 MG capsule   Commonly known as:  NEURONTIN   Dose:  200 mg   Quantity:  60 capsule        Take 2 capsules (200 mg) by mouth At Bedtime   Refills:  3        lactulose 10 GM/15ML solution   Commonly known as:  CHRONULAC   Dose:  45 mL    Quantity:  4000 mL        Take 45 mLs by mouth 3 times daily   Refills:  0        ledipasvir-sofosbuvir  MG per tablet   Commonly known as:  ledipasvir-sofosbuvir   Dose:  1 tablet   Quantity:  28 tablet        Take 1 tablet by mouth daily   Refills:  2        potassium chloride SA 20 MEQ CR tablet   Commonly known as:  potassium chloride   Dose:  20 mEq   Quantity:  30 tablet        Take 1 tablet (20 mEq) by mouth daily Take only while taking your twice a day dose of Lasix then stop. Have your potassium levels checked in 2-3 days.   Refills:  1        ribavirin 200 MG Tabs   Quantity:  60 tablet        Take one tablet (200mg) by mouth in the AM with food, and one tablet (200mg) in the PM with food.   Refills:  1        rifaximin 550 MG Tabs tablet   Commonly known as:  XIFAXAN   Dose:  550 mg   Quantity:  60 tablet        Take 1 tablet (550 mg) by mouth 2 times daily   Refills:  11        spironolactone 50 MG tablet   Commonly known as:  ALDACTONE   Dose:  50 mg   Quantity:  30 tablet        Take 1 tablet (50 mg) by mouth daily   Refills:  3        vitamin D 56392 UNIT capsule   Commonly known as:  ERGOCALCIFEROL   Quantity:  30 capsule        Twice weekly every Mon and Thurs   Refills:  3        ZOFRAN 4 MG tablet   Dose:  4 mg   Generic drug:  ondansetron        Take 1 tablet (4 mg) by mouth every 8 hours as needed for nausea   Refills:  0                Prescriptions were sent or printed at these locations (3 Prescriptions)                   [facility] [place], [place] - [address]   [address] [address]    Telephone:  [phone]   Fax:  [phone]   Hours:                  E-Prescribed (3 of 3)         lactulose encephalopathy (CHRONULAC) 10 GM/15ML SOLUTION               lactulose (CHRONULAC) 10 GM/15ML solution               furosemide (LASIX) 40 MG tablet                Procedures and tests performed during your visit     ABO/Rh type and screen    CT Abdomen  Pelvis w Contrast    Care Transition RN/SW IP Consult    Hepatic panel    Lactic acid    Lipase    Occult blood stool    UA reflex to Microscopic      Orders Needing Specimen Collection     None      Pending Results     No orders found from 4/10/2017 to 4/13/2017.            Pending Culture Results     No orders found from 4/10/2017 to 4/13/2017.            Test Results From Your Hospital Stay        4/12/2017 10:22 AM      Component Results     Component Value Ref Range & Units Status    Bilirubin Direct 0.7 (H) 0.0 - 0.2 mg/dL Final    Bilirubin Total 2.2 (H) 0.2 - 1.3 mg/dL Final    Albumin 2.6 (L) 3.4 - 5.0 g/dL Final    Protein Total 6.9 6.8 - 8.8 g/dL Final    Alkaline Phosphatase 54 40 - 150 U/L Final    ALT 33 0 - 70 U/L Final    AST 49 (H) 0 - 45 U/L Final         4/12/2017 10:22 AM      Component Results     Component Value Ref Range & Units Status    Lipase 441 (H) 73 - 393 U/L Final         4/12/2017 10:39 AM      Component Results     Component Value Ref Range & Units Status    Lactic Acid 1.3 0.4 - 2.0 mmol/L Final         4/12/2017 10:59 AM      Component Results     Component    ABO    A    RH(D)     Pos    Antibody Screen (Abnormal)    Pos    Test Valid Only At    [facility]    Specimen Expires    04/15/2017         4/12/2017 12:50 PM      Narrative     CT ABDOMEN PELVIS W CONTRAST 4/12/2017 12:04 PM    HISTORY: Pain. History of cirrhosis and TIPS procedure. Prior  testicular cancer.    CONTRAST:  95 mL Isovue-370.    TECHNIQUE: CT of the abdomen and pelvis is performed with IV contrast.    Routine assessed structures include the liver, spleen, pancreas,  adrenal glands, and kidneys. Other assessed structures include the  retroperitoneum, abdominal aorta, visualized gastrointestinal tract,  and abdominal wall. Intrapelvic anatomy is also assessed.    Radiation dose for this scan is reduced using automated exposure  control, adjustment of the mA and/or kV according to patient size,  or  iterative reconstruction technique.    COMPARISON: 9/20/2016.    FINDINGS:    Abdomen: A cirrhotic liver is again seen with no discrete focal mass.  There is evidence for a prior TIPS procedure. The vascular graft is  patent. Trace perihepatic fluid is present. Collateral vessels are  again identified related to the underlying portal hypertension.  Splenomegaly is again seen but measures slightly smaller compared to  the prior study. It is 16.1 cm in cranial-caudal dimension today  compared to 17.5 cm on the prior study. No focal splenic lesion is  demonstrated. Gallstones are again seen in the gallbladder. Modest  stool is present in the colon. No significantly dilated bowel loops  are demonstrated. Two fat-containing ventral hernias are again seen on  images 38 and 47. There is a stable 0.8 cm the left paraaortic lymph  node on image #53. This can be upper normal.    Pelvis:  No significant abnormality is demonstrated.        Impression     IMPRESSION:    1. Stable cirrhosis and portal hypertension.  2. No acute finding nor significant free peritoneal fluid  demonstrated.    JAELYN OWUSU MD         4/12/2017 11:13 AM      Component Results     Component Value Ref Range & Units Status    Color Urine Yellow  Final    Appearance Urine Clear  Final    Glucose Urine Negative NEG mg/dL Final    Bilirubin Urine Negative NEG Final    Ketones Urine Negative NEG mg/dL Final    Specific Gravity Urine 1.006 1.003 - 1.035 Final    Blood Urine Small (A) NEG Final    pH Urine 6.0 5.0 - 7.0 pH Final    Protein Albumin Urine Negative NEG mg/dL Final    Urobilinogen mg/dL Normal 0.0 - 2.0 mg/dL Final    Nitrite Urine Negative NEG Final    Leukocyte Esterase Urine Negative NEG Final    Source Midstream Urine  Final    RBC Urine 8 (H) 0 - 2 /HPF Final    WBC Urine <1 0 - 2 /HPF Final                Thank you for choosing [facility]       Thank you for choosing [facility] for your care. Our goal is always to provide you with excellent  care. Hearing back from our patients is one way we can continue to improve our services. Please take a few minutes to complete the written survey that you may receive in the mail after you visit with us. Thank you!        [employer] Information     [employer] gives you secure access to your electronic health record. If you see a primary care provider, you can also send messages to your care team and make appointments. If you have questions, please call your primary care clinic.  If you do not have a primary care provider, please call [phone] and they will assist you.        Care EveryWhere ID     This is your Care EveryWhere ID. This could be used by other organizations to access your [place] medical records  [id number]        After Visit Summary       This is your record. Keep this with you and show to your community pharmacist(s) and doctor(s) at your next visit.

## 2017-04-13 ENCOUNTER — HOSPITAL ENCOUNTER (OUTPATIENT)
Facility: CLINIC | Age: 62
Setting detail: OBSERVATION
Discharge: HOME HEALTH | End: 2017-04-15
Attending: FAMILY MEDICINE | Admitting: INTERNAL MEDICINE
Payer: MEDICARE

## 2017-04-13 DIAGNOSIS — D69.6 THROMBOCYTOPENIA (H): Chronic | ICD-10-CM

## 2017-04-13 DIAGNOSIS — E55.9 VITAMIN D DEFICIENCY: ICD-10-CM

## 2017-04-13 DIAGNOSIS — D69.6 THROMBOCYTOPENIA, UNSPECIFIED (H): ICD-10-CM

## 2017-04-13 DIAGNOSIS — K70.30 ALCOHOLIC CIRRHOSIS OF LIVER WITHOUT ASCITES (H): ICD-10-CM

## 2017-04-13 DIAGNOSIS — K70.31 ALCOHOLIC CIRRHOSIS OF LIVER WITH ASCITES (H): Chronic | ICD-10-CM

## 2017-04-13 DIAGNOSIS — K76.82 ACUTE HEPATIC ENCEPHALOPATHY (H): ICD-10-CM

## 2017-04-13 DIAGNOSIS — B18.2 CHRONIC HEPATITIS C WITHOUT HEPATIC COMA (H): Chronic | ICD-10-CM

## 2017-04-13 DIAGNOSIS — Z95.828 S/P TIPS (TRANSJUGULAR INTRAHEPATIC PORTOSYSTEMIC SHUNT): Chronic | ICD-10-CM

## 2017-04-13 LAB
ALBUMIN SERPL-MCNC: 2.6 G/DL (ref 3.4–5)
ALP SERPL-CCNC: 52 U/L (ref 40–150)
ALT SERPL W P-5'-P-CCNC: 30 U/L (ref 0–70)
AMMONIA PLAS-SCNC: 115 UMOL/L (ref 10–50)
ANION GAP SERPL CALCULATED.3IONS-SCNC: 9 MMOL/L (ref 3–14)
APTT PPP: 34 SEC (ref 22–37)
AST SERPL W P-5'-P-CCNC: 50 U/L (ref 0–45)
BASOPHILS # BLD AUTO: 0 10E9/L (ref 0–0.2)
BASOPHILS NFR BLD AUTO: 0.5 %
BILIRUB SERPL-MCNC: 2.8 MG/DL (ref 0.2–1.3)
BUN SERPL-MCNC: 24 MG/DL (ref 7–30)
CALCIUM SERPL-MCNC: 8.6 MG/DL (ref 8.5–10.1)
CHLORIDE SERPL-SCNC: 113 MMOL/L (ref 94–109)
CO2 SERPL-SCNC: 22 MMOL/L (ref 20–32)
CREAT SERPL-MCNC: 1.35 MG/DL (ref 0.66–1.25)
DIFFERENTIAL METHOD BLD: ABNORMAL
EOSINOPHIL # BLD AUTO: 0.1 10E9/L (ref 0–0.7)
EOSINOPHIL NFR BLD AUTO: 2.4 %
ERYTHROCYTE [DISTWIDTH] IN BLOOD BY AUTOMATED COUNT: 13.2 % (ref 10–15)
GFR SERPL CREATININE-BSD FRML MDRD: 54 ML/MIN/1.7M2
GLUCOSE SERPL-MCNC: 88 MG/DL (ref 70–99)
HCT VFR BLD AUTO: 33 % (ref 40–53)
HGB BLD-MCNC: 11.3 G/DL (ref 13.3–17.7)
IMM GRANULOCYTES # BLD: 0 10E9/L (ref 0–0.4)
IMM GRANULOCYTES NFR BLD: 0 %
INR PPP: 1.32 (ref 0.86–1.14)
LYMPHOCYTES # BLD AUTO: 0.7 10E9/L (ref 0.8–5.3)
LYMPHOCYTES NFR BLD AUTO: 16.4 %
MCH RBC QN AUTO: 35.8 PG (ref 26.5–33)
MCHC RBC AUTO-ENTMCNC: 34.2 G/DL (ref 31.5–36.5)
MCV RBC AUTO: 104 FL (ref 78–100)
MONOCYTES # BLD AUTO: 0.4 10E9/L (ref 0–1.3)
MONOCYTES NFR BLD AUTO: 10.5 %
NEUTROPHILS # BLD AUTO: 2.9 10E9/L (ref 1.6–8.3)
NEUTROPHILS NFR BLD AUTO: 70.2 %
PLATELET # BLD AUTO: 48 10E9/L (ref 150–450)
POTASSIUM SERPL-SCNC: 4 MMOL/L (ref 3.4–5.3)
PROT SERPL-MCNC: 6.5 G/DL (ref 6.8–8.8)
RBC # BLD AUTO: 3.16 10E12/L (ref 4.4–5.9)
SODIUM SERPL-SCNC: 144 MMOL/L (ref 133–144)
WBC # BLD AUTO: 4.1 10E9/L (ref 4–11)

## 2017-04-13 PROCEDURE — 99285 EMERGENCY DEPT VISIT HI MDM: CPT | Mod: 25

## 2017-04-13 PROCEDURE — 85610 PROTHROMBIN TIME: CPT | Performed by: FAMILY MEDICINE

## 2017-04-13 PROCEDURE — G0378 HOSPITAL OBSERVATION PER HR: HCPCS

## 2017-04-13 PROCEDURE — 25000132 ZZH RX MED GY IP 250 OP 250 PS 637: Mod: GY | Performed by: INTERNAL MEDICINE

## 2017-04-13 PROCEDURE — 82140 ASSAY OF AMMONIA: CPT | Performed by: FAMILY MEDICINE

## 2017-04-13 PROCEDURE — A9270 NON-COVERED ITEM OR SERVICE: HCPCS | Mod: GY | Performed by: INTERNAL MEDICINE

## 2017-04-13 PROCEDURE — 85730 THROMBOPLASTIN TIME PARTIAL: CPT | Performed by: FAMILY MEDICINE

## 2017-04-13 PROCEDURE — 85025 COMPLETE CBC W/AUTO DIFF WBC: CPT | Performed by: FAMILY MEDICINE

## 2017-04-13 PROCEDURE — 99285 EMERGENCY DEPT VISIT HI MDM: CPT | Mod: 25 | Performed by: FAMILY MEDICINE

## 2017-04-13 PROCEDURE — 25800025 ZZH RX 258: Performed by: FAMILY MEDICINE

## 2017-04-13 PROCEDURE — S5010 5% DEXTROSE AND 0.45% SALINE: HCPCS | Performed by: FAMILY MEDICINE

## 2017-04-13 PROCEDURE — 80053 COMPREHEN METABOLIC PANEL: CPT | Performed by: FAMILY MEDICINE

## 2017-04-13 PROCEDURE — 99220 ZZC INITIAL OBSERVATION CARE,LEVL III: CPT | Performed by: INTERNAL MEDICINE

## 2017-04-13 RX ORDER — FUROSEMIDE 40 MG
40 TABLET ORAL 2 TIMES DAILY
Status: DISCONTINUED | OUTPATIENT
Start: 2017-04-13 | End: 2017-04-15 | Stop reason: HOSPADM

## 2017-04-13 RX ORDER — SPIRONOLACTONE 50 MG/1
50 TABLET, FILM COATED ORAL DAILY
Status: DISCONTINUED | OUTPATIENT
Start: 2017-04-13 | End: 2017-04-15 | Stop reason: HOSPADM

## 2017-04-13 RX ORDER — PROCHLORPERAZINE 25 MG
25 SUPPOSITORY, RECTAL RECTAL EVERY 12 HOURS PRN
Status: DISCONTINUED | OUTPATIENT
Start: 2017-04-13 | End: 2017-04-15 | Stop reason: HOSPADM

## 2017-04-13 RX ORDER — GABAPENTIN 100 MG/1
200 CAPSULE ORAL AT BEDTIME
Status: DISCONTINUED | OUTPATIENT
Start: 2017-04-13 | End: 2017-04-15 | Stop reason: HOSPADM

## 2017-04-13 RX ORDER — PROCHLORPERAZINE MALEATE 5 MG
5-10 TABLET ORAL EVERY 6 HOURS PRN
Status: DISCONTINUED | OUTPATIENT
Start: 2017-04-13 | End: 2017-04-15 | Stop reason: HOSPADM

## 2017-04-13 RX ORDER — ONDANSETRON 4 MG/1
4 TABLET, FILM COATED ORAL EVERY 8 HOURS PRN
Status: DISCONTINUED | OUTPATIENT
Start: 2017-04-13 | End: 2017-04-13 | Stop reason: DRUGHIGH

## 2017-04-13 RX ORDER — BISACODYL 10 MG
10 SUPPOSITORY, RECTAL RECTAL DAILY PRN
Status: DISCONTINUED | OUTPATIENT
Start: 2017-04-13 | End: 2017-04-15 | Stop reason: HOSPADM

## 2017-04-13 RX ORDER — NALOXONE HYDROCHLORIDE 0.4 MG/ML
.1-.4 INJECTION, SOLUTION INTRAMUSCULAR; INTRAVENOUS; SUBCUTANEOUS
Status: DISCONTINUED | OUTPATIENT
Start: 2017-04-13 | End: 2017-04-15 | Stop reason: HOSPADM

## 2017-04-13 RX ORDER — ONDANSETRON 2 MG/ML
4 INJECTION INTRAMUSCULAR; INTRAVENOUS EVERY 6 HOURS PRN
Status: DISCONTINUED | OUTPATIENT
Start: 2017-04-13 | End: 2017-04-15 | Stop reason: HOSPADM

## 2017-04-13 RX ORDER — NALOXONE HYDROCHLORIDE 0.4 MG/ML
.1-.4 INJECTION, SOLUTION INTRAMUSCULAR; INTRAVENOUS; SUBCUTANEOUS
Status: DISCONTINUED | OUTPATIENT
Start: 2017-04-13 | End: 2017-04-13

## 2017-04-13 RX ORDER — LEDIPASVIR AND SOFOSBUVIR 90; 400 MG/1; MG/1
1 TABLET, FILM COATED ORAL DAILY
Status: DISCONTINUED | OUTPATIENT
Start: 2017-04-13 | End: 2017-04-15 | Stop reason: HOSPADM

## 2017-04-13 RX ORDER — LACTULOSE 10 G/15ML
45 SOLUTION ORAL 3 TIMES DAILY
Status: DISCONTINUED | OUTPATIENT
Start: 2017-04-13 | End: 2017-04-15 | Stop reason: HOSPADM

## 2017-04-13 RX ORDER — ONDANSETRON 4 MG/1
4 TABLET, ORALLY DISINTEGRATING ORAL EVERY 6 HOURS PRN
Status: DISCONTINUED | OUTPATIENT
Start: 2017-04-13 | End: 2017-04-15 | Stop reason: HOSPADM

## 2017-04-13 RX ORDER — RIBAVIRIN 200 MG/1
1 TABLET, FILM COATED ORAL 2 TIMES DAILY WITH MEALS
Status: DISCONTINUED | OUTPATIENT
Start: 2017-04-14 | End: 2017-04-15 | Stop reason: HOSPADM

## 2017-04-13 RX ORDER — AMOXICILLIN 250 MG
1-2 CAPSULE ORAL 2 TIMES DAILY PRN
Status: DISCONTINUED | OUTPATIENT
Start: 2017-04-13 | End: 2017-04-15 | Stop reason: HOSPADM

## 2017-04-13 RX ADMIN — GABAPENTIN 200 MG: 100 CAPSULE ORAL at 22:08

## 2017-04-13 RX ADMIN — RIFAXIMIN 550 MG: 550 TABLET ORAL at 20:53

## 2017-04-13 RX ADMIN — FUROSEMIDE 40 MG: 40 TABLET ORAL at 20:53

## 2017-04-13 RX ADMIN — SPIRONOLACTONE 50 MG: 50 TABLET, FILM COATED ORAL at 20:53

## 2017-04-13 RX ADMIN — LACTULOSE 45 ML: 10 SOLUTION ORAL at 20:53

## 2017-04-13 RX ADMIN — DEXTROSE AND SODIUM CHLORIDE: 5; 450 INJECTION, SOLUTION INTRAVENOUS at 20:54

## 2017-04-13 NOTE — ED NOTES
Patient has  [place] to Observation  order. Patient has been given Observation brochure  What does Observation mean to me .  Patient has been given the opportunity to ask questions about observation status and their plan of care.    Emmy Canchola RN

## 2017-04-13 NOTE — ED NOTES
Pt brought to ED by EMS after concerns of decreased mental status at S/O's house. Pt was in ED yesterday with high ammonia levels.

## 2017-04-13 NOTE — ED PROVIDER NOTES
History     Chief Complaint   Patient presents with     Altered Mental Status     HPI  Scott Casale is a 61 year old male with cirrhosis secondary to alcohol and chronic hep C who presents to the emergency room because of altered mental status. He was seen here yesterday. It was found that he had stopped his lactulose 3 days ago. This was restarted. Patient is a limited historian here today. He denies any pain.    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     S/P TIPS (transjugular intrahepatic portosystemic shunt)     Hepatic encephalopathy (H)     Ascites of liver     Edema of lower extremity     Chronic hepatitis C virus infection (H)     Hepatic cirrhosis (H)     Hyponatremia     Liver mass     Portal hypertension (H)     Primary malignant neoplasm of testis (H)     Thrombocytopenia (H)     Encephalopathy acute     Hypervolemia     Ankylosing spondylitis (H)     Acute hepatic encephalopathy     Past Medical History:   Diagnosis Date     AC separation 8/20/2008    left     Ascites     diuretic refractory     H/O testicular cancer age 28     Hepatitis C     genotype 1A      Shoulder dislocation      Splenomegaly      Ulcerative colitis (H)      Current Facility-Administered Medications   Medication     0.9% sodium chloride BOLUS     Current Outpatient Prescriptions   Medication     lactulose encephalopathy (CHRONULAC) 10 GM/15ML SOLUTION     lactulose (CHRONULAC) 10 GM/15ML solution     furosemide (LASIX) 40 MG tablet     spironolactone (ALDACTONE) 50 MG tablet     vitamin D (ERGOCALCIFEROL) 76362 UNIT capsule     potassium chloride SA (POTASSIUM CHLORIDE) 20 MEQ CR tablet     gabapentin (NEURONTIN) 100 MG capsule     ribavirin 200 MG TABS     rifaximin (XIFAXAN) 550 MG TABS tablet     ledipasvir-sofosbuvir  (LEDIPASVIR-SOFOSBUVIR)  MG per tablet     ondansetron (ZOFRAN) 4 MG tablet     Allergies   Allergen Reactions     Blood Transfusion Related (Informational Only) Other (See Comments)     Patient has a history of a clinically significant antibody against RBC antigens.  A delay in compatible RBCs may occur.     Percocet [Oxycodone-Acetaminophen] Nausea and Vomiting     Acetaminophen Nausea     Iodine-131 Hives     Motrin [Ibuprofen Micronized] Nausea     Tylenol Nausea     Past Surgical History:   Procedure Laterality Date     C APPENDECTOMY       C KNEE SCOPE,CLEAN/DRAIN      bilateral     COLONOSCOPY N/A 1/17/2017    Procedure: COLONOSCOPY;  Surgeon: Guru Reina Palacios MD;  Location:  GI     ESOPHAGOSCOPY, GASTROSCOPY, DUODENOSCOPY (EGD), COMBINED N/A 1/17/2017    Procedure: COMBINED ESOPHAGOSCOPY, GASTROSCOPY, DUODENOSCOPY (EGD);  Surgeon: Guru Reina Palacios MD;  Location:  GI     EYE SURGERY       IR TRANSVEN INTRAHEPATIC PORTOSYST REV       ORCHIECTOMY SCROTAL      prosthesis placed     STRABISMUS SURGERY      bilateral     Social History     Social History     Marital status:      Spouse name: N/A     Number of children: N/A     Years of education: N/A     Occupational History     Not on file.     Social History Main Topics     Smoking status: Former Smoker     Quit date: 1/1/1978     Smokeless tobacco: Never Used     Alcohol use No      Comment: quit in 2010     Drug use: No     Sexual activity: Yes     Partners: Female     Other Topics Concern     Parent/Sibling W/ Cabg, Mi Or Angioplasty Before 65f 55m? No     Social History Narrative    Admit 9/28:    Lives with new wife of 2 mos.    Tobacco:  Former smoker, quit 1978    Alcohol:  Sober since 2010, recovering alcoholic.    Drugs:  Denies         I have reviewed the Medications, Allergies, Past Medical and Surgical History, and Social History in the Epic system.    Review of Systems   Unable to perform ROS:  "Mental status change       Physical Exam   BP: 134/72  Heart Rate: 79  Temp: 98.4  F (36.9  C)  Resp: 14  Height: 180.3 cm (5' 11\")  Weight: 90.7 kg (200 lb)  SpO2: 100 %  Physical Exam   Constitutional: No distress.   Awake 61-year-old male. Responds to questions but inappropriately half the time. He does not appear in any distress or discomfort. His skin is warm and dryness color is good. He is breathing at ease and has stable vital signs./72  Temp 98.4  F (36.9  C) (Axillary)  Resp 16  Ht 1.803 m (5' 11\")  Wt 90.7 kg (200 lb)  SpO2 100%  BMI 27.89 kg/m2     HENT:   Head: Normocephalic.   Right Ear: External ear normal.   Left Ear: External ear normal.   Mouth/Throat: Oropharynx is clear and moist.   Eyes: Conjunctivae are normal. No scleral icterus.   Neck: Normal range of motion.   Cardiovascular: Normal rate and regular rhythm.    Pulmonary/Chest: Effort normal and breath sounds normal.   Abdominal: Soft.   Musculoskeletal: Normal range of motion.   Neurological: He is alert. He has normal strength. He is disoriented. No cranial nerve deficit. GCS eye subscore is 4. GCS verbal subscore is 5. GCS motor subscore is 6.   Patient is awake and is able to tell me his name. When asked what year he states Friday and when asked what hospital he states [facility] and he denies coming in by ambulance (which he did.) He moves all 4 extremities equally. There is no tremor or shaking and no asterixis.   Skin: Skin is warm and dry.   Psychiatric: He has a normal mood and affect. His behavior is normal.   Nursing note and vitals reviewed.      ED Course     ED Course     Procedures           Results for orders placed or performed during the hospital encounter of 04/13/17 (from the past 48 hour(s))   CBC with platelets differential   Result Value Ref Range    WBC 4.1 4.0 - 11.0 10e9/L    RBC Count 3.16 (L) 4.4 - 5.9 10e12/L    Hemoglobin 11.3 (L) 13.3 - 17.7 g/dL    Hematocrit 33.0 (L) 40.0 - 53.0 %     (H) 78 - " 100 fl    MCH 35.8 (H) 26.5 - 33.0 pg    MCHC 34.2 31.5 - 36.5 g/dL    RDW 13.2 10.0 - 15.0 %    Platelet Count 48 (LL) 150 - 450 10e9/L    Diff Method Automated Method     % Neutrophils 70.2 %    % Lymphocytes 16.4 %    % Monocytes 10.5 %    % Eosinophils 2.4 %    % Basophils 0.5 %    % Immature Granulocytes 0.0 %    Absolute Neutrophil 2.9 1.6 - 8.3 10e9/L    Absolute Lymphocytes 0.7 (L) 0.8 - 5.3 10e9/L    Absolute Monocytes 0.4 0.0 - 1.3 10e9/L    Absolute Eosinophils 0.1 0.0 - 0.7 10e9/L    Absolute Basophils 0.0 0.0 - 0.2 10e9/L    Abs Immature Granulocytes 0.0 0 - 0.4 10e9/L   Comprehensive metabolic panel   Result Value Ref Range    Sodium 144 133 - 144 mmol/L    Potassium 4.0 3.4 - 5.3 mmol/L    Chloride 113 (H) 94 - 109 mmol/L    Carbon Dioxide 22 20 - 32 mmol/L    Anion Gap 9 3 - 14 mmol/L    Glucose 88 70 - 99 mg/dL    Urea Nitrogen 24 7 - 30 mg/dL    Creatinine 1.35 (H) 0.66 - 1.25 mg/dL    GFR Estimate 54 (L) >60 mL/min/1.7m2    GFR Estimate If Black 65 >60 mL/min/1.7m2    Calcium 8.6 8.5 - 10.1 mg/dL    Bilirubin Total 2.8 (H) 0.2 - 1.3 mg/dL    Albumin 2.6 (L) 3.4 - 5.0 g/dL    Protein Total 6.5 (L) 6.8 - 8.8 g/dL    Alkaline Phosphatase 52 40 - 150 U/L    ALT 30 0 - 70 U/L    AST 50 (H) 0 - 45 U/L   Ammonia   Result Value Ref Range    Ammonia 115 (HH) 10 - 50 umol/L   INR   Result Value Ref Range    INR 1.32 (H) 0.86 - 1.14   PTT   Result Value Ref Range    PTT 34 22 - 37 sec         Critical Care time:  none                   Assessments & Plan (with Medical Decision Making)   MDM--patient is a 61-year-old with hepatic failure secondary to cirrhosis secondary to alcohol and chronic hep C. The patient was seen yesterday and instructed to restart his lactulose. It is unclear if he did. He has a history of noncompliance with the lactulose. He is more of a delirium today. He will need admission for observation and lactulose to get his ammonia down.  I have reviewed the nursing notes.    I have reviewed  the findings, diagnosis, plan and need for follow up with the patient.    New Prescriptions    No medications on file       Final diagnoses:   Acute hepatic encephalopathy   Chronic hepatitis C without hepatic coma (H)   Thrombocytopenia (H)   S/P TIPS (transjugular intrahepatic portosystemic shunt)   Alcoholic cirrhosis of liver without ascites (H)       4/13/2017   [facility] EMERGENCY DEPARTMENT     SaltyNicolas MD  04/13/17 1816       SaltyNicolas MD  04/13/17 3519

## 2017-04-13 NOTE — IP AVS SNAPSHOT
[facility]    [address] [facility] [address] [address]    Phone:  [phone]    Fax:  [phone]                                       After Visit Summary   4/13/2017    Scott Casale    MRN: [id number]           After Visit Summary Signature Page     I have received my discharge instructions, and my questions have been answered. I have discussed any challenges I see with this plan with the nurse or doctor.    ..........................................................................................................................................  Patient/Patient Representative Signature      ..........................................................................................................................................  Patient Representative Print Name and Relationship to Patient    ..................................................               ................................................  Date                                            Time    ..........................................................................................................................................  Reviewed by Signature/Title    ...................................................              ..............................................  Date                                                            Time

## 2017-04-13 NOTE — IP AVS SNAPSHOT
MRN:[id number]                      After Visit Summary   4/13/2017    Scott Casale    MRN: [id number]           Thank you!     Thank you for choosing [facility] for your care. Our goal is always to provide you with excellent care. Hearing back from our patients is one way we can continue to improve our services. Please take a few minutes to complete the written survey that you may receive in the mail after you visit with us. Thank you!        Patient Information     Date Of Birth          1955        Designated Caregiver       Most Recent Value    Caregiver    Will someone help with your care after discharge? yes    Name of designated caregiver quincy Butler    Phone number of caregiver [phone]    Caregiver address same      About your hospital stay     You were admitted on:  April 13, 2017 You last received care in the:  [facility]    You were discharged on:  April 15, 2017       Who to Call     For medical emergencies, please call 911.  For non-urgent questions about your medical care, please call your primary care provider or clinic, [phone]          Attending Provider     Provider Specialty    Salty, Nicolas VIDES MD Emergency Medicine    Marshal Rivera MD Internal Medicine    Kaushik Rivera MD Internal Medicine    [facility], James Grider MD Internal Medicine       Primary Care Provider Office Phone # Fax #    Greg Concepcion [phone]       [address] [address]        After Care Instructions     Activity       Your activity upon discharge: activity as tolerated            Diet       Follow this diet upon discharge: Low sodium, low protein                  Follow-up Appointments     Follow-up and recommended labs and tests        Follow up with primary care provider, Greg Concepcion, within 7 days for hospital follow- up.  No follow up labs or test are needed.                  Your next 10 appointments already scheduled      Buzz 15, 2017  8:30 AM CDT   Lab with  LAB    [facility] ([facility])    [address] [place] [id number] [phone]            Buzz 15, 2017  9:00 AM CDT   US ABDOMEN COMPLETE with UCUS3   [facility] ([facility])    [address] [place] [id number] [phone]           Please bring a list of your medicines (including vitamins, minerals and over-the-counter drugs). Also, tell your doctor about any allergies you may have. Wear comfortable clothes and leave your valuables at home.  Adults: No eating or drinking for 8 hours before the exam. You may take medicine with a small sip of water.  Children: - Children 6+ years: No food or drink for 6 hours before exam. - Children 1-5 years: No food or drink for 4 hours before exam. - Infants, breast-fed: may have breast milk up to 2 hours before exam. - Infants, formula: may have bottle until 4 hours before exam.  Please call the Imaging Department at your exam site with any questions.            Buzz 15, 2017 10:00 AM CDT   (Arrive by 9:45 AM)   Return General Liver with Warren Higginbotham MD   [facility] ([facility])    [address] [place] [id number] [phone]              Additional Services     Home Care Referral       _________________________________________________________________    Your provider has referred you to: [facility]: [facility] [phone]   [email or web address]    Extended Emergency Contact Information  Primary Emergency Contact: CASALE,CINDY  Address: [address] [place] [address] [facility]  Home Phone: [phone]  Mobile Phone: [phone]  Relation: Spouse  Secondary Emergency Contact: CHRISTINE GARSIA  Address: [address] [place] [address] [employer]  \[facility]\""  Home Phone: [phone]  Mobile Phone: [phone]  Relation: Sister    Patient Anticipated Discharge Date: 4/14 or 4/15/17   RN, PT, HHA to begin 24 - 48 hours after discharge.  PLEASE EVALUATE AND TREAT (Evaluation timeline is 24 - 48 hrs. Please call if there is need for a variance to this timeline).    REASON FOR REFERRAL: Assessment & Treatment: RN    ADDITIONAL SERVICES NEEDED: None    OTHER PERTINENT INFORMATION: Patient was last seen by provider on 4/14/17 for hepatic encephalopathy.    No current outpatient prescriptions on file.    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     S/P TIPS (transjugular intrahepatic portosystemic shunt)     Hepatic encephalopathy (H)     Ascites of liver     Edema of lower extremity     Chronic hepatitis C virus infection (H)     Hepatic cirrhosis (H)     Hyponatremia     Liver mass     Portal hypertension (H)     Primary malignant neoplasm of testis (H)     Thrombocytopenia (H)     Encephalopathy acute     Hypervolemia     Ankylosing spondylitis (H)     Acute hepatic encephalopathy      Documentation of Face to Face and Certification for Home Health Services    I certify that patient, Scott Casale is under my care and that I, or a Nurse Practitioner or Physician's Assistant working with me, had a face-to-face encounter that meets the physician face-to-face encounter requirements with this patient on: 4/14/2017.    This encounter with the patient was in whole, or in part, for the following medical condition, which is the primary reason for Home Health Care: Hepatic encephalopathy.    I certify that, based on my findings, the following services are medically necessary Home Health Services: Nursing    My clinical findings support the need for the above services because: Nurse is needed:  "To assess hepatic encephalopathy after changes in medications or other medical regimen..    Further, I certify that my clinical findings support that this patient is homebound (i.e. absences from home require considerable and taxing effort and are for medical reasons or [facility] services or infrequently or of short duration when for other reasons) because: Requires assistance of another person or specialized equipment to access medical services because patient: Requires supervision of another for safe transfer..    Based on the above findings, I certify that this patient is confined to the home and needs intermittent skilled nursing care, physical therapy and/or speech therapy.  The patient is under my care, and I have initiated the establishment of the plan of care.  This patient will be followed by a physician who will periodically review the plan of care.    Physician/Provider to provide follow up care: Greg Concepcion certified Physician at time of discharge: Dr. Julien    Please be aware that coverage of these services is subject to the terms and limitations of your health insurance plan.  Call member services at your health plan with any benefit or coverage questions.                  Pending Results     No orders found from 4/11/2017 to 4/14/2017.            Statement of Approval     Ordered          04/15/17 1259  I have reviewed and agree with all the recommendations and orders detailed in this document.  EFFECTIVE NOW     Approved and electronically signed by:  James Julien MD             Admission Information     Date & Time Provider Department Dept. Phone    4/13/2017 James Julien MD [facility] [phone]      Your Vitals Were     Blood Pressure Pulse Temperature Respirations Height Weight    121/87 (BP Location: Right arm) 82 97.9  F (36.6  C) (Oral) 16 1.803 m (5' 11\") 91 kg (200 lb 9.9 oz)    Pulse Oximetry BMI (Body Mass Index)             "    100% 27.98 kg/m2          [employer] Information     [employer] gives you secure access to your electronic health record. If you see a primary care provider, you can also send messages to your care team and make appointments. If you have questions, please call your primary care clinic.  If you do not have a primary care provider, please call [phone] and they will assist you.        Care EveryWhere ID     This is your Care EveryWhere ID. This could be used by other organizations to access your [facility] medical records  [id number]           Review of your medicines      CONTINUE these medicines which may have CHANGED, or have new prescriptions. If we are uncertain of the size of tablets/capsules you have at home, strength may be listed as something that might have changed.        Dose / Directions    potassium chloride SA 20 MEQ CR tablet   Commonly known as:  potassium chloride   This may have changed:    - when to take this  - additional instructions   Used for:  Hypokalemia        Dose:  20 mEq   Take 1 tablet (20 mEq) by mouth daily Take only while taking your twice a day dose of Lasix then stop. Have your potassium levels checked in 2-3 days.   Quantity:  30 tablet   Refills:  1       ribavirin 200 MG Tabs   This may have changed:    - how much to take  - how to take this  - when to take this  - additional instructions   Used for:  Chronic hepatitis C without hepatic coma (H)        Take one tablet (200mg) by mouth in the AM with food, and one tablet (200mg) in the PM with food.   Quantity:  60 tablet   Refills:  1       vitamin D 33545 UNIT capsule   Commonly known as:  ERGOCALCIFEROL   This may have changed:    - how much to take  - how to take this  - when to take this   Used for:  Alcoholic cirrhosis of liver with ascites (H), Vitamin D deficiency        Dose:  72380 Units   Start taking on:  4/17/2017   Take 1 capsule (50,000 Units) by mouth in the morning, Mon and Thur Twice weekly every Mon and Thurs    Quantity:  9 capsule   Refills:  0         CONTINUE these medicines which have NOT CHANGED        Dose / Directions    furosemide 40 MG tablet   Commonly known as:  LASIX        Dose:  40 mg   Take 1 tablet (40 mg) by mouth 2 times daily   Quantity:  60 tablet   Refills:  1       gabapentin 100 MG capsule   Commonly known as:  NEURONTIN   Used for:  Chronic low back pain with sciatica, sciatica laterality unspecified, unspecified back pain laterality        Dose:  200 mg   Take 2 capsules (200 mg) by mouth At Bedtime   Quantity:  60 capsule   Refills:  3       lactulose 10 GM/15ML solution   Commonly known as:  CHRONULAC   Used for:  Hepatic encephalopathy (H)        Dose:  45 mL   Take 45 mLs by mouth 3 times daily   Quantity:  4000 mL   Refills:  0       ledipasvir-sofosbuvir  MG per tablet   Commonly known as:  ledipasvir-sofosbuvir   Used for:  Hepatitis C virus infection without hepatic coma, unspecified chronicity        Dose:  1 tablet   Take 1 tablet by mouth daily   Quantity:  28 tablet   Refills:  2       ondansetron 4 MG tablet   Commonly known as:  ZOFRAN   Used for:  Alcoholic cirrhosis of liver without ascites (H)        Dose:  4 mg   Take 1 tablet (4 mg) by mouth every 8 hours as needed for nausea   Quantity:  18 tablet   Refills:  0       rifaximin 550 MG Tabs tablet   Commonly known as:  XIFAXAN   Used for:  Hepatic encephalopathy (H)        Dose:  550 mg   Take 1 tablet (550 mg) by mouth 2 times daily   Quantity:  60 tablet   Refills:  11       spironolactone 50 MG tablet   Commonly known as:  ALDACTONE   Used for:  Alcoholic cirrhosis of liver with ascites (H)        Dose:  50 mg   Take 1 tablet (50 mg) by mouth daily   Quantity:  30 tablet   Refills:  3            Where to get your medicines      These medications were sent to [id number] - [place], [place] - [address]  [address] [facility] [address]     Phone:  [phone]     ondansetron 4 MG tablet    vitamin  D 47945 UNIT capsule                Protect others around you: Learn how to safely use, store and throw away your medicines at www.disposemymeds.org.             Medication List: This is a list of all your medications and when to take them. Check marks below indicate your daily home schedule. Keep this list as a reference.      Medications           Morning Afternoon Evening Bedtime As Needed    furosemide 40 MG tablet   Commonly known as:  LASIX   Take 1 tablet (40 mg) by mouth 2 times daily   Last time this was given:  40 mg on 4/15/2017  8:20 AM                       Next dose due tonight               gabapentin 100 MG capsule   Commonly known as:  NEURONTIN   Take 2 capsules (200 mg) by mouth At Bedtime   Last time this was given:  200 mg on 4/14/2017  9:40 PM                        Next dose due at  Bedtime             lactulose 10 GM/15ML solution   Commonly known as:  CHRONULAC   Take 45 mLs by mouth 3 times daily   Last time this was given:  45 mLs on 4/15/2017  8:20 AM                   Next dose due this afternoon                      ledipasvir-sofosbuvir  MG per tablet   Commonly known as:  ledipasvir-sofosbuvir   Take 1 tablet by mouth daily   Last time this was given:  1 tablet on 4/15/2017  8:22 AM            Next dose due tomorrow   Morning                         ondansetron 4 MG tablet   Commonly known as:  ZOFRAN   Take 1 tablet (4 mg) by mouth every 8 hours as needed for nausea                            New prescription at [facility]       potassium chloride SA 20 MEQ CR tablet   Commonly known as:  potassium chloride   Take 1 tablet (20 mEq) by mouth daily Take only while taking your twice a day dose of Lasix then stop. Have your potassium levels checked in 2-3 days.                                   ribavirin 200 MG Tabs   Take one tablet (200mg) by mouth in the AM with food, and one tablet (200mg) in the PM with food.   Last time this was given:  200 mg on 4/15/2017  8:21 AM                        Next dose due tonight                 rifaximin 550 MG Tabs tablet   Commonly known as:  XIFAXAN   Take 1 tablet (550 mg) by mouth 2 times daily   Last time this was given:  550 mg on 4/15/2017  8:20 AM                       Next dose due tonight                 spironolactone 50 MG tablet   Commonly known as:  ALDACTONE   Take 1 tablet (50 mg) by mouth daily   Last time this was given:  50 mg on 4/15/2017  8:21 AM            Next dose due tomorrow                         vitamin D 34974 UNIT capsule   Commonly known as:  ERGOCALCIFEROL   Take 1 capsule (50,000 Units) by mouth in the morning, Mon and Thur Twice weekly every Mon and Thurs   Start taking on:  4/17/2017            Next dose due Monday. New   Prescription at [facility]

## 2017-04-14 LAB
AMMONIA PLAS-SCNC: 94 UMOL/L (ref 10–50)
MRSA DNA SPEC QL NAA+PROBE: NORMAL
SPECIMEN SOURCE: NORMAL

## 2017-04-14 PROCEDURE — 99225 ZZC SUBSEQUENT OBSERVATION CARE,LEVEL II: CPT | Performed by: INTERNAL MEDICINE

## 2017-04-14 PROCEDURE — 36415 COLL VENOUS BLD VENIPUNCTURE: CPT | Performed by: INTERNAL MEDICINE

## 2017-04-14 PROCEDURE — 25000128 H RX IP 250 OP 636: Performed by: INTERNAL MEDICINE

## 2017-04-14 PROCEDURE — G0378 HOSPITAL OBSERVATION PER HR: HCPCS

## 2017-04-14 PROCEDURE — A9270 NON-COVERED ITEM OR SERVICE: HCPCS | Mod: GY | Performed by: INTERNAL MEDICINE

## 2017-04-14 PROCEDURE — 96374 THER/PROPH/DIAG INJ IV PUSH: CPT

## 2017-04-14 PROCEDURE — 87641 MR-STAPH DNA AMP PROBE: CPT | Performed by: INTERNAL MEDICINE

## 2017-04-14 PROCEDURE — 87640 STAPH A DNA AMP PROBE: CPT | Mod: XU | Performed by: INTERNAL MEDICINE

## 2017-04-14 PROCEDURE — 25000132 ZZH RX MED GY IP 250 OP 250 PS 637: Mod: GY | Performed by: INTERNAL MEDICINE

## 2017-04-14 PROCEDURE — 82140 ASSAY OF AMMONIA: CPT | Performed by: INTERNAL MEDICINE

## 2017-04-14 PROCEDURE — 99207 ZZC CDG-CHARGE/DX NOT SELECTED BY PROVIDER: CPT | Performed by: INTERNAL MEDICINE

## 2017-04-14 RX ADMIN — FUROSEMIDE 40 MG: 40 TABLET ORAL at 19:49

## 2017-04-14 RX ADMIN — RIBAVIRIN 200 MG: 200 TABLET, FILM COATED ORAL at 17:37

## 2017-04-14 RX ADMIN — SPIRONOLACTONE 50 MG: 50 TABLET, FILM COATED ORAL at 08:42

## 2017-04-14 RX ADMIN — LEDIPASVIR AND SOFOSBUVIR 1 TABLET: 90; 400 TABLET, FILM COATED ORAL at 12:21

## 2017-04-14 RX ADMIN — ONDANSETRON 4 MG: 2 INJECTION INTRAMUSCULAR; INTRAVENOUS at 14:32

## 2017-04-14 RX ADMIN — GABAPENTIN 200 MG: 100 CAPSULE ORAL at 21:40

## 2017-04-14 RX ADMIN — RIFAXIMIN 550 MG: 550 TABLET ORAL at 19:50

## 2017-04-14 RX ADMIN — LACTULOSE 45 ML: 10 SOLUTION ORAL at 19:49

## 2017-04-14 RX ADMIN — RIBAVIRIN 200 MG: 200 TABLET, FILM COATED ORAL at 12:21

## 2017-04-14 RX ADMIN — LACTULOSE 45 ML: 10 SOLUTION ORAL at 13:33

## 2017-04-14 RX ADMIN — RIFAXIMIN 550 MG: 550 TABLET ORAL at 08:42

## 2017-04-14 RX ADMIN — FUROSEMIDE 40 MG: 40 TABLET ORAL at 08:42

## 2017-04-14 RX ADMIN — LACTULOSE 45 ML: 10 SOLUTION ORAL at 08:41

## 2017-04-14 ASSESSMENT — ACTIVITIES OF DAILY LIVING (ADL)
TRANSFERRING: 2-->ASSISTIVE PERSON
EATING: 0-->INDEPENDENT
BATHING: 2-->ASSISTIVE PERSON
SWALLOWING: 0-->SWALLOWS FOODS/LIQUIDS WITHOUT DIFFICULTY
COMMUNICATION: 0-->UNDERSTANDS/COMMUNICATES WITHOUT DIFFICULTY
DRESS: 2-->ASSISTIVE PERSON
AMBULATION: 2-->ASSISTIVE PERSON
RETIRED_EATING: 0-->INDEPENDENT
AMBULATION: 2-->ASSISTIVE PERSON
SWALLOWING: 0-->SWALLOWS FOODS/LIQUIDS WITHOUT DIFFICULTY
RETIRED_COMMUNICATION: 0-->UNDERSTANDS/COMMUNICATES WITHOUT DIFFICULTY
COGNITION: 1 - ATTENTION OR MEMORY DEFICITS
TOILETING: 2-->ASSISTIVE PERSON
TRANSFERRING: 2-->ASSISTIVE PERSON
FALL_HISTORY_WITHIN_LAST_SIX_MONTHS: YES
BATHING: 2-->ASSISTIVE PERSON
TOILETING: 2-->ASSISTIVE PERSON
DRESS: 2-->ASSISTIVE PERSON

## 2017-04-14 NOTE — H&P
[facility] History and Physical    Scott Casale MRN# [id number]   Age: 61 year old YOB: 1955     Date of Admission:  4/13/2017    Home clinic: [address], [facility] [address]  Primary care provider: Greg Concepcion          Chief Complaint:   AMS    History is obtained from the patient, electronic health record and emergency department physician          History of Present Illness:   This patient is a 61 year old  male with a significant past medical history of cirrhosis with encephalopathy  who presents with AMS. He was seen here yesterday. It was found that he had stopped his lactulose 3 days ago. This was restarted. Patient is a limited historian here today. He denies any pain. He was brought by ambulance. He is confused and unable to provide hx at this time.           Past Medical History:     Patient Active Problem List    Diagnosis Date Noted     Acute hepatic encephalopathy 03/13/2017     Priority: Medium     Ankylosing spondylitis (H)      Priority: Medium     Hypervolemia 02/22/2017     Priority: Medium     Encephalopathy acute 02/08/2017     Priority: Medium     Hepatic encephalopathy (H) 01/05/2017     Priority: Medium     S/P TIPS (transjugular intrahepatic portosystemic shunt) 09/28/2016     Priority: Medium     Ascites of liver 09/27/2016     Priority: Medium     Overview:   Low MELD score   S/p TIPS procedure at [facility] (09/28/2016)       Hyponatremia 08/22/2016     Priority: Medium     Overview:   Chronic        Edema of lower extremity 06/15/2016     Priority: Medium     Overview:   Resistant to diuretic therapy        Chronic hepatitis C virus infection (H) 06/15/2016     Priority: Medium     Overview:   Dr Higginbotham [facility]   Harvoni Ribavirin (10/2016)       Hepatic cirrhosis (H) 06/15/2016     Priority: Medium     Overview:   Hepatitis C and history of alcohol use ( no use currently)  in the past         Liver mass 06/15/2016     Priority: Medium     Overview:   0.8 cm right lobe hyperlucency per CT 04/2016 suspicious for HCC   Liver biopsy due - seeing Liver clinic at [facility]       Portal hypertension (H) 06/15/2016     Priority: Medium     Overview:   Furosemide Spironolactone Lactulose   Intolerance to Propranolol with hypotension, dizziness       Primary malignant neoplasm of testis (H) 06/15/2016     Priority: Medium     Overview:   At age 28 s/p orchiectomy with RPLND        Thrombocytopenia (H) 06/15/2016     Priority: Medium     Overview:   6/12/2016  PLT: 48       Chronic low back pain 12/06/2012     Narcotic Contract signed 12/6/12 and sent to be scanned    Uses soma at night to help him sleep due to back pain. Has had back problems since 1986, since fell in big hole, collapsed lower discs. Also has ankylosing spondylitis. Has had much Physical Therapy and chiropractic work over years, was told surgery was only option.  Was accompanied by [place] records.    Narcotic contract -- soma #30 R2, at 3 mo can call for 3 more refills, but be seen q 6 mo for refills.  Uses for sleep.  Flexeril for daytime if needed, unrestricted.           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 12/06/2012     Since summer; ENT referral 12/6/12       GERD (gastroesophageal reflux disease) 06/26/2012     CARDIOVASCULAR SCREENING; LDL GOAL LESS THAN 130 06/25/2012     Mild major depression (H) 08/27/2008               Past Surgical History:      Past Surgical History:   Procedure Laterality Date     C APPENDECTOMY       C KNEE SCOPE,CLEAN/DRAIN      bilateral     COLONOSCOPY N/A 1/17/2017    Procedure: COLONOSCOPY;  Surgeon: Guru Reina Palacios MD;  Location:  GI     ESOPHAGOSCOPY, GASTROSCOPY, DUODENOSCOPY (EGD), COMBINED N/A 1/17/2017    Procedure: COMBINED ESOPHAGOSCOPY, GASTROSCOPY, DUODENOSCOPY (EGD);  Surgeon: Guru Reina Palacios MD;  Location:  GI     EYE SURGERY        IR TRANSVEN INTRAHEPATIC PORTOSYST REV       ORCHIECTOMY SCROTAL      prosthesis placed     STRABISMUS SURGERY      bilateral             Social History:     Social History     Social History     Marital status:      Spouse name: N/A     Number of children: N/A     Years of education: N/A     Occupational History     Not on file.     Social History Main Topics     Smoking status: Former Smoker     Quit date: 1/1/1978     Smokeless tobacco: Never Used     Alcohol use No      Comment: quit in 2010     Drug use: No     Sexual activity: Yes     Partners: Female     Other Topics Concern     Parent/Sibling W/ Cabg, Mi Or Angioplasty Before 65f 55m? No     Social History Narrative    Admit 9/28:    Lives with new wife of 2 mos.    Tobacco:  Former smoker, quit 1978    Alcohol:  Sober since 2010, recovering alcoholic.    Drugs:  Denies             Family History:     Family History   Problem Relation Age of Onset     CANCER Mother 70     brain tumor     Prostate Cancer No family hx of      Cancer - colorectal No family hx of      DIABETES Maternal Grandmother      CEREBROVASCULAR DISEASE Sister      HEART DISEASE Sister      Respiratory Son      CANCER Father      Uterine Cancer Sister              Allergies:     Allergies   Allergen Reactions     Blood Transfusion Related (Informational Only) Other (See Comments)     Patient has a history of a clinically significant antibody against RBC antigens.  A delay in compatible RBCs may occur.     Percocet [Oxycodone-Acetaminophen] Nausea and Vomiting     Acetaminophen Nausea     Iodine-131 Hives     Motrin [Ibuprofen Micronized] Nausea     Tylenol Nausea             Medications:     Prior to Admission medications    Medication Sig Last Dose Taking? Auth Provider   lactulose (CHRONULAC) 10 GM/15ML solution Take 45 mLs by mouth 3 times daily Past Week at Unknown time Yes Leonel Goodson MD   furosemide (LASIX) 40 MG tablet Take 1 tablet (40 mg) by mouth 2 times daily  4/12/2017 at am Yes Mario Yuan DO   spironolactone (ALDACTONE) 50 MG tablet Take 1 tablet (50 mg) by mouth daily 4/12/2017 at am Yes Devorah Lynn MD   vitamin D (ERGOCALCIFEROL) 79884 UNIT capsule Twice weekly every Mon and Thurs  Patient taking differently: Take 50,000 Units by mouth in the morning, Mon and Thur Twice weekly every Mon and Thurs 4/10/2017 Yes Devorah Lynn MD   potassium chloride SA (POTASSIUM CHLORIDE) 20 MEQ CR tablet Take 1 tablet (20 mEq) by mouth daily Take only while taking your twice a day dose of Lasix then stop. Have your potassium levels checked in 2-3 days.  Patient taking differently: Take 20 mEq by mouth every evening Take only while taking your twice a day dose of Lasix then stop. Have your potassium levels checked in 2-3 days. 4/11/2017 at pm Yes Kelechi Boyer MD   gabapentin (NEURONTIN) 100 MG capsule Take 2 capsules (200 mg) by mouth At Bedtime 4/11/2017 at pm Yes Franky Cardenas MD   ribavirin 200 MG TABS Take one tablet (200mg) by mouth in the AM with food, and one tablet (200mg) in the PM with food.  Patient taking differently: Take 1 tablet by mouth 2 times daily (with meals) Take one tablet (200mg) by mouth in the AM with food, and one tablet (200mg) in the PM with food. 4/12/2017 at am Yes Warren Higginbotham MD   rifaximin (XIFAXAN) 550 MG TABS tablet Take 1 tablet (550 mg) by mouth 2 times daily 4/12/2017 at am Yes Warren Higginbotham MD   ledipasvir-sofosbuvir (LEDIPASVIR-SOFOSBUVIR)  MG per tablet Take 1 tablet by mouth daily 4/12/2017 at am Yes Warren Higginbotham MD   lactulose encephalopathy (CHRONULAC) 10 GM/15ML SOLUTION Take 45 mLs (30 g) by mouth 3 times daily   Leonel Goodson MD   ondansetron (ZOFRAN) 4 MG tablet Take 1 tablet (4 mg) by mouth every 8 hours as needed for nausea   Jade Ceja PA-C            Review of Systems:   The Review of Systems is negative in ALL other than noted in the HPI         "  Physical Exam:   Blood pressure 111/69, temperature 98.4  F (36.9  C), temperature source Axillary, resp. rate 16, height 1.803 m (5' 11\"), weight 90.7 kg (200 lb), SpO2 100 %.  GENERAL APPEARANCE: healthy, alert and no distress  EYES: R eye mildly injected, eyes grossly normal  HENT: external ears and nose normal   NECK: supple, no masses or adenopathy  RESP: lungs clear to auscultation - no rales, rhonchi or wheezes  CV: regular rate and rhythm, normal S1 S2, no S3 or S4 and no murmur, click or rub   ABDOMEN: soft, nontender, no HSM or masses and bowel sounds normal  MS: no clubbing, cyanosis; no edema  SKIN: clear without significant rashes or lesions  NEURO: Normal strength and tone, sensory exam grossly normal, mentation intact and speech normal         Data:     Lab Results   Component Value Date    WBC 4.1 04/13/2017    HGB 11.3 (L) 04/13/2017    HCT 33.0 (L) 04/13/2017     (H) 04/13/2017    PLT 48 (LL) 04/13/2017     Lab Results   Component Value Date     04/13/2017    CO2 22 04/13/2017     Lab Results   Component Value Date    BUN 24 04/13/2017     No components found for: SEDRATE  No components found for: DDIMER  No results found for: BNP  Lab Results   Component Value Date    TSH 1.52 03/13/2017     No results found for: TROPONIN  UA RESULTS:  Recent Labs   Lab Test  04/12/17   1044   COLOR  Yellow   APPEARANCE  Clear   URINEGLC  Negative   URINEBILI  Negative   URINEKETONE  Negative   SG  1.006   UBLD  Small*   URINEPH  6.0   PROTEIN  Negative   NITRITE  Negative   LEUKEST  Negative   RBCU  8*   WBCU  <1     Liver Function Studies -   Recent Labs   Lab Test  04/13/17   1612   PROTTOTAL  6.5*   ALBUMIN  2.6*   BILITOTAL  2.8*   ALKPHOS  52   AST  50*   ALT  30       EKG results:  Personally reviewed. none    RADIOLOGY:  Personally reviewed.  ctc abd 4/12-     IMPRESSION:   1. Stable cirrhosis and portal hypertension.  2. No acute finding nor significant free peritoneal fluid  demonstrated   "   A/P     Acute hepatic encephalopathy  Due to not taking lactulose last few days due to insurance issues. Has ammonia of 115--was 86 on 4/5  -start home dose lactulose/ rifaximin. F/u labs in am    Chronic hepatitis C without hepatic coma (H)  -Followed by Dr. Higginbotham - GI.  -continue PTA Harvoni and ribavirin    Thrombocytopenia (H)  Chronic. Stable    S/P TIPS (transjugular intrahepatic portosystemic shunt)  Had Discussed with GI (Dr. Higginbotham) last admission--they are not going to pursue TIPS revision    cirrhosis of liver  without ascites (H)  Due to ETOH and HEP-C  -continue meds    Chronic Low Back Pain  -continue PTA gabapentin    Hx of Testicular Cancer  s/p orchiectomy at age 28 with RPLND.    DISPO  Needs SW eval--for meds/insurance--?palliative care. Need to consider before discharge.     Marshal Rivera MD  [phone]

## 2017-04-14 NOTE — CONSULTS
Care Transition Initial Assessment - RN  Reason For Consult: financial concerns   Met with: Patient.    DATA   Active Problems:    Hepatic encephalopathy (H)       Primary Care Clinic Name: [facility]  Primary Care MD Name: Dr. Concepcion  Contact information and PCP information verified: Yes      ASSESSMENT  Cognitive Status: awake, alert and oriented.       Lives With: spouse     Description of Support System: Supportive, Involved   Who is your support system?: Wife   Support Assessment: Adequate family and caregiver support   Insurance Concerns: No Insurance issues identified      This writer met with pt , introduced self and role.  I met with the patient a 4/12/17 in the ED and provided [facility] funds for medications (lactulose and diuretic).  The patient stated, he is hospitalized because he had gone too long without his lactulose.  Confirmed he has a supply of medications at home.  Discussed home care, Pt was provided with Medicare certified home care list. Pt chooses to use [facility] ([phone] Fax: [phone]).  A home care referral was made.      PLAN    Home with [facility]      Caitlin Navas RN, Care Coordinator [phone]

## 2017-04-14 NOTE — PROGRESS NOTES
Pt up in the chair for meals, transfers with assist of one. Still jerking/tremulous at times. Pt and wife don't feel like he's steady enough to go home today. Lactulose given per orders with adequate results. Pt alert and oriented. Declines any questions or concerns at this time. Call light within reach. Alarms activated and audible.

## 2017-04-14 NOTE — PROGRESS NOTES
"[facility] Medicine Progress Note  Date of Service: 04/14/2017    Assessment & Plan   Scott Casale is a 61 year old male who presented on 4/13/2017 with confusion after stopped lactulose due to ran out.     A/P     Acute hepatic encephalopathy   Due to not taking lactulose last few days due to insurance issues. Has ammonia of 115--was 86 on 4/5.    Started home dose lactulose/ rifaximin. Has had small BM and passing gas. Now oriented but feeling weak. Mild asterixis on exam.   - continue lactulose/rifaximin   - increase activity    - He has been given supply of lactulose for home use after discharge.     Chronic hepatitis C without hepatic coma (H)   - Followed by Dr. Higginbotham - GI.   - continue PTA Harvoni and ribavirin (wife bringing in from home)    Thrombocytopenia (H)   Chronic. Stable    S/P TIPS (transjugular intrahepatic portosystemic shunt)   Had Discussed with GI (Dr. Higginbotham) last admission--they are not going to pursue TIPS revision    Cirrhosis of liver  without ascites (H)   Due to ETOH and HEP-C   - continue meds    Chronic Low Back Pain   - continue PTA gabapentin    Hx of Testicular Cancer   S/p orchiectomy at age 28 with RPLND.    DISPO  Increasing activity. Will reassess later this afternoon for dc.     Attestation:  Total time: 25 minutes this visit    James Julien MD  Hospital Medicine        Interval History   Doesn't remember coming in. Feels \"better\". Had small BM this morning and passing gas. Feels weak and unsure about going home.     Physical Exam   Temp:  [98  F (36.7  C)-98.4  F (36.9  C)] 98  F (36.7  C)  Pulse:  [75] 75  Heart Rate:  [77-85] 84  Resp:  [14-18] 18  BP: (100-138)/(62-75) 115/62  SpO2:  [99 %-100 %] 100 %    Weights:   Vitals:    04/13/17 1602 04/13/17 1900   Weight: 90.7 kg (200 lb) 91 kg (200 lb 9.9 oz)    Body mass index is 27.98 kg/(m^2).    Constitutional: alert, oriented to place and situation  CV: Regular  Respiratory: CTA " bilaterally  GI: Moderate distention, bowel sounds present, no tenderness  Skin: Warm and dry, diffuse bilateral lower extremity edema   Neuro: MAEE, mild asterixis    Data     Recent Labs  Lab 04/13/17  1612 04/12/17  0922   WBC 4.1 4.3   HGB 11.3* 11.6*   * 105*   PLT 48* 52*   INR 1.32*  --     143   POTASSIUM 4.0 3.9   CHLORIDE 113* 106   CO2 22 29   BUN 24 29   CR 1.35* 1.66*   ANIONGAP 9 8   ARACELI 8.6 8.4*   GLC 88 142*   ALBUMIN 2.6* 2.6*   PROTTOTAL 6.5* 6.9   BILITOTAL 2.8* 2.2*   ALKPHOS 52 54   ALT 30 33   AST 50* 49*   LIPASE  --  441*         Recent Labs  Lab 04/13/17  1612 04/12/17  0922   GLC 88 142*        Unresulted Labs Ordered in the Past 30 Days of this Admission     Date and Time Order Name Status Description    4/14/2017 0504 Methicillin Resistant Staph Aureus PCR In process            Imaging: No results found for this or any previous visit (from the past 24 hour(s)).     I reviewed all new labs and imaging results over the last 24 hours. I personally reviewed no images or EKG's today.    Medications     dextrose 5% and 0.45% NaCl Stopped (04/14/17 1056)       sodium chloride 0.9%  500 mL Intravenous Once     furosemide  40 mg Oral BID     gabapentin  200 mg Oral At Bedtime     lactulose  45 mL Oral TID     ledipasvir-sofosbuvir  1 tablet Oral Daily     ribavirin  1 tablet Oral BID w/meals     rifaximin  550 mg Oral BID     spironolactone  50 mg Oral Daily   Reviewed jose angel Julien MD  [facility] Medicine

## 2017-04-14 NOTE — PROGRESS NOTES
"Patient woke up and triggered the bed alarm. He had pulled out his IV and there was blood all over bed and patient. Patient is intermittently confused. He said he didn't know why he pulled the IV out, as he is\" very fearful of blood and getting IV sticks\". Patient cleaned up and linen changed. New IV started in left hand without difficulty. Patient voiding in small amounts and passing a lot of flatus. Bed alarm is on for safety.   "

## 2017-04-14 NOTE — PROGRESS NOTES
"[facility] ADMISSION NOTE    Patient admitted to room 1007 at approximately 1900 via cart from emergency room. Patient was accompanied by nurse.     Verbal SBAR report received from Abdon DO RN prior to patient arrival.     Patient trasferred to bed via self. Patient alert and oriented X 2. The patient is not having any pain.  . Admission vital signs: Blood pressure 111/69, temperature 98.2  F (36.8  C), temperature source Oral, resp. rate 18, height 1.803 m (5' 11\"), weight 91 kg (200 lb 9.9 oz), SpO2 100 %. Patient was oriented to plan of care, call light, bed controls, tv, telephone, bathroom and visiting hours.     The following safety risks were identified during admission: fall. Yellow risk band applied: YES.     Buddy Fan    "

## 2017-04-14 NOTE — ACP (ADVANCE CARE PLANNING)
Patient is alert, forgetful, but is calm and cooperative. He says that he was not able to take his Lactulose for 4 days prior to ED visit because he ran out and there is a problem with his Medicare? Otherwise he says he is compliant with his medication, he denies any ETOH. He took Lactulose without difficulty.

## 2017-04-15 VITALS
OXYGEN SATURATION: 100 % | HEIGHT: 71 IN | HEART RATE: 82 BPM | BODY MASS INDEX: 28.09 KG/M2 | RESPIRATION RATE: 16 BRPM | SYSTOLIC BLOOD PRESSURE: 121 MMHG | WEIGHT: 200.62 LBS | DIASTOLIC BLOOD PRESSURE: 87 MMHG | TEMPERATURE: 97.9 F

## 2017-04-15 PROCEDURE — G0378 HOSPITAL OBSERVATION PER HR: HCPCS

## 2017-04-15 PROCEDURE — A9270 NON-COVERED ITEM OR SERVICE: HCPCS | Mod: GY | Performed by: INTERNAL MEDICINE

## 2017-04-15 PROCEDURE — 99217 ZZC OBSERVATION CARE DISCHARGE: CPT | Performed by: INTERNAL MEDICINE

## 2017-04-15 PROCEDURE — 25000132 ZZH RX MED GY IP 250 OP 250 PS 637: Mod: GY | Performed by: INTERNAL MEDICINE

## 2017-04-15 RX ORDER — ONDANSETRON 4 MG/1
4 TABLET, FILM COATED ORAL EVERY 8 HOURS PRN
Qty: 18 TABLET | Refills: 0 | Status: ON HOLD | OUTPATIENT
Start: 2017-04-15 | End: 2017-11-28

## 2017-04-15 RX ORDER — ERGOCALCIFEROL 1.25 MG/1
50000 CAPSULE, LIQUID FILLED ORAL
Qty: 9 CAPSULE | Refills: 0 | Status: SHIPPED | OUTPATIENT
Start: 2017-04-17 | End: 2017-11-27

## 2017-04-15 RX ADMIN — RIBAVIRIN 200 MG: 200 TABLET, FILM COATED ORAL at 08:21

## 2017-04-15 RX ADMIN — RIFAXIMIN 550 MG: 550 TABLET ORAL at 08:20

## 2017-04-15 RX ADMIN — LACTULOSE 45 ML: 10 SOLUTION ORAL at 08:20

## 2017-04-15 RX ADMIN — FUROSEMIDE 40 MG: 40 TABLET ORAL at 08:20

## 2017-04-15 RX ADMIN — SPIRONOLACTONE 50 MG: 50 TABLET, FILM COATED ORAL at 08:21

## 2017-04-15 RX ADMIN — LEDIPASVIR AND SOFOSBUVIR 1 TABLET: 90; 400 TABLET, FILM COATED ORAL at 08:22

## 2017-04-15 NOTE — PHARMACY - DISCHARGE MEDICATION RECONCILIATION
Discharge medication review for this patient is complete. Pharmacist assisted with medication reconciliation of discharge medications with prior to admission medications.     The following changes were made to the discharge medication list based on pharmacist review:  Added:  NA  Discontinued: NA  Changed:  Dosing for zofran and vitamin D      Patient's Discharge Medication List  - medications as listed on After Visit Summary (AVS)     Review of your medicines      CONTINUE these medicines which may have CHANGED, or have new prescriptions. If we are uncertain of the size of tablets/capsules you have at home, strength may be listed as something that might have changed.       Dose / Directions    potassium chloride SA 20 MEQ CR tablet   Commonly known as:  potassium chloride   This may have changed:    - when to take this  - additional instructions   Used for:  Hypokalemia        Dose:  20 mEq   Take 1 tablet (20 mEq) by mouth daily Take only while taking your twice a day dose of Lasix then stop. Have your potassium levels checked in 2-3 days.   Quantity:  30 tablet   Refills:  1       ribavirin 200 MG Tabs   This may have changed:    - how much to take  - how to take this  - when to take this  - additional instructions   Used for:  Chronic hepatitis C without hepatic coma (H)        Take one tablet (200mg) by mouth in the AM with food, and one tablet (200mg) in the PM with food.   Quantity:  60 tablet   Refills:  1       vitamin D 61836 UNIT capsule   Commonly known as:  ERGOCALCIFEROL   This may have changed:    - how much to take  - how to take this  - when to take this   Used for:  Alcoholic cirrhosis of liver with ascites (H), Vitamin D deficiency        Dose:  98641 Units   Start taking on:  4/17/2017   Take 1 capsule (50,000 Units) by mouth in the morning, Mon and Thur Twice weekly every Mon and Thurs   Quantity:  9 capsule   Refills:  0         CONTINUE these medicines which have NOT CHANGED       Dose /  Directions    furosemide 40 MG tablet   Commonly known as:  LASIX        Dose:  40 mg   Take 1 tablet (40 mg) by mouth 2 times daily   Quantity:  60 tablet   Refills:  1       gabapentin 100 MG capsule   Commonly known as:  NEURONTIN   Used for:  Chronic low back pain with sciatica, sciatica laterality unspecified, unspecified back pain laterality        Dose:  200 mg   Take 2 capsules (200 mg) by mouth At Bedtime   Quantity:  60 capsule   Refills:  3       lactulose 10 GM/15ML solution   Commonly known as:  CHRONULAC   Used for:  Hepatic encephalopathy (H)        Dose:  45 mL   Take 45 mLs by mouth 3 times daily   Quantity:  4000 mL   Refills:  0       ledipasvir-sofosbuvir  MG per tablet   Commonly known as:  ledipasvir-sofosbuvir   Used for:  Hepatitis C virus infection without hepatic coma, unspecified chronicity        Dose:  1 tablet   Take 1 tablet by mouth daily   Quantity:  28 tablet   Refills:  2       ondansetron 4 MG tablet   Commonly known as:  ZOFRAN   Used for:  Alcoholic cirrhosis of liver without ascites (H)        Dose:  4 mg   Take 1 tablet (4 mg) by mouth every 8 hours as needed for nausea   Quantity:  18 tablet   Refills:  0       rifaximin 550 MG Tabs tablet   Commonly known as:  XIFAXAN   Used for:  Hepatic encephalopathy (H)        Dose:  550 mg   Take 1 tablet (550 mg) by mouth 2 times daily   Quantity:  60 tablet   Refills:  11       spironolactone 50 MG tablet   Commonly known as:  ALDACTONE   Used for:  Alcoholic cirrhosis of liver with ascites (H)        Dose:  50 mg   Take 1 tablet (50 mg) by mouth daily   Quantity:  30 tablet   Refills:  3            Where to get your medicines      These medications were sent to [facility] - [place], [place] - [address]   [address] [facility] [address]     Phone:  [phone]      ondansetron 4 MG tablet     vitamin D 29045 UNIT capsule

## 2017-04-15 NOTE — PROGRESS NOTES
Patient had an uneventful shift. He has been Alert and oriented times 4, he uses the call light for asst. To the bathroom, he slept well, eating drinking and voiding without difficulty. Patient is still somewhat unsteady with walking to the bathroom, he is jerky, which he says gets worse when his ammonia level is elevated. Ammonia level yesterday was 94. No labs ordered for this morning.

## 2017-04-15 NOTE — PROGRESS NOTES
Assisted back to bed per patient request with assist of 1.  Patient remains jerky/tremoulus with movement.  Mentation seems clear.

## 2017-04-15 NOTE — PROGRESS NOTES
[facility] DISCHARGE NOTE    Patient discharged to home at 1:24 PM via wheel chair. Accompanied by spouse and staff. Discharge instructions reviewed with patient and spouse, opportunity offered to ask questions. Prescriptions sent to patients preferred pharmacy. All belongings sent with patient. Home medications sent home with pt. Pt declined wanting afternoon dose of lactulose prior to leaving because he didn't want to have to stool in the car on the way home.     Nichole C. Bushweiler

## 2017-04-15 NOTE — DISCHARGE SUMMARY
[facility]    Discharge Summary  Hospital Medicine    Date of Admission:  4/13/2017  Date of Discharge:  4/15/2017   Discharging Provider: James Julien MD  Date of Service: 4/15/2017     Primary Care     Greg Concepcion  [facility] [address]  [facility] [address]      Identification and Chief Compaint: Scott Casale is a 61 year old male who presented on 4/13/2017 with complaint of confusion after stopped lactulose due to ran out.    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  No follow up labs or test are needed.     Activity   Your activity upon discharge: activity as tolerated     Diet   Follow this diet upon discharge: Low sodium, low protein       Discharge Medications   Current Discharge Medication List      CONTINUE these medications which have CHANGED    Details   ondansetron (ZOFRAN) 4 MG tablet Take 1 tablet (4 mg) by mouth every 8 hours as needed for nausea  Qty: 18 tablet, Refills: 0    Associated Diagnoses: Alcoholic cirrhosis of liver without ascites (H)      vitamin D (ERGOCALCIFEROL) 53234 UNIT capsule Take 1 capsule (50,000 Units) by mouth in the morning, Mon and Thur Twice weekly every Mon and Thurs  Qty: 9 capsule, Refills: 0    Associated Diagnoses: Alcoholic cirrhosis of liver with ascites (H); Vitamin D deficiency         CONTINUE these medications which have NOT CHANGED    Details   lactulose (CHRONULAC) 10 GM/15ML solution Take 45 mLs by mouth 3 times daily  Qty: 4000 mL, Refills: 0    Associated Diagnoses: Hepatic encephalopathy (H)       furosemide (LASIX) 40 MG tablet Take 1 tablet (40 mg) by mouth 2 times daily  Qty: 60 tablet, Refills: 1      spironolactone (ALDACTONE) 50 MG tablet Take 1 tablet (50 mg) by mouth daily  Qty: 30 tablet, Refills: 3    Associated Diagnoses: Alcoholic cirrhosis of liver with ascites (H)      potassium chloride SA (POTASSIUM CHLORIDE) 20 MEQ CR tablet Take 1 tablet (20 mEq) by mouth daily Take only while taking your twice a day dose of Lasix then stop. Have your potassium levels checked in 2-3 days.  Qty: 30 tablet, Refills: 1    Associated Diagnoses: Hypokalemia      gabapentin (NEURONTIN) 100 MG capsule Take 2 capsules (200 mg) by mouth At Bedtime  Qty: 60 capsule, Refills: 3    Associated Diagnoses: Chronic low back pain with sciatica, sciatica laterality unspecified, unspecified back pain laterality      ribavirin 200 MG TABS Take one tablet (200mg) by mouth in the AM with food, and one tablet (200mg) in the PM with food.  Qty: 60 tablet, Refills: 1    Associated Diagnoses: Chronic hepatitis C without hepatic coma (H)      rifaximin (XIFAXAN) 550 MG TABS tablet Take 1 tablet (550 mg) by mouth 2 times daily  Qty: 60 tablet, Refills: 11    Associated Diagnoses: Hepatic encephalopathy (H)      ledipasvir-sofosbuvir (LEDIPASVIR-SOFOSBUVIR)  MG per tablet Take 1 tablet by mouth daily  Qty: 28 tablet, Refills: 2    Associated Diagnoses: Hepatitis C virus infection without hepatic coma, unspecified chronicity           Allergies   Allergies   Allergen Reactions     Blood Transfusion Related (Informational Only) Other (See Comments)     Patient has a history of a clinically significant antibody against RBC antigens.  A delay in compatible RBCs may occur.     Percocet [Oxycodone-Acetaminophen] Nausea and Vomiting     Acetaminophen Nausea     Iodine-131 Hives     Motrin [Ibuprofen Micronized] Nausea     Tylenol Nausea       Consultations This Hospital Stay   No consultations were requested during this  admission    Significant Results and Procedures   Procedures    None    Data   Results for orders placed or performed during the hospital encounter of 04/12/17   CT Abdomen Pelvis w Contrast    Narrative    CT ABDOMEN PELVIS W CONTRAST 4/12/2017 12:04 PM    HISTORY: Pain. History of cirrhosis and TIPS procedure. Prior  testicular cancer.    CONTRAST:  95 mL Isovue-370.    TECHNIQUE: CT of the abdomen and pelvis is performed with IV contrast.    Routine assessed structures include the liver, spleen, pancreas,  adrenal glands, and kidneys. Other assessed structures include the  retroperitoneum, abdominal aorta, visualized gastrointestinal tract,  and abdominal wall. Intrapelvic anatomy is also assessed.    Radiation dose for this scan is reduced using automated exposure  control, adjustment of the mA and/or kV according to patient size, or  iterative reconstruction technique.    COMPARISON: 9/20/2016.    FINDINGS:    Abdomen: A cirrhotic liver is again seen with no discrete focal mass.  There is evidence for a prior TIPS procedure. The vascular graft is  patent. Trace perihepatic fluid is present. Collateral vessels are  again identified related to the underlying portal hypertension.  Splenomegaly is again seen but measures slightly smaller compared to  the prior study. It is 16.1 cm in cranial-caudal dimension today  compared to 17.5 cm on the prior study. No focal splenic lesion is  demonstrated. Gallstones are again seen in the gallbladder. Modest  stool is present in the colon. No significantly dilated bowel loops  are demonstrated. Two fat-containing ventral hernias are again seen on  images 38 and 47. There is a stable 0.8 cm the left paraaortic lymph  node on image #53. This can be upper normal.    Pelvis:  No significant abnormality is demonstrated.      Impression    IMPRESSION:    1. Stable cirrhosis and portal hypertension.  2. No acute finding nor significant free peritoneal fluid  demonstrated.    JAELYN  MD FRANCOISE       History of Present Illness   (From H&P) This patient is a 61 year old  male with a significant past medical history of cirrhosis with encephalopathy who presents with AMS. He was seen here yesterday. It was found that he had stopped his lactulose 3 days ago. This was restarted. Patient is a limited historian here today. He denies any pain. He was brought by ambulance. He is confused and unable to provide hx at this time.    Hospital Course   Scott Casale was admitted on 4/13/2017.  The following problems were addressed during his hospitalization:    Acute hepatic encephalopathy  Due to not taking lactulose last few days due to insurance issues. Has ammonia of 115--was 86 on 4/5. Started home dose lactulose/ rifaximin, and after one night he was oriented but feeling weak with mild asterixis on exam. After a second night, patient felling back to baseline and has been up walking the unit independently.  Continue lactulose/rifaximin on discharge.  He has been given supply of lactulose for home use while he fixes his insurance issues.      Chronic hepatitis C without hepatic coma (H)    Followed by Dr. Higginbotham. Continue PTA Harvoni and ribavirin course which is almost done.      Thrombocytopenia (H)    Chronic. Stable     S/P TIPS (transjugular intrahepatic portosystemic shunt)    Had Discussed with GI (Dr. Higginbotham) last admission--they are not going to pursue TIPS revision as shunt is patent.      Cirrhosis of liver without ascites (H)    Due to ETOH and HEP-C. Continue diuretics.     Chronic Low Back Pain   Compensated. Continue PTA gabapentin.    Pending Results   Unresulted Labs Ordered in the Past 30 Days of this Admission     No orders found from 2/12/2017 to 4/14/2017.          Physical Exam   Temp:  [97.8  F (36.6  C)-98.4  F (36.9  C)] 97.9  F (36.6  C)  Pulse:  [74-82] 82  Resp:  [16] 16  BP: (109-128)/(46-87) 121/87  SpO2:  [98 %-100 %] 100 %  Vitals:    04/13/17 1602 04/13/17 1900   Weight:  90.7 kg (200 lb) 91 kg (200 lb 9.9 oz)       Constitutional: alert and oriented, NAD, brighter  CV: Regular  Respiratory: CTA bilaterally  GI: Soft, non-tender, moderate distention but not tense  Skin: Warm and dry    The discharge plan was discussed with the patient and he expressed understanding.    Total time on this discharge was 25 minutes.    James Julien MD  [facility] Medicine

## 2017-04-18 DIAGNOSIS — B18.2 CHRONIC HEPATITIS C WITHOUT HEPATIC COMA (H): Primary | Chronic | ICD-10-CM

## 2017-04-19 ENCOUNTER — TELEPHONE (OUTPATIENT)
Dept: FAMILY MEDICINE | Facility: CLINIC | Age: 62
End: 2017-04-19

## 2017-04-19 NOTE — TELEPHONE ENCOUNTER
S-(situation): Request for TO for delaying services for home care assessment until today.    B-(background): The patient discharged from [facility] on 4/15/17.     A-(assessment): This SN spoke with the patient on 4/17/17 to schedule a home care assessment, and he declined a visit until 4/19/17. This SN called on 4/17 and 4/18 to the patient's care team requesting a TO to delay the assessment until today, but no call back has been received.    R-(recommendations): Please reply to this message or call [facility] at [phone] with a telephone order. Thank you.

## 2017-04-21 ENCOUNTER — TELEPHONE (OUTPATIENT)
Dept: FAMILY MEDICINE | Facility: CLINIC | Age: 62
End: 2017-04-21

## 2017-04-21 NOTE — TELEPHONE ENCOUNTER
S:  Homecare assessment completed on 4-19-17.  B:  Recent hospitalization due to medication noncompliance.  A:  Client in need of skilled homecare services  R:  [facility] if you do not agree with the following plan: SN 1-3 times per week for medication education, medication compliance, and lab draws if needed. PT for strengthening and endurance r/t weakness and difficulty climbing stairs. OT for compression stocking assessment and sock aide. MSW for community resources due to pt has no food in home per his report, cannot afford medications, and cannot afford current housing/ seeking low income housing.  Diamond Moss LPN

## 2017-04-24 ENCOUNTER — TELEPHONE (OUTPATIENT)
Dept: GASTROENTEROLOGY | Facility: CLINIC | Age: 62
End: 2017-04-24

## 2017-04-24 DIAGNOSIS — K70.30 ALCOHOLIC CIRRHOSIS OF LIVER WITHOUT ASCITES (H): Primary | Chronic | ICD-10-CM

## 2017-04-24 NOTE — TELEPHONE ENCOUNTER
S-(situation): Patient admitted to [facility] on 4/19     B-(background): he was hospitalized to [facility] with altered mental status due to non compliance with meds     A-(assessment): Leonel thinks he needs weekly labs, nothing was ordered on discharge from [facility]    R-(recommendations): Please clarify what labs and when they are needed.

## 2017-04-24 NOTE — TELEPHONE ENCOUNTER
Per Dr. Higginbotham patient should get labs drawn every other week, BMP, CBC & LFT.  Orders entered.   LVM updating Letha Oneal RN.  Call back number given if needed.

## 2017-04-26 DIAGNOSIS — B18.2 CHRONIC HEPATITIS C WITHOUT HEPATIC COMA (H): ICD-10-CM

## 2017-04-26 DIAGNOSIS — K70.30 ALCOHOLIC CIRRHOSIS OF LIVER WITHOUT ASCITES (H): Chronic | ICD-10-CM

## 2017-04-26 DIAGNOSIS — K70.31 ALCOHOLIC CIRRHOSIS OF LIVER WITH ASCITES (H): Chronic | ICD-10-CM

## 2017-04-26 LAB
ALT SERPL W P-5'-P-CCNC: 28 U/L (ref 0–70)
AMMONIA PLAS-SCNC: 57 UMOL/L (ref 10–50)
ANION GAP SERPL CALCULATED.3IONS-SCNC: 9 MMOL/L (ref 3–14)
BUN SERPL-MCNC: 22 MG/DL (ref 7–30)
CALCIUM SERPL-MCNC: 8.8 MG/DL (ref 8.5–10.1)
CHLORIDE SERPL-SCNC: 108 MMOL/L (ref 94–109)
CO2 SERPL-SCNC: 26 MMOL/L (ref 20–32)
CREAT SERPL-MCNC: 1.38 MG/DL (ref 0.66–1.25)
ERYTHROCYTE [DISTWIDTH] IN BLOOD BY AUTOMATED COUNT: 13.7 % (ref 10–15)
GFR SERPL CREATININE-BSD FRML MDRD: 52 ML/MIN/1.7M2
GLUCOSE SERPL-MCNC: 82 MG/DL (ref 70–99)
HCT VFR BLD AUTO: 34.9 % (ref 40–53)
HGB BLD-MCNC: 12.3 G/DL (ref 13.3–17.7)
MCH RBC QN AUTO: 38.1 PG (ref 26.5–33)
MCHC RBC AUTO-ENTMCNC: 35.2 G/DL (ref 31.5–36.5)
MCV RBC AUTO: 108 FL (ref 78–100)
PLATELET # BLD AUTO: 58 10E9/L (ref 150–450)
POTASSIUM SERPL-SCNC: 4 MMOL/L (ref 3.4–5.3)
RBC # BLD AUTO: 3.23 10E12/L (ref 4.4–5.9)
SODIUM SERPL-SCNC: 143 MMOL/L (ref 133–144)
WBC # BLD AUTO: 4.7 10E9/L (ref 4–11)

## 2017-04-26 PROCEDURE — 36415 COLL VENOUS BLD VENIPUNCTURE: CPT | Performed by: INTERNAL MEDICINE

## 2017-04-26 PROCEDURE — 85027 COMPLETE CBC AUTOMATED: CPT | Performed by: INTERNAL MEDICINE

## 2017-04-26 PROCEDURE — 82140 ASSAY OF AMMONIA: CPT | Performed by: INTERNAL MEDICINE

## 2017-04-26 PROCEDURE — 80048 BASIC METABOLIC PNL TOTAL CA: CPT | Performed by: INTERNAL MEDICINE

## 2017-04-26 PROCEDURE — 87522 HEPATITIS C REVRS TRNSCRPJ: CPT | Performed by: INTERNAL MEDICINE

## 2017-04-26 PROCEDURE — 84460 ALANINE AMINO (ALT) (SGPT): CPT | Performed by: INTERNAL MEDICINE

## 2017-04-27 LAB
ALBUMIN SERPL-MCNC: 2.7 G/DL (ref 3.4–5)
ALP SERPL-CCNC: 69 U/L (ref 40–150)
ALT SERPL W P-5'-P-CCNC: 28 U/L (ref 0–70)
ANION GAP SERPL CALCULATED.3IONS-SCNC: 6 MMOL/L (ref 3–14)
AST SERPL W P-5'-P-CCNC: 47 U/L (ref 0–45)
BILIRUB DIRECT SERPL-MCNC: 0.6 MG/DL (ref 0–0.2)
BILIRUB SERPL-MCNC: 2.7 MG/DL (ref 0.2–1.3)
BUN SERPL-MCNC: 22 MG/DL (ref 7–30)
CALCIUM SERPL-MCNC: 8.6 MG/DL (ref 8.5–10.1)
CHLORIDE SERPL-SCNC: 106 MMOL/L (ref 94–109)
CO2 SERPL-SCNC: 27 MMOL/L (ref 20–32)
CREAT SERPL-MCNC: 1.57 MG/DL (ref 0.66–1.25)
ERYTHROCYTE [DISTWIDTH] IN BLOOD BY AUTOMATED COUNT: 14 % (ref 10–15)
GFR SERPL CREATININE-BSD FRML MDRD: 45 ML/MIN/1.7M2
GLUCOSE SERPL-MCNC: 106 MG/DL (ref 70–99)
HCT VFR BLD AUTO: 35.8 % (ref 40–53)
HCV RNA SERPL NAA+PROBE-ACNC: NORMAL [IU]/ML
HCV RNA SERPL NAA+PROBE-LOG IU: NORMAL LOG IU/ML
HGB BLD-MCNC: 11.8 G/DL (ref 13.3–17.7)
MCH RBC QN AUTO: 35.4 PG (ref 26.5–33)
MCHC RBC AUTO-ENTMCNC: 33 G/DL (ref 31.5–36.5)
MCV RBC AUTO: 108 FL (ref 78–100)
PLATELET # BLD AUTO: 65 10E9/L (ref 150–450)
POTASSIUM SERPL-SCNC: 3.8 MMOL/L (ref 3.4–5.3)
PROT SERPL-MCNC: 7 G/DL (ref 6.8–8.8)
RBC # BLD AUTO: 3.33 10E12/L (ref 4.4–5.9)
SODIUM SERPL-SCNC: 139 MMOL/L (ref 133–144)
WBC # BLD AUTO: 4.6 10E9/L (ref 4–11)

## 2017-04-27 PROCEDURE — 85027 COMPLETE CBC AUTOMATED: CPT | Performed by: INTERNAL MEDICINE

## 2017-04-27 PROCEDURE — 80076 HEPATIC FUNCTION PANEL: CPT | Performed by: INTERNAL MEDICINE

## 2017-04-27 PROCEDURE — 80048 BASIC METABOLIC PNL TOTAL CA: CPT | Performed by: INTERNAL MEDICINE

## 2017-04-28 ENCOUNTER — APPOINTMENT (OUTPATIENT)
Dept: ULTRASOUND IMAGING | Facility: CLINIC | Age: 62
End: 2017-04-28
Attending: EMERGENCY MEDICINE
Payer: MEDICARE

## 2017-04-28 ENCOUNTER — HOSPITAL ENCOUNTER (EMERGENCY)
Facility: CLINIC | Age: 62
Discharge: HOME | End: 2017-04-28
Attending: EMERGENCY MEDICINE | Admitting: EMERGENCY MEDICINE
Payer: MEDICARE

## 2017-04-28 VITALS
BODY MASS INDEX: 27.2 KG/M2 | OXYGEN SATURATION: 99 % | RESPIRATION RATE: 16 BRPM | TEMPERATURE: 97.9 F | DIASTOLIC BLOOD PRESSURE: 45 MMHG | WEIGHT: 195 LBS | SYSTOLIC BLOOD PRESSURE: 102 MMHG

## 2017-04-28 DIAGNOSIS — K80.50 CALCULUS OF BILE DUCT WITHOUT CHOLECYSTITIS AND WITHOUT OBSTRUCTION: ICD-10-CM

## 2017-04-28 LAB
ALBUMIN SERPL-MCNC: 2.4 G/DL (ref 3.4–5)
ALP SERPL-CCNC: 65 U/L (ref 40–150)
ALT SERPL W P-5'-P-CCNC: 26 U/L (ref 0–70)
ANION GAP SERPL CALCULATED.3IONS-SCNC: 7 MMOL/L (ref 3–14)
AST SERPL W P-5'-P-CCNC: 41 U/L (ref 0–45)
BASOPHILS # BLD AUTO: 0 10E9/L (ref 0–0.2)
BASOPHILS NFR BLD AUTO: 0.2 %
BILIRUB SERPL-MCNC: 2.5 MG/DL (ref 0.2–1.3)
BUN SERPL-MCNC: 20 MG/DL (ref 7–30)
CALCIUM SERPL-MCNC: 8.2 MG/DL (ref 8.5–10.1)
CHLORIDE SERPL-SCNC: 106 MMOL/L (ref 94–109)
CO2 SERPL-SCNC: 26 MMOL/L (ref 20–32)
CREAT SERPL-MCNC: 1.45 MG/DL (ref 0.66–1.25)
DIFFERENTIAL METHOD BLD: ABNORMAL
EOSINOPHIL # BLD AUTO: 0.1 10E9/L (ref 0–0.7)
EOSINOPHIL NFR BLD AUTO: 1.9 %
ERYTHROCYTE [DISTWIDTH] IN BLOOD BY AUTOMATED COUNT: 13.5 % (ref 10–15)
GFR SERPL CREATININE-BSD FRML MDRD: 49 ML/MIN/1.7M2
GLUCOSE SERPL-MCNC: 90 MG/DL (ref 70–99)
HCT VFR BLD AUTO: 34.1 % (ref 40–53)
HGB BLD-MCNC: 11.4 G/DL (ref 13.3–17.7)
IMM GRANULOCYTES # BLD: 0 10E9/L (ref 0–0.4)
IMM GRANULOCYTES NFR BLD: 0.2 %
LIPASE SERPL-CCNC: 409 U/L (ref 73–393)
LYMPHOCYTES # BLD AUTO: 0.6 10E9/L (ref 0.8–5.3)
LYMPHOCYTES NFR BLD AUTO: 14.6 %
MCH RBC QN AUTO: 35.3 PG (ref 26.5–33)
MCHC RBC AUTO-ENTMCNC: 33.4 G/DL (ref 31.5–36.5)
MCV RBC AUTO: 106 FL (ref 78–100)
MONOCYTES # BLD AUTO: 0.8 10E9/L (ref 0–1.3)
MONOCYTES NFR BLD AUTO: 18.8 %
NEUTROPHILS # BLD AUTO: 2.8 10E9/L (ref 1.6–8.3)
NEUTROPHILS NFR BLD AUTO: 64.3 %
PLATELET # BLD AUTO: 57 10E9/L (ref 150–450)
POTASSIUM SERPL-SCNC: 3.9 MMOL/L (ref 3.4–5.3)
PROT SERPL-MCNC: 6.5 G/DL (ref 6.8–8.8)
RBC # BLD AUTO: 3.23 10E12/L (ref 4.4–5.9)
SODIUM SERPL-SCNC: 139 MMOL/L (ref 133–144)
WBC # BLD AUTO: 4.3 10E9/L (ref 4–11)

## 2017-04-28 PROCEDURE — 83690 ASSAY OF LIPASE: CPT | Performed by: EMERGENCY MEDICINE

## 2017-04-28 PROCEDURE — 99284 EMERGENCY DEPT VISIT MOD MDM: CPT | Performed by: EMERGENCY MEDICINE

## 2017-04-28 PROCEDURE — 80053 COMPREHEN METABOLIC PANEL: CPT | Performed by: EMERGENCY MEDICINE

## 2017-04-28 PROCEDURE — 25000125 ZZHC RX 250: Performed by: EMERGENCY MEDICINE

## 2017-04-28 PROCEDURE — 85025 COMPLETE CBC W/AUTO DIFF WBC: CPT | Performed by: EMERGENCY MEDICINE

## 2017-04-28 PROCEDURE — 96374 THER/PROPH/DIAG INJ IV PUSH: CPT

## 2017-04-28 PROCEDURE — 99285 EMERGENCY DEPT VISIT HI MDM: CPT | Mod: 25

## 2017-04-28 PROCEDURE — 96376 TX/PRO/DX INJ SAME DRUG ADON: CPT

## 2017-04-28 PROCEDURE — 76705 ECHO EXAM OF ABDOMEN: CPT

## 2017-04-28 PROCEDURE — 25000128 H RX IP 250 OP 636: Performed by: EMERGENCY MEDICINE

## 2017-04-28 RX ORDER — ONDANSETRON 4 MG/1
4 TABLET, ORALLY DISINTEGRATING ORAL EVERY 6 HOURS PRN
Status: DISCONTINUED | OUTPATIENT
Start: 2017-04-28 | End: 2017-04-28 | Stop reason: HOSPADM

## 2017-04-28 RX ORDER — HYDROMORPHONE HYDROCHLORIDE 1 MG/ML
0.5 INJECTION, SOLUTION INTRAMUSCULAR; INTRAVENOUS; SUBCUTANEOUS
Status: COMPLETED | OUTPATIENT
Start: 2017-04-28 | End: 2017-04-28

## 2017-04-28 RX ORDER — HYDROCODONE BITARTRATE AND ACETAMINOPHEN 5; 325 MG/1; MG/1
1 TABLET ORAL EVERY 4 HOURS PRN
Qty: 20 TABLET | Refills: 0 | Status: SHIPPED | OUTPATIENT
Start: 2017-04-28 | End: 2017-05-03

## 2017-04-28 RX ADMIN — HYDROMORPHONE HYDROCHLORIDE 0.5 MG: 1 INJECTION, SOLUTION INTRAMUSCULAR; INTRAVENOUS; SUBCUTANEOUS at 17:38

## 2017-04-28 RX ADMIN — HYDROMORPHONE HYDROCHLORIDE 0.5 MG: 1 INJECTION, SOLUTION INTRAMUSCULAR; INTRAVENOUS; SUBCUTANEOUS at 16:13

## 2017-04-28 RX ADMIN — ONDANSETRON 4 MG: 4 TABLET, ORALLY DISINTEGRATING ORAL at 18:00

## 2017-04-28 ASSESSMENT — ENCOUNTER SYMPTOMS
CONSTIPATION: 1
RESPIRATORY NEGATIVE: 1
CARDIOVASCULAR NEGATIVE: 1
UNEXPECTED WEIGHT CHANGE: 0
FATIGUE: 1
WEAKNESS: 1
BRUISES/BLEEDS EASILY: 0
NAUSEA: 1
ABDOMINAL PAIN: 1

## 2017-04-28 NOTE — ED AVS SNAPSHOT
[facility] Emergency Department    [address] [address]    Phone:  [phone]    Fax:  [phone]                                       Scott Casale   MRN: [id number]    Department:  [facility] Emergency Department   Date of Visit:  4/28/2017           Patient Information     Date Of Birth          1955        Your diagnoses for this visit were:     Calculus of bile duct without cholecystitis and without obstruction        You were seen by Mario Yuan DO.      Follow-up Information     Schedule an appointment as soon as possible for a visit with Franky Peoples MD.    Specialty:  Surgery    Contact information:    [facility]  [address] [address]  [phone]          Discharge Instructions       Maintain low fat diet  Call for surgical consultation- Franky Peoples MD  Return to the emergency room for:  Fever  Progressive worsening abdominal pain  Jaundice    Future Appointments        Provider Department Dept Phone Center    6/15/2017 8:30 AM Lab [facility] Lab [phone] [facility]    6/15/2017 9:00 AM [facility] IMAGING CENTER ULTRASOUND ROOM 3 [facility] Imaging Center -[phone] [facility]    6/15/2017 10:00 AM Warren Higginbotham MD [facility] Hepatology [phone] [facility]      24 Hour Appointment Hotline       To make an appointment at any [facility], call [phone] ([phone]). If you don't have a family doctor or clinic, we will help you find one. [facility] clinics are conveniently located to serve the needs of you and your family.             Review of your medicines      START taking        Dose / Directions Last dose taken    HYDROcodone-acetaminophen 5-325 MG per tablet   Commonly known as:  NORCO   Dose:  1 tablet   Quantity:  20 tablet        Take 1 tablet by mouth every 4 hours as needed for moderate to severe pain   Refills:  0          Our records show that you are taking the medicines listed below. If these are incorrect,  please call your family doctor or clinic.        Dose / Directions Last dose taken    furosemide 40 MG tablet   Commonly known as:  LASIX   Dose:  40 mg   Quantity:  60 tablet        Take 1 tablet (40 mg) by mouth 2 times daily   Refills:  1        gabapentin 100 MG capsule   Commonly known as:  NEURONTIN   Dose:  200 mg   Quantity:  60 capsule        Take 2 capsules (200 mg) by mouth At Bedtime   Refills:  3        lactulose 10 GM/15ML solution   Commonly known as:  CHRONULAC   Dose:  45 mL   Quantity:  4000 mL        Take 45 mLs by mouth 3 times daily   Refills:  0        ledipasvir-sofosbuvir  MG per tablet   Commonly known as:  ledipasvir-sofosbuvir   Dose:  1 tablet   Quantity:  28 tablet        Take 1 tablet by mouth daily   Refills:  2        ondansetron 4 MG tablet   Commonly known as:  ZOFRAN   Dose:  4 mg   Quantity:  18 tablet        Take 1 tablet (4 mg) by mouth every 8 hours as needed for nausea   Refills:  0        potassium chloride SA 20 MEQ CR tablet   Commonly known as:  potassium chloride   Dose:  20 mEq   Quantity:  30 tablet        Take 1 tablet (20 mEq) by mouth daily Take only while taking your twice a day dose of Lasix then stop. Have your potassium levels checked in 2-3 days.   Refills:  1        ribavirin 200 MG Tabs   Quantity:  60 tablet        Take one tablet (200mg) by mouth in the AM with food, and one tablet (200mg) in the PM with food.   Refills:  1        rifaximin 550 MG Tabs tablet   Commonly known as:  XIFAXAN   Dose:  550 mg   Quantity:  60 tablet        Take 1 tablet (550 mg) by mouth 2 times daily   Refills:  11        spironolactone 50 MG tablet   Commonly known as:  ALDACTONE   Dose:  50 mg   Quantity:  30 tablet        Take 1 tablet (50 mg) by mouth daily   Refills:  3        vitamin D 65548 UNIT capsule   Commonly known as:  ERGOCALCIFEROL   Dose:  63403 Units   Quantity:  9 capsule        Take 1 capsule (50,000 Units) by mouth in the morning, Mon and Thur Twice  weekly every Mon and Thurs   Refills:  0                Prescriptions were sent or printed at these locations (1 Prescription)                   Other Prescriptions                Printed at Department/Unit printer (1 of 1)         HYDROcodone-acetaminophen (NORCO) 5-325 MG per tablet                Procedures and tests performed during your visit     CBC with platelets differential    Comprehensive metabolic panel    Lipase    US Abdomen Limited      Orders Needing Specimen Collection     None      Pending Results     No orders found from 4/26/2017 to 4/29/2017.            Pending Culture Results     No orders found from 4/26/2017 to 4/29/2017.            Test Results From Your Hospital Stay        4/28/2017  3:41 PM      Component Results     Component Value Ref Range & Units Status    WBC 4.3 4.0 - 11.0 10e9/L Final    RBC Count 3.23 (L) 4.4 - 5.9 10e12/L Final    Hemoglobin 11.4 (L) 13.3 - 17.7 g/dL Final    Hematocrit 34.1 (L) 40.0 - 53.0 % Final     (H) 78 - 100 fl Final    MCH 35.3 (H) 26.5 - 33.0 pg Final    MCHC 33.4 31.5 - 36.5 g/dL Final    RDW 13.5 10.0 - 15.0 % Final    Platelet Count 57 (L) 150 - 450 10e9/L Final    Diff Method Automated Method  Final    % Neutrophils 64.3 % Final    % Lymphocytes 14.6 % Final    % Monocytes 18.8 % Final    % Eosinophils 1.9 % Final    % Basophils 0.2 % Final    % Immature Granulocytes 0.2 % Final    Absolute Neutrophil 2.8 1.6 - 8.3 10e9/L Final    Absolute Lymphocytes 0.6 (L) 0.8 - 5.3 10e9/L Final    Absolute Monocytes 0.8 0.0 - 1.3 10e9/L Final    Absolute Eosinophils 0.1 0.0 - 0.7 10e9/L Final    Absolute Basophils 0.0 0.0 - 0.2 10e9/L Final    Abs Immature Granulocytes 0.0 0 - 0.4 10e9/L Final         4/28/2017  4:08 PM      Component Results     Component Value Ref Range & Units Status    Sodium 139 133 - 144 mmol/L Final    Potassium 3.9 3.4 - 5.3 mmol/L Final    Chloride 106 94 - 109 mmol/L Final    Carbon Dioxide 26 20 - 32 mmol/L Final    Anion Gap 7  3 - 14 mmol/L Final    Glucose 90 70 - 99 mg/dL Final    Urea Nitrogen 20 7 - 30 mg/dL Final    Creatinine 1.45 (H) 0.66 - 1.25 mg/dL Final    GFR Estimate 49 (L) >60 mL/min/1.7m2 Final    Non  GFR Calc    GFR Estimate If Black 60 (L) >60 mL/min/1.7m2 Final    African American GFR Calc    Calcium 8.2 (L) 8.5 - 10.1 mg/dL Final    Bilirubin Total 2.5 (H) 0.2 - 1.3 mg/dL Final    Albumin 2.4 (L) 3.4 - 5.0 g/dL Final    Protein Total 6.5 (L) 6.8 - 8.8 g/dL Final    Alkaline Phosphatase 65 40 - 150 U/L Final    ALT 26 0 - 70 U/L Final    AST 41 0 - 45 U/L Final         4/28/2017  4:05 PM      Component Results     Component Value Ref Range & Units Status    Lipase 409 (H) 73 - 393 U/L Final         4/28/2017  5:17 PM      Narrative     US ABDOMEN LIMITED 4/28/2017 4:48 PM     HISTORY: Right upper quadrant pain     FINDINGS: Liver is normal in echogenicity without focal lesions. A  patent TIPS shunt is visualized. The gallbladder contains multiple  gallstones, but there is no significant gallbladder wall thickening.  Trace amount of pericholecystic fluid is noted which is nonspecific in  the setting of ascites and chronic liver disease. Common bile duct is  normal in diameter. Pancreas is normal where visualized. Examination  of the right kidney is unremarkable.        Impression     IMPRESSION: Cholelithiasis without ultrasound findings to suggest  cholecystitis. No bile duct dilatation. Patent TIPS shunt.    NORM POLLACK MD                Thank you for choosing [facility]       Thank you for choosing [facility] for your care. Our goal is always to provide you with excellent care. Hearing back from our patients is one way we can continue to improve our services. Please take a few minutes to complete the written survey that you may receive in the mail after you visit with us. Thank you!        [facility] Information     [employer] gives you secure access to your electronic health record. If you see a primary care  provider, you can also send messages to your care team and make appointments. If you have questions, please call your primary care clinic.  If you do not have a primary care provider, please call [phone] and they will assist you.        Care EveryWhere ID     This is your Care EveryWhere ID. This could be used by other organizations to access your [facility] medical records  [id number]        After Visit Summary       This is your record. Keep this with you and show to your community pharmacist(s) and doctor(s) at your next visit.

## 2017-04-28 NOTE — ED PROVIDER NOTES
History     Chief Complaint   Patient presents with     Abdominal Pain     HPI      Scott Casale is a 61 year old male with 2nd past medical history for cirrhosis secondary to alcoholic liver disease and chronic hepatitis C.  Status post TIPS procedure for portal hypertension.  Recent admission and early April for hepatic encephalopathy.  CT scan completed April 12 identified proper positioning of the portal stent.  Patient was noted to have numerous gallstones.   Patient presents with acute onset of right upper quadrant pain sleeping.  Cramping, colicky type pain as described.  Rates pain 7/10 intensity.  Nausea with no emesis.  Also concerned about constipation and has been taking lactulose.  Denies fever or chills.  As noted no increased jaundice.  No change in stool or urine appearance.  Abdominal pain stays localized to the right upper quadrant and does not radiate.      I have reviewed the Medications, Allergies, Past Medical and Surgical History, and Social History in the Epic system.  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 Negative for unexpected weight change.   Respiratory: Negative.    Cardiovascular: Negative.    Gastrointestinal: Positive for abdominal pain, constipation and nausea.   Genitourinary:  Negative.    Neurological: Positive for weakness.   Hematological: Does not bruise/bleed easily.       Physical Exam   BP: 113/68  Heart Rate: 76  Temp: 97.9  F (36.6  C)  Resp: 16  Weight: 88.5 kg (195 lb)  SpO2: 98 %    Physical Exam   Constitutional: He is oriented to person, place, and time. He appears distressed.   HENT:   Head: Normocephalic.   Eyes: Pupils are equal, round, and reactive to light. Scleral icterus (Slight) is present.   Neck: Normal range of motion.   Cardiovascular: Normal rate and normal heart sounds.    Pulmonary/Chest: Effort normal and breath sounds normal. No respiratory distress.   Abdominal:   Tenderness localized to the right upper quadrant.  No guarding is present.  Positive Ellis sign.  Abdomen is moderately distended.  Old Midline abdominal incision   Musculoskeletal: He exhibits no edema.   Neurological: He is alert and oriented to person, place, and time.   Skin:   Slight jaundice -skin has a bronze color   Nursing note and vitals reviewed.      ED Course     ED Course     Procedures              Results for orders placed or performed during the hospital encounter of 04/28/17   US Abdomen Limited    Narrative    US ABDOMEN LIMITED 4/28/2017 4:48 PM     HISTORY: Right upper quadrant pain     FINDINGS: Liver is normal in echogenicity without focal lesions. A  patent TIPS shunt is visualized. The gallbladder contains multiple  gallstones, but there is no significant gallbladder wall thickening.  Trace amount of pericholecystic fluid is noted which is nonspecific in  the setting of ascites and chronic liver disease. Common bile duct is  normal in diameter. Pancreas is normal where visualized. Examination  of the right kidney is unremarkable.      Impression    IMPRESSION: Cholelithiasis without ultrasound findings to suggest  cholecystitis. No bile duct dilatation. Patent TIPS shunt.    NORM POLLACK MD   CBC with platelets differential   Result Value Ref Range    WBC 4.3 4.0 - 11.0  10e9/L    RBC Count 3.23 (L) 4.4 - 5.9 10e12/L    Hemoglobin 11.4 (L) 13.3 - 17.7 g/dL    Hematocrit 34.1 (L) 40.0 - 53.0 %     (H) 78 - 100 fl    MCH 35.3 (H) 26.5 - 33.0 pg    MCHC 33.4 31.5 - 36.5 g/dL    RDW 13.5 10.0 - 15.0 %    Platelet Count 57 (L) 150 - 450 10e9/L    Diff Method Automated Method     % Neutrophils 64.3 %    % Lymphocytes 14.6 %    % Monocytes 18.8 %    % Eosinophils 1.9 %    % Basophils 0.2 %    % Immature Granulocytes 0.2 %    Absolute Neutrophil 2.8 1.6 - 8.3 10e9/L    Absolute Lymphocytes 0.6 (L) 0.8 - 5.3 10e9/L    Absolute Monocytes 0.8 0.0 - 1.3 10e9/L    Absolute Eosinophils 0.1 0.0 - 0.7 10e9/L    Absolute Basophils 0.0 0.0 - 0.2 10e9/L    Abs Immature Granulocytes 0.0 0 - 0.4 10e9/L   Comprehensive metabolic panel   Result Value Ref Range    Sodium 139 133 - 144 mmol/L    Potassium 3.9 3.4 - 5.3 mmol/L    Chloride 106 94 - 109 mmol/L    Carbon Dioxide 26 20 - 32 mmol/L    Anion Gap 7 3 - 14 mmol/L    Glucose 90 70 - 99 mg/dL    Urea Nitrogen 20 7 - 30 mg/dL    Creatinine 1.45 (H) 0.66 - 1.25 mg/dL    GFR Estimate 49 (L) >60 mL/min/1.7m2    GFR Estimate If Black 60 (L) >60 mL/min/1.7m2    Calcium 8.2 (L) 8.5 - 10.1 mg/dL    Bilirubin Total 2.5 (H) 0.2 - 1.3 mg/dL    Albumin 2.4 (L) 3.4 - 5.0 g/dL    Protein Total 6.5 (L) 6.8 - 8.8 g/dL    Alkaline Phosphatase 65 40 - 150 U/L    ALT 26 0 - 70 U/L    AST 41 0 - 45 U/L   Lipase   Result Value Ref Range    Lipase 409 (H) 73 - 393 U/L       Assessments & Plan (with Medical Decision Making)  61-year-old male with known history for cirrhosis secondary to chronic hepatitis C and alcoholic liver disease.  Status post TIPS.  Presents with acute right upper quadrant abdominal pain.  Onset sleeping this last evening.  No fever.  Rates discomfort 7/10 intensity.  Examination notes pain localized to the right upper quadrant.  Guarding with positive Ellis sign.    CT scan April 12, 2017 demonstrated portal stent in proper position.  Multiple  gallstones noted .  Differential diagnosis: Cholelithiasis with biliary colic , cholecystitis , ascending cholangitis , hepatic pain from cirrhosis , dysfunctional displaced TIPS .  5:19 PM  Confirm cholelithiasis.  No signs for acute cholecystitis.  Referred to general surgery for surgical consultation.  Advised patient to maintain low-fat diet .     I have reviewed the nursing notes.    I have reviewed the findings, diagnosis, plan and need for follow up with the patient.    New Prescriptions    No medications on file       Final diagnoses:   Calculus of bile duct without cholecystitis and without obstruction       4/28/2017   [facility] EMERGENCY DEPARTMENT     Mario Yuan,   04/28/17 1727

## 2017-04-28 NOTE — DISCHARGE INSTRUCTIONS
Maintain low fat diet  Call for surgical consultation- Franky Peoples MD  Return to the emergency room for:  Fever  Progressive worsening abdominal pain  Jaundice

## 2017-04-28 NOTE — ED AVS SNAPSHOT
[facility]    [address] [address]    Phone:  [phone]    Fax:  [phone]                                       Scott Casale   MRN: [id number]    Department:  [facility]   Date of Visit:  4/28/2017           After Visit Summary Signature Page     I have received my discharge instructions, and my questions have been answered. I have discussed any challenges I see with this plan with the nurse or doctor.    ..........................................................................................................................................  Patient/Patient Representative Signature      ..........................................................................................................................................  Patient Representative Print Name and Relationship to Patient    ..................................................               ................................................  Date                                            Time    ..........................................................................................................................................  Reviewed by Signature/Title    ...................................................              ..............................................  Date                                                            Time

## 2017-04-28 NOTE — ED NOTES
Up this am with RUQ pain, thinks it might be his liver maybe his shunt, home nurse out yesterday for lab work,

## 2017-04-28 NOTE — ED NOTES
Pt presents to the ED with complaints of RUQ abdominal pain since he woke up this am. Pt had tips procedure done about 3 months ago, states the pain feels like it is in the same location as that shunt. Pt has (never) had pain like this before. Pain is constant, nothing makes it better or worse.Pt has been nauseated and has NOT vomited. Pt tried zofran with some relief. Last BM yesterday, normal for him (loose). No change in bladder.  NO blood in urine or stool.

## 2017-05-03 ENCOUNTER — OFFICE VISIT (OUTPATIENT)
Dept: SURGERY | Facility: CLINIC | Age: 62
End: 2017-05-03
Payer: MEDICARE

## 2017-05-03 VITALS
HEART RATE: 79 BPM | WEIGHT: 204 LBS | TEMPERATURE: 97.8 F | SYSTOLIC BLOOD PRESSURE: 123 MMHG | HEIGHT: 71 IN | BODY MASS INDEX: 28.56 KG/M2 | DIASTOLIC BLOOD PRESSURE: 69 MMHG

## 2017-05-03 DIAGNOSIS — K70.30 ALCOHOLIC CIRRHOSIS OF LIVER WITHOUT ASCITES (H): ICD-10-CM

## 2017-05-03 DIAGNOSIS — K80.20 GALLSTONES: Primary | ICD-10-CM

## 2017-05-03 PROCEDURE — 99207 ZZC NO CHARGE LOS: CPT | Performed by: SURGERY

## 2017-05-03 ASSESSMENT — PAIN SCALES - GENERAL: PAINLEVEL: MILD PAIN (3)

## 2017-05-03 NOTE — LETTER
SURGERYPLANNING/SCHEDULING WORKSHEET                              [facility]  [address]  [facility] [address] [phone]                          Scott Casale                :  1955  MRN:  [id number]  Phone: [phone] (home)     Same Day Surgery   Surgeon: Franky Peoples MD  Diagnosis:   Gallstones  Allergies:  Blood transfusion related (informational only); Percocet [oxycodone-acetaminophen]; Acetaminophen; Iodine-131; Motrin [ibuprofen micronized]; and Tylenol   A preoperative evaluation and physical will be done by this office.   ====================================================  Surgical Procedure:  General Surgery:lap jose with cholangiogram   Length of Procedure:  1 hour  Type of anesthesia:  General  The proposed surgical procedure is considered LOW risk.  Date of Procedure:________________    Time: _____________________       Special Equipment: None  Informed Consent Obtained and Signed:  NO  ====================================================  Instructions to Same Day Surgery Staff  Pneumoboots  Preop Antibiotic:  Ancef 2 gm IV  pre-op within one hour prior to incision For > 80 kg  Preop Pain Meds:  None  Preop Orders:  Routine Standing Orders.  ====================================================  Instructions to the patient:  Preop physical exam scheduled (within 30 days or 7 days prior) with:   ____________________  Clinic:  ____________________                                         Date______________Time_________________________  Come to the hospital at: ________________________________  HOME PREPARATION:   Shower with Hibiclens the night before or the morning of surgery, gently cleaning skin from neck to feet  Bathe and brush teeth the morning of surgery.  Take medications with a sip of water the morning of surgery:   Check with DrAlfonso if taking insulin.  May have  a light meal, toast and clear  liquids, up to 8 hrs before surgery  May have clear liquids (liquids one can read through) up to 4 hrs before surgery  NOTHING after 4 hrs before surgery  Stop aspirin 7-10 days before surgery  Stop NSAIDS (Ibuproven, Naproxen, etc) 5 days before surgery  Stop Plavix 7-10 days before surgery      Franky Peoples MD    5/3/2017  This form was electronically signed at chart closure                                                                        Chart Copy

## 2017-05-03 NOTE — NURSING NOTE
"Initial /69 (BP Location: Right arm, Patient Position: Chair, Cuff Size: Adult Regular)  Pulse 79  Temp 97.8  F (36.6  C) (Oral)  Ht 1.803 m (5' 11\")  Wt 92.5 kg (204 lb)  BMI 28.45 kg/m2 Estimated body mass index is 28.45 kg/(m^2) as calculated from the following:    Height as of this encounter: 1.803 m (5' 11\").    Weight as of this encounter: 92.5 kg (204 lb). .    Izabela Oneal CMA    "

## 2017-05-03 NOTE — MR AVS SNAPSHOT
After Visit Summary   5/3/2017    Scott Casale    MRN: [id number]           Patient Information     Date Of Birth          1955        Visit Information        Provider Department      5/3/2017 9:00 AM Franky Peoples MD [facility]        Today's Diagnoses     Gallstones    -  1      Care Instructions    Per Physician's instructions          Follow-ups after your visit        Additional Services     GENERAL SURG ADULT REFERRAL       Your provider has referred you to: [facility]: General Surgery Clinic [facility] [phone]   [email or web address]    Please be aware that coverage of these services is subject to the terms and limitations of your health insurance plan.  Call member services at your health plan with any benefit or coverage questions.      Please bring the following with you to your appointment:    (1) Any X-Rays, CTs or MRIs which have been performed.  Contact the facility where they were done to arrange for  prior to your scheduled appointment.   (2) List of current medications   (3) This referral request   (4) Any documents/labs given to you for this referral                  Your next 10 appointments already scheduled     May 10, 2017  9:15 AM CDT   LAB with [facility] ([facility])    [address] [address] [phone]           Patient must bring picture ID.  Patient should be prepared to give a urine specimen  Please do not eat 10-12 hours before your appointment if you are coming in fasting for labs on lipids, cholesterol, or glucose (sugar).  Pregnant women should follow their Care Team instructions. Water with medications is okay. Do not drink coffee or other fluids.   If you have concerns about taking  your medications, please ask at office or if scheduling via [employer], send a message by clicking on Secure Messaging, Message Your Care Team.            Buzz  15, 2017  8:30 AM CDT   Lab with  LAB   [facility] Lab ([facility])    [address] [place] [id number] [phone]            Buzz 15, 2017  9:00 AM CDT   US ABDOMEN COMPLETE with UCUS3   [facility] US ([facility])    [address] [place] [id number] [phone]           Please bring a list of your medicines (including vitamins, minerals and over-the-counter drugs). Also, tell your doctor about any allergies you may have. Wear comfortable clothes and leave your valuables at home.  Adults: No eating or drinking for 8 hours before the exam. You may take medicine with a small sip of water.  Children: - Children 6+ years: No food or drink for 6 hours before exam. - Children 1-5 years: No food or drink for 4 hours before exam. - Infants, breast-fed: may have breast milk up to 2 hours before exam. - Infants, formula: may have bottle until 4 hours before exam.  Please call the Imaging Department at your exam site with any questions.            Buzz 15, 2017 10:00 AM CDT   (Arrive by 9:45 AM)   Return General Liver with Warren Higginbotham MD   [facility] Hepatology ([facility])    [address] [place] [id number] [phone]              Future tests that were ordered for you today     Open Future Orders        Priority Expected Expires Ordered    US Abd Cmpl Abd/Pelvis Duplex Cmpl Routine  5/2/2018 10/4/2016            Who to contact     If you have questions or need follow up information about today's clinic visit or your schedule please contact [facility] directly at [phone].  Normal or non-critical lab and imaging results will be communicated to you by MyChart, letter or phone within 4 business days after the clinic has received the results. If you do not hear from us within 7 days, please contact the clinic through MyChart or phone. If  "you have a critical or abnormal lab result, we will notify you by phone as soon as possible.  Submit refill requests through [employer] or call your pharmacy and they will forward the refill request to us. Please allow 3 business days for your refill to be completed.          Additional Information About Your Visit        [employer] Information     [employer] gives you secure access to your electronic health record. If you see a primary care provider, you can also send messages to your care team and make appointments. If you have questions, please call your primary care clinic.  If you do not have a primary care provider, please call [phone] and they will assist you.        Care EveryWhere ID     This is your Care EveryWhere ID. This could be used by other organizations to access your [facility] medical records  [id number]        Your Vitals Were     Pulse Temperature Height BMI (Body Mass Index)          79 97.8  F (36.6  C) (Oral) 1.803 m (5' 11\") 28.45 kg/m2         Blood Pressure from Last 3 Encounters:   05/03/17 123/69   04/28/17 102/45   04/15/17 121/87    Weight from Last 3 Encounters:   05/03/17 92.5 kg (204 lb)   04/28/17 88.5 kg (195 lb)   04/13/17 91 kg (200 lb 9.9 oz)              We Performed the Following     GENERAL SURG ADULT REFERRAL          Today's Medication Changes          These changes are accurate as of: 5/3/17  9:15 AM.  If you have any questions, ask your nurse or doctor.               These medicines have changed or have updated prescriptions.        Dose/Directions    potassium chloride SA 20 MEQ CR tablet   Commonly known as:  potassium chloride   This may have changed:    - when to take this  - additional instructions   Used for:  Hypokalemia        Dose:  20 mEq   Take 1 tablet (20 mEq) by mouth daily Take only while taking your twice a day dose of Lasix then stop. Have your potassium levels checked in 2-3 days.   Quantity:  30 tablet   Refills:  1       ribavirin 200 MG Tabs   This may " have changed:    - how much to take  - how to take this  - when to take this  - additional instructions   Used for:  Chronic hepatitis C without hepatic coma (H)        Take one tablet (200mg) by mouth in the AM with food, and one tablet (200mg) in the PM with food.   Quantity:  60 tablet   Refills:  1                Primary Care Provider Office Phone # Fax #    Greg Concepcion [phone]       [address]  [facility] [address]        Thank you!     Thank you for choosing [facility]  for your care. Our goal is always to provide you with excellent care. Hearing back from our patients is one way we can continue to improve our services. Please take a few minutes to complete the written survey that you may receive in the mail after your visit with us. Thank you!             Your Updated Medication List - Protect others around you: Learn how to safely use, store and throw away your medicines at www.disposemymeds.org.          This list is accurate as of: 5/3/17  9:15 AM.  Always use your most recent med list.                   Brand Name Dispense Instructions for use    furosemide 40 MG tablet    LASIX    60 tablet    Take 1 tablet (40 mg) by mouth 2 times daily       gabapentin 100 MG capsule    NEURONTIN    60 capsule    Take 2 capsules (200 mg) by mouth At Bedtime       HYDROcodone-acetaminophen 5-325 MG per tablet    NORCO    20 tablet    Take 1 tablet by mouth every 4 hours as needed for moderate to severe pain       lactulose 10 GM/15ML solution    CHRONULAC    4000 mL    Take 45 mLs by mouth 3 times daily       ledipasvir-sofosbuvir  MG per tablet    ledipasvir-sofosbuvir    28 tablet    Take 1 tablet by mouth daily       ondansetron 4 MG tablet    ZOFRAN    18 tablet    Take 1 tablet (4 mg) by mouth every 8 hours as needed for nausea       potassium chloride SA 20 MEQ CR tablet    potassium chloride    30 tablet    Take 1 tablet (20 mEq) by mouth daily  Take only while taking your twice a day dose of Lasix then stop. Have your potassium levels checked in 2-3 days.       ribavirin 200 MG Tabs     60 tablet    Take one tablet (200mg) by mouth in the AM with food, and one tablet (200mg) in the PM with food.       rifaximin 550 MG Tabs tablet    XIFAXAN    60 tablet    Take 1 tablet (550 mg) by mouth 2 times daily       spironolactone 50 MG tablet    ALDACTONE    30 tablet    Take 1 tablet (50 mg) by mouth daily       vitamin D 27897 UNIT capsule    ERGOCALCIFEROL    9 capsule    Take 1 capsule (50,000 Units) by mouth in the morning, Mon and Thur Twice weekly every Mon and Thurs

## 2017-05-03 NOTE — PROGRESS NOTES
61-year-old male complaining of three-week history of chronic right upper quadrant abdominal pain. Pain is a constant 2 out of 10 without radiation. It is dull and achy. It is not aggravated by food intake. No other associate his symptoms. He denies fevers chills nausea and vomiting. He does have a strong history of cirrhosis and has had a TIPS procedure. He reports since his TIPS procedure he doesn't require draining of ascites. He is stop drinking alcohol. He states his liver is definitely cirrhotic.    Assessment and plan: 61-year-old male with symptomatic cholelithiasis. Unfortunately, due to patient's liver status (cirrhosis), it is safest if he has his gallbladder removed down at the [place] where there are hepatobiliary surgeons available. [facility] and he understands the risks and the reason for the referral.    Franky Peoples MD

## 2017-05-04 ENCOUNTER — TELEPHONE (OUTPATIENT)
Dept: PHARMACY | Facility: CLINIC | Age: 62
End: 2017-05-04

## 2017-05-04 NOTE — TELEPHONE ENCOUNTER
This patient is due for MTM follow-up. I called the patient to schedule an appointment.     Left message for return call.    Zahira Ramirez PharmD  Medication Therapy Management Resident

## 2017-05-05 NOTE — TELEPHONE ENCOUNTER
Leonel called back and left a message to try him again. Left another message for return call.     Obie ChadwickD  Medication Therapy Management Resident

## 2017-05-08 ENCOUNTER — CARE COORDINATION (OUTPATIENT)
Dept: SURGERY | Facility: CLINIC | Age: 62
End: 2017-05-08

## 2017-05-08 NOTE — PROGRESS NOTES
Called and spoke to patient regarding his appt with Dr. Dave on Wednesday.     Reviewed chart with Dr. Dave. Due to his liver concerns, it is best that the patient see Dr. Higginbotham first for clearance since he has not been seen since 3/2017 and this is a new concern. Spoke to Dr. Higginbotham's RNCC, Chloe. Offered patient 0930 & 1000, with Dr. Higginbotham tomorrow. Patient is unable to make those appts due to transportation and cost. He states he doesn't have any pain, and he doesn't fee that it is related to his gallbladder. He thinks his pain was due to the flu that his wife had last week, then he got it.     Patient declined appointment with Dr. Higginbotham tomorrow. He was also notified that we will need to cancel his appt with Dr. Dave as the visit with Dr. Higginbotham is needed prior to surgery consult per Dr. Dave. Ensured that patient has hospital phone number incase he has an emergency after hours. Chloe Higginbotham RNCC will call patient to schedule follow up per patient request. Chloe SOLORZANOCC was also notified of the above, and message was routed.

## 2017-05-09 ENCOUNTER — TELEPHONE (OUTPATIENT)
Dept: PHARMACY | Facility: CLINIC | Age: 62
End: 2017-05-09

## 2017-05-09 ENCOUNTER — TELEPHONE (OUTPATIENT)
Dept: GASTROENTEROLOGY | Facility: CLINIC | Age: 62
End: 2017-05-09

## 2017-05-09 NOTE — TELEPHONE ENCOUNTER
Call back to patient regarding call center message.  Patient aware at this time Dr. Higginbotham doesn't have any openings sooner than 6/15, advised patient to check back periodically for any cancellations. Writer will also check for any cancellations periodically. Patient would prefer a couple days notice due to transportation if possible.        ----- Message from Caroline Rooney sent at 5/9/2017 12:12 PM CDT -----  Regarding: Dr. Higginbotham - Pt needs sooner appt per Pt msg from Neda Damon  Contact: [phone]  [facility]!  Pt called after rcving a msg from Neda Damon that he needs to get in sooner with Dr. Higginbotham.  Pt is scheduled (by Neda) for 6/15 labs, US, and Dr hilliard.  - next avail in [facility] is 6/26.  Pt requests call at [phone].  Ok to leave .  Thank you  Caroline  [facility] Call Center

## 2017-05-09 NOTE — TELEPHONE ENCOUNTER
Called patient for MTM  appointment today (IDALIA f/up). Left message for return call.    Zahira Ramirez PharmD  Medication Therapy Management Resident  Pager: [phone]

## 2017-05-09 NOTE — TELEPHONE ENCOUNTER
S: Client has been having labs done by [facility] RN every other week while he was a home care patient but has been discharged from [facility] today 5-8-17. Will need labs at [facility] on 5-10-17 at his appt. then.  B: 61 y.o with chronic Hep C. has been ordered to have every other week BMP, Hepatic Function, and CBC labs by home care RN. He is also going into lab for every other week ammonia levels. [facility] RN unable to obtain adequate blood specimen on 5-8-17 for labs, so client will need to have them done as outpt. at appt. at [facility] on 5-10-17.  A: Client seeing a Dr. PRUDENCE Iqbal about his gallbladder on 5-10-17 at [facility]. Please put in order for his every 2 weeks labs for that day as well.  R: [facility] has D/C'd client = no more home care services after today, 5-8-17 - all future labs need to be set up at outpt. lab. Please have your staff facilitate. Thank You.

## 2017-05-11 ENCOUNTER — DOCUMENTATION ONLY (OUTPATIENT)
Dept: GASTROENTEROLOGY | Facility: CLINIC | Age: 62
End: 2017-05-11

## 2017-05-11 ENCOUNTER — TELEPHONE (OUTPATIENT)
Dept: PHARMACY | Facility: CLINIC | Age: 62
End: 2017-05-11

## 2017-05-11 ENCOUNTER — TELEPHONE (OUTPATIENT)
Dept: INTERNAL MEDICINE | Facility: CLINIC | Age: 62
End: 2017-05-11

## 2017-05-11 ENCOUNTER — ALLIED HEALTH/NURSE VISIT (OUTPATIENT)
Dept: PHARMACY | Facility: CLINIC | Age: 62
End: 2017-05-11

## 2017-05-11 DIAGNOSIS — K70.30 ALCOHOLIC CIRRHOSIS OF LIVER WITHOUT ASCITES (H): Chronic | ICD-10-CM

## 2017-05-11 DIAGNOSIS — R18.8 ASCITES OF LIVER: Primary | Chronic | ICD-10-CM

## 2017-05-11 DIAGNOSIS — G89.29 CHRONIC LOW BACK PAIN WITH SCIATICA, SCIATICA LATERALITY UNSPECIFIED, UNSPECIFIED BACK PAIN LATERALITY: ICD-10-CM

## 2017-05-11 DIAGNOSIS — K70.31 ALCOHOLIC CIRRHOSIS OF LIVER WITH ASCITES (H): ICD-10-CM

## 2017-05-11 DIAGNOSIS — E55.9 VITAMIN D DEFICIENCY: ICD-10-CM

## 2017-05-11 DIAGNOSIS — R18.8 ASCITES OF LIVER: Chronic | ICD-10-CM

## 2017-05-11 DIAGNOSIS — R11.0 NAUSEA: ICD-10-CM

## 2017-05-11 DIAGNOSIS — B18.2 CHRONIC HEPATITIS C WITHOUT HEPATIC COMA (H): Chronic | ICD-10-CM

## 2017-05-11 DIAGNOSIS — K76.82 HEPATIC ENCEPHALOPATHY (H): ICD-10-CM

## 2017-05-11 DIAGNOSIS — B18.2 CHRONIC HEPATITIS C WITHOUT HEPATIC COMA (H): Primary | ICD-10-CM

## 2017-05-11 DIAGNOSIS — M54.40 CHRONIC LOW BACK PAIN WITH SCIATICA, SCIATICA LATERALITY UNSPECIFIED, UNSPECIFIED BACK PAIN LATERALITY: ICD-10-CM

## 2017-05-11 LAB
ALT SERPL W P-5'-P-CCNC: 23 U/L (ref 0–70)
AMMONIA PLAS-SCNC: 56 UMOL/L (ref 10–50)
ANION GAP SERPL CALCULATED.3IONS-SCNC: 9 MMOL/L (ref 3–14)
BUN SERPL-MCNC: 22 MG/DL (ref 7–30)
CALCIUM SERPL-MCNC: 8.4 MG/DL (ref 8.5–10.1)
CHLORIDE SERPL-SCNC: 110 MMOL/L (ref 94–109)
CO2 SERPL-SCNC: 26 MMOL/L (ref 20–32)
CREAT SERPL-MCNC: 1.25 MG/DL (ref 0.66–1.25)
ERYTHROCYTE [DISTWIDTH] IN BLOOD BY AUTOMATED COUNT: 12.9 % (ref 10–15)
GFR SERPL CREATININE-BSD FRML MDRD: 59 ML/MIN/1.7M2
GLUCOSE SERPL-MCNC: 117 MG/DL (ref 70–99)
HCT VFR BLD AUTO: 34.2 % (ref 40–53)
HGB BLD-MCNC: 11.7 G/DL (ref 13.3–17.7)
MCH RBC QN AUTO: 35.7 PG (ref 26.5–33)
MCHC RBC AUTO-ENTMCNC: 34.2 G/DL (ref 31.5–36.5)
MCV RBC AUTO: 104 FL (ref 78–100)
PLATELET # BLD AUTO: 45 10E9/L (ref 150–450)
POTASSIUM SERPL-SCNC: 3.7 MMOL/L (ref 3.4–5.3)
RBC # BLD AUTO: 3.28 10E12/L (ref 4.4–5.9)
SODIUM SERPL-SCNC: 145 MMOL/L (ref 133–144)
WBC # BLD AUTO: 4.1 10E9/L (ref 4–11)

## 2017-05-11 PROCEDURE — 87522 HEPATITIS C REVRS TRNSCRPJ: CPT | Performed by: INTERNAL MEDICINE

## 2017-05-11 PROCEDURE — 99606 MTMS BY PHARM EST 15 MIN: CPT | Performed by: PHARMACIST

## 2017-05-11 PROCEDURE — 82140 ASSAY OF AMMONIA: CPT | Performed by: STUDENT IN AN ORGANIZED HEALTH CARE EDUCATION/TRAINING PROGRAM

## 2017-05-11 PROCEDURE — 99607 MTMS BY PHARM ADDL 15 MIN: CPT | Performed by: PHARMACIST

## 2017-05-11 PROCEDURE — 36415 COLL VENOUS BLD VENIPUNCTURE: CPT | Performed by: INTERNAL MEDICINE

## 2017-05-11 PROCEDURE — 85027 COMPLETE CBC AUTOMATED: CPT | Performed by: INTERNAL MEDICINE

## 2017-05-11 PROCEDURE — 80048 BASIC METABOLIC PNL TOTAL CA: CPT | Performed by: INTERNAL MEDICINE

## 2017-05-11 PROCEDURE — 84460 ALANINE AMINO (ALT) (SGPT): CPT | Performed by: INTERNAL MEDICINE

## 2017-05-11 NOTE — PROGRESS NOTES
DATE:  5/11/2017   TIME OF RECEIPT FROM LAB:  1020  LAB TEST:  Platelets   LAB VALUE:  42  RESULTS GIVEN WITH READ-BACK TO (PROVIDER): Dr. Warren Higginbotham  TIME LAB VALUE REPORTED TO PROVIDER:   1032

## 2017-05-11 NOTE — PROGRESS NOTES
Platelet result of 45 at 5/11/2017 at 10:45 from [facility]-based lab. This is in addition to the [facility] ER lab of 42 previously reported today.   Will route to hepatology.

## 2017-05-11 NOTE — PROGRESS NOTES
SUBJECTIVE/OBJECTIVE:                                                    Scott Casale is a 61 year old male called for a follow-up visit for Medication Therapy Management.  He was discharged from [facility] on 2/22 for hypervolemia, cirrhosis of liver. He was re-hospitalized on 4/13 for hepatic encephalopathy, and went to the emergency room for calculus of the bile duct on 4/28.    Chief Complaint: Follow up from our visit on 2/28/17. Wondering about weight gain/loss.    Medication Adherence: issues found and discussed below, pt is using pill boxes    Allergies/ADRs: Reviewed in Epic  Tobacco: No tobacco use   Alcohol: not currently using  Caffeine: 1 cup/day of coffee  PMH: Reviewed in Epic    Liver Cirrhosis/Encephalopathy: Current therapy includes lactulose 45 mL BID/TID, rifaximin 550 mg BID, spironolactone 50 mg daily, furosemide 40 mg BID until weight of 185, when it may go down to once daily. He also takes 20 mEq potassium daily, with his second dose of furosemide. Weight of 207 today, was 212. He is not feeling confused, so he feels his ammonia level must be good. He is having 2-3 stools per day. Is trying to walk daily. He does sometimes forget to take second dose of furosemide, even though he does use pill boxes now because he is out and about. Reports avoiding fatty foods, salty foods, and sweets.    Hepatitis C: Past therapy includes ribavirin 200 mg BID and Harvoni 1 tablet daily. Is followed by gastroenterology Dr. Higginbotham. He reports his last blood test for HepC was negative. Has upcoming appointment to re-check and make sure he is maintaining negative viral load.     Nerve Pain: Current therapy includes gabapentin 200 mg at bedtime. He also reports this is helpful for helping him sleep. He feels this is effective, reports not having much pain. No side effects.     Nausea: Current therapy includes ondansetron 4 mg Q8Hr PRN. He has not needed this in the last couple weeks, is effective when needed. No reported  side effects.    Vitamin D Deficiency: Current therapy includes vitamin D 71307 units twice weekly. He says he is forgetting to take this one. He does not use anything to help organize his medications.        Current labs include:  BP Readings from Last 3 Encounters:   05/03/17 123/69   04/28/17 102/45   04/15/17 121/87       Lab Results   Component Value Date    CHOL 141 12/19/2016     Lab Results   Component Value Date    TRIG 66 12/19/2016     Lab Results   Component Value Date    HDL 66 12/19/2016     Lab Results   Component Value Date    LDL 61 12/19/2016       Liver Function Studies -   Recent Labs   Lab Test  04/28/17   1529   PROTTOTAL  6.5*   ALBUMIN  2.4*   BILITOTAL  2.5*   ALKPHOS  65   AST  41   ALT  26       No results found for: UCRR, MICROL, UMALCR    Last Basic Metabolic Panel:  Lab Results   Component Value Date     04/28/2017      Lab Results   Component Value Date    POTASSIUM 3.9 04/28/2017     Lab Results   Component Value Date    CHLORIDE 106 04/28/2017     Lab Results   Component Value Date    BUN 20 04/28/2017     Lab Results   Component Value Date    CR 1.45 04/28/2017     GFR Estimate   Date Value Ref Range Status   04/28/2017 49 (L) >60 mL/min/1.7m2 Final     Comment:     Non  GFR Calc   04/27/2017 45 (L) >60 mL/min/1.7m2 Final     Comment:     Non  GFR Calc   04/26/2017 52 (L) >60 mL/min/1.7m2 Final     Comment:     Non  GFR Calc     GFR Estimate If Black   Date Value Ref Range Status   04/28/2017 60 (L) >60 mL/min/1.7m2 Final     Comment:      GFR Calc   04/27/2017 54 (L) >60 mL/min/1.7m2 Final     Comment:      GFR Calc   04/26/2017 63 >60 mL/min/1.7m2 Final     Comment:      GFR Calc     TSH   Date Value Ref Range Status   03/13/2017 1.52 0.40 - 4.00 mU/L Final   ]    Most Recent Immunizations   Administered Date(s) Administered     Influenza (IIV3) 10/16/2016     Pneumococcal 23  valent 02/12/2017     TDAP Vaccine (Adacel) 12/06/2012     ASSESSMENT:                                                    Current medications were reviewed today as discussed above.      Medication Adherence: advised pt to pack medications in backpack he carries     Liver Cirrhosis/Encephalopathy: Needs improvement. Pt has been gaining/losing weight which may be tied to lack of furosemide compliance.     Hepatitis C: stable     Nerve Pain: stable     Nausea: stable     Vitamin D Deficiency: stable     PLAN:                                                      1. Pt to ensure he gets two doses of furosemide daily     I spent 20 minutes with this patient today.  A copy of the visit note was provided to the patient's primary care provider.     Will follow up as needed.    The patient was sent via [facility] a summary of these recommendations as an after visit summary.    I concur with the note as dictated above which reflects our joint assessment and plan.   Radha De La Torre, ObieD      Obie ChadwickD  Medication Therapy Management Resident  Pager: [phone]

## 2017-05-11 NOTE — TELEPHONE ENCOUNTER
Orders entered and lab notified.  Mary Muñiz RN        ----- Message from José Peterson sent at 5/11/2017 10:01 AM CDT -----  Regarding: Lab order  Contact: [phone]  [facility]     He is currently there with them for a lab appt, however there is no lab order for him. She states patient need to have ammonia lab test.     Wondering if this can be order asap.

## 2017-05-11 NOTE — MR AVS SNAPSHOT
After Visit Summary   5/11/2017    Scott Casale    MRN: [id number]           Patient Information     Date Of Birth          1955        Visit Information        Provider Department      5/11/2017 8:30 AM Radha De La Torre, [facility] MTM        Today's Diagnoses     Chronic hepatitis C without hepatic coma (H)    -  1    Alcoholic cirrhosis of liver with ascites (H)        Chronic low back pain with sciatica, sciatica laterality unspecified, unspecified back pain laterality        Nausea        Vitamin D deficiency        Hepatic encephalopathy (H)          Care Instructions    Recommendations from today's MTM visit:                                                      1. Try packing your pillbox in your backpack to help you remember to take your second dose of furosemide every day. This should help with your weight fluctuations.    Next MTM visit: as needed    To schedule another MTM appointment, please call the clinic directly or you may call the MTM scheduling line at [phone] or toll-free at [phone].     My Clinical Pharmacist's contact information:                                                      It was a pleasure speaking with you today!  Please feel free to contact me with any questions or concerns you have.      Zahira Ramirez, PharmD  Medication Therapy Management Resident  Pager: [phone]    You may receive a survey about the MTM services you received.  I would appreciate your feedback to help me serve you better in the future. Please fill it out and return it when you can. Your comments will be anonymous.                  Follow-ups after your visit        Your next 10 appointments already scheduled     Buzz 15, 2017  8:30 AM CDT   Lab with ML LAB    [facility] Lab ([facility])    [address] [address] [phone]            Buzz 15, 2017  9:00 AM CDT   US ABDOMEN COMPLETE with TERE STANLEY  [facility] ([facility])    [address] [place] [id number] [phone]           Please bring a list of your medicines (including vitamins, minerals and over-the-counter drugs). Also, tell your doctor about any allergies you may have. Wear comfortable clothes and leave your valuables at home.  Adults: No eating or drinking for 8 hours before the exam. You may take medicine with a small sip of water.  Children: - Children 6+ years: No food or drink for 6 hours before exam. - Children 1-5 years: No food or drink for 4 hours before exam. - Infants, breast-fed: may have breast milk up to 2 hours before exam. - Infants, formula: may have bottle until 4 hours before exam.  Please call the Imaging Department at your exam site with any questions.            Buzz 15, 2017 10:00 AM CDT   (Arrive by 9:45 AM)   Return General Liver with Warren Higginbotham MD   [facility] Hepatology ([facility])    [address] [place] [id number] [phone]              Who to contact     If you have questions or need follow up information about today's clinic visit or your schedule please contact [facility] MTM directly at [phone].  Normal or non-critical lab and imaging results will be communicated to you by MyChart, letter or phone within 4 business days after the clinic has received the results. If you do not hear from us within 7 days, please contact the clinic through MyChart or phone. If you have a critical or abnormal lab result, we will notify you by phone as soon as possible.  Submit refill requests through [employer] or call your pharmacy and they will forward the refill request to us. Please allow 3 business days for your refill to be completed.          Additional Information About Your Visit        [employer] Information     [employer] gives you secure access to your electronic health record. If you see a  primary care provider, you can also send messages to your care team and make appointments. If you have questions, please call your primary care clinic.  If you do not have a primary care provider, please call [phone] and they will assist you.        Care EveryWhere ID     This is your Care EveryWhere ID. This could be used by other organizations to access your [facility] medical records  [id number]         Blood Pressure from Last 3 Encounters:   05/03/17 123/69   04/28/17 102/45   04/15/17 121/87    Weight from Last 3 Encounters:   05/03/17 204 lb (92.5 kg)   04/28/17 195 lb (88.5 kg)   04/13/17 200 lb 9.9 oz (91 kg)              Today, you had the following     No orders found for display         Today's Medication Changes          These changes are accurate as of: 5/11/17  9:53 AM.  If you have any questions, ask your nurse or doctor.               These medicines have changed or have updated prescriptions.        Dose/Directions    potassium chloride SA 20 MEQ CR tablet   Commonly known as:  potassium chloride   This may have changed:    - when to take this  - additional instructions   Used for:  Hypokalemia        Dose:  20 mEq   Take 1 tablet (20 mEq) by mouth daily Take only while taking your twice a day dose of Lasix then stop. Have your potassium levels checked in 2-3 days.   Quantity:  30 tablet   Refills:  1                Primary Care Provider Office Phone # Fax #    Greg Concepcion [phone]       [address]  [facility] [address]        Thank you!     Thank you for choosing [facility]  for your care. Our goal is always to provide you with excellent care. Hearing back from our patients is one way we can continue to improve our services. Please take a few minutes to complete the written survey that you may receive in the mail after your visit with us. Thank you!             Your Updated Medication List - Protect others around you: Learn  how to safely use, store and throw away your medicines at www.disposemymeds.org.          This list is accurate as of: 5/11/17  9:53 AM.  Always use your most recent med list.                   Brand Name Dispense Instructions for use    furosemide 40 MG tablet    LASIX    60 tablet    Take 1 tablet (40 mg) by mouth 2 times daily       gabapentin 100 MG capsule    NEURONTIN    60 capsule    Take 2 capsules (200 mg) by mouth At Bedtime       lactulose 10 GM/15ML solution    CHRONULAC    4000 mL    Take 45 mLs by mouth 3 times daily       ondansetron 4 MG tablet    ZOFRAN    18 tablet    Take 1 tablet (4 mg) by mouth every 8 hours as needed for nausea       potassium chloride SA 20 MEQ CR tablet    potassium chloride    30 tablet    Take 1 tablet (20 mEq) by mouth daily Take only while taking your twice a day dose of Lasix then stop. Have your potassium levels checked in 2-3 days.       rifaximin 550 MG Tabs tablet    XIFAXAN    60 tablet    Take 1 tablet (550 mg) by mouth 2 times daily       spironolactone 50 MG tablet    ALDACTONE    30 tablet    Take 1 tablet (50 mg) by mouth daily       vitamin D 96057 UNIT capsule    ERGOCALCIFEROL    9 capsule    Take 1 capsule (50,000 Units) by mouth in the morning, Mon and Thur Twice weekly every Mon and Thurs

## 2017-05-15 DIAGNOSIS — K70.30 ALCOHOLIC CIRRHOSIS OF LIVER WITHOUT ASCITES (H): Primary | Chronic | ICD-10-CM

## 2017-05-15 LAB
HCV RNA SERPL NAA+PROBE-ACNC: NORMAL [IU]/ML
HCV RNA SERPL NAA+PROBE-LOG IU: NORMAL LOG IU/ML

## 2017-06-01 ENCOUNTER — TELEPHONE (OUTPATIENT)
Dept: GASTROENTEROLOGY | Facility: CLINIC | Age: 62
End: 2017-06-01

## 2017-06-01 NOTE — TELEPHONE ENCOUNTER
Left message for pt reminding them of upcoming appointment.  Instructed pt to bring updated medications list.  Instructed pt to arrive an hour and a half to two hours prior to appt time for labs.  Cmaden Oneal, CMA

## 2017-06-13 DIAGNOSIS — E87.6 HYPOKALEMIA: ICD-10-CM

## 2017-07-31 ENCOUNTER — TELEPHONE (OUTPATIENT)
Dept: GASTROENTEROLOGY | Facility: CLINIC | Age: 62
End: 2017-07-31

## 2017-07-31 DIAGNOSIS — B18.2 CHRONIC HEPATITIS C WITHOUT HEPATIC COMA (H): Primary | Chronic | ICD-10-CM

## 2017-08-08 ENCOUNTER — TRANSFERRED RECORDS (OUTPATIENT)
Dept: HEALTH INFORMATION MANAGEMENT | Facility: CLINIC | Age: 62
End: 2017-08-08

## 2017-08-18 ENCOUNTER — CARE COORDINATION (OUTPATIENT)
Dept: GASTROENTEROLOGY | Facility: CLINIC | Age: 62
End: 2017-08-18

## 2017-08-18 NOTE — PROGRESS NOTES
8/18/2017  1:53 PM      Hep C Care Coordination Call   Patient contacted the clinic for results of his 3 month post Hepatitis C treatment lab. Labs were faxed back to the clinic from [facility] and sent to scanning. Results show the virus is undetected. Patient has cleared the virus and is happy to hear the news! Patient does require ongoing follow up for cirrhosis. Patient states he is currently living in [place] and is unsure when he will be back in [place]. Patient did need assistance with finding a clinic or  in [place] to assist with health insurance and low income clincs. I did look up several locations and was able to assist patient with connecting to [facility]. Patient also updated his contact information and would like the Hep C lab results sent to the updated address on file which is in [place]. Patient has no further questions or concerns at this time and is aware he can contact this RN CC directly at [phone].           Michelle Vanegas RN, BSN, PHN  [facility]   RN Care Coordinator Hepatology Specialty Clinic/Program

## 2017-11-27 ENCOUNTER — APPOINTMENT (OUTPATIENT)
Dept: GENERAL RADIOLOGY | Facility: CLINIC | Age: 62
DRG: 433 | End: 2017-11-27
Attending: PHYSICIAN ASSISTANT
Payer: MEDICARE

## 2017-11-27 ENCOUNTER — HOSPITAL ENCOUNTER (INPATIENT)
Facility: CLINIC | Age: 62
LOS: 1 days | Discharge: HOME | DRG: 433 | End: 2017-11-28
Attending: EMERGENCY MEDICINE | Admitting: INTERNAL MEDICINE
Payer: MEDICARE

## 2017-11-27 DIAGNOSIS — R25.1 SHAKING: ICD-10-CM

## 2017-11-27 DIAGNOSIS — K76.82 HEPATIC ENCEPHALOPATHY (H): ICD-10-CM

## 2017-11-27 DIAGNOSIS — K70.30 ALCOHOLIC CIRRHOSIS OF LIVER WITHOUT ASCITES (H): ICD-10-CM

## 2017-11-27 DIAGNOSIS — E72.20 HYPERAMMONEMIA (H): ICD-10-CM

## 2017-11-27 DIAGNOSIS — K70.31 ALCOHOLIC CIRRHOSIS OF LIVER WITH ASCITES (H): Chronic | ICD-10-CM

## 2017-11-27 DIAGNOSIS — Z87.19 HISTORY OF CIRRHOSIS OF LIVER: ICD-10-CM

## 2017-11-27 DIAGNOSIS — Z95.828 S/P TIPS (TRANSJUGULAR INTRAHEPATIC PORTOSYSTEMIC SHUNT): ICD-10-CM

## 2017-11-27 PROBLEM — G93.40 ENCEPHALOPATHY ACUTE: Status: RESOLVED | Noted: 2017-02-08 | Resolved: 2017-11-27

## 2017-11-27 PROBLEM — E87.70 HYPERVOLEMIA: Status: RESOLVED | Noted: 2017-02-22 | Resolved: 2017-11-27

## 2017-11-27 LAB
ALBUMIN SERPL-MCNC: 2.5 G/DL (ref 3.4–5)
ALBUMIN UR-MCNC: 10 MG/DL
ALP SERPL-CCNC: 66 U/L (ref 40–150)
ALT SERPL W P-5'-P-CCNC: 24 U/L (ref 0–70)
AMMONIA PLAS-SCNC: 199 UMOL/L (ref 10–50)
ANION GAP SERPL CALCULATED.3IONS-SCNC: 5 MMOL/L (ref 3–14)
APPEARANCE UR: CLEAR
AST SERPL W P-5'-P-CCNC: 49 U/L (ref 0–45)
BASOPHILS # BLD AUTO: 0 10E9/L (ref 0–0.2)
BASOPHILS NFR BLD AUTO: 0.5 %
BILIRUB SERPL-MCNC: 2.9 MG/DL (ref 0.2–1.3)
BILIRUB UR QL STRIP: NEGATIVE
BUN SERPL-MCNC: 17 MG/DL (ref 7–30)
CALCIUM SERPL-MCNC: 8.3 MG/DL (ref 8.5–10.1)
CHLORIDE SERPL-SCNC: 113 MMOL/L (ref 94–109)
CO2 SERPL-SCNC: 25 MMOL/L (ref 20–32)
COLOR UR AUTO: YELLOW
CREAT SERPL-MCNC: 1.09 MG/DL (ref 0.66–1.25)
DIFFERENTIAL METHOD BLD: ABNORMAL
EOSINOPHIL # BLD AUTO: 0.1 10E9/L (ref 0–0.7)
EOSINOPHIL NFR BLD AUTO: 2.1 %
ERYTHROCYTE [DISTWIDTH] IN BLOOD BY AUTOMATED COUNT: 13.4 % (ref 10–15)
GFR SERPL CREATININE-BSD FRML MDRD: 68 ML/MIN/1.7M2
GLUCOSE SERPL-MCNC: 122 MG/DL (ref 70–99)
GLUCOSE UR STRIP-MCNC: NEGATIVE MG/DL
HCT VFR BLD AUTO: 34.2 % (ref 40–53)
HGB BLD-MCNC: 12 G/DL (ref 13.3–17.7)
HGB UR QL STRIP: ABNORMAL
IMM GRANULOCYTES # BLD: 0 10E9/L (ref 0–0.4)
IMM GRANULOCYTES NFR BLD: 0 %
KETONES UR STRIP-MCNC: NEGATIVE MG/DL
LEUKOCYTE ESTERASE UR QL STRIP: NEGATIVE
LYMPHOCYTES # BLD AUTO: 0.6 10E9/L (ref 0.8–5.3)
LYMPHOCYTES NFR BLD AUTO: 14.6 %
MCH RBC QN AUTO: 35.4 PG (ref 26.5–33)
MCHC RBC AUTO-ENTMCNC: 35.1 G/DL (ref 31.5–36.5)
MCV RBC AUTO: 101 FL (ref 78–100)
MONOCYTES # BLD AUTO: 0.7 10E9/L (ref 0–1.3)
MONOCYTES NFR BLD AUTO: 15.8 %
MUCOUS THREADS #/AREA URNS LPF: PRESENT /LPF
NEUTROPHILS # BLD AUTO: 2.8 10E9/L (ref 1.6–8.3)
NEUTROPHILS NFR BLD AUTO: 67 %
NITRATE UR QL: NEGATIVE
PH UR STRIP: 6.5 PH (ref 5–7)
PLATELET # BLD AUTO: 63 10E9/L (ref 150–450)
POTASSIUM SERPL-SCNC: 3.7 MMOL/L (ref 3.4–5.3)
PROT SERPL-MCNC: 6.3 G/DL (ref 6.8–8.8)
RBC # BLD AUTO: 3.39 10E12/L (ref 4.4–5.9)
RBC #/AREA URNS AUTO: 34 /HPF (ref 0–2)
SODIUM SERPL-SCNC: 143 MMOL/L (ref 133–144)
SOURCE: ABNORMAL
SP GR UR STRIP: 1.02 (ref 1–1.03)
UROBILINOGEN UR STRIP-MCNC: >12 MG/DL (ref 0–2)
WBC # BLD AUTO: 4.2 10E9/L (ref 4–11)
WBC #/AREA URNS AUTO: 1 /HPF (ref 0–2)

## 2017-11-27 PROCEDURE — 80053 COMPREHEN METABOLIC PANEL: CPT | Performed by: EMERGENCY MEDICINE

## 2017-11-27 PROCEDURE — 99285 EMERGENCY DEPT VISIT HI MDM: CPT | Mod: 25 | Performed by: EMERGENCY MEDICINE

## 2017-11-27 PROCEDURE — 99285 EMERGENCY DEPT VISIT HI MDM: CPT | Mod: 25

## 2017-11-27 PROCEDURE — 82140 ASSAY OF AMMONIA: CPT | Performed by: EMERGENCY MEDICINE

## 2017-11-27 PROCEDURE — 25000132 ZZH RX MED GY IP 250 OP 250 PS 637: Mod: GY | Performed by: EMERGENCY MEDICINE

## 2017-11-27 PROCEDURE — A9270 NON-COVERED ITEM OR SERVICE: HCPCS | Mod: GY | Performed by: PHYSICIAN ASSISTANT

## 2017-11-27 PROCEDURE — A9270 NON-COVERED ITEM OR SERVICE: HCPCS | Mod: GY | Performed by: EMERGENCY MEDICINE

## 2017-11-27 PROCEDURE — 81001 URINALYSIS AUTO W/SCOPE: CPT | Performed by: EMERGENCY MEDICINE

## 2017-11-27 PROCEDURE — 25000132 ZZH RX MED GY IP 250 OP 250 PS 637: Mod: GY | Performed by: PHYSICIAN ASSISTANT

## 2017-11-27 PROCEDURE — 85025 COMPLETE CBC W/AUTO DIFF WBC: CPT | Performed by: EMERGENCY MEDICINE

## 2017-11-27 PROCEDURE — 99223 1ST HOSP IP/OBS HIGH 75: CPT | Mod: AI | Performed by: PHYSICIAN ASSISTANT

## 2017-11-27 PROCEDURE — 71020 XR CHEST 2 VW: CPT

## 2017-11-27 PROCEDURE — 12000000 ZZH R&B MED SURG/OB

## 2017-11-27 RX ORDER — NALOXONE HYDROCHLORIDE 0.4 MG/ML
.1-.4 INJECTION, SOLUTION INTRAMUSCULAR; INTRAVENOUS; SUBCUTANEOUS
Status: DISCONTINUED | OUTPATIENT
Start: 2017-11-27 | End: 2017-11-28 | Stop reason: HOSPADM

## 2017-11-27 RX ORDER — ONDANSETRON 4 MG/1
4 TABLET, ORALLY DISINTEGRATING ORAL EVERY 6 HOURS PRN
Status: DISCONTINUED | OUTPATIENT
Start: 2017-11-27 | End: 2017-11-27

## 2017-11-27 RX ORDER — NALOXONE HYDROCHLORIDE 0.4 MG/ML
.1-.4 INJECTION, SOLUTION INTRAMUSCULAR; INTRAVENOUS; SUBCUTANEOUS
Status: DISCONTINUED | OUTPATIENT
Start: 2017-11-27 | End: 2017-11-27

## 2017-11-27 RX ORDER — ONDANSETRON 4 MG/1
4 TABLET, ORALLY DISINTEGRATING ORAL EVERY 6 HOURS PRN
Status: DISCONTINUED | OUTPATIENT
Start: 2017-11-27 | End: 2017-11-28 | Stop reason: HOSPADM

## 2017-11-27 RX ORDER — ONDANSETRON 2 MG/ML
4 INJECTION INTRAMUSCULAR; INTRAVENOUS EVERY 6 HOURS PRN
Status: DISCONTINUED | OUTPATIENT
Start: 2017-11-27 | End: 2017-11-28 | Stop reason: HOSPADM

## 2017-11-27 RX ORDER — LACTULOSE 10 G/15ML
30 SOLUTION ORAL 3 TIMES DAILY
Status: DISCONTINUED | OUTPATIENT
Start: 2017-11-27 | End: 2017-11-27

## 2017-11-27 RX ORDER — PROCHLORPERAZINE 25 MG
25 SUPPOSITORY, RECTAL RECTAL EVERY 12 HOURS PRN
Status: DISCONTINUED | OUTPATIENT
Start: 2017-11-27 | End: 2017-11-28 | Stop reason: HOSPADM

## 2017-11-27 RX ORDER — FUROSEMIDE 40 MG
40 TABLET ORAL 2 TIMES DAILY
Status: DISCONTINUED | OUTPATIENT
Start: 2017-11-27 | End: 2017-11-28 | Stop reason: HOSPADM

## 2017-11-27 RX ORDER — ACETAMINOPHEN 325 MG/1
650 TABLET ORAL EVERY 4 HOURS PRN
Status: DISCONTINUED | OUTPATIENT
Start: 2017-11-27 | End: 2017-11-28 | Stop reason: HOSPADM

## 2017-11-27 RX ORDER — SPIRONOLACTONE 25 MG/1
50 TABLET ORAL DAILY
Status: DISCONTINUED | OUTPATIENT
Start: 2017-11-28 | End: 2017-11-28 | Stop reason: HOSPADM

## 2017-11-27 RX ORDER — PROCHLORPERAZINE MALEATE 10 MG
10 TABLET ORAL EVERY 6 HOURS PRN
Status: DISCONTINUED | OUTPATIENT
Start: 2017-11-27 | End: 2017-11-28 | Stop reason: HOSPADM

## 2017-11-27 RX ORDER — ONDANSETRON 2 MG/ML
4 INJECTION INTRAMUSCULAR; INTRAVENOUS EVERY 6 HOURS PRN
Status: DISCONTINUED | OUTPATIENT
Start: 2017-11-27 | End: 2017-11-27

## 2017-11-27 RX ORDER — LACTULOSE 10 G/15ML
30 SOLUTION ORAL 3 TIMES DAILY
Status: DISCONTINUED | OUTPATIENT
Start: 2017-11-27 | End: 2017-11-28 | Stop reason: HOSPADM

## 2017-11-27 RX ORDER — POTASSIUM CHLORIDE 1500 MG/1
20 TABLET, EXTENDED RELEASE ORAL EVERY EVENING
Status: DISCONTINUED | OUTPATIENT
Start: 2017-11-27 | End: 2017-11-28 | Stop reason: HOSPADM

## 2017-11-27 RX ORDER — IBUPROFEN 600 MG/1
600 TABLET, FILM COATED ORAL EVERY 6 HOURS PRN
Status: DISCONTINUED | OUTPATIENT
Start: 2017-11-27 | End: 2017-11-27

## 2017-11-27 RX ADMIN — FUROSEMIDE 40 MG: 40 TABLET ORAL at 19:48

## 2017-11-27 RX ADMIN — LACTULOSE 30 G: 10 SOLUTION ORAL at 14:31

## 2017-11-27 RX ADMIN — LACTULOSE 30 G: 10 SOLUTION ORAL at 19:47

## 2017-11-27 RX ADMIN — RIFAXIMIN 550 MG: 550 TABLET ORAL at 19:49

## 2017-11-27 RX ADMIN — POTASSIUM CHLORIDE 20 MEQ: 1500 TABLET, EXTENDED RELEASE ORAL at 18:31

## 2017-11-27 ASSESSMENT — ENCOUNTER SYMPTOMS
PSYCHIATRIC NEGATIVE: 1
ABDOMINAL DISTENTION: 1
NEUROLOGICAL NEGATIVE: 1
ALLERGIC/IMMUNOLOGIC NEGATIVE: 1
ENDOCRINE NEGATIVE: 1
RESPIRATORY NEGATIVE: 1
MUSCULOSKELETAL NEGATIVE: 1
HEMATOLOGIC/LYMPHATIC NEGATIVE: 1
CARDIOVASCULAR NEGATIVE: 1
CONSTITUTIONAL NEGATIVE: 1
EYES NEGATIVE: 1

## 2017-11-27 NOTE — ED NOTES
Pt here today with shaking that started yesterday. Per pt, he has a hx of Hep C and cirrhosis and is concerned that his ammonia level is high. Pt states that he is supposed to take lactulose and lasix but has not taken his medications for a few months because he has not been able to get them. He had a similar episode in May.

## 2017-11-27 NOTE — ED PROVIDER NOTES
History     Chief Complaint   Patient presents with     Shaking     concern with elevated ammonia level, also unable to get medications refill with insurance issue     HPI  Scott Casale is a 62 year old male with a history of hepatitis C cured with harvoni, liver cirrhosis, transjugular intrahepatic portosystemic shunt, portal hypertension, and hyponatremia who presents to the Emergency Department for concerns of shaking. Patient reports having similar symptoms in the past as a result of elevated ammonia level, most recently in May 2017. Patient is noncompliant with his medications as he is unable to obtain refills on his prescriptions. Patient has not had his prescriptions since May 2017. Patient resides in [place], but is visiting his son, who is expecting a child next month. He plans to stay until then, but is unsure if he will be able to do so. He is working on applying for Medicare in [place]. Patient denies alcohol use and reports occasional marijuana use.      Social History: Lives in [place], [place]. Patient has been here for the last week visiting his son in [place], [place].     Problem List:    Patient Active Problem List    Diagnosis Date Noted     Acute hepatic encephalopathy 03/13/2017     Priority: Medium     Ankylosing spondylitis (H)      Priority: Medium     S/P TIPS (transjugular intrahepatic portosystemic shunt) 09/28/2016     Priority: Medium     Ascites of liver 09/27/2016     Priority: Medium     Overview:   Low MELD score   S/p TIPS procedure at [facility] (09/28/2016)       Hyponatremia 08/22/2016     Priority: Medium     Overview:   Chronic        Edema of lower extremity 06/15/2016     Priority: Medium     Overview:   Resistant to diuretic therapy        Chronic hepatitis C virus infection (H) 06/15/2016     Priority: Medium     Overview:   Dr Higginbotham [facility]   Harvoni Ribavirin (10/2016)       Hepatic cirrhosis (H) 06/15/2016     Priority: Medium      Overview:   Hepatitis C and history of alcohol use ( no use currently)  in the past        Liver mass 06/15/2016     Priority: Medium     Overview:   0.8 cm right lobe hyperlucency per CT 04/2016 suspicious for HCC   Liver biopsy due - seeing Liver clinic at [facility]       Portal hypertension (H) 06/15/2016     Priority: Medium     Overview:   Furosemide Spironolactone Lactulose   Intolerance to Propranolol with hypotension, dizziness       Primary malignant neoplasm of testis (H) 06/15/2016     Priority: Medium     Overview:   At age 28 s/p orchiectomy with RPLND        Thrombocytopenia (H) 06/15/2016     Priority: Medium     Overview:   6/12/2016  PLT: 48       Chronic low back pain 12/06/2012     Priority: Medium     Narcotic Contract signed 12/6/12 and sent to be scanned    Uses soma at night to help him sleep due to back pain. Has had back problems since 1986, since fell in big hole, collapsed lower discs. Also has ankylosing spondylitis. Has had much Physical Therapy and chiropractic work over years, was told surgery was only option.  Was accompanied by [place] records.    Narcotic contract -- soma #30 R2, at 3 mo can call for 3 more refills, but be seen q 6 mo for refills.  Uses for sleep.  Flexeril for daytime if needed, unrestricted.           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 12/06/2012     Priority: Medium     Since summer; ENT referral 12/6/12       GERD (gastroesophageal reflux disease) 06/26/2012     Priority: Medium     CARDIOVASCULAR SCREENING; LDL GOAL LESS THAN 130 06/25/2012     Priority: Medium     Mild major depression (H) 08/27/2008     Priority: Medium        Past Medical History:    Past Medical History:   Diagnosis Date     AC separation 8/20/2008     Ascites      H/O testicular cancer age 28     Hepatitis C      Shoulder dislocation      Splenomegaly      Ulcerative colitis (H)        Past Surgical History:    Past Surgical History:   Procedure Laterality Date     C  APPENDECTOMY       C KNEE SCOPE,CLEAN/DRAIN      bilateral     COLONOSCOPY N/A 1/17/2017    Procedure: COLONOSCOPY;  Surgeon: Guru Reina Palacios MD;  Location:  GI     ESOPHAGOSCOPY, GASTROSCOPY, DUODENOSCOPY (EGD), COMBINED N/A 1/17/2017    Procedure: COMBINED ESOPHAGOSCOPY, GASTROSCOPY, DUODENOSCOPY (EGD);  Surgeon: Guru Reina Palacios MD;  Location:  GI     EYE SURGERY       IR TRANSVEN INTRAHEPATIC PORTOSYST REV       ORCHIECTOMY SCROTAL      prosthesis placed     STRABISMUS SURGERY      bilateral       Family History:    Family History   Problem Relation Age of Onset     CANCER Mother 70     brain tumor     DIABETES Maternal Grandmother      CEREBROVASCULAR DISEASE Sister      HEART DISEASE Sister      Respiratory Son      CANCER Father      Uterine Cancer Sister      Prostate Cancer No family hx of      Cancer - colorectal No family hx of        Social History:  Marital Status:   [2]  Social History   Substance Use Topics     Smoking status: Former Smoker     Quit date: 1/1/1978     Smokeless tobacco: Never Used     Alcohol use No      Comment: quit in 2010        Medications:      ondansetron (ZOFRAN) 4 MG tablet   lactulose (CHRONULAC) 10 GM/15ML solution   furosemide (LASIX) 40 MG tablet   spironolactone (ALDACTONE) 50 MG tablet   potassium chloride SA (POTASSIUM CHLORIDE) 20 MEQ CR tablet   gabapentin (NEURONTIN) 100 MG capsule   rifaximin (XIFAXAN) 550 MG TABS tablet         Review of Systems   Constitutional: Negative.  Chills: shaking.   HENT: Negative.    Eyes: Negative.    Respiratory: Negative.    Cardiovascular: Negative.    Gastrointestinal: Positive for abdominal distention (without abdominal pain).   Endocrine: Negative.    Genitourinary: Negative.    Musculoskeletal: Negative.    Skin: Negative.    Allergic/Immunologic: Negative.    Neurological: Negative.    Hematological: Negative.    Psychiatric/Behavioral: Negative.        Physical Exam   BP:  "129/81  Pulse: 75  Heart Rate: 75  Temp: 97.9  F (36.6  C)  Resp: 20  Height: 180.3 cm (5' 11\")  Weight: 95.3 kg (210 lb)  SpO2: 99 %      Physical Exam   Constitutional: He appears well-developed and well-nourished.   HENT:   Head: Normocephalic and atraumatic.   Eyes: Conjunctivae are normal. Pupils are equal, round, and reactive to light. Right eye exhibits no discharge. Left eye exhibits no discharge. No scleral icterus.   Neck: Normal range of motion. Neck supple.   Cardiovascular: Normal rate.    Pulmonary/Chest: Effort normal.   Abdominal: He exhibits distension.   Neurological: He is alert.   Skin: Diaphoretic: jaundice.   Psychiatric: His behavior is normal. Thought content normal.       ED Course     ED Course     Procedures               Critical Care time:  none                 ED medications:  Medications   lactulose (CHRONULAC) solution 30 g (30 g Oral Given 11/27/17 1431)       ED labs and imaging:  Results for orders placed or performed during the hospital encounter of 11/27/17   CBC with platelets differential   Result Value Ref Range    WBC 4.2 4.0 - 11.0 10e9/L    RBC Count 3.39 (L) 4.4 - 5.9 10e12/L    Hemoglobin 12.0 (L) 13.3 - 17.7 g/dL    Hematocrit 34.2 (L) 40.0 - 53.0 %     (H) 78 - 100 fl    MCH 35.4 (H) 26.5 - 33.0 pg    MCHC 35.1 31.5 - 36.5 g/dL    RDW 13.4 10.0 - 15.0 %    Platelet Count 63 (L) 150 - 450 10e9/L    Diff Method Automated Method     % Neutrophils 67.0 %    % Lymphocytes 14.6 %    % Monocytes 15.8 %    % Eosinophils 2.1 %    % Basophils 0.5 %    % Immature Granulocytes 0.0 %    Absolute Neutrophil 2.8 1.6 - 8.3 10e9/L    Absolute Lymphocytes 0.6 (L) 0.8 - 5.3 10e9/L    Absolute Monocytes 0.7 0.0 - 1.3 10e9/L    Absolute Eosinophils 0.1 0.0 - 0.7 10e9/L    Absolute Basophils 0.0 0.0 - 0.2 10e9/L    Abs Immature Granulocytes 0.0 0 - 0.4 10e9/L   Comprehensive metabolic panel   Result Value Ref Range    Sodium 143 133 - 144 mmol/L    Potassium 3.7 3.4 - 5.3 mmol/L    " "Chloride 113 (H) 94 - 109 mmol/L    Carbon Dioxide 25 20 - 32 mmol/L    Anion Gap 5 3 - 14 mmol/L    Glucose 122 (H) 70 - 99 mg/dL    Urea Nitrogen 17 7 - 30 mg/dL    Creatinine 1.09 0.66 - 1.25 mg/dL    GFR Estimate 68 >60 mL/min/1.7m2    GFR Estimate If Black 83 >60 mL/min/1.7m2    Calcium 8.3 (L) 8.5 - 10.1 mg/dL    Bilirubin Total 2.9 (H) 0.2 - 1.3 mg/dL    Albumin 2.5 (L) 3.4 - 5.0 g/dL    Protein Total 6.3 (L) 6.8 - 8.8 g/dL    Alkaline Phosphatase 66 40 - 150 U/L    ALT 24 0 - 70 U/L    AST 49 (H) 0 - 45 U/L   Ammonia   Result Value Ref Range    Ammonia 199 (HH) 10 - 50 umol/L         ED vitals:  Vitals:    11/27/17 1158 11/27/17 1307   BP: 129/81 156/77   Pulse: 75    Resp: 20 16   Temp: 97.9  F (36.6  C)    TempSrc: Oral    SpO2: 99% 99%   Weight: 95.3 kg (210 lb)    Height: 1.803 m (5' 11\")      Assessments & Plan (with Medical Decision Making)   Clinical impression: 62-year-old male who presented for evaluation for concern that he is more shaky.  He reports his symptoms are similar to prior episodes when he has had hyperammonemia because of medication non-compliance.  Patient tells me he currently lives in [facility] and has been staying with his son and his partner who lives in [facility] for the week.  He is planning to stay in town for at least another month but is unsure.  He reports he has lost his medical coverage and does not have insurance to refill any of his medications and has a result has been out of his medication since May 2017 patient has a complex hepatobiliary history.  He has a known history of liver cirrhosis from alcoholism and hepatitis C.  He was last hospitalized here at [facility] from April 13 for 04/15/2017 for confusion felt to be due to hepatic encephalopathy as a result of stopping his lactulose.  He does also have a history of a TIPS procedure.On my exam the patient does have some jaundice.  He also has abdominal distention and appears to when to an pain at " times but does not appear in any significant distress.  He is afebrile and otherwise hemodynamically stable.  He does not complain of any abdominal pain's concern was that his ammonia level was high resulted in him feeling shaky today.        ED Course and Plan:    I reviewed his medical records in detail including his last hospital course from April 2017.  Patient's last ED visit was on 04/28/2017 where he presented with epigastric abdominal pain and found to have gallstones but no acute cholecystitis.  Patient tells me he did not undergo any surgical intervention at that time after that evaluation.  I also reviewed his prior medication list which includes patient was supposed to be on lactulose (10g/15ml)- 45 ML's 3 times a day.  Furosemide 40 mg twice a day, spironolactone 50 mg daily, rifaximin 550mg BID, ribavirin 200mg QAM, QPM.  Patient cannot tell me what medications he takes.  He also reports that he has been out of his prescription since May 2017 while he has been living in [place].  He currently does not have the ability to pay for his prescriptions and does not have any prospects of medical coverage.  He tells me he has been applying for medical assistance through the [facility].we discussed that the focus of care today in the emergency department will be on reasons that would result in him shaking.  He shaking does not appear to be due to seizure-like activity however with his history of liver cirrhosis and prior history of hepatic encephalopathy labs were obtained.  Clinically there is no evidence for rigors.    patient did mention that he knows the care and [place] medically is better than in [place] but he plans to return to [place]   His labs are at baseline. Chronic thrombocytopenia is improved. Ammonia is 199.  Patient is given 30 g of lactulose based on his home medications   Although patient reports he has not had any of his medications since May 2017.  I am willing to write the patient  a prescription for home but he has no way to pay for his prescription or to fill his prescription hence I elected to admit the patient to the hospital but to help with therapy for concern for hyperammonemia resulting in some of his reported symptoms including shaking.    I spoke with Rhiannon Krueger PA-C- admitting hospitalist at 3.10PM. I reviewed rationale for admission and hospitalization because the patient cannot afford his prescriptions and has no medical insurance and due to his degree of elevation of ammonia and need for treatment.    and patient care may be able to help him with insurance and his prescription.  The hospitalist service may also consider helping with follow-up with GI as patient has been lost to follow-up with his report of living in [place], [place] plan of care is reviewed with the patient who expressed understanding..         Disclaimer: This note consists of symbols derived from keyboarding, dictation and/or voice recognition software. As a result, there may be errors in the script that have gone undetected. Please consider this when interpreting information found in this chart.  I have reviewed the nursing notes.    I have reviewed the findings, diagnosis, plan and need for follow up with the patient.       New Prescriptions    No medications on file       Final diagnoses:   Hyperammonemia (H)   History of cirrhosis of liver - due to hepatitis C and alcohol   Shaking   S/P TIPS (transjugular intrahepatic portosystemic shunt) - 2016     This document serves as a record of the services and decisions personally performed and made by Son Garcia. The HPI was created on his behalf by Sonia Plascencia, a trained medical scribe. The creation of this document is based on the provider's statements to the medical scribe.     Sonia Plascencia 1:11 PM November 27, 2017    Provider:   The information in this document created by the medical scribe for me, accurately reflects  the services I personally performed and the decisions made by me. I have reviewed and approved this document for accuracy prior to leaving the patient care area. Son Garcia, 1:11 PM November 27, 2017 11/27/2017   [facility] EMERGENCY DEPARTMENT     Son Garcia MD  11/27/17 2030

## 2017-11-27 NOTE — IP AVS SNAPSHOT
[facility]    [address] [address]    Phone:  [phone]    Fax:  [phone]                                       After Visit Summary   11/27/2017    Scott Casale    MRN: [id number]           After Visit Summary Signature Page     I have received my discharge instructions, and my questions have been answered. I have discussed any challenges I see with this plan with the nurse or doctor.    ..........................................................................................................................................  Patient/Patient Representative Signature      ..........................................................................................................................................  Patient Representative Print Name and Relationship to Patient    ..................................................               ................................................  Date                                            Time    ..........................................................................................................................................  Reviewed by Signature/Title    ...................................................              ..............................................  Date                                                            Time

## 2017-11-27 NOTE — IP AVS SNAPSHOT
MRN:[id number]                      After Visit Summary   11/27/2017    Scott Casale    MRN: [id number]           Thank you!     Thank you for choosing [facility] for your care. Our goal is always to provide you with excellent care. Hearing back from our patients is one way we can continue to improve our services. Please take a few minutes to complete the written survey that you may receive in the mail after you visit with us. Thank you!        Patient Information     Date Of Birth          1955        Designated Caregiver       Most Recent Value    Caregiver    Will someone help with your care after discharge? yes    Name of designated caregiver Carrie - Wife    Phone number of caregiver [phone]    Caregiver address [place], [place]      About your hospital stay     You were admitted on:  November 27, 2017 You last received care in the:  [facility]    You were discharged on:  November 28, 2017        Reason for your hospital stay       Confusion due to hepatic encepholopathy                  Who to Call     For medical emergencies, please call 911.  For non-urgent questions about your medical care, please call your primary care provider or clinic, [phone]          Attending Provider     Provider Specialty    Son Garcia MD Emergency Medicine    Marshal Rivera MD Internal Medicine    Kaushik Rivera MD Internal Medicine       Primary Care Provider Office Phone # Fax #    Greg Concepcion [phone]       When to contact your care team       Call your primary doctor if you have any of the following: temperature greater than 100 F,  increased shortness of breath, increased Abdominal or Leg swelling, increased pain in the abdomen, Confusion                  After Care Instructions     Activity       Your activity upon discharge: activity as tolerated            Diet       Follow this diet upon discharge: Orders Placed This Encounter  Low Salt (3  "gm/day) Hepatic diet                  Follow-up Appointments     Follow-up and recommended labs and tests        Follow up with primary care provider, Greg Concepcion, within 7 days for hospital follow- up.  The following labs/tests are recommended: CMP.                  Pending Results     No orders found for last 3 day(s).            Statement of Approval     Ordered          11/28/17 1150  I have reviewed and agree with all the recommendations and orders detailed in this document.  EFFECTIVE NOW     Approved and electronically signed by:  Kaushik Rivera MD             Admission Information     Date & Time Provider Department Dept. Phone    11/27/2017 Kaushik Rivera MD [facility] Surgical [phone]      Your Vitals Were     Blood Pressure Pulse Temperature Respirations Height Weight    120/61 (BP Location: Right arm) 78 96.7  F (35.9  C) (Oral) 18 1.803 m (5' 11\") 94.1 kg (207 lb 7.3 oz)    Pulse Oximetry BMI (Body Mass Index)                97% 28.93 kg/m2          RNDOMN Information     RNDOMN gives you secure access to your electronic health record. If you see a primary care provider, you can also send messages to your care team and make appointments. If you have questions, please call your primary care clinic.  If you do not have a primary care provider, please call [phone] and they will assist you.        Care EveryWhere ID     This is your Care EveryWhere ID. This could be used by other organizations to access your [facility] medical records  [id number]        Equal Access to Services     DARIANA FLANNERY : Hadii carlitos Yao, waaxda luqadaha, qaybta kaalmada khai ramsey. [facility] [phone].    ATENCIÓN: Si habla español, tiene a ruvalcaba disposición servicios gratuitos de asistencia lingüística. Llame al [phone].    We comply with applicable federal civil rights laws and [place] laws. We do not discriminate on the basis of race, color, " national origin, age, disability, sex, sexual orientation, or gender identity.               Review of your medicines      CONTINUE these medicines which may have CHANGED, or have new prescriptions. If we are uncertain of the size of tablets/capsules you have at home, strength may be listed as something that might have changed.        Dose / Directions    * furosemide 40 MG tablet   Commonly known as:  LASIX   This may have changed:  Another medication with the same name was added. Make sure you understand how and when to take each.   Used for:  Alcoholic cirrhosis of liver with ascites (H)        Dose:  40 mg   Take 1 tablet (40 mg) by mouth 2 times daily   Quantity:  60 tablet   Refills:  1       * furosemide 40 MG tablet   Commonly known as:  LASIX   This may have changed:  You were already taking a medication with the same name, and this prescription was added. Make sure you understand how and when to take each.        Dose:  40 mg   Take 1 tablet (40 mg) by mouth 2 times daily   Quantity:  30 tablet   Refills:  0       potassium chloride SA 20 MEQ CR tablet   Commonly known as:  KLOR-CON   This may have changed:    - when to take this  - additional instructions   Used for:  Hypokalemia        Dose:  20 mEq   Take 1 tablet (20 mEq) by mouth daily Take only while taking your twice a day dose of Lasix then stop. Have your potassium levels checked in 2-3 days.   Quantity:  30 tablet   Refills:  1       * spironolactone 50 MG tablet   Commonly known as:  ALDACTONE   This may have changed:  Another medication with the same name was added. Make sure you understand how and when to take each.   Used for:  Alcoholic cirrhosis of liver with ascites (H)        Dose:  50 mg   Take 1 tablet (50 mg) by mouth daily   Quantity:  30 tablet   Refills:  3       * spironolactone 50 MG tablet   Commonly known as:  ALDACTONE   This may have changed:  You were already taking a medication with the same name, and this prescription was  added. Make sure you understand how and when to take each.        Dose:  50 mg   Start taking on:  11/29/2017   Take 1 tablet (50 mg) by mouth daily   Quantity:  30 tablet   Refills:  0       * Notice:  This list has 4 medication(s) that are the same as other medications prescribed for you. Read the directions carefully, and ask your doctor or other care provider to review them with you.      CONTINUE these medicines which have NOT CHANGED        Dose / Directions    gabapentin 100 MG capsule   Commonly known as:  NEURONTIN   Used for:  Chronic low back pain with sciatica, sciatica laterality unspecified, unspecified back pain laterality        Dose:  200 mg   Take 2 capsules (200 mg) by mouth At Bedtime   Quantity:  60 capsule   Refills:  3       lactulose 10 GM/15ML solution   Commonly known as:  CHRONULAC        Dose:  45 mL   Take 45 mLs by mouth 3 times daily   Quantity:  4000 mL   Refills:  0       ondansetron 4 MG tablet   Commonly known as:  ZOFRAN   Used for:  Alcoholic cirrhosis of liver without ascites (H)        Dose:  4 mg   Take 1 tablet (4 mg) by mouth every 8 hours as needed for nausea   Quantity:  18 tablet   Refills:  0       rifaximin 550 MG Tabs tablet   Commonly known as:  XIFAXAN        Dose:  550 mg   Take 1 tablet (550 mg) by mouth 2 times daily   Quantity:  60 tablet   Refills:  11            Where to get your medicines      These medications were sent to [facility] [address] [place] [address] [address]   [address] [facility] [address]     Phone:  [phone]     furosemide 40 MG tablet    spironolactone 50 MG tablet         Some of these will need a paper prescription and others can be bought over the counter. Ask your nurse if you have questions.     Bring a paper prescription for each of these medications     furosemide 40 MG tablet    lactulose 10 GM/15ML solution    ondansetron 4 MG tablet    rifaximin 550 MG Tabs tablet    spironolactone 50 MG tablet                ANTIBIOTIC INSTRUCTION     You've Been Prescribed an Antibiotic - Now What?  Your healthcare team thinks that you or your loved one might have an infection. Some infections can be treated with antibiotics, which are powerful, life-saving drugs. Like all medications, antibiotics have side effects and should only be used when necessary. There are some important things you should know about your antibiotic treatment.      Your healthcare team may run tests before you start taking an antibiotic.    Your team may take samples (e.g., from your blood, urine or other areas) to run tests to look for bacteria. These test can be important to determine if you need an antibiotic at all and, if you do, which antibiotic will work best.      Within a few days, your healthcare team might change or even stop your antibiotic.    Your team may start you on an antibiotic while they are working to find out what is making you sick.    Your team might change your antibiotic because test results show that a different antibiotic would be better to treat your infection.    In some cases, once your team has more information, they learn that you do not need an antibiotic at all. They may find out that you don't have an infection, or that the antibiotic you're taking won't work against your infection. For example, an infection caused by a virus can't be treated with antibiotics. Staying on an antibiotic when you don't need it is more likely to be harmful than helpful.      You may experience side effects from your antibiotic.    Like all medications, antibiotics have side effects. Some of these can be serious.    Let you healthcare team know if you have any known allergies when you are admitted to the hospital.    One significant side effect of nearly all antibiotics is the risk of severe and sometimes deadly diarrhea caused by Clostridium difficile (C. Difficile). This occurs when a person takes antibiotics because some good germs are  destroyed. Antibiotic use allows C. diificile to take over, putting patients at high risk for this serious infection.    As a patient or caregiver, it is important to understand your or your loved one's antibiotic treatment. It is especially important for caregivers to speak up when patients can't speak for themselves. Here are some important questions to ask your healthcare team.    What infection is this antibiotic treating and how do you know I have that infection?    What side effects might occur from this antibiotic?    How long will I need to take this antibiotic?    Is it safe to take this antibiotic with other medications or supplements (e.g., vitamins) that I am taking?     Are there any special directions I need to know about taking this antibiotic? For example, should I take it with food?    How will I be monitored to know whether my infection is responding to the antibiotic?    What tests may help to make sure the right antibiotic is prescribed for me?      Information provided by:  www.cdc.gov/getsmart  U.S. Department of Health and Human Services  Centers for disease Control and Prevention  National Center for Emerging and Zoonotic Infectious Diseases  Division of Healthcare Quality Promotion         Protect others around you: Learn how to safely use, store and throw away your medicines at www.disposemymeds.org.             Medication List: This is a list of all your medications and when to take them. Check marks below indicate your daily home schedule. Keep this list as a reference.      Medications           Morning Afternoon Evening Bedtime As Needed    * furosemide 40 MG tablet   Commonly known as:  LASIX   Take 1 tablet (40 mg) by mouth 2 times daily   Last time this was given:  40 mg on 11/28/2017  8:21 AM                                * furosemide 40 MG tablet   Commonly known as:  LASIX   Take 1 tablet (40 mg) by mouth 2 times daily   Last time this was given:  40 mg on 11/28/2017  8:21 AM                                 gabapentin 100 MG capsule   Commonly known as:  NEURONTIN   Take 2 capsules (200 mg) by mouth At Bedtime                                lactulose 10 GM/15ML solution   Commonly known as:  CHRONULAC   Take 45 mLs by mouth 3 times daily   Last time this was given:  30 g on 11/28/2017  8:21 AM                                ondansetron 4 MG tablet   Commonly known as:  ZOFRAN   Take 1 tablet (4 mg) by mouth every 8 hours as needed for nausea                                potassium chloride SA 20 MEQ CR tablet   Commonly known as:  KLOR-CON   Take 1 tablet (20 mEq) by mouth daily Take only while taking your twice a day dose of Lasix then stop. Have your potassium levels checked in 2-3 days.   Last time this was given:  20 mEq on 11/27/2017  6:31 PM                                rifaximin 550 MG Tabs tablet   Commonly known as:  XIFAXAN   Take 1 tablet (550 mg) by mouth 2 times daily   Last time this was given:  550 mg on 11/28/2017  8:22 AM                                * spironolactone 50 MG tablet   Commonly known as:  ALDACTONE   Take 1 tablet (50 mg) by mouth daily   Last time this was given:  50 mg on 11/28/2017  8:21 AM                                * spironolactone 50 MG tablet   Commonly known as:  ALDACTONE   Take 1 tablet (50 mg) by mouth daily   Start taking on:  11/29/2017   Last time this was given:  50 mg on 11/28/2017  8:21 AM                                * Notice:  This list has 4 medication(s) that are the same as other medications prescribed for you. Read the directions carefully, and ask your doctor or other care provider to review them with you.

## 2017-11-27 NOTE — H&P
"[facility]    History and Physical  Hospital Medicine       Date of Admission:  11/27/2017  Date of Service: 11/27/2017     Primary Care Physician   Greg Concepcion [phone]    Assessment & Plan   Scott Casale is a 62 year old male with PMH significant for cirrhosis of the liver (hx of hepatitis C, hx of alcohol abuse), s/p TIPS procedure (due to portal hypertension, refractory ascites), chronic thrombocytopenia, chronic hyperbilirubinemia, chronic LE edema, GERD, and hx of depression who now presents with \"shaking.\"      Acute hepatic encephalopathy (Grade I-II) due to medication noncompliance  Presented with \"shaking\" for a \"few\" days.  No fever.  No recent cough, dysuria, generalized malaise.  Similar presentation to previous episodes of hepatic encephalopathy.  Has been without insurance and has had infrequent access to lactulose, rifaximin since May of 2017. VS reveal mild hypertension.  Bilirubin 2.9.  AST 49.  Ammonia 199.  DDx: encephalopathy due to medication non-compliance  - initiate lactulose 45mL (30g) TID  - resume rafaxmin  - low tolerance to obtain blood cultures if patient developed leukocytosis or fever  - IP care transition consult placed  - IP PT/OT consults placed  - UA with culture ordered, pending    Cough  Present x2 weeks.  Non-productive  - 2-view CXR ordered, pending    Hepatic cirrhosis (H)  S/P TIPS (tx for refractory ascites)  Last seen in our system 03/20/2017 with Dr. Warren Higginbotham.  AT that time, was on lactulose TID, rafaximin and had goal BM of 3x daily.  - see above      Thrombocytopenia (H)  63k on arrival.  This is chronic  - CBC with platelets/diff in AM    Hyperbilirubinemia  Chronic.  Likely secondary to liver disease.  2.9 on arrival  - CMP in AM    Chronic LE Edema  Has not been on medication for a period of months.  Since last discharge 04/2017, weight is up 7 pounds.  - resume lasix 40mg BID  - resume spironolactone 50mg daily (will plan to hold " in AM if BP does not tolerate)      Chronic hepatitis C virus infection (H)  Completed treatment with [facility] in 2016/2017.    Hx of Cholelithiasis  Seen in ED 05/2017 - followed up with surgery 05/03/2017.  He was given a referral to the [facility] to be seen by a surgeon at that facility given that he was too high risk for surgery at [facility].  The patient did not follow up on this.    Mild major depression (H)  Not on mediation      GERD (gastroesophageal reflux disease)  No on medication.  No symptoms at present       F: not indicated  E: CMP in AM  N: as tolerated  DVT Prophylaxis: mechanical    Code Status: Full Code    Disposition: Anticipate discharge in 2-3 days. Appropriate for inpatient care.    Case discussed with Dr. Marshal Rivera.  Assessment and plan as written above.    Rhiannon Krueger PA-C  [facility] Hospitalist Program    History is obtained from the patient and review of the EMR.    Past Medical History    Past Medical History:   Diagnosis Date     AC separation 8/20/2008    left     Ascites     diuretic refractory     H/O testicular cancer age 28     Hepatitis C     genotype 1A      Shoulder dislocation      Splenomegaly      Ulcerative colitis (H)        Past Surgical History   Past Surgical History:   Procedure Laterality Date     C APPENDECTOMY       C KNEE SCOPE,CLEAN/DRAIN      bilateral     COLONOSCOPY N/A 1/17/2017    Procedure: COLONOSCOPY;  Surgeon: Guru Reina Palacios MD;  Location:  GI     ESOPHAGOSCOPY, GASTROSCOPY, DUODENOSCOPY (EGD), COMBINED N/A 1/17/2017    Procedure: COMBINED ESOPHAGOSCOPY, GASTROSCOPY, DUODENOSCOPY (EGD);  Surgeon: Guru Reina Palacios MD;  Location:  GI     EYE SURGERY       IR TRANSVEN INTRAHEPATIC PORTOSYST REV       ORCHIECTOMY SCROTAL      prosthesis placed     STRABISMUS SURGERY      bilateral       Family History    Family History   Problem Relation Age of Onset     CANCER Mother 70     brain tumor  "    DIABETES Maternal Grandmother      CEREBROVASCULAR DISEASE Sister      HEART DISEASE Sister      Respiratory Son      CANCER Father      Uterine Cancer Sister      Prostate Cancer No family hx of      Cancer - colorectal No family hx of        History of Present Illness   Scott Casale is a 62 year old male with PMH significant for cirrhosis of the liver (hx of hepatitis C, hx of alcohol abuse), s/p TIPS procedure (due to portal hypertension, refractory ascites), chronic thrombocytopenia, chronic hyperbilirubinemia, chronic LE edema, GERD, and hx of depression who now presents with \"shaking.\"    The patient lives in [place].  He reports that he took he lactulose in May and June of this year, but that after that point in time he ran of out of his prescriptions, ran out of finances, and had moved to [place] (from [place]) and as such, had no GI physician following his care.  He reports that he has been \"on the verge\" of a hepatic encephalopathy episode for the last several months.  He reports that several days ago, he began to \"shake\" more violently.  He felt that this was progressively getting worse.  After discussing these findings with his wife, she insisted that he present to the ED for further evaluation.    He denies fever/chills.  He has intermittent nausea which is unchanged from baseline.  He has mild nausea after eating in the evenings. He has had a cough that began several weeks ago.  This is non-productive.  He has intermittent abdominal pain which is mild and relates to his gallstones.  Last bowel movement was last night.  He is having only 1 BM per day (goal per GI notation in 3 stools per day).    ROS: Denies fever, chills, nausea, vomiting, myalgias, cold-like symptoms (congestion, pharyngitis, sinus pressure, rhinorrhea), headaches, lightheadedness, dizziness, chest pain, palpitations/flutters, SOB, wheezes, diarrhea/constipation, dysuria, hematuria, joint swelling, joint pain, leg swelling, and " rashes.      Prior to Admission Medications   Prior to Admission Medications   Prescriptions Last Dose Informant Patient Reported? Taking?   furosemide (LASIX) 40 MG tablet More than a month at Unknown time Self No No   Sig: Take 1 tablet (40 mg) by mouth 2 times daily   gabapentin (NEURONTIN) 100 MG capsule More than a month at Unknown time Self No No   Sig: Take 2 capsules (200 mg) by mouth At Bedtime   lactulose (CHRONULAC) 10 GM/15ML solution More than a month at Unknown time Self No No   Sig: Take 45 mLs by mouth 3 times daily   ondansetron (ZOFRAN) 4 MG tablet More than a month at Unknown time Self No No   Sig: Take 1 tablet (4 mg) by mouth every 8 hours as needed for nausea   potassium chloride SA (POTASSIUM CHLORIDE) 20 MEQ CR tablet More than a month at Unknown time Self No No   Sig: Take 1 tablet (20 mEq) by mouth daily Take only while taking your twice a day dose of Lasix then stop. Have your potassium levels checked in 2-3 days.   Patient taking differently: Take 20 mEq by mouth every evening Take only while taking your twice a day dose of Lasix then stop. Have your potassium levels checked in 2-3 days.   rifaximin (XIFAXAN) 550 MG TABS tablet More than a month at Unknown time Self No No   Sig: Take 1 tablet (550 mg) by mouth 2 times daily   spironolactone (ALDACTONE) 50 MG tablet More than a month at Unknown time Self No No   Sig: Take 1 tablet (50 mg) by mouth daily      Facility-Administered Medications: None       Allergies   Allergies   Allergen Reactions     Blood Transfusion Related (Informational Only) Other (See Comments)     Patient has a history of a clinically significant antibody against RBC antigens.  A delay in compatible RBCs may occur.     Percocet [Oxycodone-Acetaminophen] Nausea and Vomiting     Acetaminophen Nausea     Iodine-131 Hives     Motrin [Ibuprofen Micronized] Nausea     Tylenol Nausea       Social History   Social History     Social History     Marital status:       "Spouse name: N/A     Number of children: N/A     Years of education: N/A     Occupational History     Not on file.     Social History Main Topics     Smoking status: Former Smoker     Quit date: 1/1/1978     Smokeless tobacco: Never Used     Alcohol use No      Comment: quit in 2010     Drug use: Yes     Special: Marijuana      Comment: intermittent, uses for pain     Sexual activity: Yes     Partners: Female     Other Topics Concern     Parent/Sibling W/ Cabg, Mi Or Angioplasty Before 65f 55m? No     Social History Narrative    Admit 9/28:    Lives with new wife of 2 mos.    Tobacco:  Former smoker, quit 1978    Alcohol:  Sober since 2010, recovering alcoholic.    Drugs:  Denies       Physical Exam     BP (!) 197/101  Pulse 75  Temp 97.9  F (36.6  C) (Oral)  Resp 16  Ht 1.803 m (5' 11\")  Wt 95.3 kg (210 lb)  SpO2 99%  BMI 29.29 kg/m2     Weight: 210 lbs 0 oz Body mass index is 29.29 kg/(m^2).     Constitutional: Alert and oriented x3 (believes the month is April, otherwise oriented).  Cooperative.  Appears stated age.  Appears in no acute distress. Slightly jaundiced. Slightly tremulous with directed motion.  HEENT: Oropharynx is clear and moist. No evidence of cranial trauma.  Lymph/Hematologic: No occipital, submental, submandibular, anterior or posterior cervical, or supraclavicular lymphadenopathy is appreciated.  Cardiovascular: Regular rate/ rhythm.  S1 and S2 grossly normal.  No appreciable murmur, rub, gallop.   No lower extremity edema.  Respiratory: Clear to auscultation bilaterally. Equal chest expansion.  GI: Distended. Slightly tender to palpation of RUQ. Hypoactive bowel sounds, no hepatosplenomegaly.  Genitourinary: Deferred  Musculoskeletal: Normal muscle bulk and tone.  Skin: Warm and dry, no rashes.   Neurologic: Neck supple. Cranial nerves 3-12 are grossly intact.  is symmetric.  Normal finger to nose testing    Data   Data reviewed today:     Recent Labs  Lab 11/27/17  1330   WBC 4.2 "   HGB 12.0*   *   PLT 63*      POTASSIUM 3.7   CHLORIDE 113*   CO2 25   BUN 17   CR 1.09   ANIONGAP 5   ARACELI 8.3*   *   ALBUMIN 2.5*   PROTTOTAL 6.3*   BILITOTAL 2.9*   ALKPHOS 66   ALT 24   AST 49*       No results found for this or any previous visit (from the past 24 hour(s)).    I personally reviewed no images or EKG's today.    MONICA Madrid-C  [facility] Medicine

## 2017-11-28 VITALS
BODY MASS INDEX: 29.04 KG/M2 | SYSTOLIC BLOOD PRESSURE: 120 MMHG | WEIGHT: 207.45 LBS | HEIGHT: 71 IN | OXYGEN SATURATION: 97 % | DIASTOLIC BLOOD PRESSURE: 61 MMHG | TEMPERATURE: 96.7 F | RESPIRATION RATE: 18 BRPM | HEART RATE: 78 BPM

## 2017-11-28 LAB
ALBUMIN SERPL-MCNC: 2.7 G/DL (ref 3.4–5)
ALP SERPL-CCNC: 63 U/L (ref 40–150)
ALT SERPL W P-5'-P-CCNC: 24 U/L (ref 0–70)
AMMONIA PLAS-SCNC: 50 UMOL/L (ref 10–50)
ANION GAP SERPL CALCULATED.3IONS-SCNC: 6 MMOL/L (ref 3–14)
AST SERPL W P-5'-P-CCNC: 50 U/L (ref 0–45)
BASOPHILS # BLD AUTO: 0 10E9/L (ref 0–0.2)
BASOPHILS NFR BLD AUTO: 0.5 %
BILIRUB SERPL-MCNC: 3.8 MG/DL (ref 0.2–1.3)
BUN SERPL-MCNC: 17 MG/DL (ref 7–30)
CALCIUM SERPL-MCNC: 7.8 MG/DL (ref 8.5–10.1)
CHLORIDE SERPL-SCNC: 113 MMOL/L (ref 94–109)
CO2 SERPL-SCNC: 23 MMOL/L (ref 20–32)
CREAT SERPL-MCNC: 1.07 MG/DL (ref 0.66–1.25)
DIFFERENTIAL METHOD BLD: ABNORMAL
EOSINOPHIL # BLD AUTO: 0.1 10E9/L (ref 0–0.7)
EOSINOPHIL NFR BLD AUTO: 2.5 %
ERYTHROCYTE [DISTWIDTH] IN BLOOD BY AUTOMATED COUNT: 13.3 % (ref 10–15)
GFR SERPL CREATININE-BSD FRML MDRD: 70 ML/MIN/1.7M2
GLUCOSE SERPL-MCNC: 100 MG/DL (ref 70–99)
HCT VFR BLD AUTO: 35.1 % (ref 40–53)
HGB BLD-MCNC: 12.3 G/DL (ref 13.3–17.7)
IMM GRANULOCYTES # BLD: 0 10E9/L (ref 0–0.4)
IMM GRANULOCYTES NFR BLD: 0.2 %
LYMPHOCYTES # BLD AUTO: 0.8 10E9/L (ref 0.8–5.3)
LYMPHOCYTES NFR BLD AUTO: 17.4 %
MCH RBC QN AUTO: 35.3 PG (ref 26.5–33)
MCHC RBC AUTO-ENTMCNC: 35 G/DL (ref 31.5–36.5)
MCV RBC AUTO: 101 FL (ref 78–100)
MONOCYTES # BLD AUTO: 0.5 10E9/L (ref 0–1.3)
MONOCYTES NFR BLD AUTO: 11.7 %
NEUTROPHILS # BLD AUTO: 3 10E9/L (ref 1.6–8.3)
NEUTROPHILS NFR BLD AUTO: 67.7 %
PLATELET # BLD AUTO: 65 10E9/L (ref 150–450)
POTASSIUM SERPL-SCNC: 3.7 MMOL/L (ref 3.4–5.3)
PROT SERPL-MCNC: 6.4 G/DL (ref 6.8–8.8)
RBC # BLD AUTO: 3.48 10E12/L (ref 4.4–5.9)
SODIUM SERPL-SCNC: 142 MMOL/L (ref 133–144)
WBC # BLD AUTO: 4.4 10E9/L (ref 4–11)

## 2017-11-28 PROCEDURE — 25000128 H RX IP 250 OP 636: Performed by: PHYSICIAN ASSISTANT

## 2017-11-28 PROCEDURE — 25000132 ZZH RX MED GY IP 250 OP 250 PS 637: Mod: GY | Performed by: PHYSICIAN ASSISTANT

## 2017-11-28 PROCEDURE — 99238 HOSP IP/OBS DSCHRG MGMT 30/<: CPT | Performed by: INTERNAL MEDICINE

## 2017-11-28 PROCEDURE — 36415 COLL VENOUS BLD VENIPUNCTURE: CPT | Performed by: PHYSICIAN ASSISTANT

## 2017-11-28 PROCEDURE — 85025 COMPLETE CBC W/AUTO DIFF WBC: CPT | Performed by: PHYSICIAN ASSISTANT

## 2017-11-28 PROCEDURE — 90686 IIV4 VACC NO PRSV 0.5 ML IM: CPT | Performed by: PHYSICIAN ASSISTANT

## 2017-11-28 PROCEDURE — 80053 COMPREHEN METABOLIC PANEL: CPT | Performed by: PHYSICIAN ASSISTANT

## 2017-11-28 PROCEDURE — A9270 NON-COVERED ITEM OR SERVICE: HCPCS | Mod: GY | Performed by: PHYSICIAN ASSISTANT

## 2017-11-28 PROCEDURE — 82140 ASSAY OF AMMONIA: CPT | Performed by: INTERNAL MEDICINE

## 2017-11-28 RX ORDER — SPIRONOLACTONE 50 MG/1
50 TABLET, FILM COATED ORAL DAILY
Qty: 30 TABLET | Refills: 0 | Status: SHIPPED | OUTPATIENT
Start: 2017-11-29 | End: 2018-07-19

## 2017-11-28 RX ORDER — LACTULOSE 10 G/15ML
45 SOLUTION ORAL 3 TIMES DAILY
Qty: 4000 ML | Refills: 0 | Status: SHIPPED | OUTPATIENT
Start: 2017-11-28 | End: 2018-07-19

## 2017-11-28 RX ORDER — ONDANSETRON 4 MG/1
4 TABLET, FILM COATED ORAL EVERY 8 HOURS PRN
Qty: 18 TABLET | Refills: 0 | Status: SHIPPED | OUTPATIENT
Start: 2017-11-28 | End: 2018-08-29

## 2017-11-28 RX ORDER — FUROSEMIDE 40 MG
40 TABLET ORAL 2 TIMES DAILY
Qty: 30 TABLET | Refills: 0 | Status: SHIPPED | OUTPATIENT
Start: 2017-11-28 | End: 2018-07-19

## 2017-11-28 RX ORDER — SPIRONOLACTONE 50 MG/1
50 TABLET, FILM COATED ORAL DAILY
Qty: 30 TABLET | Refills: 3 | Status: SHIPPED | OUTPATIENT
Start: 2017-11-28 | End: 2018-07-19

## 2017-11-28 RX ORDER — FUROSEMIDE 40 MG
40 TABLET ORAL 2 TIMES DAILY
Qty: 60 TABLET | Refills: 1 | Status: SHIPPED | OUTPATIENT
Start: 2017-11-28 | End: 2018-07-19

## 2017-11-28 RX ORDER — LACTULOSE 10 G/15ML
45 SOLUTION ORAL 3 TIMES DAILY
Qty: 4000 ML | Refills: 0 | Status: SHIPPED | OUTPATIENT
Start: 2017-11-28 | End: 2017-11-28

## 2017-11-28 RX ADMIN — FUROSEMIDE 40 MG: 40 TABLET ORAL at 08:21

## 2017-11-28 RX ADMIN — INFLUENZA A VIRUS A/MICHIGAN/45/2015 X-275 (H1N1) ANTIGEN (FORMALDEHYDE INACTIVATED), INFLUENZA A VIRUS A/HONG KONG/4801/2014 X-263B (H3N2) ANTIGEN (FORMALDEHYDE INACTIVATED), INFLUENZA B VIRUS B/PHUKET/3073/2013 ANTIGEN (FORMALDEHYDE INACTIVATED), AND INFLUENZA B VIRUS B/BRISBANE/60/2008 ANTIGEN (FORMALDEHYDE INACTIVATED) 0.5 ML: 15; 15; 15; 15 INJECTION, SUSPENSION INTRAMUSCULAR at 08:22

## 2017-11-28 RX ADMIN — RIFAXIMIN 550 MG: 550 TABLET ORAL at 08:22

## 2017-11-28 RX ADMIN — SPIRONOLACTONE 50 MG: 25 TABLET ORAL at 08:21

## 2017-11-28 RX ADMIN — LACTULOSE 30 G: 10 SOLUTION ORAL at 08:21

## 2017-11-28 NOTE — DISCHARGE SUMMARY
"[facility]    Discharge Summary  Hospital Medicine    Date of Admission:  11/27/2017  Date of Discharge:  11/28/2017   Discharging Provider: Kaushik Rivera  Date of Service: 11/28/2017     Primary Care     Greg Concepcion  [facility] [address]  [facility] [address]      Identification and Chief Compaint: Scott Casale is a 62 year old male with PMH significant for cirrhosis of the liver (hx of hepatitis C, hx of alcohol abuse), s/p TIPS procedure (due to portal hypertension, refractory ascites), chronic thrombocytopenia, chronic hyperbilirubinemia, chronic LE edema, GERD, and hx of depression who now presents with \"shaking.\".    Discharge Diagnoses       Acute hepatic encephalopathy    Mild major depression (H)    GERD (gastroesophageal reflux disease)    S/P TIPS (transjugular intrahepatic portosystemic shunt)    Ascites of liver    Chronic hepatitis C virus infection (H)    Hepatic cirrhosis (H)    Portal hypertension (H)    Thrombocytopenia (H)    Hyperammonemia (H)                 Discharge Disposition   Discharged to home    Discharge Orders     Reason for your hospital stay   Confusion due to hepatic encepholopathy     Follow-up and recommended labs and tests    Follow up with primary care provider, Grge Concepcion, within 7 days for hospital follow- up.  The following labs/tests are recommended: CMP.     When to contact your care team   Call your primary doctor if you have any of the following: temperature greater than 100 F,  increased shortness of breath, increased Abdominal or Leg swelling, increased pain in the abdomen, Confusion     Activity   Your activity upon discharge: activity as tolerated     Full Code     Diet   Follow this diet upon discharge: Orders Placed This Encounter  Low Salt (3 gm/day) Hepatic diet       Discharge Medications   Current Discharge Medication List      CONTINUE these medications which have CHANGED    Details   lactulose (CHRONULAC) 10 " GM/15ML solution Take 45 mLs by mouth 3 times daily  Qty: 4000 mL, Refills: 0    Associated Diagnoses: Hepatic encephalopathy (H)      spironolactone (ALDACTONE) 50 MG tablet Take 1 tablet (50 mg) by mouth daily  Qty: 30 tablet, Refills: 3    Associated Diagnoses: Alcoholic cirrhosis of liver with ascites (H)      furosemide (LASIX) 40 MG tablet Take 1 tablet (40 mg) by mouth 2 times daily  Qty: 60 tablet, Refills: 1    Associated Diagnoses: Alcoholic cirrhosis of liver with ascites (H)      rifaximin (XIFAXAN) 550 MG TABS tablet Take 1 tablet (550 mg) by mouth 2 times daily  Qty: 60 tablet, Refills: 11    Associated Diagnoses: Hepatic encephalopathy (H)         CONTINUE these medications which have NOT CHANGED    Details   ondansetron (ZOFRAN) 4 MG tablet Take 1 tablet (4 mg) by mouth every 8 hours as needed for nausea  Qty: 18 tablet, Refills: 0    Associated Diagnoses: Alcoholic cirrhosis of liver without ascites (H)      potassium chloride SA (POTASSIUM CHLORIDE) 20 MEQ CR tablet Take 1 tablet (20 mEq) by mouth daily Take only while taking your twice a day dose of Lasix then stop. Have your potassium levels checked in 2-3 days.  Qty: 30 tablet, Refills: 1    Associated Diagnoses: Hypokalemia      gabapentin (NEURONTIN) 100 MG capsule Take 2 capsules (200 mg) by mouth At Bedtime  Qty: 60 capsule, Refills: 3    Associated Diagnoses: Chronic low back pain with sciatica, sciatica laterality unspecified, unspecified back pain laterality           Allergies   Allergies   Allergen Reactions     Blood Transfusion Related (Informational Only) Other (See Comments)     Patient has a history of a clinically significant antibody against RBC antigens.  A delay in compatible RBCs may occur.     Percocet [Oxycodone-Acetaminophen] Nausea and Vomiting     Acetaminophen Nausea     Iodine-131 Hives     Motrin [Ibuprofen Micronized] Nausea     Tylenol Nausea           SOCIAL WORK IP CONSULT  PHYSICAL THERAPY ADULT IP  "CONSULT  OCCUPATIONAL THERAPY ADULT IP CONSULT  CARE TRANSITION RN/SW IP CONSULT    Significant Results and Procedures   Procedures    None    Data   Results for orders placed or performed during the hospital encounter of 11/27/17   XR Chest 2 Views    Narrative    CHEST TWO VIEWS 11/27/2017 4:25 PM     HISTORY: Cough.    COMPARISON: None.    FINDINGS: Heart size and pulmonary vascularity are within normal  limits. The lungs are clear. No pneumothorax or pleural effusion.  Numerous surgical clips noted in the abdomen.      Impression    IMPRESSION: No radiographic evidence of acute chest abnormality.     KRISTOPHER JACKSON MD       History of Present Illness   (From H&P) Scott Casale is a 62 year old male with PMH significant for cirrhosis of the liver (hx of hepatitis C, hx of alcohol abuse), s/p TIPS procedure (due to portal hypertension, refractory ascites), chronic thrombocytopenia, chronic hyperbilirubinemia, chronic LE edema, GERD, and hx of depression who now presents with \"shaking.\"    Hospital Course   Scott Casale was admitted on 11/27/2017.  The following problems were addressed during his hospitalization:    Scott Casale came in with the symptoms of shaking. His ammonia was elevated at 199.  He was not taking his lactulose due to lack of money.  Later on he said that he was not taking any of his medications.  He was given lactulose as was supposed to be prescribed and passed 3 stools on the day of admission and one stool on the day of discharge.  His ammonia level has come down to 50.   [facility] [place] provided him with medications were stopped 1 month.  Patient is alert oriented and not shaking much at the time of discharge.  He denies any abdominal pain, fever, black colored vomitus or stool.     Pending Results   Unresulted Labs Ordered in the Past 30 Days of this Admission     No orders found for last 61 day(s).          Physical Exam   Temp:  [96.7  F (35.9  C)-98.7  F (37.1  C)] 96.7  F (35.9 "  C)  Pulse:  [69-78] 78  Heart Rate:  [75-77] 77  Resp:  [16-20] 18  BP: (120-197)/() 120/61  SpO2:  [97 %-100 %] 97 %  Vitals:    11/27/17 1158 11/27/17 1755   Weight: 95.3 kg (210 lb) 94.1 kg (207 lb 7.3 oz)       Constitutional: Not in any acute distress  CV: S1 S2 Regular  Respiratory: Clear to auscultation bilaterally  GI: Soft, nontender  CNS: Alert and Oriented x 3 I do not see asterixis      The discharge plan was discussed with the patient     Total time on this discharge was 40 minutes.    Kaushik Rivera   Hospitalist. [facility]. [place].

## 2017-11-28 NOTE — PLAN OF CARE
Problem: Patient Care Overview  Goal: Plan of Care/Patient Progress Review  OT: Per discussion in rounds, No IP OT needs identified at this time.

## 2017-11-28 NOTE — PLAN OF CARE
Problem: Patient Care Overview  Goal: Plan of Care/Patient Progress Review  Outcome: Improving  Alert, disoriented to time. VSS on RA. Denies pain. 3 stools overnight since lactulose re-started. Neuros unchanged. C/o dry cough, LS clear. A1, voiding adequate amounts, DC pending.

## 2017-11-28 NOTE — PLAN OF CARE
Problem: Fall Risk (Adult)  Goal: Identify Related Risk Factors and Signs and Symptoms  Related risk factors and signs and symptoms are identified upon initiation of Human Response Clinical Practice Guideline (CPG).   Outcome: Improving  Patient states that he feels better. Had 3 stools overnight. Alert, appropriate, neuros intact. VSS.

## 2017-11-28 NOTE — CONSULTS
CARE TRANSITION SOCIAL WORK INITIAL ASSESSMENT:      Met with: Patient.    DATA  Principal Problem:    Acute hepatic encephalopathy  Active Problems:    Mild major depression (H)    GERD (gastroesophageal reflux disease)    S/P TIPS (transjugular intrahepatic portosystemic shunt)    Ascites of liver    Chronic hepatitis C virus infection (H)    Hepatic cirrhosis (H)    Portal hypertension (H)    Thrombocytopenia (H)    Hyperammonemia (H)    Hepatic encephalopathy (H)       Primary Care Clinic Name: [facility]  Primary Care MD Name: Jamey  Contact information and PCP information verified: Yes      ASSESSMENT  Cognitive Status: awake, alert and oriented.             Lives With: alone  Living Arrangements: house     Description of Support System: Supportive, Involved   Who is your support system?: Wife, Children   Support Assessment: Adequate family and caregiver support, Adequate social supports   Insurance Concerns: Underinsured/limits on insurance        This writer met with pt introduced self and role. Discussed discharge planning, pt reports that he is in [place] because of family. He primarily lives in [place], he reports he is in the process of applying for MA there. Pt reports he has no money for his medications, this writer authorized anahy care for pts medications, pt will dc today.     PLAN    home    Discharge Planner   Discharge Plans in progress: home  Barriers to discharge plan: finances  Follow up plan: CTS to follow prn       Entered by: Blanquita Longo 11/28/2017 11:22 AM             Blanquita ARENAS, [facility], [facility] [phone]

## 2017-11-28 NOTE — PLAN OF CARE
"Problem: Patient Care Overview  Goal: Plan of Care/Patient Progress Review  [facility] ADMISSION NOTE    Patient admitted to room *2306 at approximately 1755 via wheel chair from emergency room. Patient was accompanied by spouse.     Verbal SBAR report received from RASHAD Peña prior to patient arrival.     Patient ambulated to bed with stand-by assist. Patient alert and oriented X 2. Pain is controlled without any medications. 0-10 Pain Scale: 7. Admission vital signs: Blood pressure 153/72, pulse 72, temperature 97.8  F (36.6  C), temperature source Oral, resp. rate 16, height 1.803 m (5' 11\"), weight 94.1 kg (207 lb 7.3 oz), SpO2 99 %. Patient and spouse were oriented to plan of care, call light, bed controls, tv, telephone, bathroom and visiting hours.     The following safety risks were identified during admission: fall. Yellow risk band applied: YES.     Neda Heart        "

## 2017-11-28 NOTE — PROGRESS NOTES
[facility] DISCHARGE NOTE    Patient discharged to home at 12:23 PM via wheel chair. Accompanied by spouse and staff. Discharge instructions reviewed with patient, opportunity offered to ask questions. Prescriptions sent to patients preferred pharmacy. All belongings sent with patient. Meds per anahy care:furosemide, aldactone, lactulose, and rifaximin. Other meds potassium, gabapentin not anahy cared per Dr. Kaushik Rivera.    Leidy Benítez

## 2017-12-13 NOTE — PLAN OF CARE
Internal Medicine Daily Progress Note    Patient: Nila Tyler Date: 12/13/2017   YOB: 1960 Attending: Mau Moseley MD   57 year old female      Chief Complaint: Left leg swelling. She is s/p left TKA    Subjective: She still complains of left knee pain and headache. She also complains fatigue and feels  tired easily. She denies to cp, sob, nausea, vomiting, fever or sweating.    Problem List:   Patient Active Problem List   Diagnosis   • REFRACTION DISORDER (astig/presb)   • Cervical spondylosis with myelopathy   • Neuralgia, neuritis, and radiculitis, unspecified   • Myalgia and myositis, unspecified   • Claw toe (acquired)   • Unspecified mechanical complication of internal orthopedic device, implant, and graft   • OSTEOARTH KNEE, RT, TC W/LSJN, SEV   • Arthritis of right knee   • Cavovarus deformity of foot, acquired   • Achilles tendinitis   • Plantar fasciitis   • Paroxysmal atrial fibrillation (CMS/HCC)   • Family history of malignant hyperthermia- per pt   • Radial styloid tenosynovitis of left hand   • Mechanical low back pain   • Bilateral hip pain   • Gait disturbance   • Generalized muscle weakness   • Spina bifida of lumbar region without hydrocephalus (CMS/HCC)   • Primary osteoarthritis of both knees   • Anemia of chronic disease   • Colonic inertia   • Generalized weakness   • Bilateral leg weakness   • RLQ abdominal pain   • Postural hypotension   • Arachnoiditis   • Skin yeast infection   • Candida vaginitis   • Chronic constipation   • Primary osteoarthritis of left knee   • Obesity, Class III, BMI 40-49.9 (morbid obesity) (CMS/HCC)   • Arthritis of left knee       Allergies:   ALLERGIES:   Allergen Reactions   • Penicillins RASH     rash   • Sulfa Antibiotics Other (See Comments)     Possible allergy not sure       Vitals :   Vitals:    12/13/17 0601   BP: 110/70   Pulse: 101   Resp: 20   Temp: 97.9 °F (36.6 °C)       Physical Examination :  Constitutional:  Obese, No acute  Problem: Goal Outcome Summary  Goal: Goal Outcome Summary  PT 7C: Engaged pt in bed mobility IND, sit <> stand SBA to IND (varrying throughout session), standing dynamic and static balance challenges, and gait training without AD + SBA/CGA for 350ft (total gait of >500ft if calculate in balance challenges). Pt demonstrating slight improvement in balance today, but continues to demonstrate the greatest deficits with dynamic activities during gait. Pt is not safe to ambulate IND at this time. PT anticipating discharge to home with 24 hour assist from wife in the short-term and (HH/OP PT) OP balance rehab is also recommended.        distress, non-toxic appearance  Eyes:  Pupils equal round, pale conjunctiva  HENT:  Atraumatic, normocephalic,  external ears normal, nose normal  Neck: Supple   Respiratory:  No respiratory distress, normal breath sounds, no rales, no wheezing   Cardiovascular:  Normal rate, normal rhythm, no murmurs, no gallops, no rubs   GI:  Soft, obese, normal bowel sounds, non tender, colostomy+, previous surgery scar   :  No costovertebral angle tenderness   Skin:  Well hydrated, no rash  Neurologic:  Alert & oriented x 3, Bilateral paraparesis   Extremities: No clubbing, cyanosis. Warm and well perfused. Pulses 2+ throughout. LLE dressing intact and dry. Left leg swollen > right leg.   Psychiatric:  Speech and behavior appropriate    Medications :  Scheduled  • ciprofloxacin  500 mg Oral BID Abreakfast & HS   • pantoprazole  40 mg Oral BID AC   • ferrous sulfate  325 mg Oral BID WC   • fentaNYL  1 patch Transdermal Q3 Days    And   • fentanyl  1 patch Transdermal Q3 Days   • senna  2 tablet Oral Nightly   • baclofen  20 mg Oral TID   • cyclobenzaprine  10 mg Oral TID   • nitrofurantoin (macrocrystal-monohydrate)  100 mg Oral QHS   • pregabalin  200 mg Oral TID   • influenza virus quadrivalent vaccine inactivated (PRESERVATIVE FREE)  0.5 mL Intramuscular Once       PRN  • docusate sodium-sennosides (SENOKOT S) 50-8.6 MG 2 tablet  2 tablet Oral Daily PRN For total dose see MAR   • nitroGLYcerin (NITROSTAT) sublingual tablet 0.4 mg  0.4 mg Sublingual Q5 Min PRN For total dose see MAR   • sodium chloride (NORMAL SALINE) 0.9 % bolus 500 mL  500 mL Intravenous PRN For total dose see MAR   • sodium chloride (PF) 0.9 % injection 2 mL  2 mL Injection PRN For total dose see MAR   • acetaminophen (TYLENOL) tablet 650 mg  650 mg Oral Q4H PRN For total dose see MAR   • bisacodyl (DULCOLAX) suppository 10 mg  10 mg Rectal Daily PRN For total dose see MAR   • calcium carbonate (TUMS) chewable tablet 500-1,000 mg  500-1,000 mg Oral Q4H  PRN For total dose see MAR   • diphenhydrAMINE (BENADRYL) capsule 25 mg  25 mg Oral Q6H PRN For total dose see MAR   • ondansetron (ZOFRAN) injection 4 mg  4 mg Intravenous BID PRN For total dose see MAR   • polyethylene glycol (GLYCOLAX, MIRALAX) packet 17 g  17 g Oral Daily PRN For total dose see MAR   • prochlorperazine (COMPAZINE) injection 5 mg  5 mg Intravenous Q4H PRN For total dose see MAR   • prochlorperazine (COMPAZINE) tablet 5 mg  5 mg Oral Q4H PRN For total dose see MAR   • clotrimazole (LOTRIMIN) 1 % cream   Topical BID PRN For total dose see MAR   • oxyCODONE (IMM REL) (ROXICODONE) tablet 15-30 mg  15-30 mg Oral Q3H PRN For total dose see MAR   • zolpidem (AMBIEN) tablet 5 mg  5 mg Oral Nightly PRN For total dose see MAR       Continuous infusion       Laboratory Results:    Recent Labs  Lab 12/13/17  0505 12/12/17  0634 12/11/17  0424  12/08/17  0630  12/07/17  0635   WBC 6.0 4.5 6.6  < > 5.2  --  6.0   HCT 25.3* 22.5* 26.6*  < > 22.7*  --  18.7*   HGB 8.1* 7.1* 8.6*  < > 7.4*  < > 6.1*    382 466*  < > 282  --  326   INR 1.0 1.1 1.1  < > 1.9  --  3.2   SODIUM 142 142 142  < > 139  --  137   POTASSIUM 4.3 3.8 4.0  < > 4.1  --  4.1   CHLORIDE 107 106 104  < > 104  --  103   CO2 28 31 33*  < > 31  --  27   CALCIUM 8.2* 7.7* 8.7  < > 7.9*  --  8.1*   GLUCOSE 96 90 103*  < > 101*  --  97   BUN 17 11 6  < > 24*  --  29*   CREATININE 0.71 0.64 0.60  < > 0.60  --  0.60   AST  --   --   --   --  34  --  39*   GPT  --   --   --   --  36  --  35   ALKPT  --   --   --   --  71  --  69   BILIRUBIN  --   --   --   --  0.6  --  0.3   ALBUMIN  --   --   --   --  2.3*  --  2.3*   GFRNA >90 >90 >90  < > >90  --  >90   < > = values in this interval not displayed.    Imaging Results Reviewed Include:     Radiologic data:  US Lower Extremity Venous Duplex Left   Final Result   IMPRESSION:   No ultrasound evidence of deep venous thrombosis in the left lower   extremity deep venous system.         CT TIBIA-FIBULA  LEFT   Final Result   IMPRESSION:   1. No soft tissue hematoma is identified.   2. Left total knee arthroplasty. Very subtle fractures involving the   lateral aspect of the lateral tibial plateau and the anterior cortex of the   proximal fibular metaphysis.   3. Muscular atrophy, as detailed above.         CT FEMUR LEFT   Final Result   IMPRESSION:   1. No soft tissue hematoma is identified.   2. Left total knee arthroplasty. Very subtle fractures involving the   lateral aspect of the lateral tibial plateau and the anterior cortex of the   proximal fibular metaphysis.   3. Muscular atrophy, as detailed above.           DVT/VTE Prophylaxis:  VTE Pharmacologic Prophylaxis: No pharmacologic Venous Thromboembolism prophylaxis due to Active Bleeding / Risk of Bleeding  VTE Mechanical Prophylaxis: Yes    Assessment and Plan:  1. S/p left TKA  -Post op mgt as per Ortho  -Pain mgt on board for pain control  -Left leg swollen > right leg. Doppler US negative DVT   -Pt requires 6 weeks of Coumadin given strong family hx of VTE    2. Acute anemia   -Cause of blood loss unknown  -S/p endoscopy/colonoscopy 12/11/17  -EGD revealed 2 clean-based ulcers in the gastric antrum measuring 5 mm and 1.5 cm. No evidence of active bleeding.  -Protonix bid  - Normal colonoscopy through the stoma up to the cecum   -Hgb stable after transfusion   -Further recommendations as per GI    3. Paroxysmal Afib s/p ablation now in SR    4. Spina bifida with paraparesis/Neurogenic bowel s/p colostomy/ Constipation/Neurogenic bladder with urinary retention requiring intermittent straight catheterization of the bladder  -On stool softeners for constipation    - Patient is independent with colostomy care   -Patient doing self-catheterization independently    5. Lymphedema of the legs    6.  Chronic pain syndrome  -Pain management following and on narcotics, baclofen, Flexeril and Lyrica     7. Morbid obesity  -BMI > 40  -Lifestyle modifications and the  patient    8. Gastric ulcer  -Biopsy showed gastritis with erosion/ulcer and H Pylori negative   -Protonix bid  -GI following     9. UTI- Ciprofloxacin for a total of 3 days     Gerry Abrams MD    12/13/2017 8:11 AM  [facility] hospitalist service , [facility]  Pager:  [phone]   Page directly from 7am to 7PM  Night Shift 7:00 p.m. - 7:00 a.m. Use On Call Pager [phone] Except for [facility] and [address] [place] [address] Use Pager [phone]

## 2017-12-22 ENCOUNTER — TRANSFERRED RECORDS (OUTPATIENT)
Dept: HEALTH INFORMATION MANAGEMENT | Facility: CLINIC | Age: 62
End: 2017-12-22

## 2017-12-22 LAB
ALT SERPL-CCNC: 39 U/L (ref 13–61)
AST SERPL-CCNC: 48 U/L (ref 15–37)
CREAT SERPL-MCNC: 1.2 MG/DL (ref 0.5–1.3)
GFR SERPL CREATININE-BSD FRML MDRD: >60 ML/MIN/BSA
GLUCOSE SERPL-MCNC: 116 MG/DL (ref 70–110)
POTASSIUM SERPL-SCNC: 4 MEQ/L (ref 3.5–5)

## 2018-02-28 ENCOUNTER — TRANSFERRED RECORDS (OUTPATIENT)
Dept: HEALTH INFORMATION MANAGEMENT | Facility: CLINIC | Age: 63
End: 2018-02-28

## 2018-03-30 ENCOUNTER — TRANSFERRED RECORDS (OUTPATIENT)
Dept: HEALTH INFORMATION MANAGEMENT | Facility: CLINIC | Age: 63
End: 2018-03-30

## 2018-03-30 LAB
ALT SERPL-CCNC: 26 U/L (ref 13–61)
AST SERPL-CCNC: 45 U/L (ref 15–37)

## 2018-03-31 LAB
CREAT SERPL-MCNC: 1.1 MG/DL (ref 0.5–1.3)
GFR SERPL CREATININE-BSD FRML MDRD: >60 ML/MIN/SA
GLUCOSE SERPL-MCNC: 97 MG/DL (ref 70–110)
POTASSIUM SERPL-SCNC: 3.9 MEQ/L (ref 3.5–5)

## 2018-06-12 ENCOUNTER — TRANSFERRED RECORDS (OUTPATIENT)
Dept: HEALTH INFORMATION MANAGEMENT | Facility: CLINIC | Age: 63
End: 2018-06-12

## 2018-06-13 ENCOUNTER — TRANSFERRED RECORDS (OUTPATIENT)
Dept: HEALTH INFORMATION MANAGEMENT | Facility: CLINIC | Age: 63
End: 2018-06-13

## 2018-06-13 LAB — INR PPP: 1.5 (ref 0.8–1.1)

## 2018-06-15 ENCOUNTER — TRANSFERRED RECORDS (OUTPATIENT)
Dept: HEALTH INFORMATION MANAGEMENT | Facility: CLINIC | Age: 63
End: 2018-06-15

## 2018-06-15 LAB
ALT SERPL-CCNC: 21 U/L (ref 4–34)
AST SERPL-CCNC: 46 U/L (ref 17–59)
CREAT SERPL-MCNC: 1 MG/DL (ref 0.66–1.25)
GFR SERPL CREATININE-BSD FRML MDRD: >60 ML/MIN/SA
GLUCOSE SERPL-MCNC: 95 MG/DL (ref 74–106)
POTASSIUM SERPL-SCNC: 3.8 MEQ/L (ref 3.5–5.1)

## 2018-06-22 ENCOUNTER — TRANSFERRED RECORDS (OUTPATIENT)
Dept: HEALTH INFORMATION MANAGEMENT | Facility: CLINIC | Age: 63
End: 2018-06-22

## 2018-07-19 ENCOUNTER — HOSPITAL ENCOUNTER (EMERGENCY)
Facility: CLINIC | Age: 63
Discharge: HOME | End: 2018-07-19
Attending: STUDENT IN AN ORGANIZED HEALTH CARE EDUCATION/TRAINING PROGRAM | Admitting: STUDENT IN AN ORGANIZED HEALTH CARE EDUCATION/TRAINING PROGRAM
Payer: MEDICARE

## 2018-07-19 VITALS
DIASTOLIC BLOOD PRESSURE: 84 MMHG | HEART RATE: 67 BPM | TEMPERATURE: 98.1 F | SYSTOLIC BLOOD PRESSURE: 153 MMHG | RESPIRATION RATE: 16 BRPM | OXYGEN SATURATION: 98 %

## 2018-07-19 DIAGNOSIS — D69.6 THROMBOCYTOPENIA (H): ICD-10-CM

## 2018-07-19 DIAGNOSIS — K76.82 HEPATIC ENCEPHALOPATHY (H): ICD-10-CM

## 2018-07-19 DIAGNOSIS — K70.31 ALCOHOLIC CIRRHOSIS OF LIVER WITH ASCITES (H): Chronic | ICD-10-CM

## 2018-07-19 DIAGNOSIS — E87.6 HYPOKALEMIA: ICD-10-CM

## 2018-07-19 DIAGNOSIS — D53.1 MEGALOBLASTIC ANEMIA: ICD-10-CM

## 2018-07-19 DIAGNOSIS — R79.1 ELEVATED INR: ICD-10-CM

## 2018-07-19 DIAGNOSIS — E72.20 SERUM AMMONIA INCREASED (H): ICD-10-CM

## 2018-07-19 LAB
ALBUMIN SERPL-MCNC: 2.7 G/DL (ref 3.4–5)
ALP SERPL-CCNC: 89 U/L (ref 40–150)
ALT SERPL W P-5'-P-CCNC: 25 U/L (ref 0–70)
AMMONIA PLAS-SCNC: 146 UMOL/L (ref 10–50)
ANION GAP SERPL CALCULATED.3IONS-SCNC: 7 MMOL/L (ref 3–14)
AST SERPL W P-5'-P-CCNC: 53 U/L (ref 0–45)
BASOPHILS # BLD AUTO: 0 10E9/L (ref 0–0.2)
BASOPHILS NFR BLD AUTO: 0.2 %
BILIRUB SERPL-MCNC: 1.9 MG/DL (ref 0.2–1.3)
BUN SERPL-MCNC: 26 MG/DL (ref 7–30)
CALCIUM SERPL-MCNC: 8.2 MG/DL (ref 8.5–10.1)
CHLORIDE SERPL-SCNC: 114 MMOL/L (ref 94–109)
CO2 SERPL-SCNC: 23 MMOL/L (ref 20–32)
CREAT SERPL-MCNC: 1.03 MG/DL (ref 0.66–1.25)
DIFFERENTIAL METHOD BLD: ABNORMAL
EOSINOPHIL # BLD AUTO: 0.1 10E9/L (ref 0–0.7)
EOSINOPHIL NFR BLD AUTO: 2.1 %
ERYTHROCYTE [DISTWIDTH] IN BLOOD BY AUTOMATED COUNT: 13.3 % (ref 10–15)
GFR SERPL CREATININE-BSD FRML MDRD: 73 ML/MIN/1.7M2
GLUCOSE SERPL-MCNC: 117 MG/DL (ref 70–99)
HCT VFR BLD AUTO: 35.8 % (ref 40–53)
HGB BLD-MCNC: 12.6 G/DL (ref 13.3–17.7)
IMM GRANULOCYTES # BLD: 0 10E9/L (ref 0–0.4)
IMM GRANULOCYTES NFR BLD: 0.2 %
INR PPP: 1.42 (ref 0.86–1.14)
LIPASE SERPL-CCNC: 366 U/L (ref 73–393)
LYMPHOCYTES # BLD AUTO: 0.8 10E9/L (ref 0.8–5.3)
LYMPHOCYTES NFR BLD AUTO: 19.1 %
MCH RBC QN AUTO: 36.3 PG (ref 26.5–33)
MCHC RBC AUTO-ENTMCNC: 35.2 G/DL (ref 31.5–36.5)
MCV RBC AUTO: 103 FL (ref 78–100)
MONOCYTES # BLD AUTO: 0.5 10E9/L (ref 0–1.3)
MONOCYTES NFR BLD AUTO: 11.6 %
NEUTROPHILS # BLD AUTO: 2.8 10E9/L (ref 1.6–8.3)
NEUTROPHILS NFR BLD AUTO: 66.8 %
NRBC # BLD AUTO: 0 10*3/UL
NRBC BLD AUTO-RTO: 0 /100
PLATELET # BLD AUTO: 60 10E9/L (ref 150–450)
POTASSIUM SERPL-SCNC: 4 MMOL/L (ref 3.4–5.3)
PROT SERPL-MCNC: 6.4 G/DL (ref 6.8–8.8)
RBC # BLD AUTO: 3.47 10E12/L (ref 4.4–5.9)
SODIUM SERPL-SCNC: 144 MMOL/L (ref 133–144)
WBC # BLD AUTO: 4.2 10E9/L (ref 4–11)

## 2018-07-19 PROCEDURE — 85025 COMPLETE CBC W/AUTO DIFF WBC: CPT | Performed by: STUDENT IN AN ORGANIZED HEALTH CARE EDUCATION/TRAINING PROGRAM

## 2018-07-19 PROCEDURE — 99284 EMERGENCY DEPT VISIT MOD MDM: CPT | Mod: Z6 | Performed by: STUDENT IN AN ORGANIZED HEALTH CARE EDUCATION/TRAINING PROGRAM

## 2018-07-19 PROCEDURE — 99283 EMERGENCY DEPT VISIT LOW MDM: CPT | Performed by: STUDENT IN AN ORGANIZED HEALTH CARE EDUCATION/TRAINING PROGRAM

## 2018-07-19 PROCEDURE — 85610 PROTHROMBIN TIME: CPT | Performed by: STUDENT IN AN ORGANIZED HEALTH CARE EDUCATION/TRAINING PROGRAM

## 2018-07-19 PROCEDURE — 83690 ASSAY OF LIPASE: CPT | Performed by: STUDENT IN AN ORGANIZED HEALTH CARE EDUCATION/TRAINING PROGRAM

## 2018-07-19 PROCEDURE — 80053 COMPREHEN METABOLIC PANEL: CPT | Performed by: STUDENT IN AN ORGANIZED HEALTH CARE EDUCATION/TRAINING PROGRAM

## 2018-07-19 PROCEDURE — 82140 ASSAY OF AMMONIA: CPT | Performed by: STUDENT IN AN ORGANIZED HEALTH CARE EDUCATION/TRAINING PROGRAM

## 2018-07-19 RX ORDER — FUROSEMIDE 40 MG
40 TABLET ORAL 2 TIMES DAILY
Qty: 14 TABLET | Refills: 0 | Status: SHIPPED | OUTPATIENT
Start: 2018-07-19 | End: 2018-07-20

## 2018-07-19 RX ORDER — FUROSEMIDE 40 MG
40 TABLET ORAL
Status: DISCONTINUED | OUTPATIENT
Start: 2018-07-20 | End: 2018-07-19 | Stop reason: CLARIF

## 2018-07-19 RX ORDER — SPIRONOLACTONE 50 MG/1
50 TABLET, FILM COATED ORAL DAILY
Qty: 7 TABLET | Refills: 0 | Status: SHIPPED | OUTPATIENT
Start: 2018-07-19 | End: 2018-07-20

## 2018-07-19 RX ORDER — LACTULOSE 10 G/15ML
10 SOLUTION ORAL ONCE
Status: DISCONTINUED | OUTPATIENT
Start: 2018-07-19 | End: 2018-07-19 | Stop reason: HOSPADM

## 2018-07-19 RX ORDER — SPIRONOLACTONE 50 MG/1
50 TABLET, FILM COATED ORAL DAILY
Status: DISCONTINUED | OUTPATIENT
Start: 2018-07-20 | End: 2018-07-19 | Stop reason: CLARIF

## 2018-07-19 RX ORDER — FUROSEMIDE 20 MG
40 TABLET ORAL
Status: DISCONTINUED | OUTPATIENT
Start: 2018-07-20 | End: 2018-07-19 | Stop reason: CLARIF

## 2018-07-19 RX ORDER — LACTULOSE 10 G/15ML
45 SOLUTION ORAL 3 TIMES DAILY
Qty: 4000 ML | Refills: 0 | Status: SHIPPED | OUTPATIENT
Start: 2018-07-19 | End: 2018-07-20

## 2018-07-19 NOTE — ED NOTES
Pt states that he just got back from [place] and he has back pain and probably a hifh ammonia level. He has has Hepatitis C  And cirrhosis. He states that he is having trouble walking and  Some  Cognitive issues. He has a TIPS and gets abdomen drained periodically. He is alert and cooperative.

## 2018-07-19 NOTE — ED AVS SNAPSHOT
[facility]    [address] [address]    Phone:  [phone]    Fax:  [phone]                                       Scott Casale   MRN: [id number]    Department:  [facility]   Date of Visit:  7/19/2018           After Visit Summary Signature Page     I have received my discharge instructions, and my questions have been answered. I have discussed any challenges I see with this plan with the nurse or doctor.    ..........................................................................................................................................  Patient/Patient Representative Signature      ..........................................................................................................................................  Patient Representative Print Name and Relationship to Patient    ..................................................               ................................................  Date                                            Time    ..........................................................................................................................................  Reviewed by Signature/Title    ...................................................              ..............................................  Date                                                            Time

## 2018-07-19 NOTE — ED PROVIDER NOTES
History     Chief Complaint   Patient presents with     Back Pain     low back pain, weak, hx of liver failure     HPI  Scott Casale is a 63 year old male with past medical history which includes depression, liver mass, ankylosing spondylitis, thrombocytopenia, chronic hepatitis C virus infection, hepatic cirrhosis and portal hypertension status post TIPS procedure who presents for evaluation after returning to the area from [place].  Patient explains that he had been living in [place] since May 2017 but recently lost his home and unable to stay any longer with a friend in [place], he plans to move back to [place] and stay with his nearby family.  He returned from [place] a few days ago but admits that he has not been taking his prescription medications including furosemide, spironolactone, or lactulose.  He describes feeling a little cloudy in his thinking and believes that his ammonia level may be rising again.  He has achy lumbar back pain without radiation, sensory deficit, or focal weakness.  No recent traumatic injury or fall.  Patient denies recent fever, chills, chest pain, cough, shortness of breath, back pain, abdominal pain, ascites, vomiting, diarrhea, or other gastrointestinal symptoms.  He has yet to establish with a primary care provider in the area but records indicate that he was previously followed by Dr. Warren Higginbotham of [facility].    Problem List:    Patient Active Problem List    Diagnosis Date Noted     Hyperammonemia (H) 11/27/2017     Priority: Medium     Hepatic encephalopathy (H) 11/27/2017     Priority: Medium     Acute hepatic encephalopathy 03/13/2017     Priority: Medium     Ankylosing spondylitis (H)      Priority: Medium     S/P TIPS (transjugular intrahepatic portosystemic shunt) 09/28/2016     Priority: Medium     Ascites of liver 09/27/2016     Priority: Medium     Overview:   Low MELD score   S/p TIPS procedure at [facility] (09/28/2016)        Hyponatremia 08/22/2016     Priority: Medium     Overview:   Chronic        Edema of lower extremity 06/15/2016     Priority: Medium     Overview:   Resistant to diuretic therapy        Chronic hepatitis C virus infection (H) 06/15/2016     Priority: Medium     Overview:   Dr Higginbotham [facility]   Harvoni Ribavirin (10/2016)       Hepatic cirrhosis (H) 06/15/2016     Priority: Medium     Overview:   Hepatitis C and history of alcohol use ( no use currently)  in the past        Liver mass 06/15/2016     Priority: Medium     Overview:   0.8 cm right lobe hyperlucency per CT 04/2016 suspicious for HCC   Liver biopsy due - seeing Liver clinic at [facility]       Portal hypertension (H) 06/15/2016     Priority: Medium     Overview:   Furosemide Spironolactone Lactulose   Intolerance to Propranolol with hypotension, dizziness       Primary malignant neoplasm of testis (H) 06/15/2016     Priority: Medium     Overview:   At age 28 s/p orchiectomy with RPLND        Thrombocytopenia (H) 06/15/2016     Priority: Medium     Overview:   6/12/2016  PLT: 48       Chronic low back pain 12/06/2012     Priority: Medium     Narcotic Contract signed 12/6/12 and sent to be scanned    Uses soma at night to help him sleep due to back pain. Has had back problems since 1986, since fell in big hole, collapsed lower discs. Also has ankylosing spondylitis. Has had much Physical Therapy and chiropractic work over years, was told surgery was only option.  Was accompanied by [place] records.    Narcotic contract -- soma #30 R2, at 3 mo can call for 3 more refills, but be seen q 6 mo for refills.  Uses for sleep.  Flexeril for daytime if needed, unrestricted.           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 12/06/2012     Priority: Medium     Since summer; ENT referral 12/6/12       GERD (gastroesophageal reflux disease) 06/26/2012     Priority: Medium     CARDIOVASCULAR SCREENING; LDL GOAL LESS THAN 130  06/25/2012     Priority: Medium     Mild major depression (H) 08/27/2008     Priority: Medium        Past Medical History:    Past Medical History:   Diagnosis Date     AC separation 8/20/2008     Ascites      H/O testicular cancer age 28     Hepatitis C      Shoulder dislocation      Splenomegaly      Ulcerative colitis (H)        Past Surgical History:    Past Surgical History:   Procedure Laterality Date     C APPENDECTOMY       C KNEE SCOPE,CLEAN/DRAIN      bilateral     COLONOSCOPY N/A 1/17/2017    Procedure: COLONOSCOPY;  Surgeon: Guru Reina Palacios MD;  Location:  GI     ESOPHAGOSCOPY, GASTROSCOPY, DUODENOSCOPY (EGD), COMBINED N/A 1/17/2017    Procedure: COMBINED ESOPHAGOSCOPY, GASTROSCOPY, DUODENOSCOPY (EGD);  Surgeon: Guru Reina Palacios MD;  Location:  GI     EYE SURGERY       IR TRANSVEN INTRAHEPATIC PORTOSYST REV       ORCHIECTOMY SCROTAL      prosthesis placed     STRABISMUS SURGERY      bilateral       Family History:    Family History   Problem Relation Age of Onset     Cancer Mother 70     brain tumor     Diabetes Maternal Grandmother      Cerebrovascular Disease Sister      HEART DISEASE Sister      Respiratory Son      Cancer Father      Uterine Cancer Sister      Prostate Cancer No family hx of      Cancer - colorectal No family hx of        Social History:  Marital Status:   [2]  Social History   Substance Use Topics     Smoking status: Former Smoker     Quit date: 1/1/1978     Smokeless tobacco: Never Used     Alcohol use No      Comment: quit in 2010        Medications:      furosemide (LASIX) 40 MG tablet   lactulose (CHRONULAC) 10 GM/15ML solution   spironolactone (ALDACTONE) 50 MG tablet   gabapentin (NEURONTIN) 100 MG capsule   ondansetron (ZOFRAN) 4 MG tablet   potassium chloride SA (POTASSIUM CHLORIDE) 20 MEQ CR tablet   rifaximin (XIFAXAN) 550 MG TABS tablet   [DISCONTINUED] furosemide (LASIX) 40 MG tablet   [DISCONTINUED] furosemide (LASIX)  40 MG tablet   [DISCONTINUED] spironolactone (ALDACTONE) 50 MG tablet   [DISCONTINUED] spironolactone (ALDACTONE) 50 MG tablet         Review of Systems  Constitutional:  Negative for fever or recent illness.  Cardiovascular:  Negative for chest pain.  Respiratory:  Negative for cough or shortness of breath.  Gastrointestinal:  Negative for abdominal pain, nausea, vomiting, or diarrhea.  Negative for fecal incontinence.  Denies blood with bowel movements.   Genitourinary:  Negative for dysuria, urinary retention or oliguria.  Musculoskeletal: Baseline chronic low back pain remains unchanged.  Negative for recent fall or injury.  Neurological:  Negative for headache or confusion.    All others reviewed and are negative.      Physical Exam   BP: 137/73  Pulse: 67  Temp: 98.1  F (36.7  C)  SpO2: 100 %      Physical Exam  Constitutional:  Well developed, well nourished.  Appears nontoxic and in no acute distress.  Resting comfortably on the gurney.  HENT:  Normocephalic and atraumatic.  Symmetric in appearance.  Eyes:  Conjunctivae are normal.  Neck:  Neck supple.  Cardiovascular:  No cyanosis.  RRR.  No audible murmurs noted.    Respiratory:  Effort normal without sign of respiratory distress.  CTAB without diminished regions.  No wheezing, rhonchi, or crackles.  Gastrointestinal:  Soft, nondistended abdomen.  Nontender and without guarding.  No rigidity or rebound tenderness.  Negative Ellis's sign.  Negative McBurney's point.  No palpable pulsatile mass.   Genitourinary:  Noncontributory.   Musculoskeletal:  Moves extremities spontaneously.  No step-offs noted and no tenderness to palpation of midline thoracic or lumbosacral vertebra.  Hip flexion, knee extension, dorsiflexion/extensor halliucis (L5), and plantar flexion (S1) intact and equal bilaterally.  Sensation of medial leg, dorsum of foot, and planter aspect of foot are intact bilaterally and without evidence of saddle anesthesia.    Neurological:  Patient is  alert.  Skin:  Skin is warm and dry.  Psychiatric:  Normal mood and affect.      ED Course     ED Course     Procedures              Critical Care time:  none               Results for orders placed or performed during the hospital encounter of 07/19/18 (from the past 24 hour(s))   CBC with platelets differential   Result Value Ref Range    WBC 4.2 4.0 - 11.0 10e9/L    RBC Count 3.47 (L) 4.4 - 5.9 10e12/L    Hemoglobin 12.6 (L) 13.3 - 17.7 g/dL    Hematocrit 35.8 (L) 40.0 - 53.0 %     (H) 78 - 100 fl    MCH 36.3 (H) 26.5 - 33.0 pg    MCHC 35.2 31.5 - 36.5 g/dL    RDW 13.3 10.0 - 15.0 %    Platelet Count 60 (L) 150 - 450 10e9/L    Diff Method Automated Method     % Neutrophils 66.8 %    % Lymphocytes 19.1 %    % Monocytes 11.6 %    % Eosinophils 2.1 %    % Basophils 0.2 %    % Immature Granulocytes 0.2 %    Nucleated RBCs 0 0 /100    Absolute Neutrophil 2.8 1.6 - 8.3 10e9/L    Absolute Lymphocytes 0.8 0.8 - 5.3 10e9/L    Absolute Monocytes 0.5 0.0 - 1.3 10e9/L    Absolute Eosinophils 0.1 0.0 - 0.7 10e9/L    Absolute Basophils 0.0 0.0 - 0.2 10e9/L    Abs Immature Granulocytes 0.0 0 - 0.4 10e9/L    Absolute Nucleated RBC 0.0    Comprehensive metabolic panel   Result Value Ref Range    Sodium 144 133 - 144 mmol/L    Potassium 4.0 3.4 - 5.3 mmol/L    Chloride 114 (H) 94 - 109 mmol/L    Carbon Dioxide 23 20 - 32 mmol/L    Anion Gap 7 3 - 14 mmol/L    Glucose 117 (H) 70 - 99 mg/dL    Urea Nitrogen 26 7 - 30 mg/dL    Creatinine 1.03 0.66 - 1.25 mg/dL    GFR Estimate 73 >60 mL/min/1.7m2    GFR Estimate If Black 88 >60 mL/min/1.7m2    Calcium 8.2 (L) 8.5 - 10.1 mg/dL    Bilirubin Total 1.9 (H) 0.2 - 1.3 mg/dL    Albumin 2.7 (L) 3.4 - 5.0 g/dL    Protein Total 6.4 (L) 6.8 - 8.8 g/dL    Alkaline Phosphatase 89 40 - 150 U/L    ALT 25 0 - 70 U/L    AST 53 (H) 0 - 45 U/L   Lipase   Result Value Ref Range    Lipase 366 73 - 393 U/L   INR   Result Value Ref Range    INR 1.42 (H) 0.86 - 1.14   Ammonia (on ice)   Result Value  "Ref Range    Ammonia 146 (HH) 10 - 50 umol/L       Medications   lactulose (CHRONULAC) solution 10 g (not administered)       Assessments & Plan (with Medical Decision Making)   Scott Casale is a 63 year old male who presents to the department for evaluation as he has just recently returned to [place] from [place].  Further discussions revealed that the patient came tonight to get \"plugged back in to the system\".  He feels a bit cloudy but is not confused, alert and oriented in the department, no sign of overt hepatic encephalopathy.  He has chronic low back pain which remains unchanged, without recent trauma, and low suspicion for cauda equina syndrome or central cord compression.  Patient maintains that he has medications including lactulose and diuretics but they are packed up in his vehicle somewhere.  Patient's CBC is without leukocytosis but about cytopenia and mild megaloblastic anemia noted.  INR is mildly elevated as his bilirubin, likely related to his chronic liver condition.  Ammonia level of 146 is elevated, but again does not show symptoms of encephalopathy.  I had a long discussion with the patient regarding the need for a stable medical management team with input from gastroenterology and patient agrees.  We have attempted to schedule primary care clinic appointment for the patient at [facility] tomorrow, unfortunately there is no availability for our  to directly schedule an appointment but clinic staff has informed us that they will call the patient shortly after 8 AM with a plan to be seen tomorrow.  Furthermore, a consultation order was placed with [facility] as he has seen Dr. [facility] in the past.  In the meantime he agrees to resume his prescription medications which he believes he can find, prescriptions have been printed for him in case he is unable to uncover them amongst his things.  He seems comfortable with the discharge plan we discussed including follow up " and return instructions.    Disclaimer:  This note consists of symbols derived from keyboarding, dictation, and/or voice recognition software.  As a result, there may be errors in the script that have gone undetected.  Please consider this when interpreting information found in the chart.        I have reviewed the nursing notes.    I have reviewed the findings, diagnosis, plan and need for follow up with the patient.       New Prescriptions    No medications on file       Final diagnoses:   Serum ammonia increased (H)   Thrombocytopenia (H)   Megaloblastic anemia   Elevated INR       7/19/2018   [facility] EMERGENCY DEPARTMENT     Nickolas Alvarez DO  07/19/18 1947

## 2018-07-19 NOTE — ED AVS SNAPSHOT
[facility] Emergency Department    [address] [address]    Phone:  [phone]    Fax:  [phone]                                       Scott Casale   MRN: [id number]    Department:  [facility] Department   Date of Visit:  7/19/2018           Patient Information     Date Of Birth          1955        Your diagnoses for this visit were:     Serum ammonia increased (H)     Thrombocytopenia (H)     Megaloblastic anemia     Elevated INR     Alcoholic cirrhosis of liver with ascites (H)     Hepatic encephalopathy (H)     Hypokalemia        You were seen by Nickolas Alvarez DO.      Follow-up Information     Go to Critical access hospital.    Why:  Followup for reevaluation and managment plan tomorrow for scheduled appointment.    Contact information:    [address] [address] [phone]        Follow up with Warren Higginbotham MD. Schedule an appointment as soon as possible for a visit in 1 week.    Specialty:  Gastroenterology    Why:  Followup for reevaluation and managment plan.    Contact information:    [address]  [facility] [address] [phone]        Discharge References/Attachments     CIRRHOSIS OF THE LIVER, DISCHARGE INSTRUCTIONS FOR (ENGLISH)    (S) HEPATIC ENCEPHALOPATHY (ENGLISH)      24 Hour Appointment Hotline       To make an appointment at any [facility], call [phone] ([phone]). If you don't have a family doctor or clinic, we will help you find one. [facility] are conveniently located to serve the needs of you and your family.          ED Discharge Orders     GASTROENTEROLOGY ADULT REF CONSULT ONLY       Preferred Location: [facility] [phone]  Has seen Dr. Higginbotham of [facility]    Please be aware that coverage of these services is subject to the terms and limitations of your health insurance plan.  Call member services at your health plan with any benefit or coverage  questions.  Any procedures must be performed at a [facility] OR coordinated by your clinic's referral office.    Please bring the following with you to your appointment:    (1) Any X-Rays, CTs or MRIs which have been performed.  Contact the facility where they were done to arrange for  prior to your scheduled appointment.    (2) List of current medications   (3) This referral request   (4) Any documents/labs given to you for this referral                     Review of your medicines      CONTINUE these medicines which may have CHANGED, or have new prescriptions. If we are uncertain of the size of tablets/capsules you have at home, strength may be listed as something that might have changed.        Dose / Directions Last dose taken    furosemide 40 MG tablet   Commonly known as:  LASIX   Dose:  40 mg   What changed:  Another medication with the same name was removed. Continue taking this medication, and follow the directions you see here.   Quantity:  14 tablet        Take 1 tablet (40 mg) by mouth 2 times daily for 7 days   Refills:  0        potassium chloride SA 20 MEQ CR tablet   Commonly known as:  KLOR-CON   Dose:  20 mEq   What changed:    - when to take this  - additional instructions   Quantity:  30 tablet        Take 1 tablet (20 mEq) by mouth daily Take only while taking your twice a day dose of Lasix then stop. Have your potassium levels checked in 2-3 days.   Refills:  1        spironolactone 50 MG tablet   Commonly known as:  ALDACTONE   Dose:  50 mg   What changed:  Another medication with the same name was removed. Continue taking this medication, and follow the directions you see here.   Quantity:  7 tablet        Take 1 tablet (50 mg) by mouth daily for 7 days   Refills:  0          Our records show that you are taking the medicines listed below. If these are incorrect, please call your family doctor or clinic.        Dose / Directions Last dose taken    gabapentin 100 MG capsule    Commonly known as:  NEURONTIN   Dose:  200 mg   Quantity:  60 capsule        Take 2 capsules (200 mg) by mouth At Bedtime   Refills:  3        lactulose 10 GM/15ML solution   Commonly known as:  CHRONULAC   Dose:  45 mL   Quantity:  4000 mL        Take 45 mLs by mouth 3 times daily   Refills:  0        ondansetron 4 MG tablet   Commonly known as:  ZOFRAN   Dose:  4 mg   Quantity:  18 tablet        Take 1 tablet (4 mg) by mouth every 8 hours as needed for nausea   Refills:  0        rifaximin 550 MG Tabs tablet   Commonly known as:  XIFAXAN   Dose:  550 mg   Quantity:  60 tablet        Take 1 tablet (550 mg) by mouth 2 times daily   Refills:  11                Prescriptions were sent or printed at these locations (3 Prescriptions)                   Other Prescriptions                Printed at Department/Unit printer (3 of 3)         furosemide (LASIX) 40 MG tablet               lactulose (CHRONULAC) 10 GM/15ML solution               spironolactone (ALDACTONE) 50 MG tablet                Procedures and tests performed during your visit     Ammonia (on ice)    CBC with platelets differential    Comprehensive metabolic panel    INR    Lipase      Orders Needing Specimen Collection     None      Pending Results     No orders found from 7/17/2018 to 7/20/2018.            Pending Culture Results     No orders found from 7/17/2018 to 7/20/2018.            Pending Results Instructions     If you had any lab results that were not finalized at the time of your Discharge, you can call the ED Lab Result RN at [phone]. You will be contacted by this team for any positive Lab results or changes in treatment. The nurses are available 7 days a week from 10A to 6:30P.  You can leave a message 24 hours per day and they will return your call.        Test Results From Your Hospital Stay        7/19/2018  5:54 PM      Component Results     Component Value Ref Range & Units Status    WBC 4.2 4.0 - 11.0 10e9/L Final    RBC Count  3.47 (L) 4.4 - 5.9 10e12/L Final    Hemoglobin 12.6 (L) 13.3 - 17.7 g/dL Final    Hematocrit 35.8 (L) 40.0 - 53.0 % Final     (H) 78 - 100 fl Final    MCH 36.3 (H) 26.5 - 33.0 pg Final    MCHC 35.2 31.5 - 36.5 g/dL Final    RDW 13.3 10.0 - 15.0 % Final    Platelet Count 60 (L) 150 - 450 10e9/L Final    Diff Method Automated Method  Final    % Neutrophils 66.8 % Final    % Lymphocytes 19.1 % Final    % Monocytes 11.6 % Final    % Eosinophils 2.1 % Final    % Basophils 0.2 % Final    % Immature Granulocytes 0.2 % Final    Nucleated RBCs 0 0 /100 Final    Absolute Neutrophil 2.8 1.6 - 8.3 10e9/L Final    Absolute Lymphocytes 0.8 0.8 - 5.3 10e9/L Final    Absolute Monocytes 0.5 0.0 - 1.3 10e9/L Final    Absolute Eosinophils 0.1 0.0 - 0.7 10e9/L Final    Absolute Basophils 0.0 0.0 - 0.2 10e9/L Final    Abs Immature Granulocytes 0.0 0 - 0.4 10e9/L Final    Absolute Nucleated RBC 0.0  Final         7/19/2018  6:12 PM      Component Results     Component Value Ref Range & Units Status    Sodium 144 133 - 144 mmol/L Final    Potassium 4.0 3.4 - 5.3 mmol/L Final    Chloride 114 (H) 94 - 109 mmol/L Final    Carbon Dioxide 23 20 - 32 mmol/L Final    Anion Gap 7 3 - 14 mmol/L Final    Glucose 117 (H) 70 - 99 mg/dL Final    Urea Nitrogen 26 7 - 30 mg/dL Final    Creatinine 1.03 0.66 - 1.25 mg/dL Final    GFR Estimate 73 >60 mL/min/1.7m2 Final    Non  GFR Calc    GFR Estimate If Black 88 >60 mL/min/1.7m2 Final    African American GFR Calc    Calcium 8.2 (L) 8.5 - 10.1 mg/dL Final    Bilirubin Total 1.9 (H) 0.2 - 1.3 mg/dL Final    Albumin 2.7 (L) 3.4 - 5.0 g/dL Final    Protein Total 6.4 (L) 6.8 - 8.8 g/dL Final    Alkaline Phosphatase 89 40 - 150 U/L Final    ALT 25 0 - 70 U/L Final    AST 53 (H) 0 - 45 U/L Final         7/19/2018  6:10 PM      Component Results     Component Value Ref Range & Units Status    Lipase 366 73 - 393 U/L Final         7/19/2018  5:59 PM      Component Results     Component Value  Ref Range & Units Status    INR 1.42 (H) 0.86 - 1.14 Final         7/19/2018  6:31 PM      Component Results     Component Value Ref Range & Units Status    Ammonia 146 (HH) 10 - 50 umol/L Final    Critical Value called to and read back by  JAYCOB ANNE RN BY GH [id number] 07/19/2018                  Thank you for choosing [facility]       Thank you for choosing [facility] for your care. Our goal is always to provide you with excellent care. Hearing back from our patients is one way we can continue to improve our services. Please take a few minutes to complete the written survey that you may receive in the mail after you visit with us. Thank you!        [employer] Information     [employer] gives you secure access to your electronic health record. If you see a primary care provider, you can also send messages to your care team and make appointments. If you have questions, please call your primary care clinic.  If you do not have a primary care provider, please call [phone] and they will assist you.        Care EveryWhere ID     This is your Care EveryWhere ID. This could be used by other organizations to access your [facility] medical records  [id number]        Equal Access to Services     DARIANA FLANNERY : Hadii carlitos Yao, waamalia centeno, danial ramsey, khai yun. So [facility] [phone].    ATENCIÓN: Si habla español, tiene a ruvalcaba disposición servicios gratuitos de asistencia lingüística. Johnny al [phone].    We comply with applicable federal civil rights laws and [place] laws. We do not discriminate on the basis of race, color, national origin, age, disability, sex, sexual orientation, or gender identity.            After Visit Summary       This is your record. Keep this with you and show to your community pharmacist(s) and doctor(s) at your next visit.

## 2018-07-20 ENCOUNTER — PATIENT OUTREACH (OUTPATIENT)
Dept: FAMILY MEDICINE | Facility: CLINIC | Age: 63
End: 2018-07-20

## 2018-07-20 ENCOUNTER — OFFICE VISIT (OUTPATIENT)
Dept: FAMILY MEDICINE | Facility: CLINIC | Age: 63
End: 2018-07-20
Payer: MEDICARE

## 2018-07-20 VITALS
WEIGHT: 212.9 LBS | OXYGEN SATURATION: 99 % | DIASTOLIC BLOOD PRESSURE: 60 MMHG | HEART RATE: 65 BPM | BODY MASS INDEX: 29.69 KG/M2 | SYSTOLIC BLOOD PRESSURE: 108 MMHG | TEMPERATURE: 98.2 F

## 2018-07-20 DIAGNOSIS — K70.31 ALCOHOLIC CIRRHOSIS OF LIVER WITH ASCITES (H): Primary | Chronic | ICD-10-CM

## 2018-07-20 DIAGNOSIS — K76.82 HEPATIC ENCEPHALOPATHY (H): ICD-10-CM

## 2018-07-20 DIAGNOSIS — E87.6 HYPOKALEMIA: ICD-10-CM

## 2018-07-20 PROCEDURE — 99214 OFFICE O/P EST MOD 30 MIN: CPT | Performed by: INTERNAL MEDICINE

## 2018-07-20 RX ORDER — SPIRONOLACTONE 50 MG/1
50 TABLET, FILM COATED ORAL DAILY
Qty: 30 TABLET | Refills: 11 | Status: SHIPPED | OUTPATIENT
Start: 2018-07-20

## 2018-07-20 RX ORDER — LACTULOSE 10 G/15ML
45 SOLUTION ORAL 3 TIMES DAILY
Qty: 4000 ML | Refills: 11 | Status: SHIPPED | OUTPATIENT
Start: 2018-07-20

## 2018-07-20 RX ORDER — POTASSIUM CHLORIDE 1500 MG/1
20 TABLET, EXTENDED RELEASE ORAL DAILY
Qty: 30 TABLET | Refills: 11 | Status: SHIPPED | OUTPATIENT
Start: 2018-07-20

## 2018-07-20 RX ORDER — FUROSEMIDE 40 MG
40 TABLET ORAL 2 TIMES DAILY
Qty: 60 TABLET | Refills: 11 | Status: SHIPPED | OUTPATIENT
Start: 2018-07-20

## 2018-07-20 ASSESSMENT — ACTIVITIES OF DAILY LIVING (ADL): DEPENDENT_IADLS:: INDEPENDENT

## 2018-07-20 NOTE — MR AVS SNAPSHOT
After Visit Summary   7/20/2018    Scott Casale    MRN: [id number]           Patient Information     Date Of Birth          1955        Visit Information        Provider Department      7/20/2018 1:00 PM Josie Dumont,  [facility]        Today's Diagnoses     Alcoholic cirrhosis of liver with ascites (H)    -  1    Hypokalemia          Care Instructions    1. Goal is to have 3 bowel movement per day, may need 1-3 doses of lactulose to achieve this.  2. Medications refilled  3. Care Coordinator referral to help with cost of drugs, housing concerns.  4. Referral to see Dr. Higginbotham at GI  [phone]  5. Come back and see  in 3 weeks, sooner if needed.  Get non-fasting labs prior          Follow-ups after your visit        Additional Services     CARE COORDINATION REFERRAL       Services are provided by a Care Coordinator for people with complex needs such as: medical, social, or financial troubles.  The Care Coordinator works with the patient and their Primary Care Provider to determine health goals, obtain resources, achieve outcomes, and develop care plans that help coordinate the patient's care.     Reason for Referral: Financial Support: Medication Affordability     Additional pertinent details:  Cannot afford meds    Clinical Staff have discussed the Care Coordination Referral with the patient and/or caregiver: yes                  Future tests that were ordered for you today     Open Future Orders        Priority Expected Expires Ordered    **Comprehensive metabolic panel FUTURE anytime Routine 7/20/2018 7/20/2019 7/20/2018    **CBC with platelets FUTURE anytime Routine 7/20/2018 7/20/2019 7/20/2018            Who to contact     If you have questions or need follow up information about today's clinic visit or your schedule please contact [facility] directly at [phone].  Normal or non-critical lab and imaging results will be communicated to you  by Epocrates, letter or phone within 4 business days after the clinic has received the results. If you do not hear from us within 7 days, please contact the clinic through Epocrates or phone. If you have a critical or abnormal lab result, we will notify you by phone as soon as possible.  Submit refill requests through Epocrates or call your pharmacy and they will forward the refill request to us. Please allow 3 business days for your refill to be completed.          Additional Information About Your Visit        [employer] Information     Epocrates gives you secure access to your electronic health record. If you see a primary care provider, you can also send messages to your care team and make appointments. If you have questions, please call your primary care clinic.  If you do not have a primary care provider, please call [phone] and they will assist you.        Care EveryWhere ID     This is your Care EveryWhere ID. This could be used by other organizations to access your [facility] medical records  [id number]        Your Vitals Were     Pulse Temperature Pulse Oximetry BMI (Body Mass Index)          65 98.2  F (36.8  C) (Tympanic) 99% 29.69 kg/m2         Blood Pressure from Last 3 Encounters:   07/20/18 108/60   07/19/18 153/84   11/28/17 120/61    Weight from Last 3 Encounters:   07/20/18 212 lb 14.4 oz (96.6 kg)   11/27/17 207 lb 7.3 oz (94.1 kg)   05/03/17 204 lb (92.5 kg)              We Performed the Following     CARE COORDINATION REFERRAL          Today's Medication Changes          These changes are accurate as of 7/20/18  1:40 PM.  If you have any questions, ask your nurse or doctor.               These medicines have changed or have updated prescriptions.        Dose/Directions    potassium chloride SA 20 MEQ CR tablet   Commonly known as:  KLOR-CON   This may have changed:  additional instructions   Used for:  Hypokalemia, Alcoholic cirrhosis of liver with ascites (H)   Changed by:  Josie Dumont DO         Dose:  20 mEq   Take 1 tablet (20 mEq) by mouth daily   Quantity:  30 tablet   Refills:  11         Stop taking these medicines if you haven't already. Please contact your care team if you have questions.     gabapentin 100 MG capsule   Commonly known as:  NEURONTIN   Stopped by:  Josie Dumont DO           rifaximin 550 MG Tabs tablet   Commonly known as:  XIFAXAN   Stopped by:  Josie Dumont DO                Where to get your medicines      These medications were sent to [facility] [address] [facility]  [address] [facility] [address]     Phone:  [phone]     furosemide 40 MG tablet    potassium chloride SA 20 MEQ CR tablet    spironolactone 50 MG tablet                Primary Care Provider Office Phone # Fax #    Josie Dumont [phone] [address] [facility] [address]        Equal Access to Services     [facility] : Hadii aad ku hadasho Soomaali, waaxda luqadaha, qaybta kaalmada adeegyada, waxay idiin hayaan shelly kharadeepak jolley . So [facility] [phone].    ATENCIÓN: Si habla español, tiene a ruvalcaba disposición servicios gratuitos de asistencia lingüística. Johnny al [phone].    We comply with applicable federal civil rights laws and [place] laws. We do not discriminate on the basis of race, color, national origin, age, disability, sex, sexual orientation, or gender identity.            Thank you!     Thank you for choosing [facility]  for your care. Our goal is always to provide you with excellent care. Hearing back from our patients is one way we can continue to improve our services. Please take a few minutes to complete the written survey that you may receive in the mail after your visit with us. Thank you!             Your Updated Medication List - Protect others around you: Learn how to safely use, store and throw away your medicines at www.disposemymeds.org.          This list is accurate as  of 7/20/18  1:40 PM.  Always use your most recent med list.                   Brand Name Dispense Instructions for use Diagnosis    furosemide 40 MG tablet    LASIX    60 tablet    Take 1 tablet (40 mg) by mouth 2 times daily    Alcoholic cirrhosis of liver with ascites (H)       lactulose 10 GM/15ML solution    CHRONULAC    4000 mL    Take 45 mLs by mouth 3 times daily    Hepatic encephalopathy (H)       ondansetron 4 MG tablet    ZOFRAN    18 tablet    Take 1 tablet (4 mg) by mouth every 8 hours as needed for nausea    Alcoholic cirrhosis of liver without ascites (H)       potassium chloride SA 20 MEQ CR tablet    KLOR-CON    30 tablet    Take 1 tablet (20 mEq) by mouth daily    Hypokalemia, Alcoholic cirrhosis of liver with ascites (H)       spironolactone 50 MG tablet    ALDACTONE    30 tablet    Take 1 tablet (50 mg) by mouth daily    Alcoholic cirrhosis of liver with ascites (H)

## 2018-07-20 NOTE — LETTER
[facility]  [address] [place] [address]  [phone]      July 25, 2018    Scott Casale  [address] [place] [address]      Dear Leonel,    I am a clinic care coordinator- who works with Josie Dumont DO at the [facility].  I want to thank you again for meeting with me while you you were in the clinic.      As we discussed, here are some options that I think may be able to assist you with increasing your income:    1.  [facility] [place]'s Service Office-  [phone]--Please contact this office to get a copy of your  and to see if you qualify for an  funding.    2.  [facility] Human Services Office-  [phone]--Please contact this office to see if you may qualify for Medical Assistance, Food Support, a Mental Health , and/or Housing Services.      Please feel free to contact me at [phone], with any questions or concerns. We at [facility] are focused on providing you with the highest-quality healthcare experience possible and that all starts with you.     Sincerely,     Brianna Moore    Enclosed: I have enclosed a copy of the Complex Care Plan. This has helpful information and goals that we have talked about. Please keep this in an easy to access place to use as needed.

## 2018-07-20 NOTE — PATIENT INSTRUCTIONS
1. Goal is to have 3 bowel movement per day, may need 1-3 doses of lactulose to achieve this.  2. Medications refilled  3. Care Coordinator referral to help with cost of drugs, housing concerns.  4. Referral to see Dr. Higginbotham at GI  [phone]  5. Come back and see  in 3 weeks, sooner if needed.  Get non-fasting labs prior

## 2018-07-20 NOTE — ED NOTES
Pt states that he wants to go home. He was standing in the sifuentes near the ice machine rocking on his toes  And balls of feet showing great balance. He states that he is going to his sitters house and there is someone there, He is alert  And appropriate  And heels a long conversation regarding where he grew up. He state he will come back in the   AM for his other medications. He was given Lactulose but refused to take it until he got home.

## 2018-07-20 NOTE — ED NOTES
Patient states he doesn't have enough $ for prescriptions - call placed to [facility] and they are working on a solution to his RX needs - patient then was unwilling to wait for the medications. States he will be seen in clinic tomorrow and was hoping to get medications filled at that time.

## 2018-07-20 NOTE — PROGRESS NOTES
"  SUBJECTIVE:   Scott Casale is a 63 year old male who presents to clinic today for the following health issues:    ED/UC Followup:    Facility:  [facility] ER  Date of visit: 7/19/18  Reason for visit:   Serum ammonia increased (H)   Thrombocytopenia (H)   Megaloblastic anemia   Elevated INR     Current Status: feeling better today after taking lactulose       Per ER visit yesterday \"Patient explains that he had been living in [place] since May 2017 but recently lost his home and unable to stay any longer with a friend in [place], he plans to move back to [place] and stay with his nearby family.  He returned from [place] a few days ago but admits that he has not been taking his prescription medications including furosemide, spironolactone, or lactulose.  He describes feeling a little cloudy in his thinking and believes that his ammonia level may be rising again\"      Last GI visit 3/2017 - My impression is that Mr. 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  He is status post TIPS for diuretic refractory ascites.  He really is doing quite well at this visit.  His mental status is much improved.  His ascites is well controlled.  We just have to make sure that he continues on the correct amount of lactulose and rifaximin.  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  He is up-to-date with regard to vaccines, and my plan will be to see him back in the clinic again in 6 weeks.     He reports he recently traveled from [place] to [place] and did not take his lactulose because he couldn't have diarrhea.  He was seeing doctors in [place].  Was hospitalized for encephalopathy, he thinks this was 7/2017 and about 1 month ago.  He does not think he was seeing GI doc there.     Meds: lactulose BID which would result in 2 bowel movement day.  He was supposed to be on rifaximin, but can't afford  Lasix unknown dose.  He also thinks maybe " spironolactone    He is currently homeless.  Staying at sisters, looking to get a motor home.  He  doesn't have Rx drug coverage. He is a .  Sober x 6 years.  Quit smoking age 21    Wt Readings from Last 5 Encounters:   07/20/18 212 lb 14.4 oz (96.6 kg)   11/27/17 207 lb 7.3 oz (94.1 kg)   05/03/17 204 lb (92.5 kg)   04/28/17 195 lb (88.5 kg)   04/13/17 200 lb 9.9 oz (91 kg)           Current Outpatient Prescriptions   Medication Sig Dispense Refill     lactulose (CHRONULAC) 10 GM/15ML solution Take 45 mLs by mouth 3 times daily 4000 mL 0     ondansetron (ZOFRAN) 4 MG tablet Take 1 tablet (4 mg) by mouth every 8 hours as needed for nausea 18 tablet 0     furosemide (LASIX) 40 MG tablet Take 1 tablet (40 mg) by mouth 2 times daily for 7 days (Patient not taking: Reported on 7/20/2018) 14 tablet 0     gabapentin (NEURONTIN) 100 MG capsule Take 2 capsules (200 mg) by mouth At Bedtime (Patient not taking: Reported on 7/20/2018) 60 capsule 3     potassium chloride SA (POTASSIUM CHLORIDE) 20 MEQ CR tablet Take 1 tablet (20 mEq) by mouth daily Take only while taking your twice a day dose of Lasix then stop. Have your potassium levels checked in 2-3 days. (Patient not taking: Reported on 7/20/2018) 30 tablet 1     rifaximin (XIFAXAN) 550 MG TABS tablet Take 1 tablet (550 mg) by mouth 2 times daily (Patient not taking: Reported on 7/20/2018) 60 tablet 11     spironolactone (ALDACTONE) 50 MG tablet Take 1 tablet (50 mg) by mouth daily for 7 days (Patient not taking: Reported on 7/20/2018) 7 tablet 0       Reviewed and updated as needed this visit by clinical staff  Tobacco  Allergies  Meds  Med Hx  Surg Hx  Fam Hx  Soc Hx      Reviewed and updated as needed this visit by Provider         ROS:  Constitutional, HEENT, cardiovascular, pulmonary, GI, , musculoskeletal, neuro, skin, endocrine and psych systems are negative, except as otherwise noted.    OBJECTIVE:     /60 (BP Location: Right arm, Patient  Position: Sitting, Cuff Size: Adult Regular)  Pulse 65  Temp 98.2  F (36.8  C) (Tympanic)  Wt 212 lb 14.4 oz (96.6 kg)  SpO2 99%  BMI 29.69 kg/m2  Body mass index is 29.69 kg/(m^2).  GENERAL APPEARANCE: alert and no distress  EYES: Eyes grossly normal to inspection, PERRL, conjunctivae and sclerae normal and no jaundice  HENT: MMM  NECK: no adenopathy, no asymmetry, masses, or scars and thyroid normal to palpation  RESP: lungs clear to auscultation - no rales, rhonchi or wheezes  CV: regular rates and rhythm, normal S1 S2, no S3 or S4 and no murmur, click or rub  LYMPHATICS: no leg edema  ABDOMEN: soft, nontender, without hepatosplenomegaly or masses, bowel sounds normal and no ascites    Diagnostic Test Results:  Results for orders placed or performed during the hospital encounter of 07/19/18   CBC with platelets differential   Result Value Ref Range    WBC 4.2 4.0 - 11.0 10e9/L    RBC Count 3.47 (L) 4.4 - 5.9 10e12/L    Hemoglobin 12.6 (L) 13.3 - 17.7 g/dL    Hematocrit 35.8 (L) 40.0 - 53.0 %     (H) 78 - 100 fl    MCH 36.3 (H) 26.5 - 33.0 pg    MCHC 35.2 31.5 - 36.5 g/dL    RDW 13.3 10.0 - 15.0 %    Platelet Count 60 (L) 150 - 450 10e9/L    Diff Method Automated Method     % Neutrophils 66.8 %    % Lymphocytes 19.1 %    % Monocytes 11.6 %    % Eosinophils 2.1 %    % Basophils 0.2 %    % Immature Granulocytes 0.2 %    Nucleated RBCs 0 0 /100    Absolute Neutrophil 2.8 1.6 - 8.3 10e9/L    Absolute Lymphocytes 0.8 0.8 - 5.3 10e9/L    Absolute Monocytes 0.5 0.0 - 1.3 10e9/L    Absolute Eosinophils 0.1 0.0 - 0.7 10e9/L    Absolute Basophils 0.0 0.0 - 0.2 10e9/L    Abs Immature Granulocytes 0.0 0 - 0.4 10e9/L    Absolute Nucleated RBC 0.0    Comprehensive metabolic panel   Result Value Ref Range    Sodium 144 133 - 144 mmol/L    Potassium 4.0 3.4 - 5.3 mmol/L    Chloride 114 (H) 94 - 109 mmol/L    Carbon Dioxide 23 20 - 32 mmol/L    Anion Gap 7 3 - 14 mmol/L    Glucose 117 (H) 70 - 99 mg/dL    Urea Nitrogen  26 7 - 30 mg/dL    Creatinine 1.03 0.66 - 1.25 mg/dL    GFR Estimate 73 >60 mL/min/1.7m2    GFR Estimate If Black 88 >60 mL/min/1.7m2    Calcium 8.2 (L) 8.5 - 10.1 mg/dL    Bilirubin Total 1.9 (H) 0.2 - 1.3 mg/dL    Albumin 2.7 (L) 3.4 - 5.0 g/dL    Protein Total 6.4 (L) 6.8 - 8.8 g/dL    Alkaline Phosphatase 89 40 - 150 U/L    ALT 25 0 - 70 U/L    AST 53 (H) 0 - 45 U/L   Lipase   Result Value Ref Range    Lipase 366 73 - 393 U/L   INR   Result Value Ref Range    INR 1.42 (H) 0.86 - 1.14   Ammonia (on ice)   Result Value Ref Range    Ammonia 146 (HH) 10 - 50 umol/L       ASSESSMENT/PLAN:     1. Alcoholic cirrhosis of liver with ascites (H) - refills given.  Care Coordinator will work to get his lactulose at a minimum.  Reestablish with CHASE raymond in 3 weeks, labs prior  - CARE COORDINATION REFERRAL  - furosemide (LASIX) 40 MG tablet; Take 1 tablet (40 mg) by mouth 2 times daily  Dispense: 60 tablet; Refill: 11  - spironolactone (ALDACTONE) 50 MG tablet; Take 1 tablet (50 mg) by mouth daily  Dispense: 30 tablet; Refill: 11  - potassium chloride SA (KLOR-CON) 20 MEQ CR tablet; Take 1 tablet (20 mEq) by mouth daily  Dispense: 30 tablet; Refill: 11  - **Comprehensive metabolic panel FUTURE anytime; Future  - **CBC with platelets FUTURE anytime; Future  - Ammonia; Future    2. Hypokalemia  - potassium chloride SA (KLOR-CON) 20 MEQ CR tablet; Take 1 tablet (20 mEq) by mouth daily  Dispense: 30 tablet; Refill: 11    3. Hepatic encephalopathy (H) - clear today  - lactulose (CHRONULAC) 10 GM/15ML solution; Take 45 mLs by mouth 3 times daily  Dispense: 4000 mL; Refill: 11    Patient Instructions   1. Goal is to have 3 bowel movement per day, may need 1-3 doses of lactulose to achieve this.  2. Medications refilled  3. Care Coordinator referral to help with cost of drugs, housing concerns.  4. Referral to see Dr. Higginbotham at GI  [phone]  5. Come back and see  in 3 weeks, sooner if needed.  Get non-fasting labs  prior      Josie Dumont, [facility]

## 2018-07-20 NOTE — LETTER
[facility]  Complex Care Plan  About Me:  Patient Name:  Scott Casale    YOB: 1955  Age:   63 year old   [facility] MRN: [id number] Telephone Information:  Home Phone [phone]   Mobile [phone]   Mobile [phone]       Address:    [address] [place] [address] Email address:  [email or web address]      Emergency Contact(s)  Name Relationship Lgl Grd Work Phone Home Phone Mobile Phone   1CHRISTINE MEDEIROS Sister   [phone]           Primary language:  English     needed? No   [facility]:  [phone] op. 1  Other communication barriers:    Preferred Method of Communication:  Mail  Current living arrangement: I live in a private home with family; Living at his sister's home  Mobility Status/ Medical Equipment: Independent    Health Maintenance  Health Maintenance Reviewed: Due/Overdue; beginning to establish cares with PCP    My Access Plan  Medical Emergency 911   Primary Clinic Line [facility] - [phone]   24 Hour Appointment Line [phone] or  [phone]) (toll-free)   24 Hour Nurse Line [phone] (toll-free)   Preferred Urgent Care [facility], [phone]   Preferred Hospital [place], [place]  [phone]   Preferred Pharmacy [id number] - [place], [place] - [address]     Behavioral Health Crisis Line The National Suicide Prevention Lifeline at 1-260.943.8169 or 911       My Care Team Members    Patient Care Team       Relationship Specialty Notifications Start End    Josie Dumont DO PCP - General Internal Medicine Admissions 7/20/18     Phone: [phone] Pager: [phone] Fax: [phone] [address] [facility] [address]    Warren Higginbotham MD MD Gastroenterology  9/16/16     Comment:  referring to IR    Phone: [phone] Fax: [phone] [address] [facility] [address] [facility] [address]     Micki Peter MD MD Vascular and Interventional Radiology  9/16/16     Phone: [phone] Pager: [phone] Fax: [phone]        [address] [place] [address] [address] [facility] [address]    Brianna Moore LSW Lead Care Coordinator Primary Care - CC Admissions 7/20/18     Phone: [phone] Fax: [phone]                My Care Plans  Self Management and Treatment Plan, Goals and (Comments)  Goals        General    Financial Wellbeing (pt-stated)     Notes - Note created  7/20/2018  3:56 PM by Brianna Moore LSW    Goal Statement: I would like to see if I qualify for any community programs for housing, food, or transportation assistance  Measure of Success: Pt will learn of resources, apply for them and open to services, if eligible.  Supportive Steps to Achieve: SW to assist pt with community programs such as [facility], 's Programs, Mental Health agencies, and Food shelves, as needed.  Barriers: Pt may not see the benefit of services at times  Strengths: Pt does not want to be homeless during the winter in [place], so is motivated to secure his own housing, if he can.  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    Care Coordination Start Date: 7/20/2018   Frequency of Care Coordination: Monthly, as needed   Form Last Updated: 07/25/2018

## 2018-07-20 NOTE — PROGRESS NOTES
Clinic Care Coordination Contact    Clinic Care Coordination Contact  OUTREACH    Referral Information:  Pt placed referral as pt shared with her he could not afford his medications.   Referral Source: PCP  Primary Diagnosis: GI Disorders    SW was able to meet with pt face to face today in clinic.  SW introduced self, title and role to pt.  Pt shared that he just moved back to [place] from [place] and is now living at his sister's home and is staying on her couch.  Pt moved back to [place] as his wife left him while they were in [place] and he knew no-one else and wanted to be near family.  Pt stated he used most of his SSDI check to move back to [place] and cannot afford his medications.  Pt has $20 left to get him through the rest of the month--that includes gas, food and necessities.    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   Pt status post TIPS procedure.    Current Behavioral Concerns: Pt with hx of depression, anxiety.    Education Provided to patient: SW and pt discussed community resources, [facility] resources and [facility] programs that pt may be eligible to receive.  Pt signed ROIs for this writer to speak to the programs in [facility] on his behalf.    Pain  Chronic pain (GOAL):: No  Health Maintenance Reviewed: Due/Overdue:   DEPRESSION ACTION PLAN Q1 YR  5/23/1973       HIV SCREEN (SYSTEM ASSIGNED)  5/23/1973       ADVANCE DIRECTIVE PLANNING Q5 YRS  5/23/2010       PHQ-9 Q6 MONTHS  4/9/2014       FAUSTINA QUESTIONNAIRE 1 YEAR  10/9/2014       Clinical Pathway: None    Medication  Management:  Pt takes his medications when he has them.  Pt did not have medications due to cost and moving across country.  SW applied for [employer] to assist in covering 1 month of medications.  [employer] Funds approved and sent to [facility] for payment of pt's medications for 1 month--cost was less than $13.00.    Functional Status:  Dependent ADLs:: Independent  Dependent IADLs:: Independent  Bed or wheelchair confined:: No  Mobility Status: Independent  Fallen 2 or more times in the past year?: No  Any fall with injury in the past year?: No    Living Situation:  Current living arrangement:: I live in a private home with family (Living at his sister's home)  Type of residence:: Private home - stairs    Diet/Exercise/Sleep:  Diet:: No added salt  Inadequate nutrition (GOAL):: No  Tube Feeding: No  Exercise:: Currently not exercising  Inadequate activity/exercise (GOAL):: No  Significant changes in sleep pattern (GOAL): No    Transportation:  Transportation means:: Regular car     Psychosocial:  Yazidi or spiritual beliefs that impact treatment:: Yes  Mental health DX:: Yes  Mental health DX how managed:: Alternative Treatments  Mental health management concern (GOAL):: No  Informal Support system:: Family, Friends     Financial/Insurance:  SSDI/ Medicare insurance  Financial/Insurance concerns (GOAL):: Yes (Would like to see if he qualifies for any assistance programs)     Resources and Interventions:   Current Resources:  Family      Advance Care Plan/Directive  Advanced Care Plans/Directives on file:: No    Referrals Placed: [facility] Linkage Line, [employer] Resources, Prescription Assistance Programs, Community Resources     Goals:   Goals        General    Financial Wellbeing (pt-stated)     Notes - Note created  7/20/2018  3:56 PM by Brianna Moore LSW    Goal Statement: I would like to see if I qualify for any community programs for housing, food, or transportation assistance  Measure of  Success: Pt will learn of resources, apply for them and open to services, if eligible.  Supportive Steps to Achieve: SW to assist pt with community programs such as [facility], 's Programs, Mental Health agencies, and Food shelves, as needed.  Barriers: Pt may not see the benefit of services at times  Strengths: Pt does not want to be homeless during the winter in [place], so is motivated to secure his own housing, if he can.  Date to Achieve By: 6 months            Patient/Caregiver understanding: Pt is going to apply for [facility] food support and Medical Assistance.  This writer will also gather the speak to the pt about 's Services, however, pt was not very forthcoming about his time in service, so unsure if pt has a .    Outreach Frequency: monthly  Future Appointments              In 3 weeks [facility]    In 3 weeks Josie Dumont,  [facility]        Plan: SW to connect with pt in 2-4 weeks, or at next PCP appointment.  Pt does have writer's business card and phone number for needs prior to this writer contacting him.    Brianna Gay  Social Work Care Coordinator  [facility]  [phone]

## 2018-07-30 ENCOUNTER — HOSPITAL ENCOUNTER (EMERGENCY)
Facility: CLINIC | Age: 63
Discharge: HOME | End: 2018-07-30
Attending: EMERGENCY MEDICINE | Admitting: EMERGENCY MEDICINE
Payer: MEDICARE

## 2018-07-30 VITALS
SYSTOLIC BLOOD PRESSURE: 144 MMHG | BODY MASS INDEX: 29.68 KG/M2 | OXYGEN SATURATION: 99 % | DIASTOLIC BLOOD PRESSURE: 77 MMHG | TEMPERATURE: 97.9 F | WEIGHT: 212 LBS | HEIGHT: 71 IN | RESPIRATION RATE: 16 BRPM

## 2018-07-30 DIAGNOSIS — E72.20 HYPERAMMONEMIA (H): ICD-10-CM

## 2018-07-30 LAB
ALBUMIN SERPL-MCNC: 2.6 G/DL (ref 3.4–5)
ALBUMIN UR-MCNC: NEGATIVE MG/DL
ALP SERPL-CCNC: 60 U/L (ref 40–150)
ALT SERPL W P-5'-P-CCNC: 35 U/L (ref 0–70)
AMMONIA PLAS-SCNC: 94 UMOL/L (ref 10–50)
ANION GAP SERPL CALCULATED.3IONS-SCNC: 7 MMOL/L (ref 3–14)
APPEARANCE UR: CLEAR
AST SERPL W P-5'-P-CCNC: 57 U/L (ref 0–45)
BASOPHILS # BLD AUTO: 0 10E9/L (ref 0–0.2)
BASOPHILS NFR BLD AUTO: 0.4 %
BILIRUB SERPL-MCNC: 2.8 MG/DL (ref 0.2–1.3)
BILIRUB UR QL STRIP: NEGATIVE
BUN SERPL-MCNC: 27 MG/DL (ref 7–30)
CALCIUM SERPL-MCNC: 8.4 MG/DL (ref 8.5–10.1)
CHLORIDE SERPL-SCNC: 113 MMOL/L (ref 94–109)
CO2 SERPL-SCNC: 24 MMOL/L (ref 20–32)
COLOR UR AUTO: YELLOW
CREAT SERPL-MCNC: 1.02 MG/DL (ref 0.66–1.25)
DIFFERENTIAL METHOD BLD: ABNORMAL
EOSINOPHIL # BLD AUTO: 0.1 10E9/L (ref 0–0.7)
EOSINOPHIL NFR BLD AUTO: 1.6 %
ERYTHROCYTE [DISTWIDTH] IN BLOOD BY AUTOMATED COUNT: 13.2 % (ref 10–15)
GFR SERPL CREATININE-BSD FRML MDRD: 74 ML/MIN/1.7M2
GLUCOSE SERPL-MCNC: 89 MG/DL (ref 70–99)
GLUCOSE UR STRIP-MCNC: NEGATIVE MG/DL
HCT VFR BLD AUTO: 38 % (ref 40–53)
HGB BLD-MCNC: 13.5 G/DL (ref 13.3–17.7)
HGB UR QL STRIP: ABNORMAL
IMM GRANULOCYTES # BLD: 0 10E9/L (ref 0–0.4)
IMM GRANULOCYTES NFR BLD: 0.7 %
KETONES UR STRIP-MCNC: NEGATIVE MG/DL
LEUKOCYTE ESTERASE UR QL STRIP: NEGATIVE
LIPASE SERPL-CCNC: 251 U/L (ref 73–393)
LYMPHOCYTES # BLD AUTO: 0.7 10E9/L (ref 0.8–5.3)
LYMPHOCYTES NFR BLD AUTO: 13.5 %
MCH RBC QN AUTO: 36 PG (ref 26.5–33)
MCHC RBC AUTO-ENTMCNC: 35.5 G/DL (ref 31.5–36.5)
MCV RBC AUTO: 101 FL (ref 78–100)
MONOCYTES # BLD AUTO: 0.7 10E9/L (ref 0–1.3)
MONOCYTES NFR BLD AUTO: 12 %
MUCOUS THREADS #/AREA URNS LPF: PRESENT /LPF
NEUTROPHILS # BLD AUTO: 3.9 10E9/L (ref 1.6–8.3)
NEUTROPHILS NFR BLD AUTO: 71.8 %
NITRATE UR QL: NEGATIVE
NRBC # BLD AUTO: 0 10*3/UL
NRBC BLD AUTO-RTO: 0 /100
PH UR STRIP: 6 PH (ref 5–7)
PLATELET # BLD AUTO: 62 10E9/L (ref 150–450)
POTASSIUM SERPL-SCNC: 3.9 MMOL/L (ref 3.4–5.3)
PROT SERPL-MCNC: 6.5 G/DL (ref 6.8–8.8)
RBC # BLD AUTO: 3.75 10E12/L (ref 4.4–5.9)
RBC #/AREA URNS AUTO: 19 /HPF (ref 0–2)
SODIUM SERPL-SCNC: 144 MMOL/L (ref 133–144)
SOURCE: ABNORMAL
SP GR UR STRIP: 1.02 (ref 1–1.03)
UROBILINOGEN UR STRIP-MCNC: 4 MG/DL (ref 0–2)
WBC # BLD AUTO: 5.5 10E9/L (ref 4–11)
WBC #/AREA URNS AUTO: 1 /HPF (ref 0–5)

## 2018-07-30 PROCEDURE — 82140 ASSAY OF AMMONIA: CPT | Performed by: EMERGENCY MEDICINE

## 2018-07-30 PROCEDURE — 81001 URINALYSIS AUTO W/SCOPE: CPT | Performed by: EMERGENCY MEDICINE

## 2018-07-30 PROCEDURE — 83690 ASSAY OF LIPASE: CPT | Performed by: EMERGENCY MEDICINE

## 2018-07-30 PROCEDURE — 85025 COMPLETE CBC W/AUTO DIFF WBC: CPT | Performed by: EMERGENCY MEDICINE

## 2018-07-30 PROCEDURE — 80053 COMPREHEN METABOLIC PANEL: CPT | Performed by: EMERGENCY MEDICINE

## 2018-07-30 PROCEDURE — 99283 EMERGENCY DEPT VISIT LOW MDM: CPT | Performed by: EMERGENCY MEDICINE

## 2018-07-30 PROCEDURE — 99284 EMERGENCY DEPT VISIT MOD MDM: CPT | Mod: Z6 | Performed by: EMERGENCY MEDICINE

## 2018-07-30 NOTE — ED NOTES
Pt notes several stressors at home, his sisters giving him a hard time. Pt states he feels similar to previous times his ammonia levels were high. Pt denies pain.

## 2018-07-30 NOTE — ED AVS SNAPSHOT
[facility] Emergency Department    [address] [address]    Phone:  [phone]    Fax:  [phone]                                       Scott Casale   MRN: [id number]    Department:  [facility] Emergency Department   Date of Visit:  7/30/2018           After Visit Summary Signature Page     I have received my discharge instructions, and my questions have been answered. I have discussed any challenges I see with this plan with the nurse or doctor.    ..........................................................................................................................................  Patient/Patient Representative Signature      ..........................................................................................................................................  Patient Representative Print Name and Relationship to Patient    ..................................................               ................................................  Date                                            Time    ..........................................................................................................................................  Reviewed by Signature/Title    ...................................................              ..............................................  Date                                                            Time

## 2018-07-30 NOTE — DISCHARGE INSTRUCTIONS
Continue current medications including lactulose and spironolactone.  Follow-up with your primary care later this week for recheck and possible recheck of ammonia level.  Your level has come down from 146-94.  Continue medications.  If any confusion headaches fevers bleeding abdominal pain or other symptoms present please return for further evaluation and care.

## 2018-07-30 NOTE — ED NOTES
Pt c/o body aches, dizziness, unsteady on feet.  Pt thinks his symptoms are related to high ammonia levels.  Pt has hx of hep C and cirrhosis of the liver.  Symptoms for past few weeks and getting worse.

## 2018-07-30 NOTE — ED PROVIDER NOTES
History     Chief Complaint   Patient presents with     Abnormal Labs     states he feels like his ammonia levels are off     HPI  Scott Casale is a 63 year old male who resents the emergency department complaining of possible elevated ammonia level.  Patient has a history of liver failure secondary to alcohol and hepatitis C.  He has been taking lactulose furosemide and Spironolactone for the last 10+ days after returning from [place].  Patient states over the past few days he has been a little off balance just does not feel right.  He is concerned his ammonia level has increased again.  He denies any fevers or chills.  He has not had any chest pain or shortness of breath.  He has occasional crampy abdominal pain but is not have any right now.  He states he has 2-3 bowel movements a day.  He has not seen any blood in his stool.  He denies any focal numbness weakness any extremity.    Problem List:    Patient Active Problem List    Diagnosis Date Noted     Hyperammonemia (H) 11/27/2017     Priority: Medium     Hepatic encephalopathy (H) 11/27/2017     Priority: Medium     Acute hepatic encephalopathy 03/13/2017     Priority: Medium     Ankylosing spondylitis (H)      Priority: Medium     S/P TIPS (transjugular intrahepatic portosystemic shunt) 09/28/2016     Priority: Medium     Ascites of liver 09/27/2016     Priority: Medium     Overview:   Low MELD score   S/p TIPS procedure at [facility] (09/28/2016)       Hyponatremia 08/22/2016     Priority: Medium     Overview:   Chronic        Edema of lower extremity 06/15/2016     Priority: Medium     Overview:   Resistant to diuretic therapy        Chronic hepatitis C virus infection (H) 06/15/2016     Priority: Medium     Overview:   Dr Higginbotham [facility]   Harvoni Ribavirin (10/2016)       Hepatic cirrhosis (H) 06/15/2016     Priority: Medium     Overview:   Hepatitis C and history of alcohol use ( no use currently)  in the past         Liver mass 06/15/2016     Priority: Medium     Overview:   0.8 cm right lobe hyperlucency per CT 04/2016 suspicious for HCC   Liver biopsy due - seeing Liver clinic at [facility]       Portal hypertension (H) 06/15/2016     Priority: Medium     Overview:   Furosemide Spironolactone Lactulose   Intolerance to Propranolol with hypotension, dizziness       Primary malignant neoplasm of testis (H) 06/15/2016     Priority: Medium     Overview:   At age 28 s/p orchiectomy with RPLND        Thrombocytopenia (H) 06/15/2016     Priority: Medium     Overview:   6/12/2016  PLT: 48       Chronic low back pain 12/06/2012     Priority: Medium     Narcotic Contract signed 12/6/12 and sent to be scanned    Uses soma at night to help him sleep due to back pain. Has had back problems since 1986, since fell in big hole, collapsed lower discs. Also has ankylosing spondylitis. Has had much Physical Therapy and chiropractic work over years, was told surgery was only option.  Was accompanied by [place] records.    Narcotic contract -- soma #30 R2, at 3 mo can call for 3 more refills, but be seen q 6 mo for refills.  Uses for sleep.  Flexeril for daytime if needed, unrestricted.           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 12/06/2012     Priority: Medium     Since summer; ENT referral 12/6/12       GERD (gastroesophageal reflux disease) 06/26/2012     Priority: Medium     CARDIOVASCULAR SCREENING; LDL GOAL LESS THAN 130 06/25/2012     Priority: Medium     Mild major depression (H) 08/27/2008     Priority: Medium        Past Medical History:    Past Medical History:   Diagnosis Date     AC separation 8/20/2008     Ascites      H/O testicular cancer age 28     Hepatitis C      Shoulder dislocation      Splenomegaly      Ulcerative colitis (H)        Past Surgical History:    Past Surgical History:   Procedure Laterality Date     C APPENDECTOMY       C KNEE SCOPE,CLEAN/DRAIN      bilateral     COLONOSCOPY N/A 1/17/2017     Procedure: COLONOSCOPY;  Surgeon: Guru Reina Palacios MD;  Location:  GI     ESOPHAGOSCOPY, GASTROSCOPY, DUODENOSCOPY (EGD), COMBINED N/A 1/17/2017    Procedure: COMBINED ESOPHAGOSCOPY, GASTROSCOPY, DUODENOSCOPY (EGD);  Surgeon: Guru Reina Palacios MD;  Location:  GI     EYE SURGERY       IR TRANSVEN INTRAHEPATIC PORTOSYST REV       ORCHIECTOMY SCROTAL      prosthesis placed     STRABISMUS SURGERY      bilateral       Family History:    Family History   Problem Relation Age of Onset     Cancer Mother 70     brain tumor     Diabetes Maternal Grandmother      Cerebrovascular Disease Sister      HEART DISEASE Sister      Respiratory Son      Cancer Father      Uterine Cancer Sister      Prostate Cancer No family hx of      Cancer - colorectal No family hx of        Social History:  Marital Status:   [2]  Social History   Substance Use Topics     Smoking status: Former Smoker     Quit date: 1/1/1978     Smokeless tobacco: Never Used     Alcohol use No      Comment: quit in 2010        Medications:      furosemide (LASIX) 40 MG tablet   lactulose (CHRONULAC) 10 GM/15ML solution   ondansetron (ZOFRAN) 4 MG tablet   potassium chloride SA (KLOR-CON) 20 MEQ CR tablet   spironolactone (ALDACTONE) 50 MG tablet         Review of Systems   Constitutional: Positive for activity change. Negative for appetite change, chills and fever.   HENT: Negative for congestion and trouble swallowing.    Eyes: Negative for visual disturbance.   Respiratory: Negative for chest tightness and shortness of breath.    Cardiovascular: Negative for chest pain and leg swelling.   Gastrointestinal: Positive for nausea. Negative for abdominal pain and vomiting.   Genitourinary: Negative for decreased urine volume and dysuria.   Musculoskeletal: Positive for myalgias. Negative for back pain, gait problem and neck pain.   Skin: Negative for rash.   Neurological: Positive for light-headedness. Negative for  "dizziness, seizures, syncope, facial asymmetry, weakness, numbness and headaches.   Hematological: Does not bruise/bleed easily.   Psychiatric/Behavioral: Negative for confusion.       Physical Exam   BP: 144/74  Heart Rate: 72  Temp: 97.9  F (36.6  C)  Resp: 16  Height: 180.3 cm (5' 11\")  Weight: 96.2 kg (212 lb)  SpO2: 99 %      Physical Exam   Constitutional: He appears well-developed and well-nourished.   HENT:   Head: Normocephalic.   Mouth/Throat: Oropharynx is clear and moist.   Eyes: Conjunctivae are normal.   Neck: Normal range of motion. Neck supple.   Cardiovascular: Normal rate, regular rhythm, normal heart sounds and intact distal pulses.    No murmur heard.  Pulmonary/Chest: Effort normal and breath sounds normal. He has no wheezes. He has no rales.   Abdominal: Soft. Bowel sounds are normal. There is no tenderness.   Musculoskeletal: Normal range of motion. He exhibits no tenderness.   Neurological: He is alert. He exhibits normal muscle tone.   Skin: Skin is warm and dry. No rash noted.   Nursing note and vitals reviewed.      ED Course     ED Course     Procedures               Critical Care time:  none               Labs Ordered and Resulted from Time of ED Arrival Up to the Time of Departure from the ED   COMPREHENSIVE METABOLIC PANEL - Abnormal; Notable for the following:        Result Value    Chloride 113 (*)     Calcium 8.4 (*)     Bilirubin Total 2.8 (*)     Albumin 2.6 (*)     Protein Total 6.5 (*)     AST 57 (*)     All other components within normal limits   CBC WITH PLATELETS DIFFERENTIAL - Abnormal; Notable for the following:     RBC Count 3.75 (*)     Hematocrit 38.0 (*)      (*)     MCH 36.0 (*)     Platelet Count 62 (*)     Absolute Lymphocytes 0.7 (*)     All other components within normal limits   URINE MACROSCOPIC WITH REFLEX TO MICRO - Abnormal; Notable for the following:     Blood Urine Large (*)     Urobilinogen mg/dL 4.0 (*)     RBC Urine 19 (*)     Mucous Urine Present " (*)     All other components within normal limits   AMMONIA - Abnormal; Notable for the following:     Ammonia 94 (*)     All other components within normal limits   LIPASE       Medications - No data to display    Assessments & Plan (with Medical Decision Making) records were reviewed.  Patient was given oral fluids and was able to drink these without difficulty.  White count was not elevated hemoglobin 13.5 platelet count was 62 but stable there was no significant left shift.  Comprehensive metabolic panel significant for bilirubin to 2.8 and AST slightly elevated at 57.  Patient's UA with large blood for urobilinogen.  His ammonia level had significantly decreased to 94.  Last reading was 146.  Findings were discussed in detail with the patient.  He appears in no acute distress right now and is able ambulate without difficulty.  He has medications at home and should continue these and follow-up with his primary care.  He is alert and oriented ×3 and vital signs are stable.  He has no significant abdominal tenderness at this time and he feels comfortable proceeding at this time he just felt that the ammonia level might be a little off but is reassured by this number.  He will return if symptoms worsen or new symptoms develop.     I have reviewed the nursing notes.    I have reviewed the findings, diagnosis, plan and need for follow up with the patient.       Discharge Medication List as of 7/30/2018  2:56 PM          Final diagnoses:   Hyperammonemia (H)       7/30/2018   [facility] EMERGENCY DEPARTMENT     Abdon Samaniego MD  07/31/18 [id number]

## 2018-07-30 NOTE — ED AVS SNAPSHOT
[facility]    [address] [facility] [address]    Phone:  [phone]    Fax:  [phone]                                       Scott Casale   MRN: [id number]    Department:  [facility]   Date of Visit:  7/30/2018           Patient Information     Date Of Birth          1955        Your diagnoses for this visit were:     Hyperammonemia (H)        You were seen by Abdon Samaniego MD.      Follow-up Information     Follow up with Josie Dumont DO.    Specialty:  Internal Medicine    Why:  This week for recheck of pneumonia    Contact information:    [address] [place] [address]  [phone]          Follow up with [facility].    Specialty:  EMERGENCY MEDICINE    Why:  If symptoms worsen    Contact information:    [address] [place] [address] [phone]    Additional information:    The medical center is located at   [address] [place] [address] (between [address] in [place], [place] miles north   of [place]).        Discharge Instructions       Continue current medications including lactulose and spironolactone.  Follow-up with your primary care later this week for recheck and possible recheck of ammonia level.  Your level has come down from 146-94.  Continue medications.  If any confusion headaches fevers bleeding abdominal pain or other symptoms present please return for further evaluation and care.    Your next 10 appointments already scheduled     Aug 10, 2018  7:05 AM CDT   LAB with [facility] ([facility])    [address] [facility] [id number] [phone]           Please do not eat 10-12 hours before your appointment if you are coming in fasting for labs on lipids, cholesterol, or glucose (sugar). This does not apply to pregnant women. Water, hot tea and black coffee (with nothing added) are okay. Do not drink other  fluids, diet soda or chew gum.            Aug 10, 2018  7:20 AM CDT   Office Visit with Josie Dumont,    [facility]    [address] [facility] [address] [phone]           Bring a current list of meds and any records pertaining to this visit. For Physicals, please bring immunization records and any forms needing to be filled out. Please arrive 10 minutes early to complete paperwork.              24 Hour Appointment Hotline       To make an appointment at any [facility], call [phone] ([phone]). If you don't have a family doctor or clinic, we will help you find one. [facility] ([facility]) are conveniently located to serve the needs of you and your family.             Review of your medicines      Our records show that you are taking the medicines listed below. If these are incorrect, please call your family doctor or clinic.        Dose / Directions Last dose taken    furosemide 40 MG tablet   Commonly known as:  LASIX   Dose:  40 mg   Quantity:  60 tablet        Take 1 tablet (40 mg) by mouth 2 times daily   Refills:  11        lactulose 10 GM/15ML solution   Commonly known as:  CHRONULAC   Dose:  45 mL   Quantity:  4000 mL        Take 45 mLs by mouth 3 times daily   Refills:  11        ondansetron 4 MG tablet   Commonly known as:  ZOFRAN   Dose:  4 mg   Quantity:  18 tablet        Take 1 tablet (4 mg) by mouth every 8 hours as needed for nausea   Refills:  0        potassium chloride SA 20 MEQ CR tablet   Commonly known as:  KLOR-CON   Dose:  20 mEq   Quantity:  30 tablet        Take 1 tablet (20 mEq) by mouth daily   Refills:  11        spironolactone 50 MG tablet   Commonly known as:  ALDACTONE   Dose:  50 mg   Quantity:  30 tablet        Take 1 tablet (50 mg) by mouth daily   Refills:  11                Procedures and tests performed during your visit     Ammonia (on ice)    CBC with platelets, differential    Comprehensive  metabolic panel    Lipase    UA reflex to Microscopic      Orders Needing Specimen Collection     None      Pending Results     No orders found from 7/28/2018 to 7/31/2018.            Pending Culture Results     No orders found from 7/28/2018 to 7/31/2018.            Pending Results Instructions     If you had any lab results that were not finalized at the time of your Discharge, you can call the ED Lab Result RN at [phone]. You will be contacted by this team for any positive Lab results or changes in treatment. The nurses are available 7 days a week from 10A to 6:30P.  You can leave a message 24 hours per day and they will return your call.        Test Results From Your Hospital Stay        7/30/2018  1:06 PM      Component Results     Component Value Ref Range & Units Status    Sodium 144 133 - 144 mmol/L Final    Potassium 3.9 3.4 - 5.3 mmol/L Final    Chloride 113 (H) 94 - 109 mmol/L Final    Carbon Dioxide 24 20 - 32 mmol/L Final    Anion Gap 7 3 - 14 mmol/L Final    Glucose 89 70 - 99 mg/dL Final    Urea Nitrogen 27 7 - 30 mg/dL Final    Creatinine 1.02 0.66 - 1.25 mg/dL Final    GFR Estimate 74 >60 mL/min/1.7m2 Final    Non  GFR Calc    GFR Estimate If Black 89 >60 mL/min/1.7m2 Final    African American GFR Calc    Calcium 8.4 (L) 8.5 - 10.1 mg/dL Final    Bilirubin Total 2.8 (H) 0.2 - 1.3 mg/dL Final    Albumin 2.6 (L) 3.4 - 5.0 g/dL Final    Protein Total 6.5 (L) 6.8 - 8.8 g/dL Final    Alkaline Phosphatase 60 40 - 150 U/L Final    ALT 35 0 - 70 U/L Final    AST 57 (H) 0 - 45 U/L Final         7/30/2018  1:12 PM      Component Results     Component Value Ref Range & Units Status    WBC 5.5 4.0 - 11.0 10e9/L Final    RBC Count 3.75 (L) 4.4 - 5.9 10e12/L Final    Hemoglobin 13.5 13.3 - 17.7 g/dL Final    Hematocrit 38.0 (L) 40.0 - 53.0 % Final     (H) 78 - 100 fl Final    MCH 36.0 (H) 26.5 - 33.0 pg Final    MCHC 35.5 31.5 - 36.5 g/dL Final    RDW 13.2 10.0 - 15.0 % Final     Platelet Count 62 (L) 150 - 450 10e9/L Final    Diff Method Automated Method  Final    % Neutrophils 71.8 % Final    % Lymphocytes 13.5 % Final    % Monocytes 12.0 % Final    % Eosinophils 1.6 % Final    % Basophils 0.4 % Final    % Immature Granulocytes 0.7 % Final    Nucleated RBCs 0 0 /100 Final    Absolute Neutrophil 3.9 1.6 - 8.3 10e9/L Final    Absolute Lymphocytes 0.7 (L) 0.8 - 5.3 10e9/L Final    Absolute Monocytes 0.7 0.0 - 1.3 10e9/L Final    Absolute Eosinophils 0.1 0.0 - 0.7 10e9/L Final    Absolute Basophils 0.0 0.0 - 0.2 10e9/L Final    Abs Immature Granulocytes 0.0 0 - 0.4 10e9/L Final    Absolute Nucleated RBC 0.0  Final         7/30/2018  1:05 PM      Component Results     Component Value Ref Range & Units Status    Lipase 251 73 - 393 U/L Final         7/30/2018  2:18 PM      Component Results     Component Value Ref Range & Units Status    Color Urine Yellow  Final    Appearance Urine Clear  Final    Glucose Urine Negative NEG^Negative mg/dL Final    Bilirubin Urine Negative NEG^Negative Final    Ketones Urine Negative NEG^Negative mg/dL Final    Specific Gravity Urine 1.023 1.003 - 1.035 Final    Blood Urine Large (A) NEG^Negative Final    pH Urine 6.0 5.0 - 7.0 pH Final    Protein Albumin Urine Negative NEG^Negative mg/dL Final    Urobilinogen mg/dL 4.0 (H) 0.0 - 2.0 mg/dL Final    Nitrite Urine Negative NEG^Negative Final    Leukocyte Esterase Urine Negative NEG^Negative Final    Source Midstream Urine  Final    RBC Urine 19 (H) 0 - 2 /HPF Final    WBC Urine 1 0 - 5 /HPF Final    Mucous Urine Present (A) NEG^Negative /LPF Final         7/30/2018  1:54 PM      Component Results     Component Value Ref Range & Units Status    Ammonia 94 (H) 10 - 50 umol/L Final                Thank you for choosing [facility]       Thank you for choosing [facility] for your care. Our goal is always to provide you with excellent care. Hearing back from our patients is one way we can continue to improve our services.  Please take a few minutes to complete the written survey that you may receive in the mail after you visit with us. Thank you!        [email or web address] Information     [email or web address] gives you secure access to your electronic health record. If you see a primary care provider, you can also send messages to your care team and make appointments. If you have questions, please call your primary care clinic.  If you do not have a primary care provider, please call [phone] and they will assist you.        Care EveryWhere ID     This is your Care EveryWhere ID. This could be used by other organizations to access your [facility] medical records  [id number]        Equal Access to Services     DARIANA [facility] : Bakari Yao, lora centeno, danial ramsey, khai jolley . So [facility] [phone].    ATENCIÓN: Si habla español, tiene a ruvalcaba disposición servicios gratuitos de asistencia lingüística. Llame al [phone].    We comply with applicable federal civil rights laws and [place] laws. We do not discriminate on the basis of race, color, national origin, age, disability, sex, sexual orientation, or gender identity.            After Visit Summary       This is your record. Keep this with you and show to your community pharmacist(s) and doctor(s) at your next visit.

## 2018-07-31 ENCOUNTER — TELEPHONE (OUTPATIENT)
Dept: FAMILY MEDICINE | Facility: CLINIC | Age: 63
End: 2018-07-31

## 2018-07-31 ENCOUNTER — PATIENT OUTREACH (OUTPATIENT)
Dept: CARE COORDINATION | Facility: CLINIC | Age: 63
End: 2018-07-31

## 2018-07-31 ASSESSMENT — ENCOUNTER SYMPTOMS
SEIZURES: 0
FEVER: 0
BRUISES/BLEEDS EASILY: 0
VOMITING: 0
ACTIVITY CHANGE: 1
CONFUSION: 0
WEAKNESS: 0
LIGHT-HEADEDNESS: 1
BACK PAIN: 0
TROUBLE SWALLOWING: 0
DYSURIA: 0
SHORTNESS OF BREATH: 0
ABDOMINAL PAIN: 0
NECK PAIN: 0
MYALGIAS: 1
NUMBNESS: 0
APPETITE CHANGE: 0
CHILLS: 0
CHEST TIGHTNESS: 0
HEADACHES: 0
FACIAL ASYMMETRY: 0
NAUSEA: 1
DIZZINESS: 0

## 2018-07-31 ASSESSMENT — ACTIVITIES OF DAILY LIVING (ADL): DEPENDENT_IADLS:: INDEPENDENT

## 2018-07-31 NOTE — PROGRESS NOTES
Clinic Care Coordination Contact    Clinic Care Coordination Contact  OUTREACH    Referral Information:  Pt enrolled with [facility] and was in the ED last evening.  Referral Source: ED Follow-Up  Primary Diagnosis: GI Disorders    SW placed call to pt to follow up on his recent ED visit due to Hyperammonemia.  SW and pt discussed that pt is taking his medications as prescribed.  Pt is living with his sister in [place] and staying on her couch, however, they do not get along very well and find the situation has been very stressful for the pt.  SW and pt discussed the community resources that this writer mailed to the pt to explore, but pt has chosen to not pursue any of them at this time, per his preconceived notions of what they offer.  Pt did finally agree to contact [facility] to see if he will qualify for food support.      Pt shared that his intention is to purchase his sister's RV and drive it back to [place] move there permanently, as pt does not feel he can tolerate [place] huerta any longer.    Chief Complaint   Patient presents with     Clinic Care Coordination - Post Hospital     ED follow up--social work   Universal Utilization: Clinic Utilization  Difficulty keeping appointments:: No  Utilization    Last refreshed: 7/31/2018  6:46 AM:  No Show Count (past year) 0       Last refreshed: 7/31/2018  6:46 AM:  ED visits 2       Last refreshed: 7/31/2018  6:46 AM:  Hospital admissions 1          Current as of: 7/31/2018  6:46 AM         Clinical Concerns:  Current Medical Concerns:  Pt states he is feeling a bit better, but remains in pain.  Pt states his whole body hurts, suspects it is from sleeping on the couch.    Current Behavioral Concerns: Pt has hx of depression, but does not take medication.  [facility] declined.    Education Provided to patient: SW encouraged pt to continue to work with this writer to secure community resources.  Pt stated he will contact  [facility] today for food support assistance, even if it is for $10-20.     Pain  Chronic pain (GOAL):: Yes  Location of chronic pain:: back pain, knees.  Chronic pain timing:: Intermittent  Chronic pain severity:: 5  Limitation of routine activities due to chronic pain:: Yes    Health Maintenance Reviewed: Due/Overdue:   DEPRESSION ACTION PLAN Q1 YR  5/23/1973       HIV SCREEN (SYSTEM ASSIGNED)  5/23/1973       ADVANCE DIRECTIVE PLANNING Q5 YRS  5/23/2010       PHQ-9 Q6 MONTHS  4/9/2014       FAUSTINA QUESTIONNAIRE 1 YEAR  10/9/2014       Medication Management:  Pt states he takes as prescribed.     Functional Status:  Dependent ADLs:: Independent  Dependent IADLs:: Independent  Bed or wheelchair confined:: No  Mobility Status: Independent    Living Situation:  Current living arrangement:: I live in a private home with family (Living at his sister's home)  Type of residence:: Private home - stairs    Diet/Exercise/Sleep:  Diet:: No added salt  Inadequate nutrition (GOAL):: No  Food Insecurity: No  Tube Feeding: No  Exercise:: Currently not exercising  Inadequate activity/exercise (GOAL):: No  Significant changes in sleep pattern (GOAL): No    Transportation:  Transportation concerns (GOAL):: No  Transportation means:: Regular car     Psychosocial:  Worship or spiritual beliefs that impact treatment:: Yes  Mental health DX:: Yes  Mental health DX how managed:: Alternative Treatments  Mental health management concern (GOAL):: No  Informal Support system:: Family, Friends     Financial/Insurance: Social Security, Medicare  Financial/Insurance concerns (GOAL):: Yes    Pt to search for other financial assistance for his living expenses.  He now wants to return to [place] and live permanently.  He no longer wants to remain in [place].     Resources and Interventions:  Current Resources:  Family.     Advance Care Plan/Directive  Advanced Care Plans/Directives on file:: No    Referrals Placed: [facility]  Resources, Prescription Assistance Programs, Community Resources    Goals        General    Financial Wellbeing (pt-stated)     Notes - Note created  7/20/2018  3:56 PM by Brianna Moore LSW    Goal Statement: I would like to see if I qualify for any community programs for housing, food, or transportation assistance  Measure of Success: Pt will learn of resources, apply for them and open to services, if eligible.  Supportive Steps to Achieve: SW to assist pt with community programs such as [facility], [place], Mental Health agencies, and Food shelves, as needed.  Barriers: Pt may not see the benefit of services at times  Strengths: Pt does not want to be homeless during the winter in [place], so is motivated to secure his own housing, if he can.  Date to Achieve By: 6 months                Patient/Caregiver understanding: Pt states he will call [facility] today for assistance with food support.    Outreach Frequency: monthly  Future Appointments              In 1 week [facility]    In 1 week Josie Dumont,  [facility]          Plan: SW to continue to engage with pt and assist as able.    Brianna Gay  Social Work Care Coordinator  [facility]  [phone]

## 2018-07-31 NOTE — TELEPHONE ENCOUNTER
Incoming records from [facility]. Given to provider for review and scan.    [facility] \

## 2018-08-13 DIAGNOSIS — K70.30 ALCOHOLIC CIRRHOSIS OF LIVER WITHOUT ASCITES (H): Primary | Chronic | ICD-10-CM

## 2018-08-14 ENCOUNTER — TELEPHONE (OUTPATIENT)
Dept: GASTROENTEROLOGY | Facility: CLINIC | Age: 63
End: 2018-08-14

## 2018-08-14 NOTE — TELEPHONE ENCOUNTER
Patient contacted and reminded of upcoming appointment.  Patient confirmed they will be attending.  Patient instructed to bring updated medications list to appointment.  Instructed pt to arrive an hour and a half to two hours prior to appt time for labs.  Camden Oneal, CMA

## 2018-08-15 ENCOUNTER — OFFICE VISIT (OUTPATIENT)
Dept: GASTROENTEROLOGY | Facility: CLINIC | Age: 63
End: 2018-08-15
Attending: INTERNAL MEDICINE
Payer: MEDICARE

## 2018-08-15 VITALS
TEMPERATURE: 97.4 F | SYSTOLIC BLOOD PRESSURE: 161 MMHG | DIASTOLIC BLOOD PRESSURE: 81 MMHG | BODY MASS INDEX: 30.55 KG/M2 | OXYGEN SATURATION: 100 % | WEIGHT: 218.2 LBS | RESPIRATION RATE: 18 BRPM | HEART RATE: 68 BPM | HEIGHT: 71 IN

## 2018-08-15 DIAGNOSIS — K70.30 ALCOHOLIC CIRRHOSIS OF LIVER WITHOUT ASCITES (H): ICD-10-CM

## 2018-08-15 DIAGNOSIS — K70.30 ALCOHOLIC CIRRHOSIS OF LIVER WITHOUT ASCITES (H): Chronic | ICD-10-CM

## 2018-08-15 DIAGNOSIS — H57.13 PAIN OF BOTH EYES: ICD-10-CM

## 2018-08-15 DIAGNOSIS — K70.31 ALCOHOLIC CIRRHOSIS OF LIVER WITH ASCITES (H): Chronic | ICD-10-CM

## 2018-08-15 DIAGNOSIS — H54.7 DECREASED VISUAL ACUITY: ICD-10-CM

## 2018-08-15 DIAGNOSIS — Z23 ENCOUNTER FOR IMMUNIZATION: ICD-10-CM

## 2018-08-15 DIAGNOSIS — K74.60 CIRRHOSIS OF LIVER WITHOUT ASCITES, UNSPECIFIED HEPATIC CIRRHOSIS TYPE (H): Primary | ICD-10-CM

## 2018-08-15 LAB
ALBUMIN SERPL-MCNC: 2.8 G/DL (ref 3.4–5)
ALP SERPL-CCNC: 74 U/L (ref 40–150)
ALT SERPL W P-5'-P-CCNC: 32 U/L (ref 0–70)
AMMONIA PLAS-SCNC: 86 UMOL/L (ref 10–50)
ANION GAP SERPL CALCULATED.3IONS-SCNC: 7 MMOL/L (ref 3–14)
AST SERPL W P-5'-P-CCNC: 55 U/L (ref 0–45)
BILIRUB DIRECT SERPL-MCNC: 0.6 MG/DL (ref 0–0.2)
BILIRUB SERPL-MCNC: 2.6 MG/DL (ref 0.2–1.3)
BUN SERPL-MCNC: 24 MG/DL (ref 7–30)
CALCIUM SERPL-MCNC: 8.1 MG/DL (ref 8.5–10.1)
CHLORIDE SERPL-SCNC: 113 MMOL/L (ref 94–109)
CO2 SERPL-SCNC: 23 MMOL/L (ref 20–32)
CREAT SERPL-MCNC: 1.23 MG/DL (ref 0.66–1.25)
ERYTHROCYTE [DISTWIDTH] IN BLOOD BY AUTOMATED COUNT: 14.3 % (ref 10–15)
GFR SERPL CREATININE-BSD FRML MDRD: 59 ML/MIN/1.7M2
GLUCOSE SERPL-MCNC: 96 MG/DL (ref 70–99)
HCT VFR BLD AUTO: 38.4 % (ref 40–53)
HGB BLD-MCNC: 13.6 G/DL (ref 13.3–17.7)
INR PPP: 1.31 (ref 0.86–1.14)
MCH RBC QN AUTO: 36.7 PG (ref 26.5–33)
MCHC RBC AUTO-ENTMCNC: 35.4 G/DL (ref 31.5–36.5)
MCV RBC AUTO: 104 FL (ref 78–100)
PLATELET # BLD AUTO: 59 10E9/L (ref 150–450)
POTASSIUM SERPL-SCNC: 4 MMOL/L (ref 3.4–5.3)
PROT SERPL-MCNC: 6.8 G/DL (ref 6.8–8.8)
RBC # BLD AUTO: 3.71 10E12/L (ref 4.4–5.9)
SODIUM SERPL-SCNC: 143 MMOL/L (ref 133–144)
WBC # BLD AUTO: 5.2 10E9/L (ref 4–11)

## 2018-08-15 PROCEDURE — 80053 COMPREHEN METABOLIC PANEL: CPT | Performed by: INTERNAL MEDICINE

## 2018-08-15 PROCEDURE — G0009 ADMIN PNEUMOCOCCAL VACCINE: HCPCS | Mod: ZF

## 2018-08-15 PROCEDURE — 85610 PROTHROMBIN TIME: CPT | Performed by: INTERNAL MEDICINE

## 2018-08-15 PROCEDURE — 36415 COLL VENOUS BLD VENIPUNCTURE: CPT | Performed by: INTERNAL MEDICINE

## 2018-08-15 PROCEDURE — 90670 PCV13 VACCINE IM: CPT | Mod: ZF | Performed by: INTERNAL MEDICINE

## 2018-08-15 PROCEDURE — 85027 COMPLETE CBC AUTOMATED: CPT | Performed by: INTERNAL MEDICINE

## 2018-08-15 PROCEDURE — 82248 BILIRUBIN DIRECT: CPT | Performed by: INTERNAL MEDICINE

## 2018-08-15 PROCEDURE — 25000128 H RX IP 250 OP 636: Mod: ZF | Performed by: INTERNAL MEDICINE

## 2018-08-15 PROCEDURE — 82140 ASSAY OF AMMONIA: CPT | Performed by: INTERNAL MEDICINE

## 2018-08-15 PROCEDURE — G0463 HOSPITAL OUTPT CLINIC VISIT: HCPCS | Mod: 25,ZF

## 2018-08-15 RX ADMIN — PNEUMOCOCCAL 13-VALENT CONJUGATE VACCINE 0.5 ML: 2.2; 2.2; 2.2; 2.2; 2.2; 4.4; 2.2; 2.2; 2.2; 2.2; 2.2; 2.2; 2.2 INJECTION, SUSPENSION INTRAMUSCULAR at 15:56

## 2018-08-15 ASSESSMENT — PAIN SCALES - GENERAL: PAINLEVEL: SEVERE PAIN (7)

## 2018-08-15 NOTE — MR AVS SNAPSHOT
After Visit Summary   8/15/2018    Scott Casale    MRN: [id number]           Patient Information     Date Of Birth          1955        Visit Information        Provider Department      8/15/2018 2:30 PM Warren Higginbotham MD [facility]        Today's Diagnoses     Cirrhosis of liver without ascites, unspecified hepatic cirrhosis type (H)    -  1    Alcoholic cirrhosis of liver without ascites (H)        Encounter for immunization         Pain of both eyes        Decreased visual acuity           Follow-ups after your visit        Additional Services     OPHTHALMOLOGY ADULT REFERRAL       Your provider has referred you to: [facility]: [facility] [phone]   [email or web address]    Please be aware that coverage of these services is subject to the terms and limitations of your health insurance plan.  Call member services at your health plan with any benefit or coverage questions.      Please bring the following with you to your appointment:    (1) Any X-Rays, CTs or MRIs which have been performed.  Contact the facility where they were done to arrange for  prior to your scheduled appointment.    (2) List of current medications  (3) This referral request   (4) Any documents/labs given to you for this referral                  Follow-up notes from your care team     Return in about 6 months (around 2/15/2019).      Your next 10 appointments already scheduled     Aug 16, 2018  9:10 AM CDT   US ABDOMEN/PELVIS DUPLEX COMPLETE with [email or web address]   [facility] Ultrasound ([facility])    [address] [address] [phone]           Please bring a list of your medicines (including vitamins, minerals and over-the-counter drugs). Also, tell your doctor about any allergies you may have. Wear comfortable clothes and leave your valuables at home.  Adults: No eating or drinking for 8 hours before the exam. You may take medicine  with a small sip of water.  Children: - Infants, breast-fed: may have breast milk up to 2 hours before exam. - Infants, formula: may have bottle until 4 hours before exam. - Children 1-5 years: No food or drink for 4 hours before exam. - Children 6 -12 years: No food or drink for 6 hours before exam. - Children over 12 years: No food or drink for 8 hours before exam. - J Tube Fed: No need to stop feedings.  Please call the Imaging Department at your exam site with any questions.            Aug 16, 2018  1:20 PM CDT   (Arrive by 1:05 PM)   [facility] GENERAL with Letha Rodrigez OD   [facility] Ophthalmology ([facility])    [address] [place] [id number] [phone]            Feb 05, 2019 10:30 AM CST   Lab with  LAB   [facility] Lab ([facility])    [address] [place] [id number] [phone]            Feb 05, 2019 10:45 AM CST   US ABDOMEN COMPLETE with UCUS1   [facility] Imaging Center US ([facility])    [address] [place] [id number] [phone]           Please bring a list of your medicines (including vitamins, minerals and over-the-counter drugs). Also, tell your doctor about any allergies you may have. Wear comfortable clothes and leave your valuables at home.  Adults: No eating or drinking for 8 hours before the exam. You may take medicine with a small sip of water.  Children: - Infants, breast-fed: may have breast milk up to 2 hours before exam. - Infants, formula: may have bottle until 4 hours before exam. - Children 1-5 years: No food or drink for 4 hours before exam. - Children 6 -12 years: No food or drink for 6 hours before exam. - Children over 12 years: No food or drink for 8 hours before exam. - J Tube Fed: No need to stop feedings.  Please call the Imaging Department at your exam site with any questions.            Feb 05, 2019 11:45 AM  CST   (Arrive by 11:30 AM)   Return General Liver with Warren Higginbotham MD   [facility] Hepatology ([facility])    [address]  [address]  [facility] [address] [phone]              Future tests that were ordered for you today     Open Standing Orders        Priority Remaining Interval Expires Ordered    US abdomen complete [UJQ129] Routine 2/2 Every 6 Months 8/15/2019 8/15/2018          Open Future Orders        Priority Expected Expires Ordered    US Abdomen Complete Routine  8/15/2019 8/15/2018    Hepatic Panel [LAB20] Routine 2/11/2019 8/15/2019 8/15/2018    Basic metabolic panel [LAB15] Routine 2/11/2019 8/15/2019 8/15/2018    CBC with platelets [LQL215] Routine 2/11/2019 8/15/2019 8/15/2018    INR [NJR0635] Routine 2/11/2019 8/15/2019 8/15/2018    AFP tumor marker [PBN897] Routine 2/11/2019 8/15/2019 8/15/2018    US Abdomen/Pelvis Duplex Complete Routine  8/15/2019 8/15/2018            Who to contact     If you have questions or need follow up information about today's clinic visit or your schedule please contact [facility] HEPATOLOGY directly at [phone].  Normal or non-critical lab and imaging results will be communicated to you by MyChart, letter or phone within 4 business days after the clinic has received the results. If you do not hear from us within 7 days, please contact the clinic through MyChart or phone. If you have a critical or abnormal lab result, we will notify you by phone as soon as possible.  Submit refill requests through [employer] or call your pharmacy and they will forward the refill request to us. Please allow 3 business days for your refill to be completed.          Additional Information About Your Visit        [employer] Information     [employer] gives you secure access to your electronic health record. If you see a primary care provider, you can also send messages to your care team and make appointments. If you have questions, please call your primary  "care clinic.  If you do not have a primary care provider, please call [phone] and they will assist you.        Care EveryWhere ID     This is your Care EveryWhere ID. This could be used by other organizations to access your [place] medical records  [id number]        Your Vitals Were     Pulse Temperature Respirations Height Pulse Oximetry BMI (Body Mass Index)    68 97.4  F (36.3  C) (Oral) 18 1.803 m (5' 11\") 100% 30.43 kg/m2       Blood Pressure from Last 3 Encounters:   08/15/18 161/81   07/30/18 144/77   07/20/18 108/60    Weight from Last 3 Encounters:   08/15/18 99 kg (218 lb 3.2 oz)   07/30/18 96.2 kg (212 lb)   07/20/18 96.6 kg (212 lb 14.4 oz)              We Performed the Following     OPHTHALMOLOGY ADULT REFERRAL     Schedule follow up appointments        Primary Care Provider Office Phone # Fax #    Josie Araujo DO Tim [phone] [address] [address]        Goals        General    Financial Wellbeing (pt-stated)     Notes - Note created  7/20/2018  3:56 PM by Brianna Moore LSW    Goal Statement: I would like to see if I qualify for any community programs for housing, food, or transportation assistance  Measure of Success: Pt will learn of resources, apply for them and open to services, if eligible.  Supportive Steps to Achieve: SW to assist pt with community programs such as [facility], 's Programs, Mental Health agencies, and Food shelves, as needed.  Barriers: Pt may not see the benefit of services at times  Strengths: Pt does not want to be homeless during the winter in [place], so is motivated to secure his own housing, if he can.  Date to Achieve By: 6 months          Equal Access to Services     DARINAA FLANNERY AH: Bakari Yao, lora centeno, qacésarta kaalkhai jasso. So [facility] [phone].    ATENCIÓN: Si habla español, tiene a ruvalcaba disposición servicios gratuitos de asistencia " lingüística. Johnny al [phone].    We comply with applicable federal civil rights laws and [place] laws. We do not discriminate on the basis of race, color, national origin, age, disability, sex, sexual orientation, or gender identity.            Thank you!     Thank you for choosing [facility] HEPATOLOGY  for your care. Our goal is always to provide you with excellent care. Hearing back from our patients is one way we can continue to improve our services. Please take a few minutes to complete the written survey that you may receive in the mail after your visit with us. Thank you!             Your Updated Medication List - Protect others around you: Learn how to safely use, store and throw away your medicines at www.disposemymeds.org.          This list is accurate as of 8/15/18  4:03 PM.  Always use your most recent med list.                   Brand Name Dispense Instructions for use Diagnosis    furosemide 40 MG tablet    LASIX    60 tablet    Take 1 tablet (40 mg) by mouth 2 times daily    Alcoholic cirrhosis of liver with ascites (H)       lactulose 10 GM/15ML solution    CHRONULAC    4000 mL    Take 45 mLs by mouth 3 times daily    Hepatic encephalopathy (H)       ondansetron 4 MG tablet    ZOFRAN    18 tablet    Take 1 tablet (4 mg) by mouth every 8 hours as needed for nausea    Alcoholic cirrhosis of liver without ascites (H)       potassium chloride SA 20 MEQ CR tablet    KLOR-CON    30 tablet    Take 1 tablet (20 mEq) by mouth daily    Hypokalemia, Alcoholic cirrhosis of liver with ascites (H)       spironolactone 50 MG tablet    ALDACTONE    30 tablet    Take 1 tablet (50 mg) by mouth daily    Alcoholic cirrhosis of liver with ascites (H)

## 2018-08-15 NOTE — LETTER
"8/15/2018      RE: Scott Casale  [address] [place] [address]       [facility]    Hepatology consult note  Consult requested by Dr David Estes for follow up of hepatitis C and alcohol related liver cirrhosis.    Chief complaint: Follow up for Hepatitis C and alcohol related liver cirrhosis    HPI:  Mr. Casale is a 64 yo M w/ hx of chronic hepatitis C (s/p treatment with Ledipasvir-Sofosbuvir, now in SVR) and alcohol related liver cirrhosis c/w :  - Diuretic refractory ascites s/p TIPS 9/28/16 (post TIPS portosystemic gradient was 5mmHg)  - Hx of hepatic encephalopathy (controlled on lactulose)  - No hx of esophageal varices (last EGD 1/17/17)  - HCC screening (last U/S 4/28/17 - with patent TIPS and no liver lesions)  - Hx of testicular cancer at the age of 28 requiring a laparotomy.    He comes in today for routine follow up. He was last seen in Dr. Higginbotham's clinic in March 2017. He moved to [place] in May 2017 and had been living there and recently moved back to [place] after he lost his home and was unable to stay with his friend there. In [place], he did not establish care with a hepatologist and stopped taking his lactulose to avoid diarrhea. He did get hospitalized once as per the patient in July of last year with hepatic encephalopathy. He was seen in ER 7/19/18 since he wanted to \"get plugged back in to the system\" and then on 7/30/18 for feeling unsteady on his feet and concern for developing hepatic encephalopathy and reported that my \"ammonia level is likely high again\". He was AAO x 3 and was discharged with plans to follow up in Dr. Higginbotham's clinic as an outpatient.   He comes in today without any significant GI complains mainly to reestablish his care with Dr. Higginbotham. He denies any hematemesis, any LE swelling or any recent confusion. He is currently on lactulose BID and has 2-3 loosely formed BMs daily.   MELD-Na based on his labs today is 15 (Na 143, Cr 1.23, T bili 2.6 " and INR 1.31)    Social Hx:  His wife left him when she did not want to travel to [place] with him. He is currently homeless and is staying with his sister with plans to get a motor home and travel back to [place] versus possible getting a low income apartment here in [place].  He has been sober for 6 years and last smoked when he was 21 years old.     ROS:  - Pain in multiple joints (R knee with severe pain, has had prior menisceal repairs, R elbow joint and lower back pain)  - Also complains of decrease in visual acuity and eye pain x 4-5 years which has been progressive. Wants a referral to an ophthalmologist here.     Medical hx Surgical hx   Past Medical History:   Diagnosis Date     AC separation 8/20/2008     Ascites      H/O testicular cancer age 28     Hepatitis C      Shoulder dislocation      Splenomegaly      Ulcerative colitis (H)       Past Surgical History:   Procedure Laterality Date     C APPENDECTOMY       C KNEE SCOPE,CLEAN/DRAIN      bilateral     COLONOSCOPY N/A 1/17/2017    Procedure: COLONOSCOPY;  Surgeon: Guru Reina Palacios MD;  Location:  GI     ESOPHAGOSCOPY, GASTROSCOPY, DUODENOSCOPY (EGD), COMBINED N/A 1/17/2017    Procedure: COMBINED ESOPHAGOSCOPY, GASTROSCOPY, DUODENOSCOPY (EGD);  Surgeon: Guru Reina Palacios MD;  Location:  GI     EYE SURGERY       IR TRANSVEN INTRAHEPATIC PORTOSYST REV       ORCHIECTOMY SCROTAL      prosthesis placed     STRABISMUS SURGERY      bilateral          Medications  Prior to Admission medications    Medication Sig Start Date End Date Taking? Authorizing Provider   furosemide (LASIX) 40 MG tablet Take 1 tablet (40 mg) by mouth 2 times daily 7/20/18   Josie Dumont DO   lactulose (CHRONULAC) 10 GM/15ML solution Take 45 mLs by mouth 3 times daily 7/20/18   Josie Dumont DO   ondansetron (ZOFRAN) 4 MG tablet Take 1 tablet (4 mg) by mouth every 8 hours as needed for nausea 11/28/17   Kaushik Rivera MD  "  potassium chloride SA (KLOR-CON) 20 MEQ CR tablet Take 1 tablet (20 mEq) by mouth daily 7/20/18   Josie Dumont DO   spironolactone (ALDACTONE) 50 MG tablet Take 1 tablet (50 mg) by mouth daily 7/20/18   Josie Dumont DO       Allergies  Allergies   Allergen Reactions     Blood Transfusion Related (Informational Only) Other (See Comments)     Patient has a history of a clinically significant antibody against RBC antigens.  A delay in compatible RBCs may occur.     Percocet [Oxycodone-Acetaminophen] Nausea and Vomiting     Acetaminophen Nausea     Iodine-131 Hives     Motrin [Ibuprofen Micronized] Nausea     Tylenol Nausea       Family hx Social hx   Family History   Problem Relation Age of Onset     Cancer Mother 70     brain tumor     Diabetes Maternal Grandmother      Cerebrovascular Disease Sister      HEART DISEASE Sister      Respiratory Son      Cancer Father      Uterine Cancer Sister      Prostate Cancer No family hx of      Cancer - colorectal No family hx of       Social History   Substance Use Topics     Smoking status: Former Smoker     Quit date: 1/1/1978     Smokeless tobacco: Never Used     Alcohol use No      Comment: quit in 2010          Review of systems  A 10-point review of systems was negative.    Examination  /81 (BP Location: Left arm, Patient Position: Sitting, Cuff Size: Adult Regular)  Pulse 68  Temp 97.4  F (36.3  C) (Oral)  Resp 18  Ht 1.803 m (5' 11\")  Wt 99 kg (218 lb 3.2 oz)  SpO2 100%  BMI 30.43 kg/m2  Body mass index is 30.43 kg/(m^2).    Gen- well, NAD, A+Ox3, normal color  Eye- EOMI  ENT- MMM, normal oropharynx  Lym- no palpable lymphadenopathy  CVS- S1, S2 normal, no added sounds, RRR  RS- CTA  Abd-   Extr-hands normal, no PRIMO  Neuro- +ve asterixis  Skin-no rash or jaundice  Psych- normal mood    Laboratory:  Labs and imaging below were independently reviewed and interpreted  Component      Latest Ref Rng & Units 7/19/2018 7/30/2018 8/15/2018 "   WBC      4.0 - 11.0 10e9/L 4.2 5.5 5.2   RBC Count      4.4 - 5.9 10e12/L 3.47 (L) 3.75 (L) 3.71 (L)   Hemoglobin      13.3 - 17.7 g/dL 12.6 (L) 13.5 13.6   Hematocrit      40.0 - 53.0 % 35.8 (L) 38.0 (L) 38.4 (L)   MCV      78 - 100 fl 103 (H) 101 (H) 104 (H)   MCH      26.5 - 33.0 pg 36.3 (H) 36.0 (H) 36.7 (H)   MCHC      31.5 - 36.5 g/dL 35.2 35.5 35.4   RDW      10.0 - 15.0 % 13.3 13.2 14.3   Platelet Count      150 - 450 10e9/L 60 (L) 62 (L) 59 (L)   Diff Method       Automated Method Automated Method    % Neutrophils      % 66.8 71.8    % Lymphocytes      % 19.1 13.5    % Monocytes      % 11.6 12.0    % Eosinophils      % 2.1 1.6    % Basophils      % 0.2 0.4    % Immature Granulocytes      % 0.2 0.7    Nucleated RBCs      0 /100 0 0    Absolute Neutrophil      1.6 - 8.3 10e9/L 2.8 3.9    Absolute Lymphocytes      0.8 - 5.3 10e9/L 0.8 0.7 (L)    Absolute Monocytes      0.0 - 1.3 10e9/L 0.5 0.7    Absolute Eosinophils      0.0 - 0.7 10e9/L 0.1 0.1    Absolute Basophils      0.0 - 0.2 10e9/L 0.0 0.0    Abs Immature Granulocytes      0 - 0.4 10e9/L 0.0 0.0    Absolute Nucleated RBC       0.0 0.0    Sodium      133 - 144 mmol/L 144 144 143   Potassium      3.4 - 5.3 mmol/L 4.0 3.9 4.0   Chloride      94 - 109 mmol/L 114 (H) 113 (H) 113 (H)   Carbon Dioxide      20 - 32 mmol/L 23 24 23   Anion Gap      3 - 14 mmol/L 7 7 7   Glucose      70 - 99 mg/dL 117 (H) 89 96   Urea Nitrogen      7 - 30 mg/dL 26 27 24   Creatinine      0.66 - 1.25 mg/dL 1.03 1.02 1.23   GFR Estimate      >60 mL/min/1.7m2 73 74 59 (L)   GFR Estimate If Black      >60 mL/min/1.7m2 88 89 72   Calcium      8.5 - 10.1 mg/dL 8.2 (L) 8.4 (L) 8.1 (L)   Bilirubin Total      0.2 - 1.3 mg/dL 1.9 (H) 2.8 (H) 2.6 (H)   Albumin      3.4 - 5.0 g/dL 2.7 (L) 2.6 (L) 2.8 (L)   Protein Total      6.8 - 8.8 g/dL 6.4 (L) 6.5 (L) 6.8   Alkaline Phosphatase      40 - 150 U/L 89 60 74   ALT      0 - 70 U/L 25 35 32   AST      0 - 45 U/L 53 (H) 57 (H) 55 (H)   Color  Urine        Yellow    Appearance Urine        Clear    Glucose Urine      NEG:Negative mg/dL  Negative    Bilirubin Urine      NEG:Negative  Negative    Ketones Urine      NEG:Negative mg/dL  Negative    Specific Gravity Urine      1.003 - 1.035  1.023    Blood Urine      NEG:Negative  Large (A)    pH Urine      5.0 - 7.0 pH  6.0    Protein Albumin Urine      NEG:Negative mg/dL  Negative    Urobilinogen mg/dL      0.0 - 2.0 mg/dL  4.0 (H)    Nitrite Urine      NEG:Negative  Negative    Leukocyte Esterase Urine      NEG:Negative  Negative    Source        Midstream Urine    RBC Urine      0 - 2 /HPF  19 (H)    WBC Urine      0 - 5 /HPF  1    Mucous Urine      NEG:Negative /LPF  Present (A)    Lipase      73 - 393 U/L 366 251    INR      0.86 - 1.14 1.42 (H)  1.31 (H)   Ammonia      10 - 50 umol/L 146 (HH) 94 (H) 86 (H)   Bilirubin Direct      0.0 - 0.2 mg/dL   0.6 (H)           Radiology:  U/S Liver 4/28/17:  Liver is normal in echogenicity without focal lesions. A patent TIPS shunt is visualized. The gallbladder contains multiple gallstones, but there is no significant gallbladder wall thickening. Trace amount of pericholecystic fluid is noted which is nonspecific in the setting of ascites and chronic liver disease. Common bile duct is normal in diameter. Pancreas is normal where visualized. Examination of the right kidney is unremarkable.  IMPRESSION: Cholelithiasis without ultrasound findings to suggest cholecystitis. No bile duct dilatation. Patent TIPS shunt.    Assessment:  64 yo M w/ hx of chronic hepatitis C (s/p treatment with Ledipasvir-Sofosbuvir, now in SVR, last checked 5/11/17) and alcohol related liver cirrhosis c/w :  - Diuretic refractory ascites s/p TIPS 9/28/16 (post TIPS portosystemic gradient was 5mmHg)  - Hx of hepatic encephalopathy (controlled on lactulose)  - No hx of esophageal varices (last EGD 1/17/17)  - HCC screening (last U/S 4/28/17 - with patent TIPS and no liver lesions)  - Hx of  testicular cancer at the age of 28 requiring a laparotomy.  MELD-Na based on his labs today is 15 (Na 143, Cr 1.23, T bili 2.6 and INR 1.31)    Plan:  - U/S liver with Doppler to evaluate for any liver lesions and patency of TIPS  - Will decrease dose of diuretics to half (Furosemide once daily 40 mg and spironolactone 50mg once daily)  - Continue 6 monthly MELD-Na labs   - Continue 6 monthly U/S liver and AFP for HCC screening.  - Encouraged continued abstinence from alcohol (sober x 6 years)  - Continue lactulose for his hepatic encephalopathy with goal BM 3-4 BMs daily.  - Consult to ophthalmology as per patient's request.   - RTC in 6 months (provided patient does not travel to [place] in which case we recommended establishing care with a hepatologist there)    Plan has been discussed and confirmed with .     Jayda Hernandez MD  Hepatology  [facility]    The patient was seen and examined.  The above assessment and plan was developed jointly with the fellow.      Warren Higginbotham MD      Professor of Medicine  [facility] [employer]      Executive Medical Director, Solid Organ Transplant Program  [facility]

## 2018-08-15 NOTE — NURSING NOTE
"Chief Complaint   Patient presents with     RECHECK     Cirrhosis       /81 (BP Location: Left arm, Patient Position: Sitting, Cuff Size: Adult Regular)  Pulse 68  Temp 97.4  F (36.3  C) (Oral)  Resp 18  Ht 1.803 m (5' 11\")  Wt 99 kg (218 lb 3.2 oz)  SpO2 100%  BMI 30.43 kg/m2    Christiana Spain [facility]  8/15/2018 2:31 PM      "

## 2018-08-15 NOTE — NURSING NOTE
Pneumococcal 13 given per Dr. Higginbotham orders. Pt's first name, last name and  verified prior to administration. Injection given without complications or questions, see immunizations for details.    Christiana Spain [facility]  8/15/2018 3:56 PM

## 2018-08-15 NOTE — LETTER
"8/15/2018       RE: Scott Casale  [address] [facility] [address]     Dear Colleague,    Thank you for referring your patient, Scott Casale, to the [facility] HEPATOLOGY at [facility]. Please see a copy of my visit note below.    [facility]    Hepatology consult note  Consult requested by Dr David Estes for follow up of hepatitis C and alcohol related liver cirrhosis.    Chief complaint: Follow up for Hepatitis C and alcohol related liver cirrhosis    HPI:  Mr. Casale is a 64 yo M w/ hx of chronic hepatitis C (s/p treatment with Ledipasvir-Sofosbuvir, now in SVR) and alcohol related liver cirrhosis c/w :  - Diuretic refractory ascites s/p TIPS 9/28/16 (post TIPS portosystemic gradient was 5mmHg)  - Hx of hepatic encephalopathy (controlled on lactulose)  - No hx of esophageal varices (last EGD 1/17/17)  - HCC screening (last U/S 4/28/17 - with patent TIPS and no liver lesions)  - Hx of testicular cancer at the age of 28 requiring a laparotomy.    He comes in today for routine follow up. He was last seen in Dr. Higginbotham's clinic in March 2017. He moved to [place] in May 2017 and had been living there and recently moved back to [place] after he lost his home and was unable to stay with his friend there. In [place], he did not establish care with a hepatologist and stopped taking his lactulose to avoid diarrhea. He did get hospitalized once as per the patient in July of last year with hepatic encephalopathy. He was seen in ER 7/19/18 since he wanted to \"get plugged back in to the system\" and then on 7/30/18 for feeling unsteady on his feet and concern for developing hepatic encephalopathy and reported that my \"ammonia level is likely high again\". He was AAO x 3 and was discharged with plans to follow up in Dr. Higginbotham's clinic as an outpatient.   He comes in today without any significant GI complains mainly to reestablish his care with Dr. Higginbotham. He denies any " hematemesis, any LE swelling or any recent confusion. He is currently on lactulose BID and has 2-3 loosely formed BMs daily.   MELD-Na based on his labs today is 15 (Na 143, Cr 1.23, T bili 2.6 and INR 1.31)    Social Hx:  His wife left him when she did not want to travel to [place] with him. He is currently homeless and is staying with his sister with plans to get a motor home and travel back to [place] versus possible getting a low income apartment here in [facility].  He has been sober for 6 years and last smoked when he was 21 years old.     ROS:  - Pain in multiple joints (R knee with severe pain, has had prior menisceal repairs, R elbow joint and lower back pain)  - Also complains of decrease in visual acuity and eye pain x 4-5 years which has been progressive. Wants a referral to an ophthalmologist here.     Medical hx Surgical hx   Past Medical History:   Diagnosis Date     AC separation 8/20/2008     Ascites      H/O testicular cancer age 28     Hepatitis C      Shoulder dislocation      Splenomegaly      Ulcerative colitis (H)       Past Surgical History:   Procedure Laterality Date     C APPENDECTOMY       C KNEE SCOPE,CLEAN/DRAIN      bilateral     COLONOSCOPY N/A 1/17/2017    Procedure: COLONOSCOPY;  Surgeon: Guru Reina Palacios MD;  Location:  GI     ESOPHAGOSCOPY, GASTROSCOPY, DUODENOSCOPY (EGD), COMBINED N/A 1/17/2017    Procedure: COMBINED ESOPHAGOSCOPY, GASTROSCOPY, DUODENOSCOPY (EGD);  Surgeon: Guru Reina Palacios MD;  Location:  GI     EYE SURGERY       IR TRANSVEN INTRAHEPATIC PORTOSYST REV       ORCHIECTOMY SCROTAL      prosthesis placed     STRABISMUS SURGERY      bilateral          Medications  Prior to Admission medications    Medication Sig Start Date End Date Taking? Authorizing Provider   furosemide (LASIX) 40 MG tablet Take 1 tablet (40 mg) by mouth 2 times daily 7/20/18   Josie Dumont, DO   lactulose (CHRONULAC) 10 GM/15ML solution Take 45  "mLs by mouth 3 times daily 7/20/18   Josie Dumont DO   ondansetron (ZOFRAN) 4 MG tablet Take 1 tablet (4 mg) by mouth every 8 hours as needed for nausea 11/28/17   Kaushik Rivera MD   potassium chloride SA (KLOR-CON) 20 MEQ CR tablet Take 1 tablet (20 mEq) by mouth daily 7/20/18   Josie Dumont DO   spironolactone (ALDACTONE) 50 MG tablet Take 1 tablet (50 mg) by mouth daily 7/20/18   Josie Dumont DO       Allergies  Allergies   Allergen Reactions     Blood Transfusion Related (Informational Only) Other (See Comments)     Patient has a history of a clinically significant antibody against RBC antigens.  A delay in compatible RBCs may occur.     Percocet [Oxycodone-Acetaminophen] Nausea and Vomiting     Acetaminophen Nausea     Iodine-131 Hives     Motrin [Ibuprofen Micronized] Nausea     Tylenol Nausea       Family hx Social hx   Family History   Problem Relation Age of Onset     Cancer Mother 70     brain tumor     Diabetes Maternal Grandmother      Cerebrovascular Disease Sister      HEART DISEASE Sister      Respiratory Son      Cancer Father      Uterine Cancer Sister      Prostate Cancer No family hx of      Cancer - colorectal No family hx of       Social History   Substance Use Topics     Smoking status: Former Smoker     Quit date: 1/1/1978     Smokeless tobacco: Never Used     Alcohol use No      Comment: quit in 2010          Review of systems  A 10-point review of systems was negative.    Examination  /81 (BP Location: Left arm, Patient Position: Sitting, Cuff Size: Adult Regular)  Pulse 68  Temp 97.4  F (36.3  C) (Oral)  Resp 18  Ht 1.803 m (5' 11\")  Wt 99 kg (218 lb 3.2 oz)  SpO2 100%  BMI 30.43 kg/m2  Body mass index is 30.43 kg/(m^2).    Gen- well, NAD, A+Ox3, normal color  Eye- EOMI  ENT- MMM, normal oropharynx  Lym- no palpable lymphadenopathy  CVS- S1, S2 normal, no added sounds, RRR  RS- CTA  Abd-   Extr-hands normal, no PRIMO  Neuro- +ve asterixis  Skin-no rash " or jaundice  Psych- normal mood    Laboratory:  Labs and imaging below were independently reviewed and interpreted  Component      Latest Ref Rng & Units 7/19/2018 7/30/2018 8/15/2018   WBC      4.0 - 11.0 10e9/L 4.2 5.5 5.2   RBC Count      4.4 - 5.9 10e12/L 3.47 (L) 3.75 (L) 3.71 (L)   Hemoglobin      13.3 - 17.7 g/dL 12.6 (L) 13.5 13.6   Hematocrit      40.0 - 53.0 % 35.8 (L) 38.0 (L) 38.4 (L)   MCV      78 - 100 fl 103 (H) 101 (H) 104 (H)   MCH      26.5 - 33.0 pg 36.3 (H) 36.0 (H) 36.7 (H)   MCHC      31.5 - 36.5 g/dL 35.2 35.5 35.4   RDW      10.0 - 15.0 % 13.3 13.2 14.3   Platelet Count      150 - 450 10e9/L 60 (L) 62 (L) 59 (L)   Diff Method       Automated Method Automated Method    % Neutrophils      % 66.8 71.8    % Lymphocytes      % 19.1 13.5    % Monocytes      % 11.6 12.0    % Eosinophils      % 2.1 1.6    % Basophils      % 0.2 0.4    % Immature Granulocytes      % 0.2 0.7    Nucleated RBCs      0 /100 0 0    Absolute Neutrophil      1.6 - 8.3 10e9/L 2.8 3.9    Absolute Lymphocytes      0.8 - 5.3 10e9/L 0.8 0.7 (L)    Absolute Monocytes      0.0 - 1.3 10e9/L 0.5 0.7    Absolute Eosinophils      0.0 - 0.7 10e9/L 0.1 0.1    Absolute Basophils      0.0 - 0.2 10e9/L 0.0 0.0    Abs Immature Granulocytes      0 - 0.4 10e9/L 0.0 0.0    Absolute Nucleated RBC       0.0 0.0    Sodium      133 - 144 mmol/L 144 144 143   Potassium      3.4 - 5.3 mmol/L 4.0 3.9 4.0   Chloride      94 - 109 mmol/L 114 (H) 113 (H) 113 (H)   Carbon Dioxide      20 - 32 mmol/L 23 24 23   Anion Gap      3 - 14 mmol/L 7 7 7   Glucose      70 - 99 mg/dL 117 (H) 89 96   Urea Nitrogen      7 - 30 mg/dL 26 27 24   Creatinine      0.66 - 1.25 mg/dL 1.03 1.02 1.23   GFR Estimate      >60 mL/min/1.7m2 73 74 59 (L)   GFR Estimate If Black      >60 mL/min/1.7m2 88 89 72   Calcium      8.5 - 10.1 mg/dL 8.2 (L) 8.4 (L) 8.1 (L)   Bilirubin Total      0.2 - 1.3 mg/dL 1.9 (H) 2.8 (H) 2.6 (H)   Albumin      3.4 - 5.0 g/dL 2.7 (L) 2.6 (L) 2.8 (L)    Protein Total      6.8 - 8.8 g/dL 6.4 (L) 6.5 (L) 6.8   Alkaline Phosphatase      40 - 150 U/L 89 60 74   ALT      0 - 70 U/L 25 35 32   AST      0 - 45 U/L 53 (H) 57 (H) 55 (H)   Color Urine        Yellow    Appearance Urine        Clear    Glucose Urine      NEG:Negative mg/dL  Negative    Bilirubin Urine      NEG:Negative  Negative    Ketones Urine      NEG:Negative mg/dL  Negative    Specific Gravity Urine      1.003 - 1.035  1.023    Blood Urine      NEG:Negative  Large (A)    pH Urine      5.0 - 7.0 pH  6.0    Protein Albumin Urine      NEG:Negative mg/dL  Negative    Urobilinogen mg/dL      0.0 - 2.0 mg/dL  4.0 (H)    Nitrite Urine      NEG:Negative  Negative    Leukocyte Esterase Urine      NEG:Negative  Negative    Source        Midstream Urine    RBC Urine      0 - 2 /HPF  19 (H)    WBC Urine      0 - 5 /HPF  1    Mucous Urine      NEG:Negative /LPF  Present (A)    Lipase      73 - 393 U/L 366 251    INR      0.86 - 1.14 1.42 (H)  1.31 (H)   Ammonia      10 - 50 umol/L 146 (HH) 94 (H) 86 (H)   Bilirubin Direct      0.0 - 0.2 mg/dL   0.6 (H)           Radiology:  U/S Liver 4/28/17:  Liver is normal in echogenicity without focal lesions. A patent TIPS shunt is visualized. The gallbladder contains multiple gallstones, but there is no significant gallbladder wall thickening. Trace amount of pericholecystic fluid is noted which is nonspecific in the setting of ascites and chronic liver disease. Common bile duct is normal in diameter. Pancreas is normal where visualized. Examination of the right kidney is unremarkable.  IMPRESSION: Cholelithiasis without ultrasound findings to suggest cholecystitis. No bile duct dilatation. Patent TIPS shunt.    Assessment:  64 yo M w/ hx of chronic hepatitis C (s/p treatment with Ledipasvir-Sofosbuvir, now in SVR, last checked 5/11/17) and alcohol related liver cirrhosis c/w :  - Diuretic refractory ascites s/p TIPS 9/28/16 (post TIPS portosystemic gradient was 5mmHg)  - Hx  of hepatic encephalopathy (controlled on lactulose)  - No hx of esophageal varices (last EGD 1/17/17)  - HCC screening (last U/S 4/28/17 - with patent TIPS and no liver lesions)  - Hx of testicular cancer at the age of 28 requiring a laparotomy.  MELD-Na based on his labs today is 15 (Na 143, Cr 1.23, T bili 2.6 and INR 1.31)    Plan:  - U/S liver with Doppler to evaluate for any liver lesions and patency of TIPS  - Will decrease dose of diuretics to half (Furosemide once daily 40 mg and spironolactone 50mg once daily)  - Continue 6 monthly MELD-Na labs   - Continue 6 monthly U/S liver and AFP for HCC screening.  - Encouraged continued abstinence from alcohol (sober x 6 years)  - Continue lactulose for his hepatic encephalopathy with goal BM 3-4 BMs daily.  - Consult to ophthalmology as per patient's request.   - RTC in 6 months (provided patient does not travel to [place] in which case we recommended establishing care with a hepatologist there)    Plan has been discussed and confirmed with .     Jayda Hernandez MD  Hepatology  [facility]    The patient was seen and examined.  The above assessment and plan was developed jointly with the fellow.      Warren Higginbotham MD      Professor of Medicine  [employer]      Executive Medical Director, Solid Organ Transplant Program  [facility]

## 2018-08-15 NOTE — PROGRESS NOTES
"[facility]    Hepatology consult note  Consult requested by Dr David Estes for follow up of hepatitis C and alcohol related liver cirrhosis.    Chief complaint: Follow up for Hepatitis C and alcohol related liver cirrhosis    HPI:  Mr. Casale is a 64 yo M w/ hx of chronic hepatitis C (s/p treatment with Ledipasvir-Sofosbuvir, now in SVR) and alcohol related liver cirrhosis c/w :  - Diuretic refractory ascites s/p TIPS 9/28/16 (post TIPS portosystemic gradient was 5mmHg)  - Hx of hepatic encephalopathy (controlled on lactulose)  - No hx of esophageal varices (last EGD 1/17/17)  - HCC screening (last U/S 4/28/17 - with patent TIPS and no liver lesions)  - Hx of testicular cancer at the age of 28 requiring a laparotomy.    He comes in today for routine follow up. He was last seen in Dr. Higginbotham's clinic in March 2017. He moved to [place] in May 2017 and had been living there and recently moved back to [place] after he lost his home and was unable to stay with his friend there. In [place], he did not establish care with a hepatologist and stopped taking his lactulose to avoid diarrhea. He did get hospitalized once as per the patient in July of last year with hepatic encephalopathy. He was seen in ER 7/19/18 since he wanted to \"get plugged back in to the system\" and then on 7/30/18 for feeling unsteady on his feet and concern for developing hepatic encephalopathy and reported that my \"ammonia level is likely high again\". He was AAO x 3 and was discharged with plans to follow up in Dr. Higginbotham's clinic as an outpatient.   He comes in today without any significant GI complains mainly to reestablish his care with Dr. Higginbotham. He denies any hematemesis, any LE swelling or any recent confusion. He is currently on lactulose BID and has 2-3 loosely formed BMs daily.   MELD-Na based on his labs today is 15 (Na 143, Cr 1.23, T bili 2.6 and INR 1.31)    Social Hx:  His wife left him when she did not want to " travel to [place] with him. He is currently homeless and is staying with his sister with plans to get a motor home and travel back to [place] versus possible getting a low income apartment here in [facility].  He has been sober for 6 years and last smoked when he was 21 years old.     ROS:  - Pain in multiple joints (R knee with severe pain, has had prior menisceal repairs, R elbow joint and lower back pain)  - Also complains of decrease in visual acuity and eye pain x 4-5 years which has been progressive. Wants a referral to an ophthalmologist here.     Medical hx Surgical hx   Past Medical History:   Diagnosis Date     AC separation 8/20/2008     Ascites      H/O testicular cancer age 28     Hepatitis C      Shoulder dislocation      Splenomegaly      Ulcerative colitis (H)       Past Surgical History:   Procedure Laterality Date     C APPENDECTOMY       C KNEE SCOPE,CLEAN/DRAIN      bilateral     COLONOSCOPY N/A 1/17/2017    Procedure: COLONOSCOPY;  Surgeon: Guru Reina Palacios MD;  Location:  GI     ESOPHAGOSCOPY, GASTROSCOPY, DUODENOSCOPY (EGD), COMBINED N/A 1/17/2017    Procedure: COMBINED ESOPHAGOSCOPY, GASTROSCOPY, DUODENOSCOPY (EGD);  Surgeon: Guru Reina Palacios MD;  Location:  GI     EYE SURGERY       IR TRANSVEN INTRAHEPATIC PORTOSYST REV       ORCHIECTOMY SCROTAL      prosthesis placed     STRABISMUS SURGERY      bilateral          Medications  Prior to Admission medications    Medication Sig Start Date End Date Taking? Authorizing Provider   furosemide (LASIX) 40 MG tablet Take 1 tablet (40 mg) by mouth 2 times daily 7/20/18   Josie Dumont,    lactulose (CHRONULAC) 10 GM/15ML solution Take 45 mLs by mouth 3 times daily 7/20/18   Josie Dumont,    ondansetron (ZOFRAN) 4 MG tablet Take 1 tablet (4 mg) by mouth every 8 hours as needed for nausea 11/28/17   Kaushik Rivera MD   potassium chloride SA (KLOR-CON) 20 MEQ CR tablet Take 1 tablet (20 mEq)  "by mouth daily 7/20/18   Josie Dumont DO   spironolactone (ALDACTONE) 50 MG tablet Take 1 tablet (50 mg) by mouth daily 7/20/18   Josie Dumont DO       Allergies  Allergies   Allergen Reactions     Blood Transfusion Related (Informational Only) Other (See Comments)     Patient has a history of a clinically significant antibody against RBC antigens.  A delay in compatible RBCs may occur.     Percocet [Oxycodone-Acetaminophen] Nausea and Vomiting     Acetaminophen Nausea     Iodine-131 Hives     Motrin [Ibuprofen Micronized] Nausea     Tylenol Nausea       Family hx Social hx   Family History   Problem Relation Age of Onset     Cancer Mother 70     brain tumor     Diabetes Maternal Grandmother      Cerebrovascular Disease Sister      HEART DISEASE Sister      Respiratory Son      Cancer Father      Uterine Cancer Sister      Prostate Cancer No family hx of      Cancer - colorectal No family hx of       Social History   Substance Use Topics     Smoking status: Former Smoker     Quit date: 1/1/1978     Smokeless tobacco: Never Used     Alcohol use No      Comment: quit in 2010          Review of systems  A 10-point review of systems was negative.    Examination  /81 (BP Location: Left arm, Patient Position: Sitting, Cuff Size: Adult Regular)  Pulse 68  Temp 97.4  F (36.3  C) (Oral)  Resp 18  Ht 1.803 m (5' 11\")  Wt 99 kg (218 lb 3.2 oz)  SpO2 100%  BMI 30.43 kg/m2  Body mass index is 30.43 kg/(m^2).    Gen- well, NAD, A+Ox3, normal color  Eye- EOMI  ENT- MMM, normal oropharynx  Lym- no palpable lymphadenopathy  CVS- S1, S2 normal, no added sounds, RRR  RS- CTA  Abd-   Extr-hands normal, no PRIMO  Neuro- +ve asterixis  Skin-no rash or jaundice  Psych- normal mood    Laboratory:  Labs and imaging below were independently reviewed and interpreted  Component      Latest Ref Rng & Units 7/19/2018 7/30/2018 8/15/2018   WBC      4.0 - 11.0 10e9/L 4.2 5.5 5.2   RBC Count      4.4 - 5.9 10e12/L 3.47 " (L) 3.75 (L) 3.71 (L)   Hemoglobin      13.3 - 17.7 g/dL 12.6 (L) 13.5 13.6   Hematocrit      40.0 - 53.0 % 35.8 (L) 38.0 (L) 38.4 (L)   MCV      78 - 100 fl 103 (H) 101 (H) 104 (H)   MCH      26.5 - 33.0 pg 36.3 (H) 36.0 (H) 36.7 (H)   MCHC      31.5 - 36.5 g/dL 35.2 35.5 35.4   RDW      10.0 - 15.0 % 13.3 13.2 14.3   Platelet Count      150 - 450 10e9/L 60 (L) 62 (L) 59 (L)   Diff Method       Automated Method Automated Method    % Neutrophils      % 66.8 71.8    % Lymphocytes      % 19.1 13.5    % Monocytes      % 11.6 12.0    % Eosinophils      % 2.1 1.6    % Basophils      % 0.2 0.4    % Immature Granulocytes      % 0.2 0.7    Nucleated RBCs      0 /100 0 0    Absolute Neutrophil      1.6 - 8.3 10e9/L 2.8 3.9    Absolute Lymphocytes      0.8 - 5.3 10e9/L 0.8 0.7 (L)    Absolute Monocytes      0.0 - 1.3 10e9/L 0.5 0.7    Absolute Eosinophils      0.0 - 0.7 10e9/L 0.1 0.1    Absolute Basophils      0.0 - 0.2 10e9/L 0.0 0.0    Abs Immature Granulocytes      0 - 0.4 10e9/L 0.0 0.0    Absolute Nucleated RBC       0.0 0.0    Sodium      133 - 144 mmol/L 144 144 143   Potassium      3.4 - 5.3 mmol/L 4.0 3.9 4.0   Chloride      94 - 109 mmol/L 114 (H) 113 (H) 113 (H)   Carbon Dioxide      20 - 32 mmol/L 23 24 23   Anion Gap      3 - 14 mmol/L 7 7 7   Glucose      70 - 99 mg/dL 117 (H) 89 96   Urea Nitrogen      7 - 30 mg/dL 26 27 24   Creatinine      0.66 - 1.25 mg/dL 1.03 1.02 1.23   GFR Estimate      >60 mL/min/1.7m2 73 74 59 (L)   GFR Estimate If Black      >60 mL/min/1.7m2 88 89 72   Calcium      8.5 - 10.1 mg/dL 8.2 (L) 8.4 (L) 8.1 (L)   Bilirubin Total      0.2 - 1.3 mg/dL 1.9 (H) 2.8 (H) 2.6 (H)   Albumin      3.4 - 5.0 g/dL 2.7 (L) 2.6 (L) 2.8 (L)   Protein Total      6.8 - 8.8 g/dL 6.4 (L) 6.5 (L) 6.8   Alkaline Phosphatase      40 - 150 U/L 89 60 74   ALT      0 - 70 U/L 25 35 32   AST      0 - 45 U/L 53 (H) 57 (H) 55 (H)   Color Urine        Yellow    Appearance Urine        Clear    Glucose Urine       NEG:Negative mg/dL  Negative    Bilirubin Urine      NEG:Negative  Negative    Ketones Urine      NEG:Negative mg/dL  Negative    Specific Gravity Urine      1.003 - 1.035  1.023    Blood Urine      NEG:Negative  Large (A)    pH Urine      5.0 - 7.0 pH  6.0    Protein Albumin Urine      NEG:Negative mg/dL  Negative    Urobilinogen mg/dL      0.0 - 2.0 mg/dL  4.0 (H)    Nitrite Urine      NEG:Negative  Negative    Leukocyte Esterase Urine      NEG:Negative  Negative    Source        Midstream Urine    RBC Urine      0 - 2 /HPF  19 (H)    WBC Urine      0 - 5 /HPF  1    Mucous Urine      NEG:Negative /LPF  Present (A)    Lipase      73 - 393 U/L 366 251    INR      0.86 - 1.14 1.42 (H)  1.31 (H)   Ammonia      10 - 50 umol/L 146 (HH) 94 (H) 86 (H)   Bilirubin Direct      0.0 - 0.2 mg/dL   0.6 (H)           Radiology:  U/S Liver 4/28/17:  Liver is normal in echogenicity without focal lesions. A patent TIPS shunt is visualized. The gallbladder contains multiple gallstones, but there is no significant gallbladder wall thickening. Trace amount of pericholecystic fluid is noted which is nonspecific in the setting of ascites and chronic liver disease. Common bile duct is normal in diameter. Pancreas is normal where visualized. Examination of the right kidney is unremarkable.  IMPRESSION: Cholelithiasis without ultrasound findings to suggest cholecystitis. No bile duct dilatation. Patent TIPS shunt.    Assessment:  62 yo M w/ hx of chronic hepatitis C (s/p treatment with Ledipasvir-Sofosbuvir, now in SVR, last checked 5/11/17) and alcohol related liver cirrhosis c/w :  - Diuretic refractory ascites s/p TIPS 9/28/16 (post TIPS portosystemic gradient was 5mmHg)  - Hx of hepatic encephalopathy (controlled on lactulose)  - No hx of esophageal varices (last EGD 1/17/17)  - HCC screening (last U/S 4/28/17 - with patent TIPS and no liver lesions)  - Hx of testicular cancer at the age of 28 requiring a laparotomy.  MELD-Na based on  his labs today is 15 (Na 143, Cr 1.23, T bili 2.6 and INR 1.31)    Plan:  - U/S liver with Doppler to evaluate for any liver lesions and patency of TIPS  - Will decrease dose of diuretics to half (Furosemide once daily 40 mg and spironolactone 50mg once daily)  - Continue 6 monthly MELD-Na labs   - Continue 6 monthly U/S liver and AFP for HCC screening.  - Encouraged continued abstinence from alcohol (sober x 6 years)  - Continue lactulose for his hepatic encephalopathy with goal BM 3-4 BMs daily.  - Consult to ophthalmology as per patient's request.   - RTC in 6 months (provided patient does not travel to [place] in which case we recommended establishing care with a hepatologist there)    Plan has been discussed and confirmed with .     Jayda Hernandez MD  Hepatology  [facility]    The patient was seen and examined.  The above assessment and plan was developed jointly with the fellow.      Warren Higginbotham MD      Professor of Medicine  [employer] [facility] [employer]      Executive Medical Director, Solid Organ Transplant Program  [facility]

## 2018-08-16 ENCOUNTER — OFFICE VISIT (OUTPATIENT)
Dept: OPHTHALMOLOGY | Facility: CLINIC | Age: 63
End: 2018-08-16
Payer: MEDICARE

## 2018-08-16 ENCOUNTER — HOSPITAL ENCOUNTER (OUTPATIENT)
Dept: ULTRASOUND IMAGING | Facility: CLINIC | Age: 63
Discharge: HOME | End: 2018-08-16
Attending: INTERNAL MEDICINE | Admitting: INTERNAL MEDICINE
Payer: MEDICARE

## 2018-08-16 DIAGNOSIS — H04.129 DRY EYE: ICD-10-CM

## 2018-08-16 DIAGNOSIS — H52.4 HYPEROPIA WITH PRESBYOPIA OF BOTH EYES: ICD-10-CM

## 2018-08-16 DIAGNOSIS — H52.03 HYPEROPIA WITH PRESBYOPIA OF BOTH EYES: ICD-10-CM

## 2018-08-16 DIAGNOSIS — H11.043 STATIONARY PERIPHERAL PTERYGIUM OF BOTH EYES: Primary | ICD-10-CM

## 2018-08-16 DIAGNOSIS — K70.30 ALCOHOLIC CIRRHOSIS OF LIVER WITHOUT ASCITES (H): ICD-10-CM

## 2018-08-16 PROCEDURE — 93976 VASCULAR STUDY: CPT

## 2018-08-16 ASSESSMENT — REFRACTION_MANIFEST
OD_ADD: +2.50
OD_CYLINDER: SPHERE
OS_SPHERE: +0.75
OS_CYLINDER: +0.25
OS_AXIS: 180
OS_ADD: +2.50
OD_SPHERE: +1.25

## 2018-08-16 ASSESSMENT — SLIT LAMP EXAM - LIDS
COMMENTS: 1+ BLEPH
COMMENTS: 1+ BLEPH

## 2018-08-16 ASSESSMENT — CONF VISUAL FIELD
OS_NORMAL: 1
OD_NORMAL: 1
METHOD: COUNTING FINGERS

## 2018-08-16 ASSESSMENT — TONOMETRY
IOP_METHOD: ICARE
OD_IOP_MMHG: 12
OS_IOP_MMHG: 11

## 2018-08-16 ASSESSMENT — CUP TO DISC RATIO
OD_RATIO: 0.25
OS_RATIO: 0.25

## 2018-08-16 ASSESSMENT — VISUAL ACUITY
METHOD: SNELLEN - LINEAR
OS_SC+: +
OD_SC: 20/25
OS_SC: 20/25

## 2018-08-16 NOTE — NURSING NOTE
Chief Complaints and History of Present Illnesses   Patient presents with     Annual Eye Exam     HPI    Affected eye(s):  Both   Symptoms:     No blurred vision      Frequency:  Constant       Do you have eye pain now?:  No      Comments:  When going in from bright light cannot see well for a while. Does not happen when wearing sunglasses. Eyes ache ongoing for years. Wears OTC readers. No eye drops.    Adriana Prasad COT 1:04 PM August 16, 2018

## 2018-08-16 NOTE — MR AVS SNAPSHOT
After Visit Summary   8/16/2018    Scott Casale    MRN: [id number]           Patient Information     Date Of Birth          1955        Visit Information        Provider Department      8/16/2018 1:20 PM Letha Rodrigez OD [facility]        Today's Diagnoses     Stationary peripheral pterygium of both eyes    -  1    Dry eye        Hyperopia with presbyopia of both eyes           Follow-ups after your visit        Your next 10 appointments already scheduled     Feb 05, 2019 10:30 AM CST   Lab with  LAB   [facility] ([facility])    [address] [place] [address] [phone]            Feb 05, 2019 10:45 AM CST   US ABDOMEN COMPLETE with [address] [place] [address] Imaging Center US ([facility])    [address] [place] [address] [phone]           Please bring a list of your medicines (including vitamins, minerals and over-the-counter drugs). Also, tell your doctor about any allergies you may have. Wear comfortable clothes and leave your valuables at home.  Adults: No eating or drinking for 8 hours before the exam. You may take medicine with a small sip of water.  Children: - Infants, breast-fed: may have breast milk up to 2 hours before exam. - Infants, formula: may have bottle until 4 hours before exam. - Children 1-5 years: No food or drink for 4 hours before exam. - Children 6 -12 years: No food or drink for 6 hours before exam. - Children over 12 years: No food or drink for 8 hours before exam. - J Tube Fed: No need to stop feedings.  Please call the Imaging Department at your exam site with any questions.            Feb 05, 2019 11:45 AM CST   (Arrive by 11:30 AM)   Return General Liver with Warren Higginbotham MD   [facility] Hepatology ([facility])    [address] [place] [address]  [address]  [facility] [address] [phone]              Future tests  that were ordered for you today     Open Standing Orders        Priority Remaining Interval Expires Ordered    US abdomen complete [ZWZ539] Routine 2/2 Every 6 Months 8/15/2019 8/15/2018          Open Future Orders        Priority Expected Expires Ordered    US Abdomen Complete Routine  8/15/2019 8/15/2018    Hepatic Panel [LAB20] Routine 2/11/2019 8/15/2019 8/15/2018    Basic metabolic panel [LAB15] Routine 2/11/2019 8/15/2019 8/15/2018    CBC with platelets [RDV227] Routine 2/11/2019 8/15/2019 8/15/2018    INR [SOB1460] Routine 2/11/2019 8/15/2019 8/15/2018    AFP tumor marker [ZCB896] Routine 2/11/2019 8/15/2019 8/15/2018    US Abdomen Pelvis Duplex Limited Routine  8/15/2019 8/15/2018            Who to contact     Please call your clinic at [phone] to:    Ask questions about your health    Make or cancel appointments    Discuss your medicines    Learn about your test results    Speak to your doctor            Additional Information About Your Visit        GeeYuu Information     G[employer]uu gives you secure access to your electronic health record. If you see a primary care provider, you can also send messages to your care team and make appointments. If you have questions, please call your primary care clinic.  If you do not have a primary care provider, please call [phone] and they will assist you.      GeeYuu is an electronic gateway that provides easy, online access to your medical records. With GeeYuu, you can request a clinic appointment, read your test results, renew a prescription or communicate with your care team.     To access your existing account, please contact your [facility] or call [phone] for assistance.        Care EveryWhere ID     This is your Care EveryWhere ID. This could be used by other organizations to access your [place] medical records  [id number]         Blood Pressure from Last 3 Encounters:   08/15/18 161/81   07/30/18 144/77   07/20/18  108/60    Weight from Last 3 Encounters:   08/15/18 99 kg (218 lb 3.2 oz)   07/30/18 96.2 kg (212 lb)   07/20/18 96.6 kg (212 lb 14.4 oz)              Today, you had the following     No orders found for display       Primary Care Provider Office Phone # Fax #    Josie Dumont [phone] [address] [facility] [address]        Goals        General    Financial Wellbeing (pt-stated)     Notes - Note created  7/20/2018  3:56 PM by Brianna Moore LSW    Goal Statement: I would like to see if I qualify for any community programs for housing, food, or transportation assistance  Measure of Success: Pt will learn of resources, apply for them and open to services, if eligible.  Supportive Steps to Achieve: SW to assist pt with community programs such as [facility], 's Programs, Mental Health agencies, and Food shelves, as needed.  Barriers: Pt may not see the benefit of services at times  Strengths: Pt does not want to be homeless during the winter in [place], so is motivated to secure his own housing, if he can.  Date to Achieve By: 6 months          Equal Access to Services     DARIANA FLANNERY AH: Bakrai Yao, lora centeno, danial ramsey, khai yun. So [facility] [phone].    ATENCIÓN: Si habla español, tiene a ruvalcaba disposición servicios gratuitos de asistencia lingüística. Llame al [phone].    We comply with applicable federal civil rights laws and [place] laws. We do not discriminate on the basis of race, color, national origin, age, disability, sex, sexual orientation, or gender identity.            Thank you!     Thank you for choosing [facility]  for your care. Our goal is always to provide you with excellent care. Hearing back from our patients is one way we can continue to improve our services. Please take a few minutes to complete the written survey that you may receive in the mail after your  visit with us. Thank you!             Your Updated Medication List - Protect others around you: Learn how to safely use, store and throw away your medicines at www.disposemymeds.org.          This list is accurate as of 8/16/18  2:31 PM.  Always use your most recent med list.                   Brand Name Dispense Instructions for use Diagnosis    furosemide 40 MG tablet    LASIX    60 tablet    Take 1 tablet (40 mg) by mouth 2 times daily    Alcoholic cirrhosis of liver with ascites (H)       lactulose 10 GM/15ML solution    CHRONULAC    4000 mL    Take 45 mLs by mouth 3 times daily    Hepatic encephalopathy (H)       ondansetron 4 MG tablet    ZOFRAN    18 tablet    Take 1 tablet (4 mg) by mouth every 8 hours as needed for nausea    Alcoholic cirrhosis of liver without ascites (H)       potassium chloride SA 20 MEQ CR tablet    KLOR-CON    30 tablet    Take 1 tablet (20 mEq) by mouth daily    Hypokalemia, Alcoholic cirrhosis of liver with ascites (H)       spironolactone 50 MG tablet    ALDACTONE    30 tablet    Take 1 tablet (50 mg) by mouth daily    Alcoholic cirrhosis of liver with ascites (H)

## 2018-08-16 NOTE — PROGRESS NOTES
A/P  1.) Pterygia each eye  -Contributing to ocular surface dryness/irritation  -Begin AT 4-5x/day each eye, warm compresses  -Rec sun protection outside  -Monitor    2.) Hyperopia/Presbyopia each eye  -Currently wearing OTC readers only  -Some eye fatigue, may consider bifocals    Monitor 1 year routine, sooner prn    I have confirmed the patient's CC, HPI and reviewed Past Medical History, Past Surgical History, Social History, Family History, Problem List, Medication List and agree with Tech note.     Letha Rodrigez, OD FAAO

## 2018-08-29 ENCOUNTER — HOSPITAL ENCOUNTER (EMERGENCY)
Facility: CLINIC | Age: 63
Discharge: HOME | End: 2018-08-29
Attending: EMERGENCY MEDICINE | Admitting: EMERGENCY MEDICINE
Payer: MEDICARE

## 2018-08-29 VITALS
SYSTOLIC BLOOD PRESSURE: 123 MMHG | DIASTOLIC BLOOD PRESSURE: 68 MMHG | BODY MASS INDEX: 30.1 KG/M2 | HEIGHT: 71 IN | TEMPERATURE: 97.9 F | RESPIRATION RATE: 16 BRPM | OXYGEN SATURATION: 99 % | WEIGHT: 215 LBS

## 2018-08-29 DIAGNOSIS — K62.5 RECTAL BLEEDING: ICD-10-CM

## 2018-08-29 LAB
ABO + RH BLD: ABNORMAL
ABO + RH BLD: ABNORMAL
ALBUMIN SERPL-MCNC: 2.7 G/DL (ref 3.4–5)
ALBUMIN UR-MCNC: NEGATIVE MG/DL
ALP SERPL-CCNC: 67 U/L (ref 40–150)
ALT SERPL W P-5'-P-CCNC: 28 U/L (ref 0–70)
ANION GAP SERPL CALCULATED.3IONS-SCNC: 8 MMOL/L (ref 3–14)
APPEARANCE UR: CLEAR
AST SERPL W P-5'-P-CCNC: 48 U/L (ref 0–45)
BASOPHILS # BLD AUTO: 0 10E9/L (ref 0–0.2)
BASOPHILS NFR BLD AUTO: 0.4 %
BILIRUB SERPL-MCNC: 3 MG/DL (ref 0.2–1.3)
BILIRUB UR QL STRIP: NEGATIVE
BLD GP AB SCN SERPL QL: ABNORMAL
BLOOD BANK CMNT PATIENT-IMP: ABNORMAL
BLOOD BANK CMNT PATIENT-IMP: ABNORMAL
BUN SERPL-MCNC: 23 MG/DL (ref 7–30)
CALCIUM SERPL-MCNC: 8.6 MG/DL (ref 8.5–10.1)
CHLORIDE SERPL-SCNC: 113 MMOL/L (ref 94–109)
CO2 SERPL-SCNC: 23 MMOL/L (ref 20–32)
COLOR UR AUTO: ABNORMAL
CREAT SERPL-MCNC: 1.1 MG/DL (ref 0.66–1.25)
DIFFERENTIAL METHOD BLD: ABNORMAL
EOSINOPHIL # BLD AUTO: 0.1 10E9/L (ref 0–0.7)
EOSINOPHIL NFR BLD AUTO: 1.5 %
ERYTHROCYTE [DISTWIDTH] IN BLOOD BY AUTOMATED COUNT: 13.7 % (ref 10–15)
GFR SERPL CREATININE-BSD FRML MDRD: 68 ML/MIN/1.7M2
GLUCOSE SERPL-MCNC: 103 MG/DL (ref 70–99)
GLUCOSE UR STRIP-MCNC: NEGATIVE MG/DL
HCT VFR BLD AUTO: 34.7 % (ref 40–53)
HGB BLD-MCNC: 12.5 G/DL (ref 13.3–17.7)
HGB UR QL STRIP: ABNORMAL
IMM GRANULOCYTES # BLD: 0 10E9/L (ref 0–0.4)
IMM GRANULOCYTES NFR BLD: 0.2 %
INR PPP: 1.36 (ref 0.86–1.14)
KETONES UR STRIP-MCNC: NEGATIVE MG/DL
LEUKOCYTE ESTERASE UR QL STRIP: NEGATIVE
LYMPHOCYTES # BLD AUTO: 0.9 10E9/L (ref 0.8–5.3)
LYMPHOCYTES NFR BLD AUTO: 17.4 %
MCH RBC QN AUTO: 36.9 PG (ref 26.5–33)
MCHC RBC AUTO-ENTMCNC: 36 G/DL (ref 31.5–36.5)
MCV RBC AUTO: 102 FL (ref 78–100)
MONOCYTES # BLD AUTO: 0.5 10E9/L (ref 0–1.3)
MONOCYTES NFR BLD AUTO: 10 %
NEUTROPHILS # BLD AUTO: 3.7 10E9/L (ref 1.6–8.3)
NEUTROPHILS NFR BLD AUTO: 70.5 %
NITRATE UR QL: NEGATIVE
NRBC # BLD AUTO: 0 10*3/UL
NRBC BLD AUTO-RTO: 0 /100
PH UR STRIP: 5 PH (ref 5–7)
PLATELET # BLD AUTO: 59 10E9/L (ref 150–450)
POTASSIUM SERPL-SCNC: 3.7 MMOL/L (ref 3.4–5.3)
PROT SERPL-MCNC: 6.5 G/DL (ref 6.8–8.8)
RBC # BLD AUTO: 3.39 10E12/L (ref 4.4–5.9)
RBC #/AREA URNS AUTO: 1 /HPF (ref 0–2)
SODIUM SERPL-SCNC: 144 MMOL/L (ref 133–144)
SOURCE: ABNORMAL
SP GR UR STRIP: 1 (ref 1–1.03)
SPECIMEN EXP DATE BLD: ABNORMAL
UROBILINOGEN UR STRIP-MCNC: 0 MG/DL (ref 0–2)
WBC # BLD AUTO: 5.2 10E9/L (ref 4–11)
WBC #/AREA URNS AUTO: <1 /HPF (ref 0–5)

## 2018-08-29 PROCEDURE — 85025 COMPLETE CBC W/AUTO DIFF WBC: CPT | Performed by: EMERGENCY MEDICINE

## 2018-08-29 PROCEDURE — 81001 URINALYSIS AUTO W/SCOPE: CPT | Performed by: EMERGENCY MEDICINE

## 2018-08-29 PROCEDURE — 86850 RBC ANTIBODY SCREEN: CPT | Performed by: EMERGENCY MEDICINE

## 2018-08-29 PROCEDURE — 86900 BLOOD TYPING SEROLOGIC ABO: CPT | Performed by: EMERGENCY MEDICINE

## 2018-08-29 PROCEDURE — 86901 BLOOD TYPING SEROLOGIC RH(D): CPT | Performed by: EMERGENCY MEDICINE

## 2018-08-29 PROCEDURE — 99284 EMERGENCY DEPT VISIT MOD MDM: CPT | Mod: 25

## 2018-08-29 PROCEDURE — 99284 EMERGENCY DEPT VISIT MOD MDM: CPT | Mod: Z6 | Performed by: EMERGENCY MEDICINE

## 2018-08-29 PROCEDURE — 85610 PROTHROMBIN TIME: CPT | Performed by: EMERGENCY MEDICINE

## 2018-08-29 PROCEDURE — 80053 COMPREHEN METABOLIC PANEL: CPT | Performed by: EMERGENCY MEDICINE

## 2018-08-29 NOTE — ED AVS SNAPSHOT
[facility] Emergency Department    [address] [facility] [address]    Phone:  [phone]    Fax:  [phone]                                       Scott Casale   MRN: [id number]    Department:  [facility] Emergency Department   Date of Visit:  8/29/2018           After Visit Summary Signature Page     I have received my discharge instructions, and my questions have been answered. I have discussed any challenges I see with this plan with the nurse or doctor.    ..........................................................................................................................................  Patient/Patient Representative Signature      ..........................................................................................................................................  Patient Representative Print Name and Relationship to Patient    ..................................................               ................................................  Date                                            Time    ..........................................................................................................................................  Reviewed by Signature/Title    ...................................................              ..............................................  Date                                                            Time          22EPIC Rev 08/18

## 2018-08-29 NOTE — ED AVS SNAPSHOT
[facility] Emergency Department    [address] [facility] [address]    Phone:  [phone]    Fax:  [phone]                                       Scott Casale   MRN: [id number]    Department:  [facility] Emergency Department   Date of Visit:  8/29/2018           Patient Information     Date Of Birth          1955        Your diagnoses for this visit were:     Rectal bleeding        You were seen by Praveen Hernandez MD.        Discharge Instructions       Your bleeding is likely related to an internal hemorrhoid.  Please return to the emergency department if you have worsening symptoms, blood in your vomit, lightheadedness, or other concerns.  Otherwise follow-up in clinic.    Your next 10 appointments already scheduled     Feb 05, 2019 10:30 AM CST   Lab with  LAB   [facility])    [address] [place] [id number] [phone]            Feb 05, 2019 10:45 AM CST   US ABDOMEN COMPLETE with UCUS1   [facility] US ([facility])    [address] [place] [id number] [phone]           Please bring a list of your medicines (including vitamins, minerals and over-the-counter drugs). Also, tell your doctor about any allergies you may have. Wear comfortable clothes and leave your valuables at home.  Adults: No eating or drinking for 8 hours before the exam. You may take medicine with a small sip of water.  Children: - Infants, breast-fed: may have breast milk up to 2 hours before exam. - Infants, formula: may have bottle until 4 hours before exam. - Children 1-5 years: No food or drink for 4 hours before exam. - Children 6 -12 years: No food or drink for 6 hours before exam. - Children over 12 years: No food or drink for 8 hours before exam. - J Tube Fed: No need to stop feedings.  Please call the Imaging Department at your exam site with any questions.             Feb 05, 2019 11:45 AM CST   (Arrive by 11:30 AM)   Return General Liver with Warren Higginbotham MD   [facility] Hepatology ([facility])    [address] [facility]  [address]  [facility] [address] [phone]              24 Hour Appointment Hotline       To make an appointment at any [facility], call [phone] ([phone]). If you don't have a family doctor or clinic, we will help you find one. [facility] are conveniently located to serve the needs of you and your family.             Review of your medicines      Our records show that you are taking the medicines listed below. If these are incorrect, please call your family doctor or clinic.        Dose / Directions Last dose taken    furosemide 40 MG tablet   Commonly known as:  LASIX   Dose:  40 mg   Quantity:  60 tablet        Take 1 tablet (40 mg) by mouth 2 times daily   Refills:  11        lactulose 10 GM/15ML solution   Commonly known as:  CHRONULAC   Dose:  45 mL   Quantity:  4000 mL        Take 45 mLs by mouth 3 times daily   Refills:  11        potassium chloride SA 20 MEQ CR tablet   Commonly known as:  KLOR-CON   Dose:  20 mEq   Quantity:  30 tablet        Take 1 tablet (20 mEq) by mouth daily   Refills:  11        spironolactone 50 MG tablet   Commonly known as:  ALDACTONE   Dose:  50 mg   Quantity:  30 tablet        Take 1 tablet (50 mg) by mouth daily   Refills:  11                Procedures and tests performed during your visit     ABO/Rh type and screen    CBC with platelets differential    Comprehensive metabolic panel    INR    UA reflex to Microscopic      Orders Needing Specimen Collection     None      Pending Results     Date and Time Order Name Status Description    8/29/2018 1226 ABO/Rh type and screen In process             Pending Culture Results     No orders found from 8/27/2018 to 8/30/2018.            Pending Results Instructions     If you had any lab results that were not finalized at  the time of your Discharge, you can call the ED Lab Result RN at [phone]. You will be contacted by this team for any positive Lab results or changes in treatment. The nurses are available 7 days a week from 10A to 6:30P.  You can leave a message 24 hours per day and they will return your call.        Test Results From Your Hospital Stay        8/29/2018 12:17 PM      Component Results     Component Value Ref Range & Units Status    Color Urine Straw  Final    Appearance Urine Clear  Final    Glucose Urine Negative NEG^Negative mg/dL Final    Bilirubin Urine Negative NEG^Negative Final    Ketones Urine Negative NEG^Negative mg/dL Final    Specific Gravity Urine 1.005 1.003 - 1.035 Final    Blood Urine Moderate (A) NEG^Negative Final    pH Urine 5.0 5.0 - 7.0 pH Final    Protein Albumin Urine Negative NEG^Negative mg/dL Final    Urobilinogen mg/dL 0.0 0.0 - 2.0 mg/dL Final    Nitrite Urine Negative NEG^Negative Final    Leukocyte Esterase Urine Negative NEG^Negative Final    Source Midstream Urine  Final    RBC Urine 1 0 - 2 /HPF Final    WBC Urine <1 0 - 5 /HPF Final         8/29/2018 12:59 PM      Component Results     Component Value Ref Range & Units Status    WBC 5.2 4.0 - 11.0 10e9/L Final    RBC Count 3.39 (L) 4.4 - 5.9 10e12/L Final    Hemoglobin 12.5 (L) 13.3 - 17.7 g/dL Final    Hematocrit 34.7 (L) 40.0 - 53.0 % Final     (H) 78 - 100 fl Final    MCH 36.9 (H) 26.5 - 33.0 pg Final    MCHC 36.0 31.5 - 36.5 g/dL Final    RDW 13.7 10.0 - 15.0 % Final    Platelet Count 59 (L) 150 - 450 10e9/L Final    Diff Method Automated Method  Final    % Neutrophils 70.5 % Final    % Lymphocytes 17.4 % Final    % Monocytes 10.0 % Final    % Eosinophils 1.5 % Final    % Basophils 0.4 % Final    % Immature Granulocytes 0.2 % Final    Nucleated RBCs 0 0 /100 Final    Absolute Neutrophil 3.7 1.6 - 8.3 10e9/L Final    Absolute Lymphocytes 0.9 0.8 - 5.3 10e9/L Final    Absolute Monocytes 0.5 0.0 - 1.3 10e9/L Final     Absolute Eosinophils 0.1 0.0 - 0.7 10e9/L Final    Absolute Basophils 0.0 0.0 - 0.2 10e9/L Final    Abs Immature Granulocytes 0.0 0 - 0.4 10e9/L Final    Absolute Nucleated RBC 0.0  Final         8/29/2018  1:24 PM      Component Results     Component Value Ref Range & Units Status    Sodium 144 133 - 144 mmol/L Final    Potassium 3.7 3.4 - 5.3 mmol/L Final    Chloride 113 (H) 94 - 109 mmol/L Final    Carbon Dioxide 23 20 - 32 mmol/L Final    Anion Gap 8 3 - 14 mmol/L Final    Glucose 103 (H) 70 - 99 mg/dL Final    Urea Nitrogen 23 7 - 30 mg/dL Final    Creatinine 1.10 0.66 - 1.25 mg/dL Final    GFR Estimate 68 >60 mL/min/1.7m2 Final    Non  GFR Calc    GFR Estimate If Black 82 >60 mL/min/1.7m2 Final    African American GFR Calc    Calcium 8.6 8.5 - 10.1 mg/dL Final    Bilirubin Total 3.0 (H) 0.2 - 1.3 mg/dL Final    Albumin 2.7 (L) 3.4 - 5.0 g/dL Final    Protein Total 6.5 (L) 6.8 - 8.8 g/dL Final    Alkaline Phosphatase 67 40 - 150 U/L Final    ALT 28 0 - 70 U/L Final    AST 48 (H) 0 - 45 U/L Final         8/29/2018  1:10 PM      Component Results     Component Value Ref Range & Units Status    INR 1.36 (H) 0.86 - 1.14 Final         8/29/2018 12:57 PM                Thank you for choosing [facility]       Thank you for choosing [facility] for your care. Our goal is always to provide you with excellent care. Hearing back from our patients is one way we can continue to improve our services. Please take a few minutes to complete the written survey that you may receive in the mail after you visit with us. Thank you!        [employer] Information     [employer] gives you secure access to your electronic health record. If you see a primary care provider, you can also send messages to your care team and make appointments. If you have questions, please call your primary care clinic.  If you do not have a primary care provider, please call [phone] and they will assist you.        Care EveryWhere ID     This is  your Care EveryWhere ID. This could be used by other organizations to access your [place] medical records  [id number]        Equal Access to Services     DARIANA FLANNERY : Bakari Yao, lora centeno, danial ramsey, khai yun. So [facility] [phone].    ATENCIÓN: Si habla español, tiene a ruvalcaba disposición servicios gratuitos de asistencia lingüística. Llame al [phone].    We comply with applicable federal civil rights laws and [place] laws. We do not discriminate on the basis of race, color, national origin, age, disability, sex, sexual orientation, or gender identity.            After Visit Summary       This is your record. Keep this with you and show to your community pharmacist(s) and doctor(s) at your next visit.

## 2018-08-29 NOTE — ED NOTES
Pt has an antibody. Blood bank calls to inform that if he were to need RBC's there would be a delay [facility]. Time of phone call [phone]

## 2018-08-29 NOTE — ED NOTES
Pt reports diarrhea today, had a large bloody stool prior to arrival. Pt takes lactulose to keep his ammonia levels low every morning, he states as a result he has loose stools normally. Pt notes there was 1 episode of really heavy bleeding. Pt states it was dark red in color. Pt denies taking blood thinners.

## 2018-08-29 NOTE — DISCHARGE INSTRUCTIONS
Your bleeding is likely related to an internal hemorrhoid.  Please return to the emergency department if you have worsening symptoms, blood in your vomit, lightheadedness, or other concerns.  Otherwise follow-up in clinic.

## 2018-08-29 NOTE — ED PROVIDER NOTES
History     Chief Complaint   Patient presents with     Rectal Bleeding     diarrhea, blood in stool today,      HPI  Scott Casale is a 63 year old male who presents for rectal bleeding.  The patient reports that just prior to arrival he was urinating and thought he had passed gas, however upon passing gas.  There was bloody output.  He wiped himself up and was very concerned.  He says this has never happened before.  He denies any pain in the rectum.  He has some mild cramping abdominal pain but says this is his normal sensation with lactulose therapy.  He says he has been taking his lactulose as prescribed, he has been having multiple loose stools daily.  The patient has a history of hepatitis C and alcoholic cirrhosis of the liver status post TIPS procedure.  He denies fever, chills, chest pain, shortness of breath, nausea, vomiting, hematemesis, or rash.  He is not on any blood thinners.  Review of his chart shows he has had both endoscopy and colonoscopy 1/2017.  The endoscopy was negative for esophageal or gastric varices or ulcers.  His colonoscopy was positive for diverticulosis and internal hemorrhoids.    Problem List:    Patient Active Problem List    Diagnosis Date Noted     Hyperammonemia (H) 11/27/2017     Priority: Medium     Hepatic encephalopathy (H) 11/27/2017     Priority: Medium     Acute hepatic encephalopathy 03/13/2017     Priority: Medium     Ankylosing spondylitis (H)      Priority: Medium     S/P TIPS (transjugular intrahepatic portosystemic shunt) 09/28/2016     Priority: Medium     Ascites of liver 09/27/2016     Priority: Medium     Overview:   Low MELD score   S/p TIPS procedure at [facility] (09/28/2016)       Hyponatremia 08/22/2016     Priority: Medium     Overview:   Chronic        Edema of lower extremity 06/15/2016     Priority: Medium     Overview:   Resistant to diuretic therapy        Chronic hepatitis C virus infection (H) 06/15/2016     Priority: Medium      Overview:   Dr Higginbotham [facility]   Harvoni Ribavirin (10/2016)       Hepatic cirrhosis (H) 06/15/2016     Priority: Medium     Overview:   Hepatitis C and history of alcohol use ( no use currently)  in the past        Liver mass 06/15/2016     Priority: Medium     Overview:   0.8 cm right lobe hyperlucency per CT 04/2016 suspicious for HCC   Liver biopsy due - seeing Liver clinic at [facility]       Portal hypertension (H) 06/15/2016     Priority: Medium     Overview:   Furosemide Spironolactone Lactulose   Intolerance to Propranolol with hypotension, dizziness       Primary malignant neoplasm of testis (H) 06/15/2016     Priority: Medium     Overview:   At age 28 s/p orchiectomy with RPLND        Thrombocytopenia (H) 06/15/2016     Priority: Medium     Overview:   6/12/2016  PLT: 48       Chronic low back pain 12/06/2012     Priority: Medium     Narcotic Contract signed 12/6/12 and sent to be scanned    Uses soma at night to help him sleep due to back pain. Has had back problems since 1986, since fell in big hole, collapsed lower discs. Also has ankylosing spondylitis. Has had much Physical Therapy and chiropractic work over years, was told surgery was only option.  Was accompanied by [place] records.    Narcotic contract -- soma #30 R2, at 3 mo can call for 3 more refills, but be seen q 6 mo for refills.  Uses for sleep.  Flexeril for daytime if needed, unrestricted.           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 12/06/2012     Priority: Medium     Since summer; ENT referral 12/6/12       GERD (gastroesophageal reflux disease) 06/26/2012     Priority: Medium     CARDIOVASCULAR SCREENING; LDL GOAL LESS THAN 130 06/25/2012     Priority: Medium     Mild major depression (H) 08/27/2008     Priority: Medium        Past Medical History:    Past Medical History:   Diagnosis Date     AC separation 8/20/2008     Ascites      H/O testicular cancer age 28     Hepatitis C      Shoulder  "dislocation      Splenomegaly      Ulcerative colitis (H)        Past Surgical History:    Past Surgical History:   Procedure Laterality Date     C APPENDECTOMY       C KNEE SCOPE,CLEAN/DRAIN      bilateral     COLONOSCOPY N/A 1/17/2017    Procedure: COLONOSCOPY;  Surgeon: Guru Reina Palacios MD;  Location:  GI     ESOPHAGOSCOPY, GASTROSCOPY, DUODENOSCOPY (EGD), COMBINED N/A 1/17/2017    Procedure: COMBINED ESOPHAGOSCOPY, GASTROSCOPY, DUODENOSCOPY (EGD);  Surgeon: Guru Reina Palacios MD;  Location:  GI     EYE SURGERY       IR TRANSVEN INTRAHEPATIC PORTOSYST REV       ORCHIECTOMY SCROTAL      prosthesis placed     STRABISMUS SURGERY      bilateral       Family History:    Family History   Problem Relation Age of Onset     Cancer Mother 70     brain tumor     Diabetes Maternal Grandmother      Cerebrovascular Disease Sister      HEART DISEASE Sister      Respiratory Son      Cancer Father      Uterine Cancer Sister      Prostate Cancer No family hx of      Cancer - colorectal No family hx of      Glaucoma No family hx of      Macular Degeneration No family hx of        Social History:  Marital Status:   [2]  Social History   Substance Use Topics     Smoking status: Former Smoker     Quit date: 1/1/1978     Smokeless tobacco: Never Used     Alcohol use No      Comment: quit in 2010        Medications:      furosemide (LASIX) 40 MG tablet   lactulose (CHRONULAC) 10 GM/15ML solution   spironolactone (ALDACTONE) 50 MG tablet   potassium chloride SA (KLOR-CON) 20 MEQ CR tablet         Review of Systems  Pertinent positives and negatives listed in the HPI, all other systems reviewed and are negative.    Physical Exam   BP: 123/68  Heart Rate: 72  Temp: 97.9  F (36.6  C)  Resp: 16  Height: 180.3 cm (5' 11\")  Weight: 97.5 kg (215 lb)  SpO2: 99 %      Physical Exam   Constitutional: He is oriented to person, place, and time. He appears well-developed and well-nourished. He appears " distressed.   HENT:   Head: Normocephalic and atraumatic.   Right Ear: External ear normal.   Left Ear: External ear normal.   Nose: Nose normal.   Eyes: Conjunctivae are normal. No scleral icterus.   Neck: Normal range of motion.   Cardiovascular: Normal rate and regular rhythm.    Pulmonary/Chest: Effort normal. No stridor. No respiratory distress.   Abdominal: Soft. He exhibits no distension. There is no tenderness. There is no rebound and no guarding.   Genitourinary: Rectal exam shows internal hemorrhoid. Rectal exam shows no external hemorrhoid, no fissure, no mass and no tenderness.   Neurological: He is alert and oriented to person, place, and time.   Skin: Skin is warm and dry. He is not diaphoretic.   Psychiatric: He has a normal mood and affect. His behavior is normal.   Nursing note and vitals reviewed.      ED Course     ED Course     Procedures               Critical Care time:  none               Results for orders placed or performed during the hospital encounter of 08/29/18 (from the past 24 hour(s))   UA reflex to Microscopic   Result Value Ref Range    Color Urine Straw     Appearance Urine Clear     Glucose Urine Negative NEG^Negative mg/dL    Bilirubin Urine Negative NEG^Negative    Ketones Urine Negative NEG^Negative mg/dL    Specific Gravity Urine 1.005 1.003 - 1.035    Blood Urine Moderate (A) NEG^Negative    pH Urine 5.0 5.0 - 7.0 pH    Protein Albumin Urine Negative NEG^Negative mg/dL    Urobilinogen mg/dL 0.0 0.0 - 2.0 mg/dL    Nitrite Urine Negative NEG^Negative    Leukocyte Esterase Urine Negative NEG^Negative    Source Midstream Urine     RBC Urine 1 0 - 2 /HPF    WBC Urine <1 0 - 5 /HPF   CBC with platelets differential   Result Value Ref Range    WBC 5.2 4.0 - 11.0 10e9/L    RBC Count 3.39 (L) 4.4 - 5.9 10e12/L    Hemoglobin 12.5 (L) 13.3 - 17.7 g/dL    Hematocrit 34.7 (L) 40.0 - 53.0 %     (H) 78 - 100 fl    MCH 36.9 (H) 26.5 - 33.0 pg    MCHC 36.0 31.5 - 36.5 g/dL    RDW 13.7  10.0 - 15.0 %    Platelet Count 59 (L) 150 - 450 10e9/L    Diff Method Automated Method     % Neutrophils 70.5 %    % Lymphocytes 17.4 %    % Monocytes 10.0 %    % Eosinophils 1.5 %    % Basophils 0.4 %    % Immature Granulocytes 0.2 %    Nucleated RBCs 0 0 /100    Absolute Neutrophil 3.7 1.6 - 8.3 10e9/L    Absolute Lymphocytes 0.9 0.8 - 5.3 10e9/L    Absolute Monocytes 0.5 0.0 - 1.3 10e9/L    Absolute Eosinophils 0.1 0.0 - 0.7 10e9/L    Absolute Basophils 0.0 0.0 - 0.2 10e9/L    Abs Immature Granulocytes 0.0 0 - 0.4 10e9/L    Absolute Nucleated RBC 0.0    Comprehensive metabolic panel   Result Value Ref Range    Sodium 144 133 - 144 mmol/L    Potassium 3.7 3.4 - 5.3 mmol/L    Chloride 113 (H) 94 - 109 mmol/L    Carbon Dioxide 23 20 - 32 mmol/L    Anion Gap 8 3 - 14 mmol/L    Glucose 103 (H) 70 - 99 mg/dL    Urea Nitrogen 23 7 - 30 mg/dL    Creatinine 1.10 0.66 - 1.25 mg/dL    GFR Estimate 68 >60 mL/min/1.7m2    GFR Estimate If Black 82 >60 mL/min/1.7m2    Calcium 8.6 8.5 - 10.1 mg/dL    Bilirubin Total 3.0 (H) 0.2 - 1.3 mg/dL    Albumin 2.7 (L) 3.4 - 5.0 g/dL    Protein Total 6.5 (L) 6.8 - 8.8 g/dL    Alkaline Phosphatase 67 40 - 150 U/L    ALT 28 0 - 70 U/L    AST 48 (H) 0 - 45 U/L   INR   Result Value Ref Range    INR 1.36 (H) 0.86 - 1.14       Medications - No data to display    Assessments & Plan (with Medical Decision Making)   63-year-old male with a history of alcoholic cirrhosis of the liver status post TIPS procedure who presents for rectal bleeding.  Temperature 36.6 C, heart rate 72, SPO2 is 91% on room air.  Abdominal exam is benign and not concerning for an acute surgical process such as diverticulitis or small bowel obstruction.  On rectal examination there are no external hemorrhoids or anal fissures.  He did feel an internal hemorrhoid upon digital rectal exam.  No gross blood on exam, brown stool, no melena, guaiac positive.  Hemoglobin is 12.5 which is similar to his prior levels.  White blood  cell count 5.2 and platelets are 59,000.  These are also stable from prior.  He is watched in the emergency department for several hours and found to be stable without further blood from his rectum.  It is likely this blood is related to his internal hemorrhoid, no indication for admission at this time, he is safe to discharge with instructions to return if he has worsening symptoms, otherwise follow-up in clinic.  The patient is in agreement with this plan.    I have reviewed the nursing notes.    I have reviewed the findings, diagnosis, plan and need for follow up with the patient.       Discharge Medication List as of 8/29/2018  2:50 PM          Final diagnoses:   Rectal bleeding       8/29/2018   [facility] EMERGENCY DEPARTMENT     Praveen Hernandez MD  08/29/18 [id number]

## 2018-09-05 ENCOUNTER — PATIENT OUTREACH (OUTPATIENT)
Dept: CARE COORDINATION | Facility: CLINIC | Age: 63
End: 2018-09-05

## 2018-09-05 ASSESSMENT — ACTIVITIES OF DAILY LIVING (ADL): DEPENDENT_IADLS:: INDEPENDENT

## 2018-09-05 NOTE — PROGRESS NOTES
Clinic Care Coordination Contact  [facility]/Voicemail    Referral Source: ED Follow-Up  Clinical Data: Care Coordinator Outreach  Outreach attempted x 1.  Left message on voicemail with call back information and requested return call.  Plan: Care Coordinator mailed out care coordination introduction letter on 7-25-18 and today. Care Coordinator will try to reach patient again in 1-2 business days.    Brianna Gay  Social Work Care Coordinator  [facility]  [phone]

## 2018-09-05 NOTE — LETTER
[facility] CARE COORDINATION  [address] [place] [address]      September 5, 2018    Scott Casale  [address] [place] [address]      Dear Leonel,    I am a clinic care coordinator who works with Josie Dumont DO at [facility] and I recently tried to call and was unable to reach you. I wanted to provide you with my contact information so that you can call me with questions or concerns about your health care. Below is a description of clinic care coordination and how I can further assist you.     The clinic care coordinator is a registered nurse and/or  who understand the health care system. The goal of clinic care coordination is to help you manage your health and improve access to the [facility] system in the most efficient manner. The registered nurse can assist you in meeting your health care goals by providing education, coordinating services, and strengthening the communication among your providers. The  can assist you with financial, behavioral, psychosocial, chemical dependency, counseling, and/or psychiatric resources.    Please feel free to contact me at [phone], with any questions or concerns. We at [facility] are focused on providing you with the highest-quality healthcare experience possible and that all starts with you.     Sincerely,     Brianna Moore    Enclosed: I have enclosed a copy of a 24 Hour Access Plan. This has helpful phone numbers for you to call when needed. Please keep this in an easy to access place to use as needed.

## 2018-09-05 NOTE — LETTER
Health Care Home - Access Care Plan    About Me  Patient Name:  Scott Casale    YOB: 1955  Age:                             63 year old   Vanessa MRN:            [id number] Telephone Information:     Home Phone [phone]   Mobile [phone]   Mobile [phone]       Address:    [address] [place] [address] Email address:  [email or web address]      Emergency Contact(s)  Name Relationship Lgl Grd Work Phone Home Phone Mobile Phone   CHRISTINE KEN Sister   [phone]             Health Maintenance: Routine Health maintenance Reviewed: Due/Overdue    My Access Plan  Medical Emergency 911   Questions or concerns during clinic hours Primary Clinic Line, I will call the clinic directly: [facility] - [phone]   24 Hour Appointment Line [phone] or  [phone] [phone]) (toll free)   24 Hour Nurse Line [phone] (toll free)   Questions or concerns outside clinic hours 24 Hour Appointment Line, I will call the after-hours on-call line:   [facility] [phone] or [phone]) (toll-free)   Preferred Urgent Care [facility], [phone]   Preferred Hospital [place], [place]  [phone]   Preferred Pharmacy [id number] - [place], [place] - [address]     Behavioral Health Crisis Line The National Suicide Prevention Lifeline at 1-404.819.4711 or 911     My Care Team Members  Patient Care Team       Relationship Specialty Notifications Start End    Josie Dumont DO PCP - General Internal Medicine Admissions 7/20/18     Phone: [phone] Pager: [phone] Fax: [phone] [address] [facility] [address]    Warren Higginbotham MD MD Gastroenterology  9/16/16     Comment:  referring to     Phone: [phone] Fax: [phone] [address] [facility] [address] 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

## 2018-09-07 ASSESSMENT — ACTIVITIES OF DAILY LIVING (ADL): DEPENDENT_IADLS:: INDEPENDENT

## 2018-09-07 NOTE — PROGRESS NOTES
Clinic Care Coordination Contact  [facility]/Voicemail    Referral Source: ED Follow-Up  Clinical Data: Care Coordinator Outreach  Outreach attempted x 1.  Left message on voicemail with call back information and requested return call.  Plan: Care Coordinator mailed out care coordination introduction letter on 7-25-18 and 9-5-18. Care Coordinator will try to reach patient again in 5-15 business days.    Brianna Gay  Social Work Care Coordinator  [facility]  [phone]

## 2018-10-31 ENCOUNTER — PATIENT OUTREACH (OUTPATIENT)
Dept: CARE COORDINATION | Facility: CLINIC | Age: 63
End: 2018-10-31

## 2018-10-31 NOTE — PROGRESS NOTES
Clinic Care Coordination Contact  [facility]/Voicemail       Clinical Data: Care Coordinator Outreach  Outreach attempted x 2.  Left message on voicemail with call back information and requested return call.  Plan: Care Coordinator mailed out care coordination introduction letters on 7-25-18 and 9-5-18. Care Coordinator will do no further outreaches at this time.      Brianna Olivas  Social Work Care Coordinator  [facility]  [phone]

## 2019-04-02 ENCOUNTER — TELEPHONE (OUTPATIENT)
Dept: GASTROENTEROLOGY | Facility: CLINIC | Age: 64
End: 2019-04-02

## 2019-09-28 ENCOUNTER — HEALTH MAINTENANCE LETTER (OUTPATIENT)
Age: 64
End: 2019-09-28

## 2020-01-23 ENCOUNTER — NURSE TRIAGE (OUTPATIENT)
Dept: NURSING | Facility: CLINIC | Age: 65
End: 2020-01-23

## 2020-01-23 NOTE — TELEPHONE ENCOUNTER
"\"I have a high ammonia count\".    Has cirrhosis.  Back pain from past back injury at age 22.  Dizzy.  Shaky.  Off balance.  Denies confusion.  Slurring his words.  He said these symptoms are what he gets when his ammonia count is high.  I instructed he go to an ER.  He may go to [facility] on the [place] or to [place]. He'll call a friend to take him. I told him to call 911 if his friend isn't able to get him in now. He didn't agree to this.   Taylor VIDES RN [facility]                   "

## 2020-01-24 ENCOUNTER — HOSPITAL ENCOUNTER (EMERGENCY)
Facility: CLINIC | Age: 65
Discharge: HOME | End: 2020-01-24
Attending: EMERGENCY MEDICINE | Admitting: EMERGENCY MEDICINE
Payer: COMMERCIAL

## 2020-01-24 VITALS
DIASTOLIC BLOOD PRESSURE: 74 MMHG | RESPIRATION RATE: 18 BRPM | BODY MASS INDEX: 31.3 KG/M2 | SYSTOLIC BLOOD PRESSURE: 147 MMHG | HEART RATE: 67 BPM | OXYGEN SATURATION: 100 % | WEIGHT: 224.43 LBS | TEMPERATURE: 98.7 F

## 2020-01-24 DIAGNOSIS — K72.10 CHRONIC LIVER FAILURE WITHOUT HEPATIC COMA (H): ICD-10-CM

## 2020-01-24 LAB
ALBUMIN SERPL-MCNC: 3.2 G/DL (ref 3.4–5)
ALP SERPL-CCNC: 87 U/L (ref 40–150)
ALT SERPL W P-5'-P-CCNC: 38 U/L (ref 0–70)
AMMONIA PLAS-SCNC: 69 UMOL/L (ref 10–50)
ANION GAP SERPL CALCULATED.3IONS-SCNC: 2 MMOL/L (ref 3–14)
AST SERPL W P-5'-P-CCNC: 69 U/L (ref 0–45)
BASOPHILS # BLD AUTO: 0 10E9/L (ref 0–0.2)
BASOPHILS NFR BLD AUTO: 0.6 %
BILIRUB SERPL-MCNC: 3.8 MG/DL (ref 0.2–1.3)
BUN SERPL-MCNC: 23 MG/DL (ref 7–30)
CALCIUM SERPL-MCNC: 8.7 MG/DL (ref 8.5–10.1)
CHLORIDE SERPL-SCNC: 111 MMOL/L (ref 94–109)
CO2 SERPL-SCNC: 29 MMOL/L (ref 20–32)
CREAT SERPL-MCNC: 1.09 MG/DL (ref 0.66–1.25)
DIFFERENTIAL METHOD BLD: ABNORMAL
EOSINOPHIL # BLD AUTO: 0.1 10E9/L (ref 0–0.7)
EOSINOPHIL NFR BLD AUTO: 2.3 %
ERYTHROCYTE [DISTWIDTH] IN BLOOD BY AUTOMATED COUNT: 14.2 % (ref 10–15)
ETHANOL SERPL-MCNC: <0.01 G/DL
GFR SERPL CREATININE-BSD FRML MDRD: 71 ML/MIN/{1.73_M2}
GLUCOSE SERPL-MCNC: 92 MG/DL (ref 70–99)
HCT VFR BLD AUTO: 44.8 % (ref 40–53)
HGB BLD-MCNC: 15.4 G/DL (ref 13.3–17.7)
IMM GRANULOCYTES # BLD: 0 10E9/L (ref 0–0.4)
IMM GRANULOCYTES NFR BLD: 0.2 %
INR PPP: 1.27 (ref 0.86–1.14)
LYMPHOCYTES # BLD AUTO: 1 10E9/L (ref 0.8–5.3)
LYMPHOCYTES NFR BLD AUTO: 18.7 %
MCH RBC QN AUTO: 35.4 PG (ref 26.5–33)
MCHC RBC AUTO-ENTMCNC: 34.4 G/DL (ref 31.5–36.5)
MCV RBC AUTO: 103 FL (ref 78–100)
MONOCYTES # BLD AUTO: 0.5 10E9/L (ref 0–1.3)
MONOCYTES NFR BLD AUTO: 9.1 %
NEUTROPHILS # BLD AUTO: 3.7 10E9/L (ref 1.6–8.3)
NEUTROPHILS NFR BLD AUTO: 69.1 %
NRBC # BLD AUTO: 0 10*3/UL
NRBC BLD AUTO-RTO: 0 /100
PLATELET # BLD AUTO: 68 10E9/L (ref 150–450)
POTASSIUM SERPL-SCNC: 4 MMOL/L (ref 3.4–5.3)
PROT SERPL-MCNC: 7.5 G/DL (ref 6.8–8.8)
RBC # BLD AUTO: 4.35 10E12/L (ref 4.4–5.9)
SODIUM SERPL-SCNC: 141 MMOL/L (ref 133–144)
WBC # BLD AUTO: 5.3 10E9/L (ref 4–11)

## 2020-01-24 PROCEDURE — 80053 COMPREHEN METABOLIC PANEL: CPT | Performed by: EMERGENCY MEDICINE

## 2020-01-24 PROCEDURE — 85025 COMPLETE CBC W/AUTO DIFF WBC: CPT | Performed by: EMERGENCY MEDICINE

## 2020-01-24 PROCEDURE — 85610 PROTHROMBIN TIME: CPT | Performed by: EMERGENCY MEDICINE

## 2020-01-24 PROCEDURE — 80320 DRUG SCREEN QUANTALCOHOLS: CPT | Performed by: EMERGENCY MEDICINE

## 2020-01-24 PROCEDURE — 99283 EMERGENCY DEPT VISIT LOW MDM: CPT | Performed by: EMERGENCY MEDICINE

## 2020-01-24 PROCEDURE — 99284 EMERGENCY DEPT VISIT MOD MDM: CPT | Mod: Z6 | Performed by: EMERGENCY MEDICINE

## 2020-01-24 PROCEDURE — 82140 ASSAY OF AMMONIA: CPT | Performed by: EMERGENCY MEDICINE

## 2020-01-24 RX ORDER — SPIRONOLACTONE 50 MG/1
50 TABLET, FILM COATED ORAL DAILY
Qty: 14 TABLET | Refills: 0 | Status: SHIPPED | OUTPATIENT
Start: 2020-01-24 | End: 2020-02-07

## 2020-01-24 RX ORDER — POTASSIUM CHLORIDE 1500 MG/1
20 TABLET, EXTENDED RELEASE ORAL DAILY
Qty: 14 TABLET | Refills: 0 | Status: SHIPPED | OUTPATIENT
Start: 2020-01-24 | End: 2020-02-07

## 2020-01-24 RX ORDER — LACTULOSE 10 G/15ML
45 SOLUTION ORAL 3 TIMES DAILY
Qty: 946 ML | Refills: 0 | Status: SHIPPED | OUTPATIENT
Start: 2020-01-24

## 2020-01-24 RX ORDER — LACTULOSE 10 G/15ML
45 SOLUTION ORAL 3 TIMES DAILY
Qty: 2835 ML | Refills: 0 | Status: SHIPPED | OUTPATIENT
Start: 2020-01-24 | End: 2020-02-07

## 2020-01-24 RX ORDER — FUROSEMIDE 40 MG
40 TABLET ORAL 2 TIMES DAILY
Qty: 28 TABLET | Refills: 0 | Status: SHIPPED | OUTPATIENT
Start: 2020-01-24 | End: 2020-02-07

## 2020-01-24 ASSESSMENT — ENCOUNTER SYMPTOMS
COLOR CHANGE: 0
NAUSEA: 0
LIGHT-HEADEDNESS: 0
NUMBNESS: 0
CONFUSION: 0
DIAPHORESIS: 0
WEAKNESS: 0
VOMITING: 0
DIFFICULTY URINATING: 0
POLYDIPSIA: 0
NECK STIFFNESS: 0
EYE REDNESS: 0
ABDOMINAL PAIN: 0
ARTHRALGIAS: 0
FATIGUE: 1
SHORTNESS OF BREATH: 0
FEVER: 0
HEADACHES: 0
ADENOPATHY: 0
BRUISES/BLEEDS EASILY: 0

## 2020-01-24 NOTE — ED PROVIDER NOTES
[place] EMERGENCY DEPARTMENT ([facility])  1/24/20   History     Chief Complaint   Patient presents with     Generalized Weakness     HPI  Scott Casale is a 64 year old male with history of cirrhosis presenting for evaluation of potentially elevated ammonia level and request for medication refill.  He states that he was in [place] for several months and that approximately 2 weeks ago he ran out of his medications.  He has not taken any lactulose, potassium, spironolactone, or furosemide.  He is feeling increasingly fatigued over the last week and feels that his ammonia level might be getting elevated.  He states that occasionally he gets some mild tremors in his hands, however, currently denies having any tremors.  No fevers.  No nausea/vomiting.  No abdominal pain.  No chest pain or shortness of breath.  No other concerns or complaints. He is followed by Dr. Higginbotham from hepatology service at [facility].    This part of the medical record was transcribed by Janneth Urias.     I have reviewed the Medications, Allergies, Past Medical and Surgical History, and Social History in the [employer] system.    Past Medical History:   Diagnosis Date     AC separation 8/20/2008    left     Ascites     diuretic refractory     H/O testicular cancer age 28     Hepatitis C     genotype 1A      Shoulder dislocation      Splenomegaly      Ulcerative colitis (H)        Past Surgical History:   Procedure Laterality Date     C APPENDECTOMY       C KNEE SCOPE,CLEAN/DRAIN      bilateral     COLONOSCOPY N/A 1/17/2017    Procedure: COLONOSCOPY;  Surgeon: Guru Reina Palacios MD;  Location:  GI     ESOPHAGOSCOPY, GASTROSCOPY, DUODENOSCOPY (EGD), COMBINED N/A 1/17/2017    Procedure: COMBINED ESOPHAGOSCOPY, GASTROSCOPY, DUODENOSCOPY (EGD);  Surgeon: Guru Reina Palacios MD;  Location:  GI     EYE SURGERY       IR TRANSVEN INTRAHEPATIC PORTOSYST REV       ORCHIECTOMY SCROTAL       prosthesis placed     STRABISMUS SURGERY      bilateral       Family History   Problem Relation Age of Onset     Cancer Mother 70        brain tumor     Diabetes Maternal Grandmother      Cerebrovascular Disease Sister      Heart Disease Sister      Respiratory Son      Cancer Father      Uterine Cancer Sister      Prostate Cancer No family hx of      Cancer - colorectal No family hx of      Glaucoma No family hx of      Macular Degeneration No family hx of        Social History     Tobacco Use     Smoking status: Former Smoker     Last attempt to quit: 1978     Years since quittin.0     Smokeless tobacco: Never Used   Substance Use Topics     Alcohol use: No     Alcohol/week: 0.0 standard drinks     Comment: quit in        Current Facility-Administered Medications   Medication     sodium chloride (PF) 0.9% PF flush 3 mL     sodium chloride (PF) 0.9% PF flush 3 mL     Current Outpatient Medications   Medication     furosemide (LASIX) 40 MG tablet     lactulose (CHRONULAC) 10 GM/15ML solution     lactulose (CHRONULAC) 10 GM/15ML solution     potassium chloride ER (K-DUR/KLOR-CON M) 20 MEQ CR tablet     rifaximin (XIFAXAN) 550 MG TABS tablet     spironolactone (ALDACTONE) 50 MG tablet     furosemide (LASIX) 40 MG tablet     lactulose (CHRONULAC) 10 GM/15ML solution     potassium chloride SA (KLOR-CON) 20 MEQ CR tablet     spironolactone (ALDACTONE) 50 MG tablet        Allergies   Allergen Reactions     Blood Transfusion Related (Informational Only) Other (See Comments)     Patient has a history of a clinically significant antibody against RBC antigens.  A delay in compatible RBCs may occur.     Percocet [Oxycodone-Acetaminophen] Nausea and Vomiting     Acetaminophen Nausea     Iodine-131 Hives     Motrin [Ibuprofen Micronized] Nausea     Tylenol Nausea        Review of Systems   Constitutional: Positive for fatigue. Negative for diaphoresis and fever.   HENT: Negative for congestion.    Eyes: Negative  for redness.   Respiratory: Negative for shortness of breath.    Cardiovascular: Negative for chest pain.   Gastrointestinal: Negative for abdominal pain, nausea and vomiting.   Endocrine: Negative for polydipsia and polyuria.   Genitourinary: Negative for difficulty urinating.   Musculoskeletal: Negative for arthralgias and neck stiffness.   Skin: Negative for color change.   Neurological: Negative for weakness, light-headedness, numbness and headaches.   Hematological: Negative for adenopathy. Does not bruise/bleed easily.   Psychiatric/Behavioral: Negative for confusion.   All other systems reviewed and are negative.      Physical Exam   BP: (!) 150/75  Pulse: 74  Temp: 98.7  F (37.1  C)  Resp: 18  Weight: 101.8 kg (224 lb 6.9 oz)  SpO2: 99 %      Physical Exam  Vitals signs and nursing note reviewed.   Constitutional:       General: He is not in acute distress.     Appearance: Normal appearance. He is not diaphoretic.   HENT:      Head: Normocephalic and atraumatic.      Nose: Nose normal.      Mouth/Throat:      Mouth: Mucous membranes are moist.      Pharynx: Oropharynx is clear.   Eyes:      General: No scleral icterus.     Extraocular Movements: Extraocular movements intact.      Conjunctiva/sclera: Conjunctivae normal.      Pupils: Pupils are equal, round, and reactive to light.   Neck:      Musculoskeletal: Normal range of motion and neck supple.   Cardiovascular:      Rate and Rhythm: Normal rate.      Pulses: Normal pulses.      Heart sounds: Normal heart sounds.   Pulmonary:      Effort: Pulmonary effort is normal. No respiratory distress.      Breath sounds: Normal breath sounds.   Abdominal:      General: There is no distension.      Palpations: Abdomen is soft.      Tenderness: There is no abdominal tenderness. There is no guarding or rebound.   Musculoskeletal: Normal range of motion.         General: No tenderness.   Lymphadenopathy:      Cervical: No cervical adenopathy.   Skin:     General: Skin  is warm.      Findings: No rash.   Neurological:      General: No focal deficit present.      Mental Status: He is alert and oriented to person, place, and time.      GCS: GCS eye subscore is 4. GCS verbal subscore is 5. GCS motor subscore is 6.      Cranial Nerves: Cranial nerves are intact. No cranial nerve deficit.      Sensory: Sensation is intact. No sensory deficit.      Motor: Motor function is intact. No weakness.      Coordination: Coordination is intact. Coordination normal.      Gait: Gait is intact. Gait normal.      Deep Tendon Reflexes: Reflexes normal.      Comments: No evidence of tremors on the examination.  No asterixis.   Psychiatric:         Mood and Affect: Mood normal.         Behavior: Behavior normal.         Thought Content: Thought content normal.         Judgment: Judgment normal.         ED Course        Procedures             Critical Care time:  none             Labs Ordered and Resulted from Time of ED Arrival Up to the Time of Departure from the ED   CBC WITH PLATELETS DIFFERENTIAL - Abnormal; Notable for the following components:       Result Value    RBC Count 4.35 (*)      (*)     MCH 35.4 (*)     Platelet Count 68 (*)     All other components within normal limits   INR - Abnormal; Notable for the following components:    INR 1.27 (*)     All other components within normal limits   COMPREHENSIVE METABOLIC PANEL - Abnormal; Notable for the following components:    Chloride 111 (*)     Anion Gap 2 (*)     Bilirubin Total 3.8 (*)     Albumin 3.2 (*)     AST 69 (*)     All other components within normal limits   AMMONIA - Abnormal; Notable for the following components:    Ammonia 69 (*)     All other components within normal limits   ALCOHOL ETHYL   PERIPHERAL IV CATHETER            Assessments & Plan (with Medical Decision Making)   This is a 64 year old male presenting for evaluation due to potentially elevated ammonia level.  I do not see evidence of hepatic encephalopathy.   GCS is 15.  He has normal neurologic exam.  Do not believe that he needs to be hospitalized.  I offered him a dose of oral lactulose, however, he declined stating that he would rather have the prescription and take the lactulose at home so he does not have a bowel movement while driving home. I believe this is a reasonable plan.  His sisters agree with this plan as well.  They also agreed to return if they notice any change in his mental status.      Patient's laboratory studies returned without any evidence of leukocytosis, WBC is normal at 5300.  There is no anemia, hemoglobin is normal at 15.4.  INR slightly elevated 1.27.  Electrolytes show normal creatinine 1.09.  LFTs show slightly elevated AST at 69.  Ammonia is slightly elevated 69, however, patient exhibits no signs of acute, hepatic encephalopathy.  At this time he will be discharged.  Gait is normal at discharge    This part of the medical record was transcribed by Joseph Morel Medical Scribe.     I have reviewed the nursing notes.    I have reviewed the findings, diagnosis, plan and need for follow up with the patient.    New Prescriptions    FUROSEMIDE (LASIX) 40 MG TABLET    Take 1 tablet (40 mg) by mouth 2 times daily for 14 days    LACTULOSE (CHRONULAC) 10 GM/15ML SOLUTION    Place 67.5 mLs (45 g) rectally 3 times daily    LACTULOSE (CHRONULAC) 10 GM/15ML SOLUTION    Take 67.5 mLs (45 g) by mouth 3 times daily for 14 days    POTASSIUM CHLORIDE ER (K-DUR/KLOR-CON M) 20 MEQ CR TABLET    Take 1 tablet (20 mEq) by mouth daily for 14 days    RIFAXIMIN (XIFAXAN) 550 MG TABS TABLET    Take 1 tablet (550 mg) by mouth 2 times daily for 14 days    SPIRONOLACTONE (ALDACTONE) 50 MG TABLET    Take 1 tablet (50 mg) by mouth daily for 14 days       Final diagnoses:   Chronic liver failure without hepatic coma (H)     I was physically present and have reviewed and verified the accuracy of this note documented by my scribe.    Jennifer Phillips MD      1/24/2020    [facility], [place], EMERGENCY DEPARTMENT     Jennifer Phillips MD  01/24/20 6130

## 2020-01-24 NOTE — ED TRIAGE NOTES
Pt presents via w/c from home with roommate. Pt states for past few days has had increased weakness, shakiness and back pain. Pt states has not taken medications for past week. Pt has hx liver failure. Pt recent return from out of states. Pt does not appear in acute distress. Pt states has hx chronic back pain.

## 2020-01-24 NOTE — ED AVS SNAPSHOT
[facility], [place], Emergency Department  [address] [place] [id number]  Phone:  [phone]                                    Scott Casale   MRN: [id number]    Department:  [facility], Emergency Department   Date of Visit:  1/24/2020           After Visit Summary Signature Page    I have received my discharge instructions, and my questions have been answered. I have discussed any challenges I see with this plan with the nurse or doctor.    ..........................................................................................................................................  Patient/Patient Representative Signature      ..........................................................................................................................................  Patient Representative Print Name and Relationship to Patient    ..................................................               ................................................  Date                                   Time    ..........................................................................................................................................  Reviewed by Signature/Title    ...................................................              ..............................................  Date                                               Time          22EPIC Rev 08/18

## 2020-01-24 NOTE — ED NOTES
ED Triage Provider Note  [facility]  Encounter Date: Jan 24, 2020    History:  Chief Complaint   Patient presents with     Generalized Weakness     Scott Casale is a 64 year old male with a history of cirrhosis and hepatic encephalopathy who states he has not had a drink for the last 6 years.  Patient states that he was recently in [place] and ran out of his medications 2 to 3 weeks ago.  Patient states that he started to feel somewhat shaky which is what happens when his ammonia level goes up.  Patient states that he also has some back pain contributing to his unsteadiness on his feet.  The patient denies any melena any fevers any coughing any vomiting or hematemesis.       Review of Systems:  No fever    Exam:  BP (!) 150/75   Pulse 74   Temp 98.7  F (37.1  C) (Oral)   Resp 18   Wt 101.8 kg (224 lb 6.9 oz)   BMI 31.30 kg/m    General: No acute distress. Appears stated age.   Cardio: Regular rate, extremities well perfused  Resp: Normal work of breathing, grossly normal respiratory rate  Neuro: Alert. CN II-XII grossly intact. Grossly intact strength.       Orders Placed This Encounter   Procedures     CBC with platelets differential     INR     Comprehensive metabolic panel     Ammonia     Alcohol ethyl     Peripheral IV catheter       Medical Decision Making:  Patient arriving to the ED with problem as above. A medical screening exam was performed. Orders initiated from Triage. The patient is appropriate to wait in triage.      Ciro Carty MD, MD on 1/24/2020 at 3:00 PM     Ciro Carty MD  01/24/20 4561

## 2020-01-27 NOTE — PROGRESS NOTES
02/23/17 1039   Quick Adds   Type of Visit Initial Occupational Therapy Evaluation   Living Environment   Lives With spouse   Living Arrangements house   Number of Stairs to Enter Home 0   Number of Stairs Within Home 14   Living Environment Comment Tub, is getting a shower chair from local [facility]   Self-Care   Usual Activity Tolerance moderate   Current Activity Tolerance moderate   Equipment Currently Used at Home none   Activity/Exercise/Self-Care Comment Patient independent in self-cares/mobility without AE, does report spouse assists with socks/shoes at times due to previous back/abdominal surgeries.  Patient reports he is aware of sock aid and may get one at some point.   Functional Level Prior   Ambulation 0-->independent   Transferring 0-->independent   Toileting 0-->independent   Bathing 0-->independent   Dressing 2-->assistive person   Eating 0-->independent   Communication 0-->understands/communicates without difficulty   Swallowing 0-->swallows foods/liquids without difficulty   Cognition 0 - no cognition issues reported   Prior Functional Level Comment Patient independent in self-cares/mobility without AE, does report spouse assists with socks/shoes at times due to previous back/abdominal surgeries.  Patient reports he is aware of sock aid and may get one at some point.   General Information   Onset of Illness/Injury or Date of Surgery - Date 02/22/17   Referring Physician Dr. Islas   Additional Occupational Profile Info/Pertinent History of Current Problem Alcoholic cirrhosis of liver with ascites - had just been [place] home this past weekend and now returns with increased edema   Precautions/Limitations no known precautions/limitations   General Observations Alert, pleasant, and agreeable   Cognitive Status Examination   Orientation orientation to person, place and time   Pain Assessment   Patient Currently in Pain No   Integumentary/Edema   Integumentary/Edema Comments B LE edema present; will be  Bessie Barros   "evaluated for lymphedema   Range of Motion (ROM)   ROM Comment B UE AROM WFL   Strength   Strength Comments B UE MMT WFL   Mobility   Bed Mobility Bed mobility skill: Sit to supine;Bed mobility skill: Supine to sit   Bed Mobility Skill: Sit to Supine   Level of [place]: Sit/Supine independent   Bed Mobility Skill: Supine to Sit   Level of [place]: Supine/Sit independent   Transfer Skill: Bed to Chair/Chair to Bed   Level of [place]: Bed to Chair independent   Transfer Skill: Sit to Stand   Level of [place]: Sit/Stand independent   Transfer Skill: Toilet Transfer   Level of [place]: Toilet independent   Upper Body Dressing   Level of [place]: Dress Upper Body independent   Lower Body Dressing   Level of [place]: Dress Lower Body minimum assist (75% patients effort)   Toileting   Level of [place]: Toilet independent   Grooming   Level of [place]: Grooming independent   Eating/Self Feeding   Level of [place]: Eating independent   Clinical Impression   Criteria for Skilled Therapeutic Interventions Met evaluation only   [facility] TM \"6 Clicks\"   2016, Trustees of [facility], under license to [employer].  All rights reserved.   6 Clicks Short Forms Daily Activity Inpatient Short Form   [facility]  \"6 Clicks\" Daily Activity Inpatient Short Form   1. Putting on and taking off regular lower body clothing? 3 - A Little   2. Bathing (including washing, rinsing, drying)? 3 - A Little   3. Toileting, which includes using toilet, bedpan or urinal? 4 - None   4. Putting on and taking off regular upper body clothing? 4 - None   5. Taking care of personal grooming such as brushing teeth? 4 - None   6. Eating meals? 4 - None   Daily Activity Raw Score (Score out of 24.Lower scores equate to lower levels of function) 22   Total Evaluation Time   Total Evaluation Time (Minutes) 16     "

## 2020-03-15 ENCOUNTER — HEALTH MAINTENANCE LETTER (OUTPATIENT)
Age: 65
End: 2020-03-15

## 2021-01-10 ENCOUNTER — HEALTH MAINTENANCE LETTER (OUTPATIENT)
Age: 66
End: 2021-01-10

## 2021-10-23 ENCOUNTER — HEALTH MAINTENANCE LETTER (OUTPATIENT)
Age: 66
End: 2021-10-23

## 2022-02-12 ENCOUNTER — HEALTH MAINTENANCE LETTER (OUTPATIENT)
Age: 67
End: 2022-02-12

## 2022-10-09 ENCOUNTER — HEALTH MAINTENANCE LETTER (OUTPATIENT)
Age: 67
End: 2022-10-09

## 2023-02-18 ENCOUNTER — HEALTH MAINTENANCE LETTER (OUTPATIENT)
Age: 68
End: 2023-02-18

## 2024-03-16 ENCOUNTER — HEALTH MAINTENANCE LETTER (OUTPATIENT)
Age: 69
End: 2024-03-16

## 2025-04-25 NOTE — TELEPHONE ENCOUNTER
8/18/17-HCV quant results received.    ------  8/8/17-Connected with patient who is currently in [place], pt reminded that end of treatment HCV quant is over due.  Patient reports he is near [facility] and would be able to have lab drawn their.  Spoke with [facility] in the lab at [facility] who said they could draw the lab if they have the order.  Order faxed to [phone] and patient updated per Kusum patient can walk-in no appointment needed.  Patient plans to have drawn in the next few days.    --------  LVM reminding patient that his 3 month post Hep c treatment lab was due 7/17/17.  Let patient know lab is ordered and asked that pt have drawn ASAP.  Call back number given if needed.   100.4 F